# Patient Record
Sex: FEMALE | Race: BLACK OR AFRICAN AMERICAN | Employment: OTHER | URBAN - NONMETROPOLITAN AREA
[De-identification: names, ages, dates, MRNs, and addresses within clinical notes are randomized per-mention and may not be internally consistent; named-entity substitution may affect disease eponyms.]

---

## 2017-01-03 ENCOUNTER — HOSPITAL ENCOUNTER (OUTPATIENT)
Dept: LAB | Age: 42
Discharge: HOME | End: 2017-01-03
Payer: MEDICAID

## 2017-01-03 LAB
INR BLD: 2.25 (ref 0.88–1.18)
PROTHROMBIN TIME: 24.3 SEC (ref 12–14.6)

## 2017-01-03 PROCEDURE — 36415 COLL VENOUS BLD VENIPUNCTURE: CPT

## 2017-01-03 PROCEDURE — 85610 PROTHROMBIN TIME: CPT

## 2017-01-06 ENCOUNTER — HOSPITAL ENCOUNTER (OUTPATIENT)
Dept: SPEECH THERAPY | Age: 42
Setting detail: THERAPIES SERIES
Discharge: HOME | End: 2017-01-06
Payer: MEDICAID

## 2017-01-06 PROCEDURE — 92507 TX SP LANG VOICE COMM INDIV: CPT

## 2017-01-17 ENCOUNTER — HOSPITAL ENCOUNTER (OUTPATIENT)
Dept: LAB | Age: 42
Discharge: HOME | End: 2017-01-17
Payer: MEDICAID

## 2017-01-17 ENCOUNTER — HOSPITAL ENCOUNTER (OUTPATIENT)
Dept: SPEECH THERAPY | Age: 42
Setting detail: THERAPIES SERIES
Discharge: HOME | End: 2017-01-17
Payer: MEDICAID

## 2017-01-17 LAB
INR BLD: 2.34 (ref 0.88–1.18)
PROTHROMBIN TIME: 25 SEC (ref 12–14.6)

## 2017-01-17 PROCEDURE — 92507 TX SP LANG VOICE COMM INDIV: CPT

## 2017-01-17 PROCEDURE — 36415 COLL VENOUS BLD VENIPUNCTURE: CPT

## 2017-01-17 PROCEDURE — 85610 PROTHROMBIN TIME: CPT

## 2017-01-18 PROCEDURE — G9162 LANG EXPRESS CURRENT STATUS: HCPCS

## 2017-01-24 ENCOUNTER — HOSPITAL ENCOUNTER (OUTPATIENT)
Dept: SPEECH THERAPY | Age: 42
Setting detail: THERAPIES SERIES
Discharge: HOME | End: 2017-01-24
Payer: MEDICAID

## 2017-01-24 PROCEDURE — 92507 TX SP LANG VOICE COMM INDIV: CPT

## 2017-01-31 ENCOUNTER — APPOINTMENT (OUTPATIENT)
Dept: SPEECH THERAPY | Age: 42
End: 2017-01-31
Payer: MEDICAID

## 2017-02-07 ENCOUNTER — HOSPITAL ENCOUNTER (OUTPATIENT)
Dept: SPEECH THERAPY | Age: 42
Setting detail: THERAPIES SERIES
Discharge: HOME | End: 2017-02-07
Payer: MEDICAID

## 2017-02-07 PROCEDURE — 92507 TX SP LANG VOICE COMM INDIV: CPT

## 2017-02-07 PROCEDURE — G9163 LANG EXPRESS GOAL STATUS: HCPCS

## 2017-02-07 PROCEDURE — G9162 LANG EXPRESS CURRENT STATUS: HCPCS

## 2017-02-28 ENCOUNTER — HOSPITAL ENCOUNTER (OUTPATIENT)
Dept: LAB | Age: 42
Discharge: HOME | End: 2017-02-28
Payer: MEDICAID

## 2017-02-28 ENCOUNTER — HOSPITAL ENCOUNTER (OUTPATIENT)
Dept: SPEECH THERAPY | Age: 42
Setting detail: THERAPIES SERIES
End: 2017-02-28
Payer: MEDICAID

## 2017-02-28 LAB
INR BLD: 2.56 (ref 0.88–1.18)
PROTHROMBIN TIME: 26.8 SEC (ref 12–14.6)

## 2017-02-28 PROCEDURE — 36415 COLL VENOUS BLD VENIPUNCTURE: CPT

## 2017-02-28 PROCEDURE — 85610 PROTHROMBIN TIME: CPT

## 2017-03-07 ENCOUNTER — HOSPITAL ENCOUNTER (OUTPATIENT)
Dept: SPEECH THERAPY | Age: 42
Discharge: HOME | End: 2017-03-07

## 2017-03-21 ENCOUNTER — HOSPITAL ENCOUNTER (OUTPATIENT)
Dept: SPEECH THERAPY | Age: 42
Setting detail: THERAPIES SERIES
Discharge: HOME | End: 2017-03-21
Payer: MEDICAID

## 2017-03-21 PROCEDURE — 92507 TX SP LANG VOICE COMM INDIV: CPT

## 2017-03-21 PROCEDURE — 97532 HC COGNITIVE THERAPY 15 MIN: CPT

## 2017-03-23 ENCOUNTER — HOSPITAL ENCOUNTER (OUTPATIENT)
Dept: LAB | Age: 42
Discharge: HOME | End: 2017-03-23
Payer: MEDICAID

## 2017-03-23 LAB
ANION GAP SERPL CALCULATED.3IONS-SCNC: 15 MMOL/L (ref 7–19)
BUN BLDV-MCNC: 9 MG/DL (ref 6–20)
CALCIUM SERPL-MCNC: 9.1 MG/DL (ref 8.6–10)
CHLORIDE BLD-SCNC: 99 MMOL/L (ref 98–111)
CO2: 23 MMOL/L (ref 22–29)
CREAT SERPL-MCNC: 0.8 MG/DL (ref 0.5–0.9)
GFR NON-AFRICAN AMERICAN: >60
GLUCOSE BLD-MCNC: 146 MG/DL (ref 74–109)
POTASSIUM SERPL-SCNC: 3.9 MMOL/L (ref 3.5–5)
SODIUM BLD-SCNC: 137 MMOL/L (ref 136–145)

## 2017-03-23 PROCEDURE — 80048 BASIC METABOLIC PNL TOTAL CA: CPT

## 2017-03-23 PROCEDURE — 36415 COLL VENOUS BLD VENIPUNCTURE: CPT

## 2017-03-28 ENCOUNTER — HOSPITAL ENCOUNTER (OUTPATIENT)
Dept: SPEECH THERAPY | Age: 42
Setting detail: THERAPIES SERIES
Discharge: HOME | End: 2017-03-28
Payer: MEDICAID

## 2017-03-28 PROCEDURE — 97532 HC COGNITIVE THERAPY 15 MIN: CPT

## 2017-03-28 PROCEDURE — 92507 TX SP LANG VOICE COMM INDIV: CPT

## 2017-04-04 ENCOUNTER — HOSPITAL ENCOUNTER (OUTPATIENT)
Dept: LAB | Age: 42
Discharge: HOME | End: 2017-04-04
Payer: MEDICAID

## 2017-04-04 LAB
INR BLD: 2.91 (ref 0.88–1.18)
PROTHROMBIN TIME: 29.5 SEC (ref 12–14.6)

## 2017-04-04 PROCEDURE — 36415 COLL VENOUS BLD VENIPUNCTURE: CPT

## 2017-04-04 PROCEDURE — 85610 PROTHROMBIN TIME: CPT

## 2017-04-11 ENCOUNTER — HOSPITAL ENCOUNTER (OUTPATIENT)
Dept: SPEECH THERAPY | Age: 42
Setting detail: THERAPIES SERIES
Discharge: HOME | End: 2017-04-11
Payer: MEDICAID

## 2017-04-11 PROCEDURE — 92507 TX SP LANG VOICE COMM INDIV: CPT

## 2017-04-11 PROCEDURE — 97532 HC COGNITIVE THERAPY 15 MIN: CPT

## 2017-04-18 ENCOUNTER — HOSPITAL ENCOUNTER (OUTPATIENT)
Dept: SPEECH THERAPY | Age: 42
Setting detail: THERAPIES SERIES
Discharge: HOME | End: 2017-04-18
Payer: MEDICAID

## 2017-04-18 PROCEDURE — 97532 HC COGNITIVE THERAPY 15 MIN: CPT

## 2017-04-18 PROCEDURE — 92507 TX SP LANG VOICE COMM INDIV: CPT

## 2017-04-25 ENCOUNTER — HOSPITAL ENCOUNTER (OUTPATIENT)
Dept: SPEECH THERAPY | Age: 42
Setting detail: THERAPIES SERIES
Discharge: HOME | End: 2017-04-25
Payer: MEDICAID

## 2017-04-25 PROCEDURE — 92507 TX SP LANG VOICE COMM INDIV: CPT

## 2017-05-02 ENCOUNTER — HOSPITAL ENCOUNTER (OUTPATIENT)
Dept: SPEECH THERAPY | Age: 42
Setting detail: THERAPIES SERIES
Discharge: HOME | End: 2017-05-02
Payer: MEDICAID

## 2017-05-02 PROCEDURE — 92507 TX SP LANG VOICE COMM INDIV: CPT

## 2017-05-10 ENCOUNTER — HOSPITAL ENCOUNTER (OUTPATIENT)
Dept: LAB | Age: 42
Discharge: HOME | End: 2017-05-10
Payer: MEDICAID

## 2017-05-10 LAB
INR BLD: 1.31 (ref 0.88–1.18)
PROTHROMBIN TIME: 16.3 SEC (ref 12–14.6)

## 2017-05-10 PROCEDURE — 85610 PROTHROMBIN TIME: CPT

## 2017-05-10 PROCEDURE — 36415 COLL VENOUS BLD VENIPUNCTURE: CPT

## 2017-05-15 ENCOUNTER — HOSPITAL ENCOUNTER (OUTPATIENT)
Dept: LAB | Age: 42
Discharge: HOME | End: 2017-05-15
Payer: MEDICAID

## 2017-05-15 LAB
INR BLD: 3.14 (ref 0.88–1.18)
PROTHROMBIN TIME: 32.7 SEC (ref 12–14.6)

## 2017-05-15 PROCEDURE — 36415 COLL VENOUS BLD VENIPUNCTURE: CPT

## 2017-05-15 PROCEDURE — 85610 PROTHROMBIN TIME: CPT

## 2017-05-16 ENCOUNTER — HOSPITAL ENCOUNTER (OUTPATIENT)
Dept: SPEECH THERAPY | Age: 42
Setting detail: THERAPIES SERIES
End: 2017-05-16
Payer: MEDICAID

## 2017-05-23 ENCOUNTER — HOSPITAL ENCOUNTER (OUTPATIENT)
Dept: LAB | Age: 42
Discharge: HOME | End: 2017-05-23
Payer: MEDICAID

## 2017-05-23 ENCOUNTER — HOSPITAL ENCOUNTER (OUTPATIENT)
Dept: SPEECH THERAPY | Age: 42
Setting detail: THERAPIES SERIES
Discharge: HOME | End: 2017-05-23
Payer: MEDICAID

## 2017-05-23 LAB
INR BLD: 2.67 (ref 0.88–1.18)
PROTHROMBIN TIME: 28.7 SEC (ref 12–14.6)

## 2017-05-23 PROCEDURE — 36415 COLL VENOUS BLD VENIPUNCTURE: CPT

## 2017-05-23 PROCEDURE — 85610 PROTHROMBIN TIME: CPT

## 2017-05-23 PROCEDURE — 92507 TX SP LANG VOICE COMM INDIV: CPT

## 2017-05-30 ENCOUNTER — HOSPITAL ENCOUNTER (OUTPATIENT)
Dept: SPEECH THERAPY | Age: 42
Setting detail: THERAPIES SERIES
Discharge: HOME | End: 2017-05-30
Payer: MEDICAID

## 2017-05-30 PROCEDURE — 92507 TX SP LANG VOICE COMM INDIV: CPT

## 2017-06-06 ENCOUNTER — HOSPITAL ENCOUNTER (OUTPATIENT)
Dept: SPEECH THERAPY | Age: 42
Setting detail: THERAPIES SERIES
Discharge: HOME | End: 2017-06-06
Payer: MEDICAID

## 2017-06-06 PROCEDURE — 92507 TX SP LANG VOICE COMM INDIV: CPT

## 2017-06-13 ENCOUNTER — HOSPITAL ENCOUNTER (OUTPATIENT)
Dept: SPEECH THERAPY | Age: 42
Setting detail: THERAPIES SERIES
Discharge: HOME | End: 2017-06-13
Payer: MEDICAID

## 2017-06-13 PROCEDURE — 92507 TX SP LANG VOICE COMM INDIV: CPT

## 2017-06-27 ENCOUNTER — APPOINTMENT (OUTPATIENT)
Dept: SPEECH THERAPY | Age: 42
End: 2017-06-27
Payer: MEDICAID

## 2017-07-05 ENCOUNTER — HOSPITAL ENCOUNTER (OUTPATIENT)
Dept: LAB | Age: 42
Discharge: HOME | End: 2017-07-05
Payer: MEDICAID

## 2017-07-05 LAB
INR BLD: 1.99 (ref 0.88–1.18)
PROTHROMBIN TIME: 22.7 SEC (ref 12–14.6)

## 2017-07-05 PROCEDURE — 85610 PROTHROMBIN TIME: CPT

## 2017-07-05 PROCEDURE — 36415 COLL VENOUS BLD VENIPUNCTURE: CPT

## 2017-07-06 ENCOUNTER — HOSPITAL ENCOUNTER (OUTPATIENT)
Dept: SPEECH THERAPY | Age: 42
Setting detail: THERAPIES SERIES
Discharge: HOME | End: 2017-07-06
Payer: MEDICAID

## 2017-07-06 PROCEDURE — 92507 TX SP LANG VOICE COMM INDIV: CPT

## 2017-07-06 PROCEDURE — G9169 MEMORY GOAL STATUS: HCPCS

## 2017-07-06 PROCEDURE — G9168 MEMORY CURRENT STATUS: HCPCS

## 2017-07-11 ENCOUNTER — APPOINTMENT (OUTPATIENT)
Dept: SPEECH THERAPY | Age: 42
End: 2017-07-11
Payer: MEDICAID

## 2017-07-18 ENCOUNTER — HOSPITAL ENCOUNTER (OUTPATIENT)
Dept: SPEECH THERAPY | Age: 42
Setting detail: THERAPIES SERIES
Discharge: HOME | End: 2017-07-18
Payer: MEDICAID

## 2017-07-18 PROCEDURE — 92507 TX SP LANG VOICE COMM INDIV: CPT

## 2017-07-25 ENCOUNTER — HOSPITAL ENCOUNTER (OUTPATIENT)
Dept: SPEECH THERAPY | Age: 42
Setting detail: THERAPIES SERIES
Discharge: HOME | End: 2017-07-25
Payer: MEDICAID

## 2017-07-25 PROCEDURE — 92507 TX SP LANG VOICE COMM INDIV: CPT

## 2017-07-28 ENCOUNTER — HOSPITAL ENCOUNTER (OUTPATIENT)
Dept: LAB | Age: 42
Discharge: HOME | End: 2017-07-28
Payer: MEDICAID

## 2017-07-28 LAB
ALBUMIN SERPL-MCNC: 3.9 G/DL (ref 3.5–5.2)
ALP BLD-CCNC: 90 U/L (ref 35–104)
ALT SERPL-CCNC: 16 U/L (ref 5–33)
ANION GAP SERPL CALCULATED.3IONS-SCNC: 14 MMOL/L (ref 7–19)
AST SERPL-CCNC: 13 U/L (ref 5–32)
BILIRUB SERPL-MCNC: <0.2 MG/DL (ref 0.2–1.2)
BUN BLDV-MCNC: 12 MG/DL (ref 6–20)
CALCIUM SERPL-MCNC: 9.4 MG/DL (ref 8.6–10)
CHLORIDE BLD-SCNC: 101 MMOL/L (ref 98–111)
CHOLESTEROL, TOTAL: 202 MG/DL (ref 160–199)
CO2: 24 MMOL/L (ref 22–29)
CREAT SERPL-MCNC: 0.8 MG/DL (ref 0.5–0.9)
GFR NON-AFRICAN AMERICAN: >60
GLUCOSE BLD-MCNC: 136 MG/DL (ref 74–109)
HDLC SERPL-MCNC: 49 MG/DL (ref 65–121)
LDL CHOLESTEROL CALCULATED: 122 MG/DL
POTASSIUM SERPL-SCNC: 3.8 MMOL/L (ref 3.5–5)
SODIUM BLD-SCNC: 139 MMOL/L (ref 136–145)
TOTAL PROTEIN: 8.1 G/DL (ref 6.6–8.7)
TRIGL SERPL-MCNC: 155 MG/DL (ref 150–199)

## 2017-08-01 ENCOUNTER — APPOINTMENT (OUTPATIENT)
Dept: SPEECH THERAPY | Age: 42
End: 2017-08-01
Payer: MEDICAID

## 2017-08-08 ENCOUNTER — HOSPITAL ENCOUNTER (OUTPATIENT)
Dept: SPEECH THERAPY | Age: 42
Setting detail: THERAPIES SERIES
Discharge: HOME | End: 2017-08-08
Payer: MEDICAID

## 2017-08-08 PROCEDURE — 92507 TX SP LANG VOICE COMM INDIV: CPT

## 2017-08-08 PROCEDURE — G9169 MEMORY GOAL STATUS: HCPCS

## 2017-08-08 PROCEDURE — G9168 MEMORY CURRENT STATUS: HCPCS

## 2017-09-08 ENCOUNTER — HOSPITAL ENCOUNTER (OUTPATIENT)
Dept: LAB | Age: 42
Discharge: HOME | End: 2017-09-08
Payer: MEDICAID

## 2017-09-08 LAB
INR BLD: 3.2 (ref 0.88–1.18)
PROTHROMBIN TIME: 33.2 SEC (ref 12–14.6)

## 2017-09-08 PROCEDURE — 36415 COLL VENOUS BLD VENIPUNCTURE: CPT

## 2017-09-08 PROCEDURE — 85610 PROTHROMBIN TIME: CPT

## 2017-09-12 ENCOUNTER — HOSPITAL ENCOUNTER (OUTPATIENT)
Dept: SPEECH THERAPY | Age: 42
Setting detail: THERAPIES SERIES
Discharge: HOME | End: 2017-09-12
Payer: MEDICAID

## 2017-09-12 PROCEDURE — 92507 TX SP LANG VOICE COMM INDIV: CPT

## 2017-09-19 ENCOUNTER — HOSPITAL ENCOUNTER (OUTPATIENT)
Dept: LAB | Age: 42
Discharge: HOME | End: 2017-09-19
Payer: MEDICAID

## 2017-09-19 LAB
INR BLD: 2.16 (ref 0.88–1.18)
PROTHROMBIN TIME: 24.3 SEC (ref 12–14.6)

## 2017-09-19 PROCEDURE — 92507 TX SP LANG VOICE COMM INDIV: CPT

## 2017-09-19 PROCEDURE — 85610 PROTHROMBIN TIME: CPT

## 2017-09-19 PROCEDURE — 36415 COLL VENOUS BLD VENIPUNCTURE: CPT

## 2017-09-26 ENCOUNTER — HOSPITAL ENCOUNTER (OUTPATIENT)
Dept: SPEECH THERAPY | Age: 42
Setting detail: THERAPIES SERIES
Discharge: HOME | End: 2017-09-26
Payer: MEDICAID

## 2017-09-26 PROCEDURE — 92507 TX SP LANG VOICE COMM INDIV: CPT

## 2017-10-03 ENCOUNTER — HOSPITAL ENCOUNTER (OUTPATIENT)
Dept: SPEECH THERAPY | Age: 42
Setting detail: THERAPIES SERIES
Discharge: HOME | End: 2017-10-03
Payer: MEDICAID

## 2017-10-03 PROCEDURE — 92507 TX SP LANG VOICE COMM INDIV: CPT

## 2017-10-03 NOTE — PROGRESS NOTES
[facility] Outpatient Speech-Language Pathology  Treatment Note          PATIENT: Sara Mckeon  REFERRING PHYSICIAN:  Dr. Baron Arts                                                                 Therapist: Kolby Zhong       MRN: [id number]  Date: 10/03/17  24 visits per year, 8/29/17-2/28/18 (expiration date)   [phone] Speech therapy  Authorization Number: [id number]  Completed visits: 4 out of 24        Subjective: The patient arrived to therapy on time and attempted all tasks. Pt denied pain this date.         Objective: The patient exhibited decreased dysfluencies this date. She did exhibit increased anomia and difficulty spelling words. She completed a worksheet targeting word finding utilizing general information questions. This task took approximately 45 minutes due to increased anomia and poor spelling this date. Mod to max cues were provided throughout. Category cues and phonemic cues were not effective this date, however, part word cues were successful. Pt completed naming activities utilizing apps on the iPad. Pt completed naming tasks at 76% and 81%  with min to mod cues. Naming task apps were timed and the patient did exhibit increased processing speed and attention after completing a few practice rounds.             SUMMARY: Based upon case history, formal, and informal assessment, patient presents with mild language impairment. impaired, limited, or restricted. Patients current impairment level for memory is  CK at least 40% but less than 60% impaired, limited, or restricted. And is expected to improve JT . CI at least 1% but less than 20% impaired, limited or restricted. Patient also demonstrates speech dysfluency, mildly impaired language, mildly impaired attention, and mildly impaired visuospatial skills.  Speech therapy is warranted at this time to improve upon patients ability to effectively communicate with conversation partners.          Speech will target the short-term goals

## 2017-10-04 ENCOUNTER — HOSPITAL ENCOUNTER (OUTPATIENT)
Dept: LAB | Age: 42
Discharge: HOME | End: 2017-10-04
Payer: MEDICAID

## 2017-10-04 LAB
INR BLD: 3.25 (ref 0.88–1.18)
PROTHROMBIN TIME: 33.6 SEC (ref 12–14.6)

## 2017-10-04 PROCEDURE — 36415 COLL VENOUS BLD VENIPUNCTURE: CPT

## 2017-10-04 PROCEDURE — 85610 PROTHROMBIN TIME: CPT

## 2017-10-10 ENCOUNTER — HOSPITAL ENCOUNTER (OUTPATIENT)
Dept: SPEECH THERAPY | Age: 42
Setting detail: THERAPIES SERIES
Discharge: HOME | End: 2017-10-10
Payer: MEDICAID

## 2017-10-10 ENCOUNTER — HOSPITAL ENCOUNTER (EMERGENCY)
Age: 42
Discharge: HOME | End: 2017-10-10
Payer: MEDICAID

## 2017-10-10 VITALS
HEIGHT: 64 IN | DIASTOLIC BLOOD PRESSURE: 90 MMHG | HEART RATE: 80 BPM | TEMPERATURE: 97.4 F | SYSTOLIC BLOOD PRESSURE: 132 MMHG | RESPIRATION RATE: 20 BRPM | BODY MASS INDEX: 50.02 KG/M2 | WEIGHT: 293 LBS | OXYGEN SATURATION: 98 %

## 2017-10-10 DIAGNOSIS — T30.0 ERYTHEMA DUE TO BURN (FIRST DEGREE): Primary | ICD-10-CM

## 2017-10-10 PROCEDURE — 90471 IMMUNIZATION ADMIN: CPT | Performed by: NURSE PRACTITIONER

## 2017-10-10 PROCEDURE — 90715 TDAP VACCINE 7 YRS/> IM: CPT | Performed by: NURSE PRACTITIONER

## 2017-10-10 PROCEDURE — 99282 EMERGENCY DEPT VISIT SF MDM: CPT | Performed by: NURSE PRACTITIONER

## 2017-10-10 PROCEDURE — 6360000002 HC RX W HCPCS: Performed by: NURSE PRACTITIONER

## 2017-10-10 PROCEDURE — 99283 EMERGENCY DEPT VISIT LOW MDM: CPT

## 2017-10-10 PROCEDURE — 92507 TX SP LANG VOICE COMM INDIV: CPT

## 2017-10-10 RX ORDER — WARFARIN SODIUM 10 MG/1
10 TABLET ORAL
Status: ON HOLD | COMMUNITY
End: 2021-09-24 | Stop reason: SDUPTHER

## 2017-10-10 RX ADMIN — TETANUS TOXOID, REDUCED DIPHTHERIA TOXOID AND ACELLULAR PERTUSSIS VACCINE, ADSORBED 0.5 ML: 5; 2.5; 8; 8; 2.5 SUSPENSION INTRAMUSCULAR at 19:57

## 2017-10-10 ASSESSMENT — ENCOUNTER SYMPTOMS: BURN: 1
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
## 2017-10-11 NOTE — ED PROVIDER NOTES
by mouth nightly Historical Med      verapamil (CALAN) 40 MG tablet Take 40 mg by mouth every 12 hours      lisinopril (PRINIVIL;ZESTRIL) 40 MG tablet Take 40 mg by mouth daily      atorvastatin (LIPITOR) 40 MG tablet Take 80 mg by mouth daily       prochlorperazine (COMPAZINE) 10 MG tablet Take 10 mg by mouth 2 times daily as needed (headache)             ALLERGIES     Bactrim [sulfamethoxazole-trimethoprim]    FAMILY HISTORY       Family History   Problem Relation Age of Onset    Osteoarthritis Mother     Diabetes Mother     Hypertension Mother     Hypertension Father     Heart Attack Brother     Heart Failure Maternal Grandmother     Diabetes Maternal Grandfather     Heart Disease Maternal Grandfather     Heart Disease Paternal Grandmother     No Known Problems Paternal Grandfather           SOCIAL HISTORY       Social History     Social History    Marital status: Single     Spouse name: N/A    Number of children: N/A    Years of education: N/A     Social History Main Topics    Smoking status: Never Smoker    Smokeless tobacco: None    Alcohol use No    Drug use: Unknown    Sexual activity: Not Asked     Other Topics Concern    None     Social History Narrative    None       SCREENINGS             PHYSICAL EXAM    (up to 7 for level 4, 8 or more for level 5)     ED Triage Vitals [10/10/17 1714]   BP Temp Temp src Pulse Resp SpO2 Height Weight   (!) 182/105 97.4 °F (36.3 °C) -- 83 17 98 % 5' 4\" (1.626 m) (!) 351 lb (159.2 kg)       Physical Exam   Constitutional: She appears well-developed and well-nourished. HENT:   Head: Normocephalic and atraumatic. Eyes: Right eye exhibits no discharge. Left eye exhibits no discharge. No scleral icterus. Neck: Normal range of motion. Neck supple. Cardiovascular: Normal rate. Pulmonary/Chest: No respiratory distress. Neurological: She is alert. Skin:        Psychiatric: She has a normal mood and affect.  Her behavior is normal.   Nursing note

## 2017-10-24 ENCOUNTER — HOSPITAL ENCOUNTER (OUTPATIENT)
Dept: SPEECH THERAPY | Age: 42
Setting detail: THERAPIES SERIES
Discharge: HOME | End: 2017-10-24
Payer: MEDICAID

## 2017-10-24 PROCEDURE — 92507 TX SP LANG VOICE COMM INDIV: CPT

## 2017-10-27 ENCOUNTER — HOSPITAL ENCOUNTER (OUTPATIENT)
Dept: LAB | Age: 42
Discharge: HOME | End: 2017-10-27
Payer: MEDICAID

## 2017-10-27 LAB
INR BLD: 2.55 (ref 0.88–1.18)
PROTHROMBIN TIME: 27.7 SEC (ref 12–14.6)

## 2017-10-27 PROCEDURE — 85610 PROTHROMBIN TIME: CPT

## 2017-10-27 PROCEDURE — 36415 COLL VENOUS BLD VENIPUNCTURE: CPT

## 2017-10-31 ENCOUNTER — HOSPITAL ENCOUNTER (OUTPATIENT)
Dept: SPEECH THERAPY | Age: 42
Setting detail: THERAPIES SERIES
Discharge: HOME | End: 2017-10-31
Payer: MEDICAID

## 2017-10-31 PROCEDURE — 92507 TX SP LANG VOICE COMM INDIV: CPT

## 2017-11-07 ENCOUNTER — HOSPITAL ENCOUNTER (OUTPATIENT)
Dept: SPEECH THERAPY | Age: 42
Setting detail: THERAPIES SERIES
Discharge: HOME | End: 2017-11-07
Payer: MEDICAID

## 2017-11-07 PROCEDURE — 92507 TX SP LANG VOICE COMM INDIV: CPT

## 2017-11-07 NOTE — PROGRESS NOTES
to transfer back to [facility] for wound care following CABG. Patient was hospitalized at [facility] for approximately 2 days before discharging home with home health RN, speech therapy, and physical therapy. She has been receiving outpatient speech therapy services since December 2016.      Communication and Behavioral Observations:    Often times, patient displayed fragmented thoughts and word finding difficulties where she would exhibit pauses before responding.  It was noted the patient had difficulty following directions during structured tasks.             COMPREHENSIVE EVALUATION     Cognitive Formal Assessment:       The Tay Cognitive Assessment or MOCA test is a one-page 30-point test administered in approximately 10 minutes. The MoCA assesses several cognitive domains. The short-term memory recall task (5 points) involves two learning trials of five nouns and delayed recall after approximately 5 minutes. Visuospatial abilities are assessed using a clock-drawing task (3 points) and a three-dimensional cube copy (1 point). Multiple aspects of executive functions are assessed using an alternation task adapted from the trail-making B task (1 point), a phonemic fluency task (1 point), and a two-item verbal abstraction task (2 points). Attention, concentration and working memory are evaluated using a sustained attention task (target detection using tapping; 1 point), a serial subtraction task (3 points), and digits forward and backward (1 point each). Language is assessed using a three-item confrontation naming task with low-familiarity animals -3 points), repetition of two syntactically complex sentences (2 points), and the aforementioned fluency task.  Finally, orientation to time and place is evaluated (6 points).  A score of 26 or above is considered normal. Today, the patient scored 20 out of 30 possible points.         MOCA:  Visuospatial/Executive 4/5   Naming 3/3   Memory -   Attention 1/2, 1/1, 0/3

## 2017-11-14 ENCOUNTER — HOSPITAL ENCOUNTER (OUTPATIENT)
Dept: SPEECH THERAPY | Age: 42
Setting detail: THERAPIES SERIES
Discharge: HOME | End: 2017-11-14
Payer: MEDICAID

## 2017-11-14 PROCEDURE — 92507 TX SP LANG VOICE COMM INDIV: CPT

## 2017-11-14 NOTE — PROGRESS NOTES
Speech Language Pathology  Facility/Department: [facility] SPEECH THERAPY  Daily Treatment Note  NAME: Braydon Hill  : 1975  MRN: [id number]  Date: 2017  Therapist: Geronimo Dinh  24 visits per year, 17-18 (expiration date)   [phone] Speech therapy  Authorization Number: [id number]  Completed visits: 8 out of 24        Subjective: The patient arrived to therapy on time and attempted all tasks. Pt denied pain this date. The patient also denied any further episodes of confusion or inability to speak. The patient had described one of these episodes at [facility] a few weeks ago.  Kaylee Watson     Objective: The patient completed a task which required the patient to define words by composition. She required moderate cues and additional processing time. She also completed a task where she generated words from letters (letters provided were at the beginning, middle or end of the word). She required minimal cues for this task. The patient exhibited decreased dysfluencies this date. She did exhibit increased anomia and difficulty spelling words.  Phonemic cues were effective this date.       SUMMARY: Based upon case history, formal, and informal assessment, patient presents with mild language impairment. impaired, limited, or restricted. Patients current impairment level for memory is  CK at least 40% but less than 60% impaired, limited, or restricted. And is expected to improve CLAUDIA . CI at least 1% but less than 20% impaired, limited or restricted. Patient also demonstrates speech dysfluency, mildly impaired language, mildly impaired attention, and mildly impaired visuospatial skills. Speech therapy is warranted at this time to improve upon patients ability to effectively communicate with conversation partners.          Speech will target the short-term goals listed below:  SHORT-TERM GOALS: Patient Tawny Carlos  1.  Demonstrate ability to identify stuttering events on at least 75% of opportunities and with min cues/prompts. Not met  2. Demonstrate appropriate application of fluency shaping and stuttering modification techniques on at least 75% of opportunities and with min cues/prompts. Not met  3. Demonstrate an increase in word finding during structured and unstructured tasks with at least 80% accuracy and with min cues/prompts. Not met  4. Demonstrate an increase in auditory comprehension and written comprehension at phrase, sentence, and paragraph level with at least 75% accuracy and with mod cues/prompts. Not met  5. Complete select and sustained attention tasks with at least 75% accuracy and with mod cues/prompts. Not met  6. Utilize memory tools and strategies for increased safety and participation in ADLS with at least 84% accuracy and with min cues/prompts. Not met  7. Complete functional reasoning/problem solving tasks with at least 75% accuracy and with min cues/prompts. Not met  8. Pt will complete immediate and short term memory tasks with delay and distractors at 75% accuracy and min cues/prompts. Not met                      Plan:  Continued daily Speech/Language treatment with goals per plan of care.           Roula Heath     Electronically Signed By:  Roula Heath M.S., CCC-SLP  11/14/2017,5:20 PM.

## 2017-11-21 ENCOUNTER — HOSPITAL ENCOUNTER (OUTPATIENT)
Dept: SPEECH THERAPY | Age: 42
Setting detail: THERAPIES SERIES
Discharge: HOME | End: 2017-11-21
Payer: MEDICAID

## 2017-11-21 PROCEDURE — 92507 TX SP LANG VOICE COMM INDIV: CPT

## 2017-11-21 NOTE — PROGRESS NOTES
Speech Language Pathology  Facility/Department: [facility] SPEECH THERAPY  Daily Treatment Note  NAME: Luis Antonio Tony  : 1975  MRN: [id number]  Date: 2017  Therapist: Palmira Rosen  24 visits per year, 17-18 (expiration date)   [phone] Speech therapy  Authorization Number: [id number]  Completed visits: 9 out of 24        Subjective: The patient arrived to therapy on time and attempted all tasks. Pt denied pain this date. The patient also denied any further episodes of confusion or inability to speak. The patient had described one of these episodes at [facility] a few weeks ago.  Rocco Perry     Objective: Increased anomia and difficulty spelling this date. The patient denies any recent episodes like the one she described while at [facility]. She has not contacted her physician about this yet and was encouraged to call the office today. Pt  also stated she is tapering off one of her meds. She also wonders if she forgot to take her meds that day before going to [facility]. The patient completed a task which required her to choose the correct spelling of words in sentences. She required moderate verbal cues this date. She also completed a task which required her to complete the spelling of a word with a provided initial letter cue. She required moderate verbal cues for this task as well. Each task took 30 minutes to complete due to slow processing this date. The patient exhibited decreased dysfluencies this date. She did exhibit increased anomia and difficulty spelling words.      SUMMARY: Based upon case history, formal, and informal assessment, patient presents with mild language impairment. impaired, limited, or restricted. Patients current impairment level for memory is  CK at least 40% but less than 60% impaired, limited, or restricted. And is expected to improve TD . CI at least 1% but less than 20% impaired, limited or restricted.  Patient also demonstrates speech dysfluency, mildly impaired language, mildly impaired attention, and mildly impaired visuospatial skills. Speech therapy is warranted at this time to improve upon patients ability to effectively communicate with conversation partners.          Speech will target the short-term goals listed below:  SHORT-TERM GOALS: Patient Leonardo Patrick  1. Demonstrate ability to identify stuttering events on at least 75% of opportunities and with min cues/prompts. Not met  2. Demonstrate appropriate application of fluency shaping and stuttering modification techniques on at least 75% of opportunities and with min cues/prompts. Not met  3. Demonstrate an increase in word finding during structured and unstructured tasks with at least 80% accuracy and with min cues/prompts. Not met  4. Demonstrate an increase in auditory comprehension and written comprehension at phrase, sentence, and paragraph level with at least 75% accuracy and with mod cues/prompts. Not met  5. Complete select and sustained attention tasks with at least 75% accuracy and with mod cues/prompts. Not met  6. Utilize memory tools and strategies for increased safety and participation in ADLS with at least 32% accuracy and with min cues/prompts. Not met  7. Complete functional reasoning/problem solving tasks with at least 75% accuracy and with min cues/prompts. Not met  8. Pt will complete immediate and short term memory tasks with delay and distractors at 75% accuracy and min cues/prompts. Not met                      Plan:  Continued daily Speech/Language treatment with goals per plan of care.               Electronically Signed By:  Zachery Stock M.S., CCC-SLP  11/21/2017,3:33 PM.

## 2017-11-28 ENCOUNTER — HOSPITAL ENCOUNTER (OUTPATIENT)
Dept: LAB | Age: 42
Discharge: HOME | End: 2017-11-28
Payer: MEDICAID

## 2017-11-28 LAB
INR BLD: 2.38 (ref 0.88–1.18)
PROTHROMBIN TIME: 26.2 SEC (ref 12–14.6)

## 2017-11-28 PROCEDURE — 36415 COLL VENOUS BLD VENIPUNCTURE: CPT

## 2017-11-28 PROCEDURE — 85610 PROTHROMBIN TIME: CPT

## 2017-12-12 ENCOUNTER — APPOINTMENT (OUTPATIENT)
Dept: SPEECH THERAPY | Age: 42
End: 2017-12-12
Payer: MEDICAID

## 2017-12-19 ENCOUNTER — HOSPITAL ENCOUNTER (OUTPATIENT)
Dept: SPEECH THERAPY | Age: 42
Setting detail: THERAPIES SERIES
Discharge: HOME | End: 2017-12-19
Payer: MEDICAID

## 2017-12-20 ENCOUNTER — APPOINTMENT (OUTPATIENT)
Dept: SPEECH THERAPY | Age: 42
End: 2017-12-20
Payer: MEDICAID

## 2017-12-21 ENCOUNTER — APPOINTMENT (OUTPATIENT)
Dept: SPEECH THERAPY | Age: 42
End: 2017-12-21
Payer: MEDICAID

## 2017-12-22 ENCOUNTER — APPOINTMENT (OUTPATIENT)
Dept: SPEECH THERAPY | Age: 42
End: 2017-12-22
Payer: MEDICAID

## 2017-12-26 ENCOUNTER — HOSPITAL ENCOUNTER (OUTPATIENT)
Dept: SPEECH THERAPY | Age: 42
Setting detail: THERAPIES SERIES
Discharge: HOME | End: 2017-12-26
Payer: MEDICAID

## 2017-12-26 PROCEDURE — 92507 TX SP LANG VOICE COMM INDIV: CPT

## 2017-12-26 NOTE — PROGRESS NOTES
Speech Language Pathology  Facility/Department: [facility] SPEECH THERAPY  Daily Treatment Note  NAME: Ritesh Hua  : 1975  MRN: [id number]  Date: 2017  Therapist: Jenniffer Dubin  24 visits per year, 17-18 (expiration date)   [phone] Speech therapy  Authorization Number: [id number]  Completed visits: 12 out of 24        Subjective: The patient arrived to therapy on time and attempted all tasks. Pt denied pain this date. The patient reports she has been reading this week, and she has been trying to work [employer] puzzles. She states she used to work these without difficulty. Patient states she is having a hard time solving these without assistance.        Objective: The patient stated she has not slept much the past two nights. Processing time was much more delayed than normal. Increased anomia and difficulty with spelling observed. The patient exhibited semantic paraphasias this date. The patient completed a timed immediate memory task which she scored at 50% accuracy with min to mod cues. Pt did need instructions reviewed with each task. (pt had to remember sequence of numbers in ascending order). She also completed recall of paragraphs with 63% accuracy with min verbal cues. Patient answered multiple choice questions and open ended questions following the paragraphs.     The patient exhibited decreased dysfluencies this date. Pt did use easy onset with minimal cueing.      SUMMARY:   Based upon case history, formal, and informal assessment, patient presents with mild language impairment. impaired, limited, or restricted. Patients current impairment level for memory is  CK at least 40% but less than 60% impaired, limited, or restricted. And is expected to improve ZY . CI at least 1% but less than 20% impaired, limited or restricted. Patient also demonstrates speech dysfluency, mildly impaired language, mildly impaired attention, and mildly impaired visuospatial skills.  Speech therapy is

## 2017-12-27 ENCOUNTER — APPOINTMENT (OUTPATIENT)
Dept: SPEECH THERAPY | Age: 42
End: 2017-12-27
Payer: MEDICAID

## 2017-12-28 ENCOUNTER — APPOINTMENT (OUTPATIENT)
Dept: SPEECH THERAPY | Age: 42
End: 2017-12-28
Payer: MEDICAID

## 2017-12-29 ENCOUNTER — APPOINTMENT (OUTPATIENT)
Dept: SPEECH THERAPY | Age: 42
End: 2017-12-29
Payer: MEDICAID

## 2018-01-04 ENCOUNTER — APPOINTMENT (OUTPATIENT)
Dept: SPEECH THERAPY | Age: 43
End: 2018-01-04
Payer: MEDICAID

## 2018-01-06 ENCOUNTER — HOSPITAL ENCOUNTER (EMERGENCY)
Age: 43
Discharge: HOME | End: 2018-01-06
Attending: EMERGENCY MEDICINE
Payer: MEDICAID

## 2018-01-06 ENCOUNTER — APPOINTMENT (OUTPATIENT)
Dept: CT IMAGING | Age: 43
End: 2018-01-06
Payer: MEDICAID

## 2018-01-06 VITALS
OXYGEN SATURATION: 91 % | WEIGHT: 293 LBS | HEIGHT: 64 IN | RESPIRATION RATE: 18 BRPM | BODY MASS INDEX: 50.02 KG/M2 | SYSTOLIC BLOOD PRESSURE: 146 MMHG | TEMPERATURE: 97.9 F | HEART RATE: 95 BPM | DIASTOLIC BLOOD PRESSURE: 88 MMHG

## 2018-01-06 DIAGNOSIS — N10 ACUTE PYELONEPHRITIS: Primary | ICD-10-CM

## 2018-01-06 DIAGNOSIS — E86.0 DEHYDRATION: ICD-10-CM

## 2018-01-06 LAB
ALBUMIN SERPL-MCNC: 3.9 G/DL (ref 3.5–5.2)
ALP BLD-CCNC: 120 U/L (ref 35–104)
ALT SERPL-CCNC: 20 U/L (ref 5–33)
ANION GAP SERPL CALCULATED.3IONS-SCNC: 16 MMOL/L (ref 7–19)
AST SERPL-CCNC: 15 U/L (ref 5–32)
BACTERIA: ABNORMAL /HPF
BASE EXCESS VENOUS: 2 MMOL/L
BASOPHILS ABSOLUTE: 0 K/UL (ref 0–0.2)
BASOPHILS RELATIVE PERCENT: 0.4 % (ref 0–1)
BILIRUB SERPL-MCNC: 0.4 MG/DL (ref 0.2–1.2)
BILIRUBIN URINE: ABNORMAL
BLOOD, URINE: ABNORMAL
BUN BLDV-MCNC: 14 MG/DL (ref 6–20)
CALCIUM SERPL-MCNC: 9 MG/DL (ref 8.6–10)
CARBOXYHEMOGLOBIN: 2 %
CHLORIDE BLD-SCNC: 96 MMOL/L (ref 98–111)
CLARITY: ABNORMAL
CO2: 26 MMOL/L (ref 22–29)
COLOR: ABNORMAL
CREAT SERPL-MCNC: 1.1 MG/DL (ref 0.5–0.9)
EOSINOPHILS ABSOLUTE: 0.1 K/UL (ref 0–0.6)
EOSINOPHILS RELATIVE PERCENT: 1.1 % (ref 0–5)
EPITHELIAL CELLS, UA: ABNORMAL /HPF
GFR NON-AFRICAN AMERICAN: 54
GLUCOSE BLD-MCNC: 189 MG/DL (ref 70–99)
GLUCOSE BLD-MCNC: 207 MG/DL (ref 74–109)
GLUCOSE URINE: NEGATIVE MG/DL
HCG(URINE) PREGNANCY TEST: NEGATIVE
HCO3 VENOUS: 29 MMOL/L (ref 23–29)
HCT VFR BLD CALC: 45.4 % (ref 37–47)
HEMOGLOBIN: 15 G/DL (ref 12–16)
INR BLD: 3.76 (ref 0.88–1.18)
KETONES, URINE: ABNORMAL MG/DL
LEUKOCYTE ESTERASE, URINE: ABNORMAL
LYMPHOCYTES ABSOLUTE: 1.6 K/UL (ref 1.1–4.5)
LYMPHOCYTES RELATIVE PERCENT: 19 % (ref 20–40)
MCH RBC QN AUTO: 29.2 PG (ref 27–31)
MCHC RBC AUTO-ENTMCNC: 33 G/DL (ref 33–37)
MCV RBC AUTO: 88.5 FL (ref 81–99)
METHEMOGLOBIN VENOUS: 0.7 %
MONOCYTES ABSOLUTE: 0.5 K/UL (ref 0–0.9)
MONOCYTES RELATIVE PERCENT: 6.2 % (ref 0–10)
NEUTROPHILS ABSOLUTE: 6.2 K/UL (ref 1.5–7.5)
NEUTROPHILS RELATIVE PERCENT: 73.1 % (ref 50–65)
NITRITE, URINE: POSITIVE
O2 CONTENT, VEN: 11 ML/DL
O2 SAT, VEN: 50 %
PCO2, VEN: 52 MMHG (ref 40–50)
PDW BLD-RTO: 13.9 % (ref 11.5–14.5)
PERFORMED ON: ABNORMAL
PH UA: 6
PH VENOUS: 7.35 (ref 7.35–7.45)
PLATELET # BLD: 294 K/UL (ref 130–400)
PMV BLD AUTO: 11.8 FL (ref 9.4–12.3)
PO2, VEN: 29 MMHG
POTASSIUM SERPL-SCNC: 4.2 MMOL/L (ref 3.5–5)
PROTEIN UA: 100 MG/DL
PROTHROMBIN TIME: 37.8 SEC (ref 12–14.6)
RBC # BLD: 5.13 M/UL (ref 4.2–5.4)
SODIUM BLD-SCNC: 138 MMOL/L (ref 136–145)
SPECIFIC GRAVITY UA: 1.02
TOTAL PROTEIN: 8.3 G/DL (ref 6.6–8.7)
UROBILINOGEN, URINE: 1 E.U./DL
WBC # BLD: 8.5 K/UL (ref 4.8–10.8)
WBC UA: ABNORMAL /HPF (ref 0–5)

## 2018-01-06 PROCEDURE — 99284 EMERGENCY DEPT VISIT MOD MDM: CPT | Performed by: EMERGENCY MEDICINE

## 2018-01-06 PROCEDURE — 81025 URINE PREGNANCY TEST: CPT

## 2018-01-06 PROCEDURE — 74150 CT ABDOMEN W/O CONTRAST: CPT

## 2018-01-06 PROCEDURE — 87086 URINE CULTURE/COLONY COUNT: CPT

## 2018-01-06 PROCEDURE — 85610 PROTHROMBIN TIME: CPT

## 2018-01-06 PROCEDURE — 87186 SC STD MICRODIL/AGAR DIL: CPT

## 2018-01-06 PROCEDURE — 36415 COLL VENOUS BLD VENIPUNCTURE: CPT

## 2018-01-06 PROCEDURE — 82803 BLOOD GASES ANY COMBINATION: CPT

## 2018-01-06 PROCEDURE — 85025 COMPLETE CBC W/AUTO DIFF WBC: CPT

## 2018-01-06 PROCEDURE — 96365 THER/PROPH/DIAG IV INF INIT: CPT

## 2018-01-06 PROCEDURE — 36600 WITHDRAWAL OF ARTERIAL BLOOD: CPT

## 2018-01-06 PROCEDURE — 82948 REAGENT STRIP/BLOOD GLUCOSE: CPT

## 2018-01-06 PROCEDURE — 81001 URINALYSIS AUTO W/SCOPE: CPT

## 2018-01-06 PROCEDURE — 80053 COMPREHEN METABOLIC PANEL: CPT

## 2018-01-06 PROCEDURE — 2580000003 HC RX 258: Performed by: EMERGENCY MEDICINE

## 2018-01-06 PROCEDURE — 6360000002 HC RX W HCPCS: Performed by: EMERGENCY MEDICINE

## 2018-01-06 PROCEDURE — 96375 TX/PRO/DX INJ NEW DRUG ADDON: CPT

## 2018-01-06 PROCEDURE — 99284 EMERGENCY DEPT VISIT MOD MDM: CPT

## 2018-01-06 RX ORDER — PROMETHAZINE HYDROCHLORIDE 25 MG/ML
12.5 INJECTION, SOLUTION INTRAMUSCULAR; INTRAVENOUS ONCE
Status: COMPLETED | OUTPATIENT
Start: 2018-01-06 | End: 2018-01-06

## 2018-01-06 RX ORDER — MORPHINE SULFATE 4 MG/ML
4 INJECTION, SOLUTION INTRAMUSCULAR; INTRAVENOUS ONCE
Status: COMPLETED | OUTPATIENT
Start: 2018-01-06 | End: 2018-01-06

## 2018-01-06 RX ORDER — ONDANSETRON 4 MG/1
4 TABLET, ORALLY DISINTEGRATING ORAL EVERY 8 HOURS PRN
Qty: 15 TABLET | Refills: 0 | Status: SHIPPED | OUTPATIENT
Start: 2018-01-06 | End: 2021-03-25

## 2018-01-06 RX ORDER — ONDANSETRON 2 MG/ML
4 INJECTION INTRAMUSCULAR; INTRAVENOUS ONCE
Status: COMPLETED | OUTPATIENT
Start: 2018-01-06 | End: 2018-01-06

## 2018-01-06 RX ORDER — CEPHALEXIN 500 MG/1
500 CAPSULE ORAL 4 TIMES DAILY
Qty: 28 CAPSULE | Refills: 0 | Status: ON HOLD | OUTPATIENT
Start: 2018-01-06 | End: 2018-02-23

## 2018-01-06 RX ORDER — HYDROCODONE BITARTRATE AND ACETAMINOPHEN 5; 325 MG/1; MG/1
1 TABLET ORAL EVERY 6 HOURS PRN
Qty: 12 TABLET | Refills: 0 | Status: SHIPPED | OUTPATIENT
Start: 2018-01-06 | End: 2018-01-09

## 2018-01-06 RX ADMIN — PROMETHAZINE HYDROCHLORIDE 12.5 MG: 25 INJECTION INTRAMUSCULAR; INTRAVENOUS at 18:58

## 2018-01-06 RX ADMIN — CEFTRIAXONE 1 G: 1 INJECTION, POWDER, FOR SOLUTION INTRAMUSCULAR; INTRAVENOUS at 18:57

## 2018-01-06 RX ADMIN — Medication 4 MG: at 18:05

## 2018-01-06 RX ADMIN — ONDANSETRON 4 MG: 2 INJECTION INTRAMUSCULAR; INTRAVENOUS at 18:05

## 2018-01-06 ASSESSMENT — ENCOUNTER SYMPTOMS
SORE THROAT: 0
COUGH: 0
VOMITING: 0
DIARRHEA: 0
SHORTNESS OF BREATH: 0
BACK PAIN: 0
NAUSEA: 0
ABDOMINAL PAIN: 1
RHINORRHEA: 0

## 2018-01-06 ASSESSMENT — PAIN SCALES - GENERAL
PAINLEVEL_OUTOF10: 5
PAINLEVEL_OUTOF10: 5
PAINLEVEL_OUTOF10: 3

## 2018-01-06 ASSESSMENT — PAIN DESCRIPTION - LOCATION: LOCATION: ABDOMEN

## 2018-01-06 ASSESSMENT — PAIN DESCRIPTION - DESCRIPTORS: DESCRIPTORS: ACHING

## 2018-01-06 ASSESSMENT — PAIN DESCRIPTION - PAIN TYPE: TYPE: ACUTE PAIN

## 2018-01-06 NOTE — ED PROVIDER NOTES
[facility] EMERG[facility]Y DEPT  eMERGENCY dEPARTMENT eNCOUnter      Pt Name: Maxx eGller  MRN: [id number]  [place] 1975  Date of evaluation: 1/6/2018  Provider: Imer Norton MD    [address] [place] [address]       Chief Complaint   Patient presents with    Abdominal Pain     patient states that she has low abdominal pain with urinary frequency. She states that she has had elevated glucose levels ranging from 300 to 400. She states that she is a diabetic. She states that her last glucose check at home was 297    Urinary Frequency         HISTORY OF PRESENT ILLNESS   (Location/Symptom, Timing/Onset, Context/Setting, Quality, Duration, Modifying Factors, Severity)  Note limiting factors. Maxx Geller is a 43 y.o. female who presents to the emergency department Concerned of abdominal pain and urinary frequency. Patient reports constant lower abdominal pain for the past approximate 2 days that is sharp but increases and pain intermittently. She's had some associated nausea no vomiting or diarrhea vaginal discharge or bleeding. She denies any back pain or history of renal stones. She also states her blood sugars been running high in the 3-400 range. HPI    Nursing Notes were reviewed. REVIEW OF SYSTEMS    (2-9 systems for level 4, 10 or more for level 5)     Review of Systems   Constitutional: Negative for chills and fever. HENT: Negative for rhinorrhea and sore throat. Respiratory: Negative for cough and shortness of breath. Cardiovascular: Negative for chest pain. Gastrointestinal: Positive for abdominal pain. Negative for diarrhea, nausea and vomiting. Genitourinary: Positive for frequency. Negative for dysuria, hematuria, urgency, vaginal bleeding and vaginal discharge. Musculoskeletal: Negative for back pain.             PAST MEDICAL HISTORY     Past Medical History:   Diagnosis Date    CAD (coronary artery disease)     [facility] occlusion [facility] [address]     Diabetes mellitus ([facility] [address] Social History Narrative    None       SCREENINGS    [place] Coma Scale  Eye Opening: Spontaneous  Best Verbal Response: Oriented  Best Motor Response: Obeys commands  [place] Coma Scale Score: 15        PHYSICAL EXAM    (up to 7 for level 4, 8 or more for level 5)     ED Triage Vitals [01/06/18 1701]   BP Temp Temp Source Pulse Resp SpO2 Height Weight   (!) 171/99 98.4 °F (36.9 °C) Oral -- 18 94 % 5' 4\" (1.626 m) (!) 384 lb (174.2 kg)       Physical Exam   Constitutional: She is oriented to person, place, and time. She appears well-developed and well-nourished. No distress. HENT:   Head: Normocephalic and atraumatic. Right Ear: External ear normal.   Left Ear: External ear normal.   Eyes: Conjunctivae and EOM are normal.   Neck: Normal range of motion. No tracheal deviation present. Cardiovascular: Normal rate, regular rhythm and normal heart sounds. Pulmonary/Chest: Breath sounds normal. She has no wheezes. She has no rales. Abdominal: Soft. She exhibits no distension. There is tenderness in the left lower quadrant. There is CVA tenderness. There is no rebound and no guarding. Obese, left cva ttp   Musculoskeletal: Normal range of motion. She exhibits no edema. Neurological: She is alert and oriented to person, place, and time. Skin: Skin is warm and dry. DIAGNOSTIC RESULTS         RADIOLOGY:   Non-plain film images such as CT, Ultrasound and MRI are read by the radiologist. Plain radiographic images are visualized and preliminarily interpreted by the emergency physician with the below findings:        CT KIDNEY WO CONTRAST   Final Result   1. No urinary tract stones. No hydronephrosis. No acute inflammatory   process seen within the abdomen and pelvis with nonenhanced imaging. .    Signed by Dr Amaury Bean on 1/6/2018 7:16 PM              LABS:  Labs Reviewed   URINALYSIS - Abnormal; Notable for the following:        Result Value    Color, UA RED (*)     Clarity, UA TURBID (*) Bilirubin Urine SMALL (*)     Ketones, Urine TRACE (*)     Blood, Urine LARGE (*)     Protein,  (*)     Nitrite, Urine POSITIVE (*)     Leukocyte Esterase, Urine LARGE (*)     All other components within normal limits   CBC WITH AUTO DIFFERENTIAL - Abnormal; Notable for the following:     Neutrophils % 73.1 (*)     Lymphocytes % 19.0 (*)     All other components within normal limits   VENOUS BLD GAS - Abnormal; Notable for the following:     pCO2, Santiago 52.0 (*)     All other components within normal limits   PROTIME-INR - Abnormal; Notable for the following:     Protime 37.8 (*)     INR 3.76 (*)     All other components within normal limits    Narrative:     CALLED TO TATIANNA, NURSE WILL REDRAW   MICROSCOPIC URINALYSIS - Abnormal; Notable for the following:     WBC, UA TNTC (*)     All other components within normal limits   COMPREHENSIVE METABOLIC PANEL - Abnormal; Notable for the following:     Chloride 96 (*)     Glucose 207 (*)     CREATININE 1.1 (*)     GFR Non-African American 54 (*)     Alkaline Phosphatase 120 (*)     All other components within normal limits   POCT GLUCOSE - Abnormal; Notable for the following:     POC Glucose 189 (*)     All other components within normal limits   URINE CULTURE   PREGNANCY, URINE       All other labs were within normal range or not returned as of this dictation.     EMERGENCY DEPARTMENT COURSE and DIFFERENTIAL DIAGNOSIS/MDM:   Vitals:    Vitals:    01/06/18 1701 01/06/18 1808 01/06/18 1900 01/06/18 1930   BP: (!) 171/99 (!) 173/87 133/89 (!) 146/88   Pulse:  98 89 95   Resp: 18 18 18 18   Temp: 98.4 °F (36.9 °C)   97.9 °F (36.6 °C)   TempSrc: Oral      SpO2: 94% 96% 93% 91%   Weight: (!) 384 lb (174.2 kg)      Height: 5' 4\" (1.626 m)          MDM  Number of Diagnoses or Management Options  Acute pyelonephritis:   Dehydration:      Amount and/or Complexity of Data Reviewed  Clinical lab tests: ordered and reviewed  Tests in the radiology section of CPT®: ordered and

## 2018-01-06 NOTE — PROGRESS NOTES
Procedure Component Value Units Date/Time     VENOUS BLD GAS [[id number]] (Abnormal) Collected: 01/06/18 1752     Specimen: Blood Updated: 01/06/18 1753      pH, Santiago 7.35      pCO2, Santiago 52.0 (H) mmHg       pO2, Santiago 29 mmHg       HCO3, Venous 29 mmol/L       Base Excess, Santiago 2 mmol/L       O2 Sat, Santiago 50 %       Carboxyhemoglobin 2.0 %       MetHgb, Santiago 0.7 %       O2 Content, Santiago 11 mL/dL      VENOUS BLOOD GAS

## 2018-01-08 ENCOUNTER — HOSPITAL ENCOUNTER (EMERGENCY)
Age: 43
Discharge: HOME | End: 2018-01-08
Payer: MEDICAID

## 2018-01-08 VITALS
HEART RATE: 93 BPM | RESPIRATION RATE: 18 BRPM | SYSTOLIC BLOOD PRESSURE: 157 MMHG | WEIGHT: 293 LBS | OXYGEN SATURATION: 98 % | DIASTOLIC BLOOD PRESSURE: 108 MMHG | TEMPERATURE: 97.4 F | HEIGHT: 63 IN | BODY MASS INDEX: 51.91 KG/M2

## 2018-01-08 DIAGNOSIS — J10.1 INFLUENZA A: Primary | ICD-10-CM

## 2018-01-08 DIAGNOSIS — N30.01 ACUTE CYSTITIS WITH HEMATURIA: ICD-10-CM

## 2018-01-08 DIAGNOSIS — E11.8 TYPE 2 DIABETES MELLITUS WITH COMPLICATION, UNSPECIFIED LONG TERM INSULIN USE STATUS: ICD-10-CM

## 2018-01-08 LAB
ALBUMIN SERPL-MCNC: 4.1 G/DL (ref 3.5–5.2)
ALP BLD-CCNC: 111 U/L (ref 35–104)
ALT SERPL-CCNC: 18 U/L (ref 5–33)
ANION GAP SERPL CALCULATED.3IONS-SCNC: 14 MMOL/L (ref 7–19)
AST SERPL-CCNC: 15 U/L (ref 5–32)
BACTERIA: ABNORMAL /HPF
BACTERIA: ABNORMAL /HPF
BASOPHILS ABSOLUTE: 0 K/UL (ref 0–0.2)
BASOPHILS RELATIVE PERCENT: 0.4 % (ref 0–1)
BILIRUB SERPL-MCNC: 0.5 MG/DL (ref 0.2–1.2)
BILIRUBIN URINE: NEGATIVE
BILIRUBIN URINE: NEGATIVE
BLOOD, URINE: NEGATIVE
BLOOD, URINE: NEGATIVE
BUN BLDV-MCNC: 13 MG/DL (ref 6–20)
CALCIUM SERPL-MCNC: 8.7 MG/DL (ref 8.6–10)
CHLORIDE BLD-SCNC: 92 MMOL/L (ref 98–111)
CLARITY: ABNORMAL
CLARITY: ABNORMAL
CO2: 28 MMOL/L (ref 22–29)
COLOR: YELLOW
COLOR: YELLOW
CREAT SERPL-MCNC: 0.9 MG/DL (ref 0.5–0.9)
EOSINOPHILS ABSOLUTE: 0.1 K/UL (ref 0–0.6)
EOSINOPHILS RELATIVE PERCENT: 1 % (ref 0–5)
EPITHELIAL CELLS, UA: 12 /HPF (ref 0–5)
EPITHELIAL CELLS, UA: ABNORMAL /HPF
EPITHELIAL CELLS, UA: ABNORMAL /HPF
GFR NON-AFRICAN AMERICAN: >60
GLUCOSE BLD-MCNC: 268 MG/DL
GLUCOSE BLD-MCNC: 268 MG/DL (ref 74–109)
GLUCOSE URINE: NEGATIVE MG/DL
GLUCOSE URINE: NEGATIVE MG/DL
HCG(URINE) PREGNANCY TEST: NEGATIVE
HCT VFR BLD CALC: 44.9 % (ref 37–47)
HEMOGLOBIN: 14.8 G/DL (ref 12–16)
HYALINE CASTS: 12 /HPF (ref 0–8)
KETONES, URINE: NEGATIVE MG/DL
KETONES, URINE: NEGATIVE MG/DL
LEUKOCYTE ESTERASE, URINE: ABNORMAL
LEUKOCYTE ESTERASE, URINE: ABNORMAL
LYMPHOCYTES ABSOLUTE: 1.1 K/UL (ref 1.1–4.5)
LYMPHOCYTES RELATIVE PERCENT: 21.3 % (ref 20–40)
MCH RBC QN AUTO: 29 PG (ref 27–31)
MCHC RBC AUTO-ENTMCNC: 33 G/DL (ref 33–37)
MCV RBC AUTO: 87.9 FL (ref 81–99)
MONOCYTES ABSOLUTE: 0.3 K/UL (ref 0–0.9)
MONOCYTES RELATIVE PERCENT: 6.3 % (ref 0–10)
NEUTROPHILS ABSOLUTE: 3.6 K/UL (ref 1.5–7.5)
NEUTROPHILS RELATIVE PERCENT: 70.6 % (ref 50–65)
NITRITE, URINE: NEGATIVE
NITRITE, URINE: NEGATIVE
PDW BLD-RTO: 13.8 % (ref 11.5–14.5)
PH UA: 7.5
PH UA: 8
PLATELET # BLD: 293 K/UL (ref 130–400)
PMV BLD AUTO: 12.2 FL (ref 9.4–12.3)
POTASSIUM SERPL-SCNC: 4.3 MMOL/L (ref 3.5–5)
PROTEIN UA: 30 MG/DL
PROTEIN UA: ABNORMAL MG/DL
RAPID INFLUENZA  B AGN: NEGATIVE
RAPID INFLUENZA A AGN: POSITIVE
RBC # BLD: 5.11 M/UL (ref 4.2–5.4)
SODIUM BLD-SCNC: 134 MMOL/L (ref 136–145)
SPECIFIC GRAVITY UA: 1.02
SPECIFIC GRAVITY UA: 1.02
TOTAL PROTEIN: 8.2 G/DL (ref 6.6–8.7)
URINE REFLEX TO CULTURE: YES
UROBILINOGEN, URINE: 1 E.U./DL
UROBILINOGEN, URINE: 1 E.U./DL
WBC # BLD: 5.1 K/UL (ref 4.8–10.8)
WBC UA: 26 /HPF (ref 0–5)
WBC UA: ABNORMAL /HPF (ref 0–5)
WBC UA: ABNORMAL /HPF (ref 0–5)

## 2018-01-08 PROCEDURE — 80053 COMPREHEN METABOLIC PANEL: CPT

## 2018-01-08 PROCEDURE — 6360000002 HC RX W HCPCS: Performed by: PHYSICIAN ASSISTANT

## 2018-01-08 PROCEDURE — 99283 EMERGENCY DEPT VISIT LOW MDM: CPT | Performed by: PHYSICIAN ASSISTANT

## 2018-01-08 PROCEDURE — 36415 COLL VENOUS BLD VENIPUNCTURE: CPT

## 2018-01-08 PROCEDURE — 96375 TX/PRO/DX INJ NEW DRUG ADDON: CPT

## 2018-01-08 PROCEDURE — 99283 EMERGENCY DEPT VISIT LOW MDM: CPT

## 2018-01-08 PROCEDURE — 85025 COMPLETE CBC W/AUTO DIFF WBC: CPT

## 2018-01-08 PROCEDURE — 87804 INFLUENZA ASSAY W/OPTIC: CPT

## 2018-01-08 PROCEDURE — 81025 URINE PREGNANCY TEST: CPT

## 2018-01-08 PROCEDURE — 96376 TX/PRO/DX INJ SAME DRUG ADON: CPT

## 2018-01-08 PROCEDURE — 81001 URINALYSIS AUTO W/SCOPE: CPT

## 2018-01-08 PROCEDURE — 96365 THER/PROPH/DIAG IV INF INIT: CPT

## 2018-01-08 PROCEDURE — 96366 THER/PROPH/DIAG IV INF ADDON: CPT

## 2018-01-08 PROCEDURE — 2580000003 HC RX 258: Performed by: PHYSICIAN ASSISTANT

## 2018-01-08 PROCEDURE — 87086 URINE CULTURE/COLONY COUNT: CPT

## 2018-01-08 RX ORDER — 0.9 % SODIUM CHLORIDE 0.9 %
1000 INTRAVENOUS SOLUTION INTRAVENOUS ONCE
Status: COMPLETED | OUTPATIENT
Start: 2018-01-08 | End: 2018-01-08

## 2018-01-08 RX ORDER — ONDANSETRON 2 MG/ML
4 INJECTION INTRAMUSCULAR; INTRAVENOUS ONCE
Status: COMPLETED | OUTPATIENT
Start: 2018-01-08 | End: 2018-01-08

## 2018-01-08 RX ORDER — PROMETHAZINE HYDROCHLORIDE 25 MG/1
25 TABLET ORAL EVERY 6 HOURS PRN
Qty: 20 TABLET | Refills: 0 | Status: SHIPPED | OUTPATIENT
Start: 2018-01-08 | End: 2018-01-15

## 2018-01-08 RX ORDER — ONDANSETRON 2 MG/ML
INJECTION INTRAMUSCULAR; INTRAVENOUS
Status: DISCONTINUED
Start: 2018-01-08 | End: 2018-01-08 | Stop reason: HOSPADM

## 2018-01-08 RX ADMIN — CEFTRIAXONE 1 G: 1 INJECTION, POWDER, FOR SOLUTION INTRAMUSCULAR; INTRAVENOUS at 20:08

## 2018-01-08 RX ADMIN — SODIUM CHLORIDE 1000 ML: 9 INJECTION, SOLUTION INTRAVENOUS at 19:11

## 2018-01-08 RX ADMIN — ONDANSETRON 4 MG: 2 INJECTION INTRAMUSCULAR; INTRAVENOUS at 19:11

## 2018-01-08 RX ADMIN — ONDANSETRON 4 MG: 2 INJECTION INTRAMUSCULAR; INTRAVENOUS at 20:12

## 2018-01-08 ASSESSMENT — ENCOUNTER SYMPTOMS
NAUSEA: 1
RHINORRHEA: 0
SORE THROAT: 0
SINUS PRESSURE: 0
COUGH: 0
ABDOMINAL PAIN: 0
WHEEZING: 0
CONSTIPATION: 0
ABDOMINAL DISTENTION: 0
SHORTNESS OF BREATH: 0
DIARRHEA: 0
EYE PAIN: 0
APNEA: 0
VOMITING: 1
CHEST TIGHTNESS: 0

## 2018-01-08 ASSESSMENT — PAIN DESCRIPTION - LOCATION: LOCATION: ABDOMEN

## 2018-01-08 ASSESSMENT — PAIN DESCRIPTION - PAIN TYPE: TYPE: ACUTE PAIN

## 2018-01-08 ASSESSMENT — PAIN DESCRIPTION - ORIENTATION: ORIENTATION: LEFT;LOWER

## 2018-01-08 ASSESSMENT — PAIN SCALES - GENERAL: PAINLEVEL_OUTOF10: 7

## 2018-01-09 ENCOUNTER — APPOINTMENT (OUTPATIENT)
Dept: SPEECH THERAPY | Age: 43
End: 2018-01-09
Payer: MEDICAID

## 2018-01-09 LAB
ORGANISM: ABNORMAL
URINE CULTURE, ROUTINE: ABNORMAL
URINE CULTURE, ROUTINE: ABNORMAL

## 2018-01-09 NOTE — ED PROVIDER NOTES
eMERGENCY dEPARTMENT eNCOUnter      Pt Name: Lucia Whitehead  MRN: [id number]  [place] 1975  Date of evaluation: 1/8/2018  Provider: Elana Suarez, [address] [place] [address]       Chief Complaint   Patient presents with    Abdominal Pain    Emesis         HISTORY OF PRESENT ILLNESS  (Location/Symptom, Timing/Onset, Context/Setting, Quality, Duration, Modifying Factors, Severity.)   Lucia Whitehead is a 43 y.o. female who presents to the emergency department  With left flank pain nausea and vomiting. Patient was seen in our emergency department 2 days ago with similar symptoms. She is diagnosed with pyelonephritis and started on antibiotics. Patient states she's been unable to take her antibiotics due to the nausea and vomiting at home. She denies any fever. Bowel movements have been normal.  No respiratory symptoms. She denies pregnancy. Nursing Notes were reviewed and I agree. REVIEW OF SYSTEMS    (2-9 systems for level 4, 10 or more for level 5)     Review of Systems   Constitutional: Negative for activity change, chills, fatigue and fever. HENT: Negative for ear pain, hearing loss, postnasal drip, rhinorrhea, sinus pressure and sore throat. Eyes: Negative for pain and visual disturbance. Respiratory: Negative for apnea, cough, chest tightness, shortness of breath and wheezing. Cardiovascular: Negative for chest pain. Gastrointestinal: Positive for nausea and vomiting. Negative for abdominal distention, abdominal pain, constipation and diarrhea. Genitourinary: Positive for flank pain. Negative for difficulty urinating, dysuria and hematuria. Musculoskeletal: Negative for arthralgias and joint swelling. Skin: Negative for rash. Neurological: Negative for dizziness, syncope, light-headedness and headaches. Psychiatric/Behavioral: Negative for agitation and confusion. Except as noted above the remainder of the review of systems was reviewed and negative.        PAST All other components within normal limits   URINALYSIS - Abnormal; Notable for the following:     Clarity, UA CLOUDY (*)     Protein, UA TRACE (*)     Leukocyte Esterase, Urine SMALL (*)     All other components within normal limits   CBC WITH AUTO DIFFERENTIAL - Abnormal; Notable for the following:     Neutrophils % 70.6 (*)     All other components within normal limits   COMPREHENSIVE METABOLIC PANEL - Abnormal; Notable for the following:     Sodium 134 (*)     Chloride 92 (*)     Glucose 268 (*)     Alkaline Phosphatase 111 (*)     All other components within normal limits   MICROSCOPIC URINALYSIS - Abnormal; Notable for the following:     WBC, UA 3-5 (*)     Hyaline Casts, UA 12 (*)     WBC, UA 26 (*)     All other components within normal limits   URINE RT REFLEX TO CULTURE - Abnormal; Notable for the following:     Clarity, UA CLOUDY (*)     Protein, UA 30 (*)     Leukocyte Esterase, Urine SMALL (*)     All other components within normal limits   MICROSCOPIC URINALYSIS - Abnormal; Notable for the following:     WBC, UA 3-5 (*)     All other components within normal limits   POCT GLUCOSE - Normal   URINE CULTURE   URINE CULTURE   PREGNANCY, URINE       All other labs were within normal range or not returned as of this dictation. EMERGENCY DEPARTMENT COURSE and DIFFERENTIAL DIAGNOSIS/MDM:   Vitals:    Vitals:    01/08/18 1747 01/08/18 1748 01/08/18 2033 01/08/18 2044   BP:  (!) 139/94 (!) 157/108    Pulse: 93      Resp: 18      Temp: 97.4 °F (36.3 °C)      SpO2: 97%  93% 98%   Weight: (!) 375 lb (170.1 kg)      Height: 5' 3\" (1.6 m)              MDM  Number of Diagnoses or Management Options  Acute cystitis with hematuria:   Influenza A:   Type 2 diabetes mellitus with complication, unspecified long term insulin use status [facility]):   Diagnosis management comments: Patient has a positive for influenza. She had a CT of the abdomen 2 days ago her last ED visit.   Patient has had no episodes of emesis since arrival to the ED. She is outside the window of treatment with Tamiflu for influenza. She is feeling better since receiving fluids here in the ED. We will provide Phenergan for home so she may continue to take her antibiotics for her urinary tract infection. Advised close follow-up with primary care doctor to repeat urinalysis within one week. Stable ready for discharge. PROCEDURES:    Procedures      FINAL IMPRESSION      1. Influenza A    2. Acute cystitis with hematuria    3. Type 2 diabetes mellitus with complication, unspecified long term insulin use status [facility])          DISPOSITION/PLAN   DISPOSITION Decision To Discharge 01/08/2018 08:44:24 PM      Patient was told that if symptoms worsen or new symptoms develop they are to return to the emergency department immediately. Patient was educated on diagnosis and treatment plan. All of patient's questions were answered, and the patient understands the discharge plan. I do not feel the patient has a life-threatening condition at this time. Patient is to be discharged.      PATIENT REFERRED TO:  Alize Santana DO  [facility] [address]  [phone]    Schedule an appointment as soon as possible for a visit         DISCHARGE MEDICATIONS:  New Prescriptions    PROMETHAZINE (PHENERGAN) 25 MG TABLET    Take 1 tablet by mouth every 6 hours as needed for Nausea       (Please note that portions of this note were completed with a voice recognition program.  Efforts were made to edit the dictations but occasionally words are mis-transcribed.)    Dameon Velasquez [place]  01/08/18 5716

## 2018-01-10 LAB — URINE CULTURE, ROUTINE: NORMAL

## 2018-01-11 ENCOUNTER — HOSPITAL ENCOUNTER (OUTPATIENT)
Dept: LAB | Age: 43
Discharge: HOME | End: 2018-01-11
Payer: MEDICAID

## 2018-01-11 LAB
INR BLD: 1.81 (ref 0.88–1.18)
PROTHROMBIN TIME: 21.1 SEC (ref 12–14.6)

## 2018-01-11 PROCEDURE — 85610 PROTHROMBIN TIME: CPT

## 2018-01-11 PROCEDURE — 36415 COLL VENOUS BLD VENIPUNCTURE: CPT

## 2018-01-16 ENCOUNTER — HOSPITAL ENCOUNTER (OUTPATIENT)
Dept: SPEECH THERAPY | Age: 43
Setting detail: THERAPIES SERIES
End: 2018-01-16
Payer: MEDICAID

## 2018-01-23 ENCOUNTER — HOSPITAL ENCOUNTER (OUTPATIENT)
Dept: SPEECH THERAPY | Age: 43
Setting detail: THERAPIES SERIES
Discharge: HOME | End: 2018-01-23
Payer: MEDICAID

## 2018-01-23 PROCEDURE — 92507 TX SP LANG VOICE COMM INDIV: CPT

## 2018-01-23 NOTE — PROGRESS NOTES
Speech Language Pathology  Facility/Department: [facility] SPEECH THERAPY  Daily Treatment Note  NAME: Tabatha Ware  : 1975  MRN: [id number]  Date: 18  Therapist: Henok Pelaez  24 visits per year, 17-18 (expiration date)   [phone] Speech therapy  Authorization Number: [id number]  Completed visits: 14 out of 24        Subjective: The patient arrived to therapy on time and attempted all tasks. Pt denied pain this date. The patient reports she has been reading this week. She has been ill with the flu and today is only her second day out of her house.     Objective:  Multiple episodes of dysfluencies observed this date. Reviewed easy onset. Patient was able to identify dysfluent episodes. Moderate anomia was observed throughout today's session (during structured tasks and during conversation). The patient was required to read several paragraphs. She answered multiple choice questions and open ended questions following paragraphs. She scored 65% on the multiple choice questions. An antonym/synonym task was completed at 71% with minimal verbal cues. An immediate memory task was completed with 68% accuracy and moderate verbal cues. SUMMARY:   Based upon case history, formal, and informal assessment, patient presents with mild language impairment. impaired, limited, or restricted. Patients current impairment level for memory is  CK at least 40% but less than 60% impaired, limited, or restricted. And is expected to improve WL . CI at least 1% but less than 20% impaired, limited or restricted. Patient also demonstrates speech dysfluency, mildly impaired language, mildly impaired attention, and mildly impaired visuospatial skills.  Speech therapy is warranted at this time to improve upon patients ability to effectively communicate with conversation partners.          Speech will target the short-term goals listed below:  SHORT-TERM GOALS: Patient will     Demonstrate ability to identify

## 2018-02-06 ENCOUNTER — HOSPITAL ENCOUNTER (OUTPATIENT)
Dept: SPEECH THERAPY | Age: 43
Setting detail: THERAPIES SERIES
Discharge: HOME | End: 2018-02-06
Payer: MEDICAID

## 2018-02-06 PROCEDURE — 92507 TX SP LANG VOICE COMM INDIV: CPT

## 2018-02-06 NOTE — PROGRESS NOTES
Speech Language Pathology  Facility/Department: [facility] SPEECH THERAPY  Daily Treatment Note  NAME: Lauro Richards  : 1975  MRN: [id number]  Date: 18  Therapist: Danish Cantor  24 visits per year, 17-18 (expiration date)   [phone] Speech therapy  Authorization Number: [id number]  Completed visits: 16 out of 24        Subjective: The patient arrived to therapy on time and attempted all tasks. Pt denied pain this date. She is scheduled to go to [facility] tomorrow for an appointment with her cardiologist clinic.     Objective: An antonym/synonym task was completed at 70% with min to mod verbal cues. An immediate memory task was completed with 53% accuracy and moderate verbal cues. Minimal episodes of dysfluencies observed this date. Reviewed easy onset. Patient was able to identify dysfluent episodes. Minimal anomia was observed throughout today's session (during structured tasks and during conversation)     The patient was required to read several paragraphs. She answered multiple choice questions and open ended questions following paragraphs. She scored 50% on the multiple choice questions. Functional problem solving tasks were completed with 70% accuracy and min to mod verbal cues.          SUMMARY:   Based upon case history, formal, and informal assessment, patient presents with mild language impairment. impaired, limited, or restricted. Patients current impairment level for memory is  CK at least 40% but less than 60% impaired, limited, or restricted. And is expected to improve JK . CI at least 1% but less than 20% impaired, limited or restricted. Patient also demonstrates speech dysfluency, mildly impaired language, mildly impaired attention, and mildly impaired visuospatial skills.  Speech therapy is warranted at this time to improve upon patients ability to effectively communicate with conversation partners.          Speech will target the short-term goals listed below:  SHORT-TERM GOALS: Patient will     Demonstrate ability to identify stuttering events on at least 75% of opportunities and with min cues/prompts. Not met     Demonstrate appropriate application of fluency shaping and stuttering modification techniques on at least 75% of opportunities and with min cues/prompts. Not met     Demonstrate an increase in word finding during structured and unstructured tasks with at least 80% accuracy and with min cues/prompts. Not met     Demonstrate an increase in auditory comprehension and written comprehension at phrase, sentence, and paragraph level with at least 75% accuracy and with mod cues/prompts. Not met     Complete select and sustained attention tasks with at least 75% accuracy and with mod cues/prompts. met     Utilize memory tools and strategies for increased safety and participation in ADLS with at least 03% accuracy and with min cues/prompts. met     Complete functional reasoning/problem solving tasks with at least 75% accuracy and with min cues/prompts. Not met     Pt will complete immediate and short term memory tasks with delay and distractors at 75% accuracy and min cues/prompts. Not met         Electronically Signed By:  Sami Purvis M.S., CCC-SLP  7/8/7098,6:26 PM.

## 2018-02-12 ENCOUNTER — HOSPITAL ENCOUNTER (OUTPATIENT)
Dept: LAB | Age: 43
Discharge: HOME | End: 2018-02-12
Payer: MEDICAID

## 2018-02-12 LAB
INR BLD: 2.33 (ref 0.88–1.18)
PROTHROMBIN TIME: 25.6 SEC (ref 12–14.6)

## 2018-02-12 PROCEDURE — 85610 PROTHROMBIN TIME: CPT

## 2018-02-12 PROCEDURE — 36415 COLL VENOUS BLD VENIPUNCTURE: CPT

## 2018-02-20 ENCOUNTER — HOSPITAL ENCOUNTER (OUTPATIENT)
Dept: SPEECH THERAPY | Age: 43
Setting detail: THERAPIES SERIES
Discharge: HOME | End: 2018-02-20
Payer: MEDICAID

## 2018-02-20 PROCEDURE — 92507 TX SP LANG VOICE COMM INDIV: CPT

## 2018-02-20 NOTE — PROGRESS NOTES
[facility] Outpatient Speech-Language Pathology  Re-Assessment Note        PATIENT: Henok Ochoa  REFERRING PHYSICIAN:  Dr. Jessica Lea                                                                 EVALUATOR: Rica Whitney       MRN: [id number]  Date of Re-evaluation: 2/20//17  Evaluating Therapist: Rica Whitney   24 visits per year, 8/29/17-2/28/18 (expiration date)   [phone] Speech therapy  Authorization Number: [id number]  Completed visits: 16 out of 24     PERTINENT MEDICAL INFORMATION:     Henok Ochoa, a 39year-old female, was re-evaluated secondary to aphasia. Patient presented to clinic with moderate dysfluencies characterized by blocks, part-word, whole-word, and phrase repetitions with frequent secondary behaviors that primarily consisted of jaw tension, lip pressing, poor eye contact, and accessory hand movements. Patient also presented with moderately impaired attention, moderately impaired memory functioning, severely impaired executive functioning, moderately impaired language functioning, and mildly impaired visuospatial skills. Information was obtained per chart review and patient interview. Patient experienced CVA on 01/31/16 and was hospitalized until 02/08/16. During acute hospital stay, patient received speech therapy and underwent screening by physical therapy. Upon discharge, patient continued to exhibit significant dysfluencies as well as trouble remembering things, getting words mixed up, and trouble comprehending things.  Patient subsequently received outpatient speech services from 02/16/16 to 07/26/16. Patient was discharged from outpatient services secondary to change in insurance and pending acute hospitalization. Patient underwent CABG, at [facility], on 07/27/16 and was hospitalized for approximately 6 days.  Upon discharge, patient was transferred to acute rehabilitation facility where she received speech, physical, and occupational therapy until 08/12/16; patient had to

## 2018-02-23 ENCOUNTER — APPOINTMENT (OUTPATIENT)
Dept: CT IMAGING | Age: 43
End: 2018-02-23
Payer: MEDICAID

## 2018-02-23 ENCOUNTER — HOSPITAL ENCOUNTER (OUTPATIENT)
Age: 43
Setting detail: OBSERVATION
Discharge: HOME | End: 2018-02-24
Attending: EMERGENCY MEDICINE | Admitting: HOSPITALIST
Payer: MEDICAID

## 2018-02-23 ENCOUNTER — APPOINTMENT (OUTPATIENT)
Dept: GENERAL RADIOLOGY | Age: 43
End: 2018-02-23
Payer: MEDICAID

## 2018-02-23 DIAGNOSIS — I26.99 OTHER PULMONARY EMBOLISM WITHOUT ACUTE COR PULMONALE, UNSPECIFIED CHRONICITY (HCC): ICD-10-CM

## 2018-02-23 DIAGNOSIS — R07.9 CHEST PAIN, UNSPECIFIED TYPE: Primary | ICD-10-CM

## 2018-02-23 LAB
ALBUMIN SERPL-MCNC: 3.6 G/DL (ref 3.5–5.2)
ALP BLD-CCNC: 109 U/L (ref 35–104)
ALT SERPL-CCNC: 23 U/L (ref 5–33)
ANION GAP SERPL CALCULATED.3IONS-SCNC: 14 MMOL/L (ref 7–19)
APTT: 47.4 SEC (ref 26–36.2)
AST SERPL-CCNC: 15 U/L (ref 5–32)
BASE EXCESS ARTERIAL: 2.6 MMOL/L (ref -2–2)
BASOPHILS ABSOLUTE: 0 K/UL (ref 0–0.2)
BASOPHILS RELATIVE PERCENT: 0.3 % (ref 0–1)
BILIRUB SERPL-MCNC: 0.3 MG/DL (ref 0.2–1.2)
BUN BLDV-MCNC: 14 MG/DL (ref 6–20)
CALCIUM SERPL-MCNC: 8.9 MG/DL (ref 8.6–10)
CARBOXYHEMOGLOBIN ARTERIAL: 1.5 % (ref 0–5)
CHLORIDE BLD-SCNC: 88 MMOL/L (ref 98–111)
CO2: 27 MMOL/L (ref 22–29)
CREAT SERPL-MCNC: 0.9 MG/DL (ref 0.5–0.9)
EOSINOPHILS ABSOLUTE: 0.3 K/UL (ref 0–0.6)
EOSINOPHILS RELATIVE PERCENT: 2.1 % (ref 0–5)
GFR NON-AFRICAN AMERICAN: >60
GLUCOSE BLD-MCNC: 416 MG/DL (ref 74–109)
HCG QUALITATIVE: NEGATIVE
HCO3 ARTERIAL: 27.2 MMOL/L (ref 22–26)
HCT VFR BLD CALC: 51 % (ref 37–47)
HEMOGLOBIN, ART, EXTENDED: 14.4 G/DL (ref 12–16)
HEMOGLOBIN: 17.2 G/DL (ref 12–16)
INR BLD: 3.34 (ref 0.88–1.18)
LYMPHOCYTES ABSOLUTE: 3.4 K/UL (ref 1.1–4.5)
LYMPHOCYTES RELATIVE PERCENT: 25.2 % (ref 20–40)
MCH RBC QN AUTO: 32.3 PG (ref 27–31)
MCHC RBC AUTO-ENTMCNC: 33.7 G/DL (ref 33–37)
MCV RBC AUTO: 95.7 FL (ref 81–99)
METHEMOGLOBIN ARTERIAL: 0.7 %
MONOCYTES ABSOLUTE: 1.2 K/UL (ref 0–0.9)
MONOCYTES RELATIVE PERCENT: 8.7 % (ref 0–10)
NEUTROPHILS ABSOLUTE: 8.5 K/UL (ref 1.5–7.5)
NEUTROPHILS RELATIVE PERCENT: 63.3 % (ref 50–65)
O2 CONTENT ARTERIAL: 19.4 ML/DL
O2 SAT, ARTERIAL: 95.4 %
O2 THERAPY: ABNORMAL
PCO2 ARTERIAL: 41 MMHG (ref 35–45)
PDW BLD-RTO: 12 % (ref 11.5–14.5)
PERFORMED ON: NORMAL
PERFORMED ON: NORMAL
PH ARTERIAL: 7.43 (ref 7.35–7.45)
PLATELET # BLD: 382 K/UL (ref 130–400)
PMV BLD AUTO: 9.5 FL (ref 9.4–12.3)
PO2 ARTERIAL: 85 MMHG (ref 80–100)
POC TROPONIN I: 0 NG/ML (ref 0–0.08)
POC TROPONIN I: 0.01 NG/ML (ref 0–0.08)
POTASSIUM SERPL-SCNC: 4.1 MMOL/L (ref 3.5–5)
POTASSIUM, WHOLE BLOOD: 4.1
PRO-BNP: 39 PG/ML (ref 0–450)
PROTHROMBIN TIME: 34.1 SEC (ref 12–14.6)
RBC # BLD: 5.33 M/UL (ref 4.2–5.4)
SODIUM BLD-SCNC: 129 MMOL/L (ref 136–145)
TOTAL PROTEIN: 7.6 G/DL (ref 6.6–8.7)
WBC # BLD: 13.5 K/UL (ref 4.8–10.8)

## 2018-02-23 PROCEDURE — G0378 HOSPITAL OBSERVATION PER HR: HCPCS

## 2018-02-23 PROCEDURE — 84484 ASSAY OF TROPONIN QUANT: CPT

## 2018-02-23 PROCEDURE — 84703 CHORIONIC GONADOTROPIN ASSAY: CPT

## 2018-02-23 PROCEDURE — 82803 BLOOD GASES ANY COMBINATION: CPT

## 2018-02-23 PROCEDURE — 71045 X-RAY EXAM CHEST 1 VIEW: CPT

## 2018-02-23 PROCEDURE — 99220 PR INITIAL OBSERVATION CARE/DAY 70 MINUTES: CPT | Performed by: INTERNAL MEDICINE

## 2018-02-23 PROCEDURE — 93005 ELECTROCARDIOGRAM TRACING: CPT

## 2018-02-23 PROCEDURE — 6360000004 HC RX CONTRAST MEDICATION: Performed by: EMERGENCY MEDICINE

## 2018-02-23 PROCEDURE — 85610 PROTHROMBIN TIME: CPT

## 2018-02-23 PROCEDURE — 96374 THER/PROPH/DIAG INJ IV PUSH: CPT

## 2018-02-23 PROCEDURE — 84132 ASSAY OF SERUM POTASSIUM: CPT

## 2018-02-23 PROCEDURE — 85025 COMPLETE CBC W/AUTO DIFF WBC: CPT

## 2018-02-23 PROCEDURE — 36415 COLL VENOUS BLD VENIPUNCTURE: CPT

## 2018-02-23 PROCEDURE — 96375 TX/PRO/DX INJ NEW DRUG ADDON: CPT

## 2018-02-23 PROCEDURE — 99285 EMERGENCY DEPT VISIT HI MDM: CPT | Performed by: EMERGENCY MEDICINE

## 2018-02-23 PROCEDURE — 71275 CT ANGIOGRAPHY CHEST: CPT

## 2018-02-23 PROCEDURE — 85730 THROMBOPLASTIN TIME PARTIAL: CPT

## 2018-02-23 PROCEDURE — 36600 WITHDRAWAL OF ARTERIAL BLOOD: CPT

## 2018-02-23 PROCEDURE — 6360000002 HC RX W HCPCS: Performed by: EMERGENCY MEDICINE

## 2018-02-23 PROCEDURE — 80053 COMPREHEN METABOLIC PANEL: CPT

## 2018-02-23 PROCEDURE — 99285 EMERGENCY DEPT VISIT HI MDM: CPT

## 2018-02-23 PROCEDURE — 83880 ASSAY OF NATRIURETIC PEPTIDE: CPT

## 2018-02-23 PROCEDURE — 6370000000 HC RX 637 (ALT 250 FOR IP): Performed by: EMERGENCY MEDICINE

## 2018-02-23 RX ORDER — BETHANECHOL CHLORIDE 25 MG/1
1 TABLET ORAL DAILY
COMMUNITY
Start: 2017-09-14

## 2018-02-23 RX ORDER — TOPIRAMATE 50 MG/1
1 TABLET, FILM COATED ORAL DAILY
COMMUNITY
Start: 2014-08-28

## 2018-02-23 RX ORDER — SPIRONOLACTONE 25 MG/1
12.5 TABLET ORAL DAILY
COMMUNITY
Start: 2017-12-08

## 2018-02-23 RX ORDER — ONDANSETRON 2 MG/ML
4 INJECTION INTRAMUSCULAR; INTRAVENOUS ONCE
Status: COMPLETED | OUTPATIENT
Start: 2018-02-23 | End: 2018-02-23

## 2018-02-23 RX ORDER — PANTOPRAZOLE SODIUM 40 MG/1
1 TABLET, DELAYED RELEASE ORAL DAILY
COMMUNITY
Start: 2017-10-05 | End: 2021-03-25

## 2018-02-23 RX ORDER — MORPHINE SULFATE 4 MG/ML
4 INJECTION, SOLUTION INTRAMUSCULAR; INTRAVENOUS ONCE
Status: COMPLETED | OUTPATIENT
Start: 2018-02-23 | End: 2018-02-23

## 2018-02-23 RX ORDER — FUROSEMIDE 40 MG/1
80 TABLET ORAL 2 TIMES DAILY
Status: ON HOLD | COMMUNITY
End: 2021-09-24 | Stop reason: SDUPTHER

## 2018-02-23 RX ORDER — ASPIRIN 325 MG
325 TABLET ORAL ONCE
Status: COMPLETED | OUTPATIENT
Start: 2018-02-23 | End: 2018-02-23

## 2018-02-23 RX ADMIN — ASPIRIN 325 MG ORAL TABLET 325 MG: 325 PILL ORAL at 18:14

## 2018-02-23 RX ADMIN — ONDANSETRON 4 MG: 2 INJECTION, SOLUTION INTRAMUSCULAR; INTRAVENOUS at 18:14

## 2018-02-23 RX ADMIN — IOPAMIDOL 90 ML: 755 INJECTION, SOLUTION INTRAVENOUS at 19:09

## 2018-02-23 RX ADMIN — MORPHINE SULFATE 4 MG: 4 INJECTION, SOLUTION INTRAMUSCULAR; INTRAVENOUS at 18:14

## 2018-02-23 ASSESSMENT — ENCOUNTER SYMPTOMS
DIARRHEA: 0
NAUSEA: 0
SORE THROAT: 0
BACK PAIN: 0
VOMITING: 0
RHINORRHEA: 0
ABDOMINAL PAIN: 0
SHORTNESS OF BREATH: 1

## 2018-02-23 ASSESSMENT — PAIN SCALES - GENERAL
PAINLEVEL_OUTOF10: 6
PAINLEVEL_OUTOF10: 5
PAINLEVEL_OUTOF10: 2

## 2018-02-23 NOTE — ED PROVIDER NOTES
[facility] EMERGENCY DEPT  eMERGENCY dEPARTMENT eNCOUnter      Pt Name: Yenny Lucas  MRN: [id number]  [place] 1975  Date of evaluation: 2/23/2018  Provider: Mary Anne Montano MD    [address] [place] [address]       Chief Complaint   Patient presents with    Chest Pain         HISTORY OF PRESENT ILLNESS   (Location/Symptom, Timing/Onset, Context/Setting, Quality, Duration, Modifying Factors, Severity)  Note limiting factors. Yenny Lucas is a 43 y.o. female who presents to the emergency department With chest pain. The patient states it started 3 days ago. She said it started after she was leaving therapy and she moved funny. She felt a pop in her chest. It almost gone away and then she went to bend over and pick something up today and she started having a repeat in her symptoms. She hasmild shortness of breath and pain with deep breath. The patient does have a history of prior CABG in the past. Kai Rodriguez. She also has a history of prior blood clot in her lung as well as her neck. She takes Coumadin. She says the symptoms do not feel similar to her prior MI. She denies any symptoms with her PE.     HPI    Nursing Notes were reviewed. REVIEW OF SYSTEMS    (2-9 systems for level 4, 10 or more for level 5)     Review of Systems   Constitutional: Negative for chills and fever. HENT: Negative for rhinorrhea and sore throat. Respiratory: Positive for shortness of breath. Cardiovascular: Positive for chest pain and leg swelling (mild bilateral lower extremity swelling). Gastrointestinal: Negative for abdominal pain, diarrhea, nausea and vomiting. Genitourinary: Negative for difficulty urinating. Musculoskeletal: Negative for back pain and neck pain. Skin: Negative for rash. Neurological: Negative for weakness and headaches. Psychiatric/Behavioral: Negative for confusion. A complete review of systems was performed and is negative except as noted above in the HPI.        PAST MEDICAL HISTORY     Past Medical History:   Diagnosis Date    CAD (coronary artery disease)     [facility] occlusion [address]     Diabetes mellitus ([address]     GERD (gastroesophageal reflux disease)     Hypertension          SURGICAL HISTORY       Past Surgical History:   Procedure Laterality Date    CARDIAC SURGERY      CORONARY ARTERY BYPASS GRAFT      2016    TONSILLECTOMY           CURRENT MEDICATIONS       Previous Medications    ASPIRIN 81 MG TABLET    Take 81 mg by mouth daily    ATORVASTATIN (LIPITOR) 40 MG TABLET    Take 80 mg by mouth daily     CEPHALEXIN (KEFLEX) 500 MG CAPSULE    Take 1 capsule by mouth 4 times daily    GABAPENTIN (NEURONTIN) 600 MG TABLET    Take 600 mg by mouth 3 times daily    LISINOPRIL (PRINIVIL;ZESTRIL) 40 MG TABLET    Take 40 mg by mouth daily    METOPROLOL SUCCINATE ER (TOPROL-XL) 25 MG XL TABLET    Take 25 mg by mouth daily    ONDANSETRON (ZOFRAN ODT) 4 MG DISINTEGRATING TABLET    Take 1 tablet by mouth every 8 hours as needed for Nausea or Vomiting    PROCHLORPERAZINE (COMPAZINE) 10 MG TABLET    Take 10 mg by mouth 2 times daily as needed (headache)    TOPIRAMATE (TOPAMAX) 100 MG TABLET    Take 100 mg by mouth nightly     VERAPAMIL (CALAN) 40 MG TABLET    Take 40 mg by mouth every 12 hours    WARFARIN (COUMADIN) 10 MG TABLET    Take 10 mg by mouth       ALLERGIES     Bactrim [sulfamethoxazole-trimethoprim]    FAMILY HISTORY       Family History   Problem Relation Age of Onset    Osteoarthritis Mother     Diabetes Mother     Hypertension Mother     Hypertension Father     Heart Attack Brother     Heart Failure Maternal Grandmother     Diabetes Maternal Grandfather     Heart Disease Maternal Grandfather     Heart Disease Paternal Grandmother     No Known Problems Paternal Grandfather           SOCIAL HISTORY       Social History     Social History    Marital status: Single     Spouse name: N/A    Number of children: N/A    Years of education: N/A     Social

## 2018-02-23 NOTE — Clinical Note
Patient Class: Observation [104]   REQUIRED: Diagnosis: Chest pain [002903]   Estimated Length of Stay: Estimated stay of more than 2 midnights   Future Attending Provider: Elisabeth Hay [[id number]]   Telemetry Bed Required?: Yes

## 2018-02-24 VITALS
HEIGHT: 64 IN | BODY MASS INDEX: 50.02 KG/M2 | DIASTOLIC BLOOD PRESSURE: 87 MMHG | WEIGHT: 293 LBS | HEART RATE: 99 BPM | TEMPERATURE: 97.8 F | OXYGEN SATURATION: 96 % | SYSTOLIC BLOOD PRESSURE: 134 MMHG | RESPIRATION RATE: 18 BRPM

## 2018-02-24 PROBLEM — R07.9 CHEST PAIN: Status: ACTIVE | Noted: 2018-02-24

## 2018-02-24 LAB
D DIMER: <0.27 UG/ML FEU (ref 0–0.48)
GLUCOSE BLD-MCNC: 302 MG/DL (ref 70–99)
GLUCOSE BLD-MCNC: 340 MG/DL (ref 70–99)
GLUCOSE BLD-MCNC: 419 MG/DL (ref 70–99)
GLUCOSE BLD-MCNC: 481 MG/DL (ref 70–99)
INR BLD: 3.2 (ref 0.88–1.18)
PERFORMED ON: ABNORMAL
PROTHROMBIN TIME: 33 SEC (ref 12–14.6)
TROPONIN: <0.01 NG/ML (ref 0–0.03)
TROPONIN: <0.01 NG/ML (ref 0–0.03)

## 2018-02-24 PROCEDURE — 2580000003 HC RX 258: Performed by: INTERNAL MEDICINE

## 2018-02-24 PROCEDURE — 36415 COLL VENOUS BLD VENIPUNCTURE: CPT

## 2018-02-24 PROCEDURE — 6370000000 HC RX 637 (ALT 250 FOR IP): Performed by: HOSPITALIST

## 2018-02-24 PROCEDURE — 6370000000 HC RX 637 (ALT 250 FOR IP): Performed by: INTERNAL MEDICINE

## 2018-02-24 PROCEDURE — 85379 FIBRIN DEGRADATION QUANT: CPT

## 2018-02-24 PROCEDURE — 82948 REAGENT STRIP/BLOOD GLUCOSE: CPT

## 2018-02-24 PROCEDURE — G0378 HOSPITAL OBSERVATION PER HR: HCPCS

## 2018-02-24 PROCEDURE — 85610 PROTHROMBIN TIME: CPT

## 2018-02-24 PROCEDURE — 84484 ASSAY OF TROPONIN QUANT: CPT

## 2018-02-24 PROCEDURE — 99245 OFF/OP CONSLTJ NEW/EST HI 55: CPT | Performed by: INTERNAL MEDICINE

## 2018-02-24 PROCEDURE — 99217 PR OBSERVATION CARE DISCHARGE MANAGEMENT: CPT | Performed by: HOSPITALIST

## 2018-02-24 RX ORDER — SODIUM CHLORIDE 0.9 % (FLUSH) 0.9 %
10 SYRINGE (ML) INJECTION PRN
Status: DISCONTINUED | OUTPATIENT
Start: 2018-02-24 | End: 2018-02-24 | Stop reason: HOSPADM

## 2018-02-24 RX ORDER — ACETAMINOPHEN 325 MG/1
650 TABLET ORAL EVERY 4 HOURS PRN
Status: DISCONTINUED | OUTPATIENT
Start: 2018-02-24 | End: 2018-02-24 | Stop reason: HOSPADM

## 2018-02-24 RX ORDER — ASPIRIN 81 MG/1
81 TABLET ORAL DAILY
Status: DISCONTINUED | OUTPATIENT
Start: 2018-02-24 | End: 2018-02-24 | Stop reason: HOSPADM

## 2018-02-24 RX ORDER — DEXTROSE MONOHYDRATE 50 MG/ML
100 INJECTION, SOLUTION INTRAVENOUS PRN
Status: DISCONTINUED | OUTPATIENT
Start: 2018-02-24 | End: 2018-02-24 | Stop reason: HOSPADM

## 2018-02-24 RX ORDER — FUROSEMIDE 80 MG
80 TABLET ORAL 2 TIMES DAILY
Status: DISCONTINUED | OUTPATIENT
Start: 2018-02-24 | End: 2018-02-24 | Stop reason: HOSPADM

## 2018-02-24 RX ORDER — PANTOPRAZOLE SODIUM 40 MG/1
40 TABLET, DELAYED RELEASE ORAL DAILY
Status: DISCONTINUED | OUTPATIENT
Start: 2018-02-24 | End: 2018-02-24 | Stop reason: HOSPADM

## 2018-02-24 RX ORDER — INSULIN GLARGINE 100 [IU]/ML
50 INJECTION, SOLUTION SUBCUTANEOUS NIGHTLY
Status: DISCONTINUED | OUTPATIENT
Start: 2018-02-24 | End: 2018-02-24 | Stop reason: HOSPADM

## 2018-02-24 RX ORDER — ONDANSETRON 2 MG/ML
4 INJECTION INTRAMUSCULAR; INTRAVENOUS EVERY 6 HOURS PRN
Status: DISCONTINUED | OUTPATIENT
Start: 2018-02-24 | End: 2018-02-24 | Stop reason: HOSPADM

## 2018-02-24 RX ORDER — SODIUM CHLORIDE 0.9 % (FLUSH) 0.9 %
10 SYRINGE (ML) INJECTION EVERY 12 HOURS SCHEDULED
Status: DISCONTINUED | OUTPATIENT
Start: 2018-02-24 | End: 2018-02-24 | Stop reason: HOSPADM

## 2018-02-24 RX ORDER — METOPROLOL SUCCINATE 25 MG/1
25 TABLET, EXTENDED RELEASE ORAL DAILY
Status: DISCONTINUED | OUTPATIENT
Start: 2018-02-24 | End: 2018-02-24 | Stop reason: HOSPADM

## 2018-02-24 RX ORDER — ATORVASTATIN CALCIUM 40 MG/1
80 TABLET, FILM COATED ORAL NIGHTLY
Status: DISCONTINUED | OUTPATIENT
Start: 2018-02-24 | End: 2018-02-24 | Stop reason: HOSPADM

## 2018-02-24 RX ORDER — NICOTINE POLACRILEX 4 MG
15 LOZENGE BUCCAL PRN
Status: DISCONTINUED | OUTPATIENT
Start: 2018-02-24 | End: 2018-02-24 | Stop reason: HOSPADM

## 2018-02-24 RX ORDER — TOPIRAMATE 25 MG/1
50 TABLET ORAL DAILY
Status: DISCONTINUED | OUTPATIENT
Start: 2018-02-24 | End: 2018-02-24 | Stop reason: HOSPADM

## 2018-02-24 RX ORDER — GABAPENTIN 600 MG/1
600 TABLET ORAL 3 TIMES DAILY
Status: DISCONTINUED | OUTPATIENT
Start: 2018-02-24 | End: 2018-02-24 | Stop reason: HOSPADM

## 2018-02-24 RX ORDER — ASPIRIN 81 MG/1
81 TABLET, CHEWABLE ORAL DAILY
Status: DISCONTINUED | OUTPATIENT
Start: 2018-02-25 | End: 2018-02-24

## 2018-02-24 RX ORDER — DEXTROSE MONOHYDRATE 25 G/50ML
12.5 INJECTION, SOLUTION INTRAVENOUS PRN
Status: DISCONTINUED | OUTPATIENT
Start: 2018-02-24 | End: 2018-02-24 | Stop reason: HOSPADM

## 2018-02-24 RX ADMIN — METOPROLOL SUCCINATE 25 MG: 25 TABLET, EXTENDED RELEASE ORAL at 08:42

## 2018-02-24 RX ADMIN — ATORVASTATIN CALCIUM 80 MG: 40 TABLET, FILM COATED ORAL at 02:10

## 2018-02-24 RX ADMIN — PANTOPRAZOLE SODIUM 40 MG: 40 TABLET, DELAYED RELEASE ORAL at 08:42

## 2018-02-24 RX ADMIN — INSULIN LISPRO 18 UNITS: 100 INJECTION, SOLUTION INTRAVENOUS; SUBCUTANEOUS at 12:08

## 2018-02-24 RX ADMIN — INSULIN LISPRO 12 UNITS: 100 INJECTION, SOLUTION INTRAVENOUS; SUBCUTANEOUS at 08:43

## 2018-02-24 RX ADMIN — FUROSEMIDE 80 MG: 80 TABLET ORAL at 08:42

## 2018-02-24 RX ADMIN — FUROSEMIDE 80 MG: 80 TABLET ORAL at 02:10

## 2018-02-24 RX ADMIN — INSULIN LISPRO 9 UNITS: 100 INJECTION, SOLUTION INTRAVENOUS; SUBCUTANEOUS at 02:10

## 2018-02-24 RX ADMIN — ASPIRIN 81 MG: 81 TABLET, COATED ORAL at 08:42

## 2018-02-24 RX ADMIN — Medication 10 ML: at 08:43

## 2018-02-24 RX ADMIN — GABAPENTIN 600 MG: 600 TABLET, FILM COATED ORAL at 13:58

## 2018-02-24 RX ADMIN — TOPIRAMATE 50 MG: 25 TABLET, FILM COATED ORAL at 08:42

## 2018-02-24 RX ADMIN — INSULIN LISPRO 12 UNITS: 100 INJECTION, SOLUTION INTRAVENOUS; SUBCUTANEOUS at 17:26

## 2018-02-24 RX ADMIN — GABAPENTIN 600 MG: 600 TABLET, FILM COATED ORAL at 08:42

## 2018-02-24 ASSESSMENT — ENCOUNTER SYMPTOMS
SPUTUM PRODUCTION: 0
SHORTNESS OF BREATH: 0
HEARTBURN: 0
DOUBLE VISION: 0
COUGH: 0
ABDOMINAL PAIN: 0
VOMITING: 0
BLURRED VISION: 0
PHOTOPHOBIA: 0
HEMOPTYSIS: 0
ORTHOPNEA: 0
NAUSEA: 0

## 2018-02-24 NOTE — CONSULTS
Historical Provider, MD   verapamil (CALAN) 40 MG tablet Take 40 mg by mouth every 12 hours   Yes Historical Provider, MD   atorvastatin (LIPITOR) 40 MG tablet Take 80 mg by mouth nightly    Yes Historical Provider, MD   ondansetron (ZOFRAN ODT) 4 MG disintegrating tablet Take 1 tablet by mouth every 8 hours as needed for Nausea or Vomiting 1/6/18   Zulema Lagos MD   lisinopril (PRINIVIL;ZESTRIL) 40 MG tablet Take 40 mg by mouth daily    Historical Provider, MD        Facility Administered Medications:    sodium chloride flush  10 mL Intravenous 2 times per day    aspirin  81 mg Oral Daily    atorvastatin  80 mg Oral Nightly    gabapentin  600 mg Oral TID    furosemide  80 mg Oral BID    metoprolol succinate  25 mg Oral Daily    pantoprazole  40 mg Oral Daily    topiramate  50 mg Oral Daily    insulin lispro  0-18 Units Subcutaneous TID WC    insulin lispro  0-9 Units Subcutaneous Nightly    warfarin (COUMADIN) daily dosing (placeholder)   Other RX Placeholder    insulin glargine  50 Units Subcutaneous Nightly       Allergies:    Bactrim [sulfamethoxazole-trimethoprim]     Social History:    Social History     Social History    Marital status: Single     Spouse name: N/A    Number of children: N/A    Years of education: N/A     Occupational History    Not on file.      Social History Main Topics    Smoking status: Never Smoker    Smokeless tobacco: Never Used    Alcohol use No    Drug use: No    Sexual activity: Not on file     Other Topics Concern    Not on file     Social History Narrative    No narrative on file       Family History:  Family History   Problem Relation Age of Onset    Osteoarthritis Mother     Diabetes Mother     Hypertension Mother     Hypertension Father     Heart Attack Brother     Heart Failure Maternal Grandmother     Diabetes Maternal Grandfather     Heart Disease Maternal Grandfather     Heart Disease Paternal Grandmother     No Known Problems Paternal

## 2018-02-24 NOTE — H&P
and bowel sounds are normal. There is no hepatosplenomegaly. There is no tenderness. Musculoskeletal:        Right lower leg: She exhibits no edema. Left lower leg: She exhibits no edema. Neurological: She is alert and oriented to person, place, and time. No cranial nerve deficit. GCS eye subscore is 4. GCS verbal subscore is 5. GCS motor subscore is 6. Skin: Skin is warm and dry. Psychiatric: She has a normal mood and affect. Her speech is normal and behavior is normal.     DATA:  EKG:  I have reviewed EKG with the following interpretation:  Imaging:    CTA PULMONARY W CONTRAST   Final Result   1. The exam is grossly limited by the patient's large body habitus as   well as the contrast bolus. Bilateral lower lobe emboli are   considered. No large central embolus. 2. Cardiomegaly. Prior mediastinal surgery. No thoracic aortic   aneurysm or dissection. 3. Hypoventilated lungs with no consolidation. Signed by Dr Samina Webber on 2/23/2018 7:25 PM      XR CHEST PORTABLE   Final Result   Impression:   1. Diminished level of inspiration with no acute process identified. Prior mediastinal surgery. .   Signed by Dr Samina Webber on 2/23/2018 6:16 PM                 Labs Reviewed   APTT - Abnormal; Notable for the following:        Result Value    aPTT 47.4 (*)     All other components within normal limits   CBC WITH AUTO DIFFERENTIAL - Abnormal; Notable for the following:     WBC 13.5 (*)     Hemoglobin 17.2 (*)     Hematocrit 51.0 (*)     MCH 32.3 (*)     Neutrophils # 8.5 (*)     Monocytes # 1.20 (*)     All other components within normal limits   COMPREHENSIVE METABOLIC PANEL - Abnormal; Notable for the following:     Sodium 129 (*)     Chloride 88 (*)     Glucose 416 (*)     Alkaline Phosphatase 109 (*)     All other components within normal limits   PROTIME-INR - Abnormal; Notable for the following:     Protime 34.1 (*)     INR 3.34 (*)     All other components within normal limits   BLOOD GAS,

## 2018-02-24 NOTE — PROGRESS NOTES
Blood Gas, Arterial [[id number]] (Abnormal) Collected: 02/23/18 2016     Specimen: Blood gases Updated: 02/23/18 2016      pH, Arterial 7.430      pCO2, Arterial 41.0 mmHg       pO2, Arterial 85.0 mmHg       HCO3, Arterial 27.2 (H) mmol/L       Base Excess, Arterial 2.6 (H) mmol/L       Hemoglobin, Art, Extended 14.4 g/dL       O2 Sat, Arterial 95.4 %       Carboxyhgb, Arterial 1.5 %       Methemoglobin, Arterial 0.7 %       O2 Content, Arterial 19.4 mL/dL      Pt on room air  resp rate 18  Right brachial puncture
in cardiac patient. CT noted but D dimer was normal  Active Problems:    CAD - medical management    Essential hypertension - medical management    Type 2 DM - Lantus, POC, Lispro    GERD - medical management    Morbid obesity due to excess calories - lifestyle efforts,check TSH in am    Mixed hyperlipidemia - check lipids in am    NAYAN - CPAP if uses at home      Advance Directive: Full Code    DVT prophylaxis: Therapeutic coumadin    Discharge planning: TBD    MDM: She has atypical CP and CAD, will defer to cardiology re: evaluation. She has an abnormal CT but D dimer is NORMAL.     Patricia Alvarez MD  [facility] Hospitalist

## 2018-02-27 ENCOUNTER — HOSPITAL ENCOUNTER (OUTPATIENT)
Dept: SPEECH THERAPY | Age: 43
Setting detail: THERAPIES SERIES
Discharge: HOME | End: 2018-02-27
Payer: MEDICAID

## 2018-02-27 LAB
EKG P AXIS: 39 DEGREES
EKG P AXIS: 49 DEGREES
EKG P-R INTERVAL: 158 MS
EKG P-R INTERVAL: 162 MS
EKG Q-T INTERVAL: 340 MS
EKG Q-T INTERVAL: 372 MS
EKG QRS DURATION: 76 MS
EKG QRS DURATION: 78 MS
EKG QTC CALCULATION (BAZETT): 415 MS
EKG QTC CALCULATION (BAZETT): 436 MS
EKG T AXIS: 87 DEGREES
EKG T AXIS: 97 DEGREES

## 2018-02-27 PROCEDURE — 92507 TX SP LANG VOICE COMM INDIV: CPT

## 2018-02-27 NOTE — PROGRESS NOTES
Speech Language Pathology  Facility/Department: [facility] SPEECH THERAPY  Daily Treatment Note      NAME: Riassa Tubbs  : 1975  MRN: [id number]  Date of Eval: 2018  Evaluating Therapist: Jac Cooney   24 visits per year, 17-18 (expiration date)   [phone] Speech therapy  Authorization Number: [id number]  Completed visits: 18 out of 24         Subjective: The patient arrived to therapy on time and attempted all tasks. Pt was recently hospitalized for chest pain. She also reports very high blood sugar. Her insurance has been contacted for additional visits. Objective:  Results of the [facility] REHABILITATION were reviewed with the patient and plan of care was updated. Pt reports difficulty with selective and alternating attention. Patient does best with activities which require sustained or divided attention. An antonym/synonym task was completed at 50% with mod verbal cues. An immediate memory task was completed with 44% accuracy and moderate verbal cues. Minimal episodes of dysfluencies observed this date. Reviewed easy onset. Patient was able to identify dysfluent episodes. Nataliel Handler was observed throughout today's session (during structured tasks and during conversation)     The patient was required to read several paragraphs. She answered multiple choice questions and open ended questions following paragraphs. She scored 57% on the multiple choice questions with mod to max cues provided by the therapist.      Divided attention tasks were attempted, and the patient scored at 36% with mod to max cues.          SUMMARY:   Based upon case history, formal, and informal assessment, patient presents with mild language impairment. impaired, limited, or restricted. Patients current impairment level for memory is  CK at least 40% but less than 60% impaired, limited, or restricted. And is expected to improve VO . CI at least 1% but less than 20% impaired, limited or restricted.  Patient also demonstrates speech dysfluency, mildly impaired language, mildly impaired attention, and mildly impaired visuospatial skills. Speech therapy is warranted at this time to improve upon patients ability to effectively communicate with conversation partners.          Speech will target the short-term goals listed below:  SHORT-TERM GOALS: Patient will     Demonstrate ability to identify stuttering events on at least 75% of opportunities and with min cues/prompts. Not met     Demonstrate appropriate application of fluency shaping and stuttering modification techniques on at least 75% of opportunities and with min cues/prompts. Not met     Demonstrate an increase in word finding during structured and unstructured tasks with at least 80% accuracy and with min cues/prompts. Not met     Demonstrate an increase in auditory comprehension and written comprehension at phrase, sentence, and paragraph level with at least 75% accuracy and with mod cues/prompts. Not met     Complete select and sustained attention tasks with at least 75% accuracy and with mod cues/prompts. met    Complete alternating attention tasks with at least 75% accuracy and mod cues/prompts.     Utilize memory tools and strategies for increased safety and participation in ADLS with at least 20% accuracy and with min cues/prompts. met     Complete functional reasoning/problem solving tasks with at least 75% accuracy and with min cues/prompts. Not met     Pt will complete immediate and short term memory tasks with delay and distractors at 75% accuracy and min cues/prompts. Not met         Plan:  Continued daily Speech/Language treatment with goals per plan of care.   Electronically Signed By:  Roxana Echavarria  1/59/7087,8:06 PM.    Jenae Elliott

## 2018-03-13 ENCOUNTER — HOSPITAL ENCOUNTER (OUTPATIENT)
Dept: SPEECH THERAPY | Age: 43
Setting detail: THERAPIES SERIES
Discharge: HOME | End: 2018-03-13
Payer: MEDICAID

## 2018-03-13 PROCEDURE — 92507 TX SP LANG VOICE COMM INDIV: CPT

## 2018-03-13 NOTE — PROGRESS NOTES
Speech Language Pathology  Facility/Department: [facility] SPEECH THERAPY  Daily Treatment Note         NAME: Kyra Ruiz  : 1975  MRN: [id number]  Date : 3/13/2018  Therapist: Alicia Graves  Insurance: Arlene Cullen Number: [id number]  Authorized visits: 1x/week for 24 weeks 3/6/18-18        Subjective: The patient arrived on time. She states she has been utilizing her Alan to read. Objective: An immediate memory task was completed with 57% accuracy and moderate verbal cues. Reading comprehension tasks were completed with 70% accuracy today with minimal verbal cues. An antonym/synonym task was completed at 65% with mod verbal cues. Minimal episodes of dysfluencies observed this date. Reviewed easy onset. Patient was able to identify dysfluent episodes. Decreased anomia was observed during today's session (during structured tasks and during conversation)        Assessment:  Patient demonstrates mild speech dysfluency, mild to mod impaired language, mildly impaired attention, and mildly impaired visuospatial skills. Speech therapy is recommended to continue  to improve upon patients ability to effectively communicate with conversation partners.   [address] will target the short-term goals listed below:  SHORT-TERM GOALS: Patient will     Demonstrate ability to identify stuttering events on at least 75% of opportunities and with min cues/prompts. Not met     Demonstrate appropriate application of fluency shaping and stuttering modification techniques on at least 75% of opportunities and with min cues/prompts. Not met     Demonstrate an increase in word finding during structured and unstructured tasks with at least 80% accuracy and with min cues/prompts. Not met     Demonstrate an increase in auditory comprehension and written comprehension at phrase, sentence, and paragraph level with at least 75% accuracy and with mod cues/prompts.  Not met     Complete select and sustained attention tasks with at least 75% accuracy and with mod cues/prompts.  met     Utilize memory tools and strategies for increased safety and participation in ADLS with at least 32% accuracy and with min cues/prompts. met     Complete functional reasoning/problem solving tasks with at least 75% accuracy and with min cues/prompts. Not met     Pt will complete immediate and short term memory tasks with delay and distractors at 75% accuracy and min cues/prompts. Not met        Plan:  Continue speech therapy services at this time.     Electronically Signed By:  Moon Mccoy  9/28/8866,3:80 PM.

## 2018-03-20 ENCOUNTER — HOSPITAL ENCOUNTER (OUTPATIENT)
Dept: SPEECH THERAPY | Age: 43
Setting detail: THERAPIES SERIES
Discharge: HOME | End: 2018-03-20
Payer: MEDICAID

## 2018-03-20 PROCEDURE — 92507 TX SP LANG VOICE COMM INDIV: CPT

## 2018-03-21 ENCOUNTER — HOSPITAL ENCOUNTER (OUTPATIENT)
Dept: LAB | Age: 43
Discharge: HOME | End: 2018-03-21
Payer: MEDICAID

## 2018-03-21 LAB
INR BLD: 4.63 (ref 0.88–1.18)
PROTHROMBIN TIME: 44.2 SEC (ref 12–14.6)

## 2018-03-21 PROCEDURE — 85610 PROTHROMBIN TIME: CPT

## 2018-03-21 PROCEDURE — 36415 COLL VENOUS BLD VENIPUNCTURE: CPT

## 2018-03-27 ENCOUNTER — HOSPITAL ENCOUNTER (OUTPATIENT)
Dept: SPEECH THERAPY | Age: 43
Setting detail: THERAPIES SERIES
Discharge: HOME | End: 2018-03-27
Payer: MEDICAID

## 2018-03-27 PROCEDURE — 92507 TX SP LANG VOICE COMM INDIV: CPT

## 2018-03-28 ENCOUNTER — HOSPITAL ENCOUNTER (OUTPATIENT)
Dept: LAB | Age: 43
Discharge: HOME | End: 2018-03-28
Payer: MEDICAID

## 2018-03-28 LAB
INR BLD: 3.76 (ref 0.88–1.18)
PROTHROMBIN TIME: 37.5 SEC (ref 12–14.6)

## 2018-04-04 ENCOUNTER — HOSPITAL ENCOUNTER (OUTPATIENT)
Dept: LAB | Age: 43
Discharge: HOME | End: 2018-04-04
Payer: MEDICAID

## 2018-04-04 LAB
INR BLD: 2.8 (ref 0.88–1.18)
PROTHROMBIN TIME: 29.7 SEC (ref 12–14.6)

## 2018-04-10 ENCOUNTER — HOSPITAL ENCOUNTER (OUTPATIENT)
Dept: SPEECH THERAPY | Age: 43
Setting detail: THERAPIES SERIES
Discharge: HOME | End: 2018-04-10
Payer: MEDICAID

## 2018-04-10 PROCEDURE — 92507 TX SP LANG VOICE COMM INDIV: CPT

## 2018-04-11 ENCOUNTER — HOSPITAL ENCOUNTER (OUTPATIENT)
Dept: LAB | Age: 43
Discharge: HOME | End: 2018-04-11
Payer: MEDICAID

## 2018-04-11 LAB
INR BLD: 2.88 (ref 0.88–1.18)
PROTHROMBIN TIME: 30.3 SEC (ref 12–14.6)

## 2018-04-11 PROCEDURE — 36415 COLL VENOUS BLD VENIPUNCTURE: CPT

## 2018-04-11 PROCEDURE — 85610 PROTHROMBIN TIME: CPT

## 2018-04-17 ENCOUNTER — HOSPITAL ENCOUNTER (OUTPATIENT)
Dept: SPEECH THERAPY | Age: 43
Setting detail: THERAPIES SERIES
Discharge: HOME | End: 2018-04-17
Payer: MEDICAID

## 2018-04-17 PROCEDURE — 92507 TX SP LANG VOICE COMM INDIV: CPT

## 2018-04-24 ENCOUNTER — HOSPITAL ENCOUNTER (OUTPATIENT)
Dept: SPEECH THERAPY | Age: 43
Setting detail: THERAPIES SERIES
Discharge: HOME | End: 2018-04-24
Payer: MEDICAID

## 2018-04-24 PROCEDURE — 92507 TX SP LANG VOICE COMM INDIV: CPT

## 2018-04-25 ENCOUNTER — HOSPITAL ENCOUNTER (OUTPATIENT)
Dept: GENERAL RADIOLOGY | Age: 43
Discharge: HOME | End: 2018-04-25
Payer: MEDICAID

## 2018-04-25 LAB
INR BLD: 6.24 (ref 0.88–1.18)
PROTHROMBIN TIME: 56 SEC (ref 12–14.6)

## 2018-04-25 PROCEDURE — 36415 COLL VENOUS BLD VENIPUNCTURE: CPT

## 2018-04-25 PROCEDURE — 85610 PROTHROMBIN TIME: CPT

## 2018-04-26 ENCOUNTER — HOSPITAL ENCOUNTER (OUTPATIENT)
Dept: CT IMAGING | Age: 43
Discharge: HOME | End: 2018-04-26
Payer: MEDICAID

## 2018-04-26 DIAGNOSIS — I63.30: ICD-10-CM

## 2018-04-26 PROCEDURE — 70450 CT HEAD/BRAIN W/O DYE: CPT

## 2018-04-27 ENCOUNTER — HOSPITAL ENCOUNTER (OUTPATIENT)
Dept: LAB | Age: 43
Discharge: HOME | End: 2018-04-27
Payer: MEDICAID

## 2018-04-27 LAB
INR BLD: 3.13 (ref 0.88–1.18)
PROTHROMBIN TIME: 32.4 SEC (ref 12–14.6)

## 2018-04-27 PROCEDURE — 36415 COLL VENOUS BLD VENIPUNCTURE: CPT

## 2018-04-27 PROCEDURE — 85610 PROTHROMBIN TIME: CPT

## 2018-05-01 ENCOUNTER — HOSPITAL ENCOUNTER (OUTPATIENT)
Dept: SPEECH THERAPY | Age: 43
Setting detail: THERAPIES SERIES
Discharge: HOME | End: 2018-05-01
Payer: MEDICAID

## 2018-05-01 PROCEDURE — 92507 TX SP LANG VOICE COMM INDIV: CPT

## 2018-05-02 ENCOUNTER — HOSPITAL ENCOUNTER (OUTPATIENT)
Dept: LAB | Age: 43
Discharge: HOME | End: 2018-05-02
Payer: MEDICAID

## 2018-05-02 LAB
INR BLD: 2.72 (ref 0.88–1.18)
PROTHROMBIN TIME: 29 SEC (ref 12–14.6)

## 2018-05-02 PROCEDURE — 36415 COLL VENOUS BLD VENIPUNCTURE: CPT

## 2018-05-02 PROCEDURE — 85610 PROTHROMBIN TIME: CPT

## 2018-05-09 ENCOUNTER — HOSPITAL ENCOUNTER (OUTPATIENT)
Dept: LAB | Age: 43
Discharge: HOME | End: 2018-05-09
Payer: MEDICAID

## 2018-05-09 LAB
INR BLD: 2.48 (ref 0.88–1.18)
PROTHROMBIN TIME: 26.9 SEC (ref 12–14.6)

## 2018-05-09 PROCEDURE — 85610 PROTHROMBIN TIME: CPT

## 2018-05-09 PROCEDURE — 36415 COLL VENOUS BLD VENIPUNCTURE: CPT

## 2018-05-22 ENCOUNTER — HOSPITAL ENCOUNTER (OUTPATIENT)
Dept: SPEECH THERAPY | Age: 43
Setting detail: THERAPIES SERIES
Discharge: HOME | End: 2018-05-22
Payer: MEDICAID

## 2018-05-22 PROCEDURE — 92507 TX SP LANG VOICE COMM INDIV: CPT

## 2018-05-23 ENCOUNTER — HOSPITAL ENCOUNTER (OUTPATIENT)
Dept: LAB | Age: 43
Discharge: HOME | End: 2018-05-23
Payer: MEDICAID

## 2018-05-23 LAB
INR BLD: 3.09 (ref 0.88–1.18)
PROTHROMBIN TIME: 32.1 SEC (ref 12–14.6)

## 2018-06-05 ENCOUNTER — APPOINTMENT (OUTPATIENT)
Dept: SPEECH THERAPY | Age: 43
End: 2018-06-05
Payer: MEDICAID

## 2018-06-15 ENCOUNTER — HOSPITAL ENCOUNTER (OUTPATIENT)
Dept: LAB | Age: 43
Discharge: HOME | End: 2018-06-15
Payer: MEDICAID

## 2018-06-15 LAB
INR BLD: 1.4 (ref 0.88–1.18)
PROTHROMBIN TIME: 17.1 SEC (ref 12–14.6)

## 2018-06-19 ENCOUNTER — HOSPITAL ENCOUNTER (OUTPATIENT)
Dept: LAB | Age: 43
Discharge: HOME | End: 2018-06-19
Payer: MEDICAID

## 2018-06-19 ENCOUNTER — HOSPITAL ENCOUNTER (OUTPATIENT)
Dept: SPEECH THERAPY | Age: 43
Setting detail: THERAPIES SERIES
Discharge: HOME | End: 2018-06-19
Payer: MEDICAID

## 2018-06-19 LAB
INR BLD: 2.58 (ref 0.88–1.18)
PROTHROMBIN TIME: 27.8 SEC (ref 12–14.6)

## 2018-06-19 PROCEDURE — 92507 TX SP LANG VOICE COMM INDIV: CPT

## 2018-06-22 ENCOUNTER — HOSPITAL ENCOUNTER (OUTPATIENT)
Dept: LAB | Age: 43
Discharge: HOME | End: 2018-06-22
Payer: MEDICAID

## 2018-06-22 LAB
ANION GAP SERPL CALCULATED.3IONS-SCNC: 19 MMOL/L (ref 7–19)
BUN BLDV-MCNC: 12 MG/DL (ref 6–20)
CALCIUM SERPL-MCNC: 8.8 MG/DL (ref 8.6–10)
CHLORIDE BLD-SCNC: 99 MMOL/L (ref 98–111)
CO2: 21 MMOL/L (ref 22–29)
CREAT SERPL-MCNC: 1 MG/DL (ref 0.5–0.9)
GFR NON-AFRICAN AMERICAN: >60
GLUCOSE BLD-MCNC: 278 MG/DL (ref 74–109)
POTASSIUM SERPL-SCNC: 4 MMOL/L (ref 3.5–5)
SODIUM BLD-SCNC: 139 MMOL/L (ref 136–145)

## 2018-06-22 PROCEDURE — 80048 BASIC METABOLIC PNL TOTAL CA: CPT

## 2018-06-22 PROCEDURE — 36415 COLL VENOUS BLD VENIPUNCTURE: CPT

## 2018-06-26 ENCOUNTER — HOSPITAL ENCOUNTER (OUTPATIENT)
Dept: SPEECH THERAPY | Age: 43
Setting detail: THERAPIES SERIES
Discharge: HOME | End: 2018-06-26
Payer: MEDICAID

## 2018-06-26 PROCEDURE — 92507 TX SP LANG VOICE COMM INDIV: CPT

## 2018-06-27 ENCOUNTER — HOSPITAL ENCOUNTER (OUTPATIENT)
Dept: LAB | Age: 43
Discharge: HOME | End: 2018-06-27
Payer: MEDICAID

## 2018-06-27 LAB
INR BLD: 3.11 (ref 0.88–1.18)
PROTHROMBIN TIME: 32.2 SEC (ref 12–14.6)

## 2018-06-27 PROCEDURE — 85610 PROTHROMBIN TIME: CPT

## 2018-06-27 PROCEDURE — 36415 COLL VENOUS BLD VENIPUNCTURE: CPT

## 2018-07-03 ENCOUNTER — HOSPITAL ENCOUNTER (OUTPATIENT)
Dept: SPEECH THERAPY | Age: 43
Setting detail: THERAPIES SERIES
Discharge: HOME | End: 2018-07-03
Payer: MEDICARE

## 2018-07-03 PROCEDURE — 92507 TX SP LANG VOICE COMM INDIV: CPT

## 2018-07-10 ENCOUNTER — HOSPITAL ENCOUNTER (OUTPATIENT)
Dept: LAB | Age: 43
Discharge: HOME | End: 2018-07-10
Payer: MEDICARE

## 2018-07-10 LAB
INR BLD: 3.8 (ref 0.88–1.18)
PROTHROMBIN TIME: 37.8 SEC (ref 12–14.6)

## 2018-07-17 ENCOUNTER — HOSPITAL ENCOUNTER (OUTPATIENT)
Dept: SPEECH THERAPY | Age: 43
Setting detail: THERAPIES SERIES
Discharge: HOME | End: 2018-07-17
Payer: MEDICARE

## 2018-07-17 PROCEDURE — 92507 TX SP LANG VOICE COMM INDIV: CPT

## 2018-07-17 NOTE — PROGRESS NOTES
Speech Language Pathology  Facility/Department: [facility] SPEECH THERAPY  Re-assessment Note       NAME: Kade Marrero  : 1975   MRN: [id number]  Date of Eval: 2018   Evaluating Therapist: Nini Garcia MS CCC-SLP     ADMITTING DIAGNOSIS: has Atherosclerosis of native coronary artery of native heart with unstable angina pectoris ([address] Essential hypertension; Diabetes mellitus ([address] Mixed hyperlipidemia; GERD (gastroesophageal reflux disease); NAYAN (obstructive sleep apnea); Morbid obesity due to excess calories ([address] Acute blood loss anemia; [facility] occlusion with cerebral infarction ([address] Type 2 diabetes mellitus without complication, with long-term current use of insulin ([address] Surgical wound dehiscence; Chest pain; Chest pain; and Gastroesophageal reflux disease without esophagitis on her problem list.           PERTINENT MEDICAL INFORMATION:     Nora Elliott, a 39year-old female, was re-evaluated secondary to aphasia. Patient presented to clinic with moderate dysfluencies characterized by blocks, part-word, whole-word, and phrase repetitions with frequent secondary behaviors that primarily consisted of jaw tension, lip pressing, poor eye contact, and accessory hand movements. Patient also presented with moderately impaired attention, moderately impaired memory functioning, severely impaired executive functioning, moderately impaired language functioning, and mildly impaired visuospatial skills. Information was obtained per chart review and patient interview. Patient experienced CVA on 16 and was hospitalized until 16. During acute hospital stay, patient received speech therapy and underwent screening by physical therapy. Upon discharge, patient continued to exhibit significant dysfluencies as well as trouble remembering things, getting words mixed up, and trouble comprehending things.  Patient subsequently received outpatient speech services from 16 to 16.  Patient was discharged from outpatient services secondary to change in insurance and pending acute hospitalization. Patient underwent CABG, at [facility], on 07/27/16 and was hospitalized for approximately 6 days. Upon discharge, patient was transferred to acute rehabilitation facility where she received speech, physical, and occupational therapy until 08/12/16; patient had to transfer back to [address] [place] [address] for wound care following CABG. Patient was hospitalized at [address] [place] [address] for approximately 2 days before discharging home with home health RN, speech therapy, and physical therapy. She has been receiving outpatient speech therapy services since December 2016.      Communication and Behavioral Observations:    Often times, patient displayed fragmented thoughts and word finding difficulties where she would exhibit pauses before responding.  It was noted the patient had difficulty following directions during structured tasks. Delayed processing was observed with simple calculations.           COMPREHENSIVE EVALUATION     Cognitive Formal Assessment:       The Tay Cognitive Assessment or MOCA test assesses several cognitive domains. The short-term memory recall task (5 points) involves two learning trials of five nouns and delayed recall after approximately 5 minutes. Visuospatial abilities are assessed using a clock-drawing task (3 points) and a three-dimensional cube copy (1 point). Multiple aspects of executive functions are assessed using an alternation task adapted from the trail-making B task (1 point), a phonemic fluency task (1 point), and a two-item verbal abstraction task (2 points). Attention, concentration and working memory are evaluated using a sustained attention task (target detection using tapping; 1 point), a serial subtraction task (3 points), and digits forward and backward (1 point each).  Language is assessed using a three-item confrontation naming task with low-familiarity animals -3 points), cues/prompts. Not met  4. Patient will demonstrate an increase in simple calculations with 100% accuracy and minimal verbal cues/prompts. Not met  5. Patient will follow multistep commands with 100% accuracy and minimal verbal cues.  Not met     If you have any questions and/or concerns, please feel free to contact me at [phone].     Thank you for this referral and the opportunity to participate in this patients plan of care.     Electronically Signed By:  Catracho Newman M.S., CCC-SLP  7/17/2018,3:30 PM.

## 2018-07-24 ENCOUNTER — HOSPITAL ENCOUNTER (OUTPATIENT)
Dept: LAB | Age: 43
Discharge: HOME | End: 2018-07-24
Payer: MEDICARE

## 2018-07-24 ENCOUNTER — HOSPITAL ENCOUNTER (OUTPATIENT)
Dept: SPEECH THERAPY | Age: 43
Setting detail: THERAPIES SERIES
Discharge: HOME | End: 2018-07-24
Payer: MEDICARE

## 2018-07-24 LAB
INR BLD: 4.43 (ref 0.88–1.18)
PROTHROMBIN TIME: 42.7 SEC (ref 12–14.6)

## 2018-07-24 PROCEDURE — 92507 TX SP LANG VOICE COMM INDIV: CPT

## 2018-07-31 ENCOUNTER — APPOINTMENT (OUTPATIENT)
Dept: SPEECH THERAPY | Age: 43
End: 2018-07-31
Payer: MEDICARE

## 2018-08-07 ENCOUNTER — HOSPITAL ENCOUNTER (OUTPATIENT)
Dept: SPEECH THERAPY | Age: 43
Setting detail: THERAPIES SERIES
Discharge: HOME | End: 2018-08-07
Payer: MEDICARE

## 2018-08-13 ENCOUNTER — HOSPITAL ENCOUNTER (OUTPATIENT)
Dept: LAB | Age: 43
Discharge: HOME | End: 2018-08-13
Payer: MEDICARE

## 2018-08-13 LAB
INR BLD: 2.79 (ref 0.88–1.18)
PROTHROMBIN TIME: 29.6 SEC (ref 12–14.6)

## 2018-08-14 ENCOUNTER — HOSPITAL ENCOUNTER (OUTPATIENT)
Dept: SPEECH THERAPY | Age: 43
Setting detail: THERAPIES SERIES
Discharge: HOME | End: 2018-08-14
Payer: MEDICARE

## 2018-08-14 PROCEDURE — G9169 MEMORY GOAL STATUS: HCPCS

## 2018-08-14 PROCEDURE — 92507 TX SP LANG VOICE COMM INDIV: CPT

## 2018-08-14 PROCEDURE — G9170 MEMORY D/C STATUS: HCPCS

## 2018-08-21 ENCOUNTER — APPOINTMENT (OUTPATIENT)
Dept: SPEECH THERAPY | Age: 43
End: 2018-08-21
Payer: MEDICARE

## 2018-08-28 ENCOUNTER — APPOINTMENT (OUTPATIENT)
Dept: SPEECH THERAPY | Age: 43
End: 2018-08-28
Payer: MEDICARE

## 2018-08-28 ENCOUNTER — HOSPITAL ENCOUNTER (OUTPATIENT)
Dept: LAB | Age: 43
Discharge: HOME | End: 2018-08-28
Payer: MEDICARE

## 2018-08-28 LAB
INR BLD: 2.78 (ref 0.88–1.18)
PROTHROMBIN TIME: 29.5 SEC (ref 12–14.6)

## 2018-08-28 PROCEDURE — 85610 PROTHROMBIN TIME: CPT

## 2018-08-28 PROCEDURE — 36415 COLL VENOUS BLD VENIPUNCTURE: CPT

## 2018-09-26 ENCOUNTER — HOSPITAL ENCOUNTER (OUTPATIENT)
Dept: LAB | Age: 43
Discharge: HOME | End: 2018-09-26
Payer: MEDICARE

## 2018-09-26 LAB
INR BLD: 1.18 (ref 0.88–1.18)
PROTHROMBIN TIME: 14.9 SEC (ref 12–14.6)

## 2018-09-26 PROCEDURE — 36415 COLL VENOUS BLD VENIPUNCTURE: CPT

## 2018-09-26 PROCEDURE — 85610 PROTHROMBIN TIME: CPT

## 2018-10-03 ENCOUNTER — HOSPITAL ENCOUNTER (OUTPATIENT)
Dept: LAB | Age: 43
Discharge: HOME | End: 2018-10-03
Payer: MEDICARE

## 2018-10-03 LAB
INR BLD: 2.05 (ref 0.88–1.18)
PROTHROMBIN TIME: 23.2 SEC (ref 12–14.6)

## 2018-10-03 PROCEDURE — 36415 COLL VENOUS BLD VENIPUNCTURE: CPT

## 2018-10-03 PROCEDURE — 85610 PROTHROMBIN TIME: CPT

## 2018-10-16 ENCOUNTER — HOSPITAL ENCOUNTER (OUTPATIENT)
Dept: LAB | Age: 43
Discharge: HOME | End: 2018-10-16
Payer: MEDICARE

## 2018-10-16 LAB
INR BLD: 3.09 (ref 0.88–1.18)
PROTHROMBIN TIME: 32.1 SEC (ref 12–14.6)

## 2018-11-08 ENCOUNTER — HOSPITAL ENCOUNTER (OUTPATIENT)
Dept: LAB | Age: 43
Discharge: HOME | End: 2018-11-08
Payer: MEDICARE

## 2018-11-08 LAB
INR BLD: 4.13 (ref 0.88–1.18)
PROTHROMBIN TIME: 40.4 SEC (ref 12–14.6)

## 2018-11-08 PROCEDURE — 36415 COLL VENOUS BLD VENIPUNCTURE: CPT

## 2018-11-08 PROCEDURE — 85610 PROTHROMBIN TIME: CPT

## 2018-11-15 ENCOUNTER — HOSPITAL ENCOUNTER (OUTPATIENT)
Dept: LAB | Age: 43
Discharge: HOME | End: 2018-11-15
Payer: MEDICARE

## 2018-11-15 LAB
INR BLD: 4.16 (ref 0.88–1.18)
PROTHROMBIN TIME: 40.6 SEC (ref 12–14.6)

## 2018-11-21 ENCOUNTER — HOSPITAL ENCOUNTER (OUTPATIENT)
Dept: LAB | Age: 43
Discharge: HOME | End: 2018-11-21
Payer: MEDICARE

## 2018-11-21 LAB
INR BLD: 1.18 (ref 0.88–1.18)
PROTHROMBIN TIME: 14.9 SEC (ref 12–14.6)

## 2018-11-21 PROCEDURE — 36415 COLL VENOUS BLD VENIPUNCTURE: CPT

## 2018-11-21 PROCEDURE — 85610 PROTHROMBIN TIME: CPT

## 2018-11-27 ENCOUNTER — HOSPITAL ENCOUNTER (OUTPATIENT)
Dept: LAB | Age: 43
Discharge: HOME | End: 2018-11-27
Payer: MEDICARE

## 2018-11-27 LAB
INR BLD: 1.91 (ref 0.88–1.18)
PROTHROMBIN TIME: 21.9 SEC (ref 12–14.6)

## 2018-12-02 ENCOUNTER — OFFICE VISIT (OUTPATIENT)
Dept: URGENT CARE | Age: 43
End: 2018-12-02
Payer: MEDICARE

## 2018-12-02 VITALS
TEMPERATURE: 97.1 F | DIASTOLIC BLOOD PRESSURE: 78 MMHG | RESPIRATION RATE: 18 BRPM | OXYGEN SATURATION: 96 % | HEART RATE: 70 BPM | SYSTOLIC BLOOD PRESSURE: 138 MMHG | BODY MASS INDEX: 64.37 KG/M2 | WEIGHT: 293 LBS
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
## 2018-12-02 PROCEDURE — 99201 PR OFFICE OUTPATIENT NEW 10 MINUTES: CPT | Performed by: NURSE PRACTITIONER

## 2018-12-02 PROCEDURE — G8417 CALC BMI ABV UP PARAM F/U: HCPCS | Performed by: NURSE PRACTITIONER

## 2018-12-02 PROCEDURE — G8598 ASA/ANTIPLAT THER USED: HCPCS | Performed by: NURSE PRACTITIONER

## 2018-12-02 PROCEDURE — G8484 FLU IMMUNIZE NO ADMIN: HCPCS | Performed by: NURSE PRACTITIONER

## 2018-12-02 PROCEDURE — 1036F TOBACCO NON-USER: CPT | Performed by: NURSE PRACTITIONER

## 2018-12-02 PROCEDURE — G8428 CUR MEDS NOT DOCUMENT: HCPCS | Performed by: NURSE PRACTITIONER

## 2018-12-02 RX ORDER — CLOTRIMAZOLE AND BETAMETHASONE DIPROPIONATE 10; .64 MG/G; MG/G
CREAM TOPICAL
Qty: 30 G | Refills: 0 | Status: SHIPPED | OUTPATIENT
Start: 2018-12-02 | End: 2021-03-25

## 2018-12-04 ENCOUNTER — HOSPITAL ENCOUNTER (OUTPATIENT)
Dept: LAB | Age: 43
Discharge: HOME | End: 2018-12-04
Payer: MEDICARE

## 2018-12-04 LAB
INR BLD: 3 (ref 0.88–1.18)
PROTHROMBIN TIME: 31.3 SEC (ref 12–14.6)

## 2018-12-04 PROCEDURE — 36415 COLL VENOUS BLD VENIPUNCTURE: CPT

## 2018-12-04 PROCEDURE — 85610 PROTHROMBIN TIME: CPT

## 2018-12-11 ENCOUNTER — HOSPITAL ENCOUNTER (OUTPATIENT)
Dept: LAB | Age: 43
Discharge: HOME | End: 2018-12-11
Payer: MEDICARE

## 2018-12-11 LAB
INR BLD: 4.24 (ref 0.88–1.18)
PROTHROMBIN TIME: 40 SEC (ref 12–14.6)

## 2018-12-19 ENCOUNTER — HOSPITAL ENCOUNTER (OUTPATIENT)
Dept: LAB | Age: 43
Discharge: HOME | End: 2018-12-19
Payer: MEDICARE

## 2018-12-19 LAB
INR BLD: 1.64 (ref 0.88–1.18)
PROTHROMBIN TIME: 18.7 SEC (ref 12–14.6)

## 2018-12-19 PROCEDURE — 85610 PROTHROMBIN TIME: CPT

## 2018-12-19 PROCEDURE — 36415 COLL VENOUS BLD VENIPUNCTURE: CPT

## 2018-12-27 ENCOUNTER — HOSPITAL ENCOUNTER (OUTPATIENT)
Dept: LAB | Age: 43
Discharge: HOME | End: 2018-12-27
Payer: MEDICARE

## 2018-12-27 LAB
INR BLD: 2.56 (ref 0.88–1.18)
PROTHROMBIN TIME: 26.7 SEC (ref 12–14.6)

## 2018-12-27 PROCEDURE — 36415 COLL VENOUS BLD VENIPUNCTURE: CPT

## 2018-12-27 PROCEDURE — 85610 PROTHROMBIN TIME: CPT

## 2019-01-09 NOTE — PROGRESS NOTES
[facility] Outpatient Speech-Language Pathology  Re-Assessment Note        PATIENT: Rudy Goode  REFERRING PHYSICIAN:  Dr. Francia Barth                                                                 EVALUATOR: Myrna Hendricks       MRN: [id number]  Date of Re-evaluation: 10/10/17  Evaluating Therapist: Myrna Hendricks   24 visits per year, 8/29/17-2/28/18 (expiration date)   [phone] Speech therapy  Authorization Number: [id number]  Completed visits: 5 out of 24     PERTINENT MEDICAL INFORMATION:     Rudy Goode, a 39year-old female, was re-evaluated secondary to aphasia. Patient presented to clinic with moderate dysfluencies characterized by blocks, part-word, whole-word, and phrase repetitions with frequent secondary behaviors that primarily consisted of jaw tension, lip pressing, poor eye contact, and accessory hand movements. Patient also presented with moderately impaired attention, moderately impaired memory functioning, severely impaired executive functioning, moderately impaired language functioning, and mildly impaired visuospatial skills. Information was obtained per chart review and patient interview. Patient experienced CVA on 01/31/16 and was hospitalized until 02/08/16. During acute hospital stay, patient received speech therapy and underwent screening by physical therapy. Upon discharge, patient continued to exhibit significant dysfluencies as well as trouble remembering things, getting words mixed up, and trouble comprehending things.  Patient subsequently received outpatient speech services from 02/16/16 to 07/26/16. Patient was discharged from outpatient services secondary to change in insurance and pending acute hospitalization. Patient underwent CABG, at [facility], on 07/27/16 and was hospitalized for approximately 6 days.  Upon discharge, patient was transferred to acute rehabilitation facility where she received speech, physical, and occupational therapy until 08/12/16; patient had to transfer back to [place] for wound care following CABG. Patient was hospitalized at [place] for approximately 2 days before discharging home with home health RN, speech therapy, and physical therapy. She has been receiving outpatient speech therapy services since December 2016.  Today, she exhibited increased dysfluencies and anomia.     Communication and Behavioral Observations:  Often times, patient displayed fragmented thoughts, dysfluencies and word finding difficulties where she would exhibit pauses before responding.  It was noted the patient had difficulty following directions during structured tasks.             COMPREHENSIVE EVALUATION     Language Formal Assessment:       The Cognitive Linguistic Quick Test, and portions of the [facility] Assessment, the Western Aphasia Battery, and the Crash Inventory were used to assess the patients cognitive linguistic skills including auditory processing, recall/short-term memory with time delays and environmental distracters and problem solving/reasoning, as well as overall expressive language skills.  Results are listed below.       Patient was oriented to all biographical and temporal information.  Patient was oriented to current location.         In relation to patients memory/recall ability, a small paragraph of information was read to the patient with the patient recalling only 11 out of 18 details from the paragraph.  No time delays and/or distracters were presented.  No cues and/or prompts were given to assist in the patients recall of paragraph details.       Symbol trails and mazes were nonlinguistic tasks that were presented to assess planning, self-monitoring/executive function skills, working memory, mental flexibility and visual planning and discrimination.  Patient did not demonstrate difficulty with the scored symbol trails.      Symbol Cancellation was a nonlinguistic task of visual attention and perception.  It provides or above is considered normal. Today, the patient scored 19 out of 30 possible points.        MOCA:  Visuospatial/Executive 5/5   Naming 3/3   Memory -   Attention 0/2, 1/1, 0/3   Language 0/2, 0/1   Abstraction 2/2   Delayed Recall 2/5   Orientation 6/6   Total Score 19/30    Normal score >26/30       Cognitive Linguistic Quick Test:  COGNITIVE DOMAIN SCORE SEVERITY RANGE SEVERITY RATING   Attention 186 215-180 WNL   Memory 152 154-141 Mild   Executive Function 21 23-20 Mild   Language 29 37-29 WNL   Visuospatial Skills 83 105-82 WNL   Composite Severity Rating 3.6 4.0-3.5 WNL          SUMMARY:   Based on case history, formal and informal evaluation, Caroline Clonts exhibits moderate deficits in the areas of memory.  Patients current level of impairment for memory is  CK (at least 40% but less than 60% impaired, limited, or restricted).  She is expected to decrease impairment of memory to at least 1% but less than 20% impaired, limited, or restricted (.CI).  Speech therapy will target the following areas: auditory comprehension, immediate recall, short term memory and calculations.     RECOMMENDATION  Based on assessment results, speech therapy is recommended at 1x/wk for approximately 24 weeks. Treatment will primarily focus on stimulating patients current cognitive linguistic functioning.      1. Patient will complete short term recall tasks with 100% accuracy and minimal verbal cues. Not met  2. Patient will improve immediate recall (with and without distractors) in structured and unstructured tasks with 100% accuracy and with minimal cues/prompts. Not met  3. Patient will demonstrate an increase in paragraph recall with 100% accuracy and minimal cues/prompts. Not met  4. Patient will demonstrate an increase in simple calculations with 100% accuracy and minimal verbal cues/prompts. Not met  5. Patient will follow multistep commands with 100% accuracy and minimal verbal cues.  Not met     If you have any Unknown

## 2019-01-10 ENCOUNTER — HOSPITAL ENCOUNTER (OUTPATIENT)
Dept: LAB | Age: 44
Discharge: HOME | End: 2019-01-10
Payer: MEDICARE

## 2019-01-10 LAB
INR BLD: 2.32 (ref 0.88–1.18)
PROTHROMBIN TIME: 24.7 SEC (ref 12–14.6)

## 2019-01-10 PROCEDURE — 36415 COLL VENOUS BLD VENIPUNCTURE: CPT

## 2019-01-10 PROCEDURE — 85610 PROTHROMBIN TIME: CPT

## 2019-01-22 ENCOUNTER — HOSPITAL ENCOUNTER (OUTPATIENT)
Dept: LAB | Age: 44
Discharge: HOME | End: 2019-01-22
Payer: MEDICARE

## 2019-01-22 LAB
INR BLD: 3.68 (ref 0.88–1.18)
PROTHROMBIN TIME: 35.7 SEC (ref 12–14.6)

## 2019-02-04 ENCOUNTER — HOSPITAL ENCOUNTER (OUTPATIENT)
Dept: LAB | Age: 44
Discharge: HOME | End: 2019-02-04
Payer: MEDICARE

## 2019-02-04 LAB
INR BLD: 1.98 (ref 0.88–1.18)
PROTHROMBIN TIME: 21.8 SEC (ref 12–14.6)

## 2019-02-19 ENCOUNTER — OFFICE VISIT (OUTPATIENT)
Dept: URGENT CARE | Age: 44
End: 2019-02-19
Payer: MEDICARE

## 2019-02-19 VITALS
DIASTOLIC BLOOD PRESSURE: 86 MMHG | SYSTOLIC BLOOD PRESSURE: 126 MMHG | RESPIRATION RATE: 18 BRPM | WEIGHT: 293 LBS | OXYGEN SATURATION: 99 % | TEMPERATURE: 97.5 F | HEIGHT: 64 IN | BODY MASS INDEX: 50.02 KG/M2 | HEART RATE: 108 BPM

## 2019-02-19 DIAGNOSIS — J06.9 URI WITH COUGH AND CONGESTION: Primary | ICD-10-CM

## 2019-02-19 DIAGNOSIS — R05.9 COUGH: ICD-10-CM

## 2019-02-19 LAB
INFLUENZA A ANTIBODY: NEGATIVE
INFLUENZA B ANTIBODY: NEGATIVE
S PYO AG THROAT QL: NORMAL

## 2019-02-19 PROCEDURE — 87804 INFLUENZA ASSAY W/OPTIC: CPT | Performed by: NURSE PRACTITIONER

## 2019-02-19 PROCEDURE — 1036F TOBACCO NON-USER: CPT | Performed by: NURSE PRACTITIONER

## 2019-02-19 PROCEDURE — G8484 FLU IMMUNIZE NO ADMIN: HCPCS | Performed by: NURSE PRACTITIONER

## 2019-02-19 PROCEDURE — G8427 DOCREV CUR MEDS BY ELIG CLIN: HCPCS | Performed by: NURSE PRACTITIONER

## 2019-02-19 PROCEDURE — G8417 CALC BMI ABV UP PARAM F/U: HCPCS | Performed by: NURSE PRACTITIONER

## 2019-02-19 PROCEDURE — G8598 ASA/ANTIPLAT THER USED: HCPCS | Performed by: NURSE PRACTITIONER

## 2019-02-19 PROCEDURE — 99213 OFFICE O/P EST LOW 20 MIN: CPT | Performed by: NURSE PRACTITIONER

## 2019-02-19 PROCEDURE — 87880 STREP A ASSAY W/OPTIC: CPT | Performed by: NURSE PRACTITIONER

## 2019-02-19 RX ORDER — METHYLPREDNISOLONE 4 MG/1
TABLET ORAL
Qty: 1 KIT | Refills: 0 | Status: SHIPPED | OUTPATIENT
Start: 2019-02-19 | End: 2020-02-05

## 2019-02-19 RX ORDER — EZETIMIBE 10 MG/1
10 TABLET ORAL DAILY
COMMUNITY
Start: 2019-02-15

## 2019-02-19 RX ORDER — AZITHROMYCIN 250 MG/1
250 TABLET, FILM COATED ORAL SEE ADMIN INSTRUCTIONS
Qty: 6 TABLET | Refills: 0 | Status: SHIPPED | OUTPATIENT
Start: 2019-02-19 | End: 2019-02-24

## 2019-02-19 RX ORDER — DEXTROMETHORPHAN HYDROBROMIDE AND PROMETHAZINE HYDROCHLORIDE 15; 6.25 MG/5ML; MG/5ML
5 SYRUP ORAL NIGHTLY PRN
Qty: 120 ML | Refills: 0 | Status: SHIPPED | OUTPATIENT
Start: 2019-02-19 | End: 2020-02-05

## 2019-02-19 RX ORDER — BENZONATATE 100 MG/1
100 CAPSULE ORAL 3 TIMES DAILY PRN
Qty: 21 CAPSULE | Refills: 0 | Status: SHIPPED | OUTPATIENT
Start: 2019-02-19 | End: 2019-02-26

## 2019-02-19 ASSESSMENT — ENCOUNTER SYMPTOMS
COUGH: 1
SORE THROAT: 1

## 2019-02-21 ENCOUNTER — HOSPITAL ENCOUNTER (OUTPATIENT)
Dept: WOMENS IMAGING | Age: 44
Discharge: HOME | End: 2019-02-21
Payer: MEDICARE

## 2019-02-21 ENCOUNTER — HOSPITAL ENCOUNTER (OUTPATIENT)
Dept: LAB | Age: 44
Discharge: HOME | End: 2019-02-21
Payer: MEDICARE

## 2019-02-21 DIAGNOSIS — Z12.39 BREAST CANCER SCREENING: ICD-10-CM

## 2019-02-21 LAB
INR BLD: 3.24 (ref 0.88–1.18)
PROTHROMBIN TIME: 32.3 SEC (ref 12–14.6)

## 2019-02-21 PROCEDURE — 77067 SCR MAMMO BI INCL CAD: CPT

## 2019-02-21 PROCEDURE — 36415 COLL VENOUS BLD VENIPUNCTURE: CPT

## 2019-02-21 PROCEDURE — 85610 PROTHROMBIN TIME: CPT

## 2019-02-25 ENCOUNTER — HOSPITAL ENCOUNTER (OUTPATIENT)
Dept: LAB | Age: 44
Discharge: HOME | End: 2019-02-25
Payer: MEDICARE

## 2019-02-25 LAB
INR BLD: 3.62 (ref 0.88–1.18)
PROTHROMBIN TIME: 35.3 SEC (ref 12–14.6)

## 2019-03-04 ENCOUNTER — HOSPITAL ENCOUNTER (OUTPATIENT)
Dept: LAB | Age: 44
Discharge: HOME | End: 2019-03-04
Payer: MEDICARE

## 2019-03-04 LAB
INR BLD: 3.86 (ref 0.88–1.18)
PROTHROMBIN TIME: 37.1 SEC (ref 12–14.6)

## 2019-03-04 PROCEDURE — 36415 COLL VENOUS BLD VENIPUNCTURE: CPT

## 2019-03-04 PROCEDURE — 85610 PROTHROMBIN TIME: CPT

## 2019-03-11 ENCOUNTER — HOSPITAL ENCOUNTER (OUTPATIENT)
Dept: LAB | Age: 44
Discharge: HOME | End: 2019-03-11
Payer: MEDICARE

## 2019-03-11 LAB
INR BLD: 4.83 (ref 0.88–1.18)
PROTHROMBIN TIME: 44.4 SEC (ref 12–14.6)

## 2019-03-11 PROCEDURE — 36415 COLL VENOUS BLD VENIPUNCTURE: CPT

## 2019-03-11 PROCEDURE — 85610 PROTHROMBIN TIME: CPT

## 2019-03-18 ENCOUNTER — HOSPITAL ENCOUNTER (OUTPATIENT)
Dept: LAB | Age: 44
Discharge: HOME | End: 2019-03-18
Payer: MEDICARE

## 2019-03-18 LAB
INR BLD: 2.09 (ref 0.88–1.18)
PROTHROMBIN TIME: 22.7 SEC (ref 12–14.6)

## 2019-03-18 PROCEDURE — 36415 COLL VENOUS BLD VENIPUNCTURE: CPT

## 2019-03-18 PROCEDURE — 85610 PROTHROMBIN TIME: CPT

## 2019-03-26 ENCOUNTER — HOSPITAL ENCOUNTER (OUTPATIENT)
Dept: LAB | Age: 44
Discharge: HOME | End: 2019-03-26
Payer: MEDICARE

## 2019-03-26 LAB
INR BLD: 1.66 (ref 0.88–1.18)
PROTHROMBIN TIME: 18.9 SEC (ref 12–14.6)

## 2019-03-26 PROCEDURE — 85610 PROTHROMBIN TIME: CPT

## 2019-03-26 PROCEDURE — 36415 COLL VENOUS BLD VENIPUNCTURE: CPT

## 2019-04-02 ENCOUNTER — HOSPITAL ENCOUNTER (OUTPATIENT)
Dept: LAB | Age: 44
Discharge: HOME | End: 2019-04-02
Payer: MEDICARE

## 2019-04-02 LAB
INR BLD: 1.95 (ref 0.88–1.18)
PROTHROMBIN TIME: 21.5 SEC (ref 12–14.6)

## 2019-04-16 ENCOUNTER — HOSPITAL ENCOUNTER (OUTPATIENT)
Dept: LAB | Age: 44
Discharge: HOME | End: 2019-04-16
Payer: MEDICARE

## 2019-04-16 LAB
INR BLD: 1.64 (ref 0.88–1.18)
PROTHROMBIN TIME: 18.7 SEC (ref 12–14.6)

## 2019-04-23 ENCOUNTER — HOSPITAL ENCOUNTER (OUTPATIENT)
Dept: LAB | Age: 44
Discharge: HOME | End: 2019-04-23
Payer: MEDICARE

## 2019-04-23 LAB
INR BLD: 1.88 (ref 0.88–1.18)
PROTHROMBIN TIME: 20.9 SEC (ref 12–14.6)

## 2019-05-13 ENCOUNTER — HOSPITAL ENCOUNTER (OUTPATIENT)
Dept: LAB | Age: 44
Discharge: HOME | End: 2019-05-13
Payer: MEDICARE

## 2019-05-13 LAB
INR BLD: 2.53 (ref 0.88–1.18)
PROTHROMBIN TIME: 26.5 SEC (ref 12–14.6)

## 2019-05-13 PROCEDURE — 36415 COLL VENOUS BLD VENIPUNCTURE: CPT

## 2019-05-13 PROCEDURE — 85610 PROTHROMBIN TIME: CPT

## 2019-06-20 ENCOUNTER — HOSPITAL ENCOUNTER (OUTPATIENT)
Dept: LAB | Age: 44
Discharge: HOME | End: 2019-06-20
Payer: MEDICARE

## 2019-06-20 LAB
INR BLD: 2.39 (ref 0.88–1.18)
PROTHROMBIN TIME: 25.3 SEC (ref 12–14.6)

## 2019-06-20 PROCEDURE — 85610 PROTHROMBIN TIME: CPT

## 2019-06-20 PROCEDURE — 36415 COLL VENOUS BLD VENIPUNCTURE: CPT

## 2019-07-23 ENCOUNTER — HOSPITAL ENCOUNTER (OUTPATIENT)
Dept: LAB | Age: 44
Discharge: HOME | End: 2019-07-23
Payer: MEDICARE

## 2019-07-23 LAB
INR BLD: 3.04 (ref 0.88–1.18)
PROTHROMBIN TIME: 30.7 SEC (ref 12–14.6)

## 2019-07-23 PROCEDURE — 85610 PROTHROMBIN TIME: CPT

## 2019-07-23 PROCEDURE — 36415 COLL VENOUS BLD VENIPUNCTURE: CPT

## 2019-08-26 ENCOUNTER — HOSPITAL ENCOUNTER (OUTPATIENT)
Dept: LAB | Age: 44
Discharge: HOME | End: 2019-08-26
Payer: MEDICARE

## 2019-08-26 LAB
INR BLD: 2.17 (ref 0.88–1.18)
PROTHROMBIN TIME: 23.4 SEC (ref 12–14.6)

## 2019-08-26 PROCEDURE — 36415 COLL VENOUS BLD VENIPUNCTURE: CPT

## 2019-08-26 PROCEDURE — 85610 PROTHROMBIN TIME: CPT

## 2019-09-25 ENCOUNTER — HOSPITAL ENCOUNTER (OUTPATIENT)
Dept: LAB | Age: 44
Discharge: HOME | End: 2019-09-25
Payer: MEDICARE

## 2019-09-25 LAB
INR BLD: 1.73 (ref 0.88–1.18)
PROTHROMBIN TIME: 19.5 SEC (ref 12–14.6)

## 2019-09-25 PROCEDURE — 85610 PROTHROMBIN TIME: CPT

## 2019-09-25 PROCEDURE — 36415 COLL VENOUS BLD VENIPUNCTURE: CPT

## 2019-10-18 ENCOUNTER — HOSPITAL ENCOUNTER (OUTPATIENT)
Dept: LAB | Age: 44
Discharge: HOME | End: 2019-10-18
Payer: MEDICARE

## 2019-10-18 LAB
INR BLD: 1.26 (ref 0.88–1.18)
PROTHROMBIN TIME: 15.2 SEC (ref 12–14.6)

## 2019-10-18 PROCEDURE — 36415 COLL VENOUS BLD VENIPUNCTURE: CPT

## 2019-10-18 PROCEDURE — 85610 PROTHROMBIN TIME: CPT

## 2019-10-22 ENCOUNTER — HOSPITAL ENCOUNTER (OUTPATIENT)
Dept: LAB | Age: 44
Discharge: HOME | End: 2019-10-22
Payer: MEDICARE

## 2019-10-22 LAB
INR BLD: 2.19 (ref 0.88–1.18)
PROTHROMBIN TIME: 23.6 SEC (ref 12–14.6)

## 2019-10-22 PROCEDURE — 85610 PROTHROMBIN TIME: CPT

## 2019-10-22 PROCEDURE — 36415 COLL VENOUS BLD VENIPUNCTURE: CPT

## 2019-10-30 ENCOUNTER — HOSPITAL ENCOUNTER (OUTPATIENT)
Dept: LAB | Age: 44
Discharge: HOME | End: 2019-10-30
Payer: MEDICARE

## 2019-10-30 LAB
INR BLD: 3.59 (ref 0.88–1.18)
PROTHROMBIN TIME: 35 SEC (ref 12–14.6)

## 2019-10-30 PROCEDURE — 36415 COLL VENOUS BLD VENIPUNCTURE: CPT

## 2019-10-30 PROCEDURE — 85610 PROTHROMBIN TIME: CPT

## 2019-11-06 ENCOUNTER — HOSPITAL ENCOUNTER (OUTPATIENT)
Dept: LAB | Age: 44
Discharge: HOME | End: 2019-11-06
Payer: MEDICARE

## 2019-11-06 LAB
INR BLD: 2.5 (ref 0.88–1.18)
PROTHROMBIN TIME: 26.2 SEC (ref 12–14.6)

## 2019-11-13 ENCOUNTER — HOSPITAL ENCOUNTER (OUTPATIENT)
Dept: LAB | Age: 44
Discharge: HOME | End: 2019-11-13
Payer: MEDICARE

## 2019-11-13 LAB
INR BLD: 1.94 (ref 0.88–1.18)
PROTHROMBIN TIME: 21.4 SEC (ref 12–14.6)

## 2019-11-20 ENCOUNTER — HOSPITAL ENCOUNTER (OUTPATIENT)
Dept: LAB | Age: 44
Discharge: HOME | End: 2019-11-20
Payer: MEDICARE

## 2019-11-20 LAB
INR BLD: 2.66 (ref 0.88–1.18)
PROTHROMBIN TIME: 27.6 SEC (ref 12–14.6)

## 2019-11-20 PROCEDURE — 85610 PROTHROMBIN TIME: CPT

## 2019-11-20 PROCEDURE — 36415 COLL VENOUS BLD VENIPUNCTURE: CPT

## 2019-12-04 ENCOUNTER — HOSPITAL ENCOUNTER (OUTPATIENT)
Dept: LAB | Age: 44
Discharge: HOME | End: 2019-12-04
Payer: MEDICARE

## 2019-12-04 LAB
INR BLD: 1.82 (ref 0.88–1.18)
PROTHROMBIN TIME: 20.3 SEC (ref 12–14.6)

## 2019-12-04 PROCEDURE — 85610 PROTHROMBIN TIME: CPT

## 2019-12-04 PROCEDURE — 36415 COLL VENOUS BLD VENIPUNCTURE: CPT

## 2019-12-11 ENCOUNTER — HOSPITAL ENCOUNTER (OUTPATIENT)
Dept: LAB | Age: 44
Discharge: HOME | End: 2019-12-11
Payer: MEDICARE

## 2019-12-11 LAB
INR BLD: 3.08 (ref 0.88–1.18)
PROTHROMBIN TIME: 31 SEC (ref 12–14.6)

## 2019-12-11 PROCEDURE — 85610 PROTHROMBIN TIME: CPT

## 2019-12-11 PROCEDURE — 36415 COLL VENOUS BLD VENIPUNCTURE: CPT

## 2019-12-18 ENCOUNTER — HOSPITAL ENCOUNTER (OUTPATIENT)
Dept: LAB | Age: 44
Discharge: HOME | End: 2019-12-18
Payer: MEDICARE

## 2019-12-18 LAB
INR BLD: 2.5 (ref 0.88–1.18)
PROTHROMBIN TIME: 26.2 SEC (ref 12–14.6)

## 2020-01-08 ENCOUNTER — HOSPITAL ENCOUNTER (OUTPATIENT)
Dept: LAB | Age: 45
Discharge: HOME | End: 2020-01-08
Payer: MEDICARE

## 2020-01-08 LAB
INR BLD: 2.44 (ref 0.88–1.18)
PROTHROMBIN TIME: 25.7 SEC (ref 12–14.6)

## 2020-01-08 PROCEDURE — 36415 COLL VENOUS BLD VENIPUNCTURE: CPT

## 2020-01-08 PROCEDURE — 85610 PROTHROMBIN TIME: CPT

## 2020-02-05 ENCOUNTER — OFFICE VISIT (OUTPATIENT)
Dept: URGENT CARE | Age: 45
End: 2020-02-05
Payer: MEDICARE

## 2020-02-05 VITALS
DIASTOLIC BLOOD PRESSURE: 84 MMHG | TEMPERATURE: 97.2 F | HEIGHT: 64 IN | OXYGEN SATURATION: 97 % | BODY MASS INDEX: 50.02 KG/M2 | WEIGHT: 293 LBS | HEART RATE: 91 BPM | SYSTOLIC BLOOD PRESSURE: 134 MMHG | RESPIRATION RATE: 18 BRPM

## 2020-02-05 LAB
INFLUENZA A ANTIBODY: NEGATIVE
INFLUENZA B ANTIBODY: NEGATIVE
S PYO AG THROAT QL: NORMAL

## 2020-02-05 PROCEDURE — 87804 INFLUENZA ASSAY W/OPTIC: CPT | Performed by: NURSE PRACTITIONER

## 2020-02-05 PROCEDURE — 1036F TOBACCO NON-USER: CPT | Performed by: NURSE PRACTITIONER

## 2020-02-05 PROCEDURE — 99213 OFFICE O/P EST LOW 20 MIN: CPT | Performed by: NURSE PRACTITIONER

## 2020-02-05 PROCEDURE — 87880 STREP A ASSAY W/OPTIC: CPT | Performed by: NURSE PRACTITIONER

## 2020-02-05 PROCEDURE — G8484 FLU IMMUNIZE NO ADMIN: HCPCS | Performed by: NURSE PRACTITIONER

## 2020-02-05 PROCEDURE — G8428 CUR MEDS NOT DOCUMENT: HCPCS | Performed by: NURSE PRACTITIONER

## 2020-02-05 PROCEDURE — G8417 CALC BMI ABV UP PARAM F/U: HCPCS | Performed by: NURSE PRACTITIONER

## 2020-02-05 RX ORDER — AMOXICILLIN 500 MG/1
500 CAPSULE ORAL 2 TIMES DAILY
Qty: 20 CAPSULE | Refills: 0 | Status: SHIPPED | OUTPATIENT
Start: 2020-02-05 | End: 2020-02-15

## 2020-02-05 RX ORDER — BENZONATATE 100 MG/1
100 CAPSULE ORAL 3 TIMES DAILY PRN
Qty: 21 CAPSULE | Refills: 0 | Status: SHIPPED | OUTPATIENT
Start: 2020-02-05 | End: 2020-02-12

## 2020-02-05 RX ORDER — DEXTROMETHORPHAN HYDROBROMIDE AND PROMETHAZINE HYDROCHLORIDE 15; 6.25 MG/5ML; MG/5ML
5 SYRUP ORAL NIGHTLY PRN
Qty: 120 ML | Refills: 0 | Status: SHIPPED | OUTPATIENT
Start: 2020-02-05 | End: 2021-03-18

## 2020-02-05 ASSESSMENT — ENCOUNTER SYMPTOMS
NAUSEA: 0
ABDOMINAL PAIN: 0
SHORTNESS OF BREATH: 0
VOMITING: 0
COUGH: 1
DIARRHEA: 0
SORE THROAT: 1
RHINORRHEA: 0

## 2020-02-05 NOTE — PATIENT INSTRUCTIONS
acetaminophen (Tylenol) can be harmful. · Get plenty of rest.  · Do not smoke or allow others to smoke around you. If you need help quitting, talk to your doctor about stop-smoking programs and medicines. These can increase your chances of quitting for good. When should you call for help? Call 911 anytime you think you may need emergency care. For example, call if:    · You have severe trouble breathing.    Call your doctor now or seek immediate medical care if:    · You seem to be getting much sicker.     · You have new or worse trouble breathing.     · You have a new or higher fever.     · You have a new rash.    Watch closely for changes in your health, and be sure to contact your doctor if:    · You have a new symptom, such as a sore throat, an earache, or sinus pain.     · You cough more deeply or more often, especially if you notice more mucus or a change in the color of your mucus.     · You do not get better as expected. Where can you learn more? Go to https://Stitch.[employer] org and sign in to your [employer] account. Enter J329 in the Valopaa box to learn more about \"Upper Respiratory Infection (Cold): Care Instructions. \"     If you do not have an account, please click on the \"Sign Up Now\" link. Current as of: June 9, 2019  Content Version: 12.3  © 5614-8736 Healthwise, Incorporated. Care instructions adapted under license by [facility] ([facility]). If you have questions about a medical condition or this instruction, always ask your healthcare professional. [place] [address] any warranty or liability for your use of this information. Patient Education        Sore Throat: Care Instructions  Your Care Instructions    Infection by bacteria or a virus causes most sore throats. Cigarette smoke, dry air, air pollution, allergies, and yelling can also cause a sore throat. Sore throats can be painful and annoying. Fortunately, most sore throats go away on their own.  Watch closely for changes in your health, and be sure to contact your doctor if you do not get better as expected. Where can you learn more? Go to https://chpepiceweb.[employer]. org and sign in to your [employer] account. Enter W725 in the One On One box to learn more about \"Sore Throat: Care Instructions. \"     If you do not have an account, please click on the \"Sign Up Now\" link. Current as of: July 28, 2019  Content Version: 12.3  © 2788-7303 Healthwise, Incorporated. Care instructions adapted under license by [facility] ([facility]). If you have questions about a medical condition or this instruction, always ask your healthcare professional. [address] any warranty or liability for your use of this information. 1. Rest and increase fluid intake. 2. Take tessalon as needed for coughing. Take Promethazine DM as needed at night for cough-this medication can make you drowsy. 3. Monitor for fever and treat as needed with tylenol or ibuprofen  4. If patient is not improving or developing any new/worsening symptoms then return to clinic as needed or follow up with PCP  5.  Antibiotic as prescribed

## 2020-02-05 NOTE — PROGRESS NOTES
(PROMETHAZINE-DM) 6.25-15 MG/5ML syrup Take 5 mLs by mouth nightly as needed for Cough 120 mL 0    ezetimibe (ZETIA) 10 MG tablet Take 10 mg by mouth      Exenatide (BYDUREON) 2 MG PEN INJECT 0.65 MLS ONCE Q WEEK      clotrimazole-betamethasone (LOTRISONE) 1-0.05 % cream Apply topically 2 times daily. 30 g 0    furosemide (LASIX) 40 MG tablet Take 80 mg by mouth 2 times daily      insulin glargine (BASAGLAR KWIKPEN) 100 UNIT/ML injection pen Inject 50 Units into the skin nightly      bethanechol (URECHOLINE) 25 MG tablet Take 1 tablet by mouth daily      metFORMIN (GLUCOPHAGE) 1000 MG tablet Take 1 tablet by mouth daily Hold till monday      insulin aspart (NOVOLOG) 100 UNIT/ML injection vial Inject 10 Units into the skin 4 times daily (before meals and nightly) Up to 10 units ss coverage as directed      pantoprazole (PROTONIX) 40 MG tablet Take 1 tablet by mouth daily      spironolactone (ALDACTONE) 25 MG tablet Take 12.5 mg by mouth daily      topiramate (TOPAMAX) 50 MG tablet Take 1 tablet by mouth daily      ondansetron (ZOFRAN ODT) 4 MG disintegrating tablet Take 1 tablet by mouth every 8 hours as needed for Nausea or Vomiting 15 tablet 0    warfarin (COUMADIN) 10 MG tablet Take 10 mg by mouth      aspirin 81 MG tablet Take 81 mg by mouth daily      gabapentin (NEURONTIN) 600 MG tablet Take 600 mg by mouth 3 times daily .  metoprolol succinate ER (TOPROL-XL) 25 MG XL tablet Take 25 mg by mouth daily      prochlorperazine (COMPAZINE) 10 MG tablet Take 10 mg by mouth 2 times daily as needed (headache)      verapamil (CALAN) 40 MG tablet Take 40 mg by mouth every 12 hours      lisinopril (PRINIVIL;ZESTRIL) 40 MG tablet Take 40 mg by mouth daily      atorvastatin (LIPITOR) 40 MG tablet Take 80 mg by mouth nightly        No current facility-administered medications for this visit.       Allergies   Allergen Reactions    Bactrim [Sulfamethoxazole-Trimethoprim]        Health Maintenance Topic Date Due    Diabetic foot exam  05/02/1985    A1C test (Diabetic or Prediabetic)  05/02/1985    Diabetic retinal exam  05/02/1985    HIV screen  05/02/1990    Diabetic microalbuminuria test  05/02/1993    Hepatitis B vaccine (1 of 3 - Risk 3-dose series) 05/02/1994    Cervical cancer screen  05/02/1996    Lipid screen  07/28/2018    Annual Wellness Visit (AWV)  06/20/2019    Potassium monitoring  06/22/2019    Creatinine monitoring  06/22/2019    Flu vaccine (1) 09/01/2019    Shingles Vaccine (1 of 2) 05/02/2025    DTaP/Tdap/Td vaccine (2 - Td) 10/10/2027    Pneumococcal 0-64 years Vaccine  Completed    Hepatitis A vaccine  Aged Out    Hib vaccine  Aged Out    Meningococcal (ACWY) vaccine  Aged Out       Subjective:     Review of Systems   Constitutional: Negative for chills, fatigue and fever. HENT: Positive for congestion and sore throat. Negative for ear pain and rhinorrhea. Respiratory: Positive for cough. Negative for shortness of breath. Gastrointestinal: Negative for abdominal pain, diarrhea, nausea and vomiting. Skin: Negative for rash. Neurological: Negative for headaches. All other systems reviewed and are negative.      :Objective      Physical Exam  Vitals signs and nursing note reviewed. Constitutional:       General: She is not in acute distress. Appearance: Normal appearance. She is well-developed. She is ill-appearing. She is not diaphoretic. HENT:      Head: Normocephalic and atraumatic. Right Ear: Tympanic membrane, ear canal and external ear normal.      Left Ear: Tympanic membrane, ear canal and external ear normal.      Nose: Nose normal.      Mouth/Throat:      Mouth: Mucous membranes are moist.      Pharynx: Posterior oropharyngeal erythema present. Eyes:      Conjunctiva/sclera: Conjunctivae normal.      Pupils: Pupils are equal, round, and reactive to light. Neck:      Musculoskeletal: Normal range of motion.    Cardiovascular: Rate and Rhythm: Normal rate and regular rhythm. Heart sounds: Normal heart sounds. No murmur. Pulmonary:      Effort: Pulmonary effort is normal. No respiratory distress. Breath sounds: Normal breath sounds. No wheezing or rhonchi. Skin:     General: Skin is warm and dry. Findings: No rash. Neurological:      Mental Status: She is alert and oriented to person, place, and time. Psychiatric:         Behavior: Behavior normal.       /84   Pulse 91   Temp 97.2 °F (36.2 °C) (Temporal)   Resp 18   Ht 5' 4\" (1.626 m)   Wt (!) 366 lb (166 kg)   SpO2 97%   BMI 62.82 kg/m²     :Assessment       Diagnosis Orders   1. Upper respiratory tract infection, unspecified type  amoxicillin (AMOXIL) 500 MG capsule    benzonatate (TESSALON) 100 MG capsule    promethazine-dextromethorphan (PROMETHAZINE-DM) 6.25-15 MG/5ML syrup   2. Sore throat  POCT rapid strep A    POCT Influenza A/B       :Plan    1. Rest and increase fluid intake. 2. Take tessalon as needed for coughing. Take Promethazine DM as needed at night for cough-this medication can make you drowsy. 3. Monitor for fever and treat as needed with tylenol or ibuprofen  4. If patient is not improving or developing any new/worsening symptoms then return to clinic as needed or follow up with PCP  5. Antibiotic as prescribed   Orders Placed This Encounter   Procedures    POCT rapid strep A    POCT Influenza A/B     Results for orders placed or performed in visit on 02/05/20   POCT rapid strep A   Result Value Ref Range    Strep A Ag None Detected None Detected   POCT Influenza A/B   Result Value Ref Range    Influenza A Ab Negative     Influenza B Ab Negative          No follow-ups on file.     Orders Placed This Encounter   Medications    amoxicillin (AMOXIL) 500 MG capsule     Sig: Take 1 capsule by mouth 2 times daily for 10 days     Dispense:  20 capsule     Refill:  0    benzonatate (TESSALON) 100 MG capsule     Sig: Take 1 capsule by mouth 3 times daily as needed for Cough     Dispense:  21 capsule     Refill:  0    promethazine-dextromethorphan (PROMETHAZINE-DM) 6.25-15 MG/5ML syrup     Sig: Take 5 mLs by mouth nightly as needed for Cough     Dispense:  120 mL     Refill:  0       Patient given educational materials- see patient instructions. Discussed use, benefit, and side effects of prescribedmedications. All patient questions answered. Pt voiced understanding. Patient Instructions       Patient Education        Upper Respiratory Infection (Cold): Care Instructions  Your Care Instructions    An upper respiratory infection, or URI, is an infection of the nose, sinuses, or throat. URIs are spread by coughs, sneezes, and direct contact. The common cold is the most frequent kind of URI. The flu and sinus infections are other kinds of URIs. Almost all URIs are caused by viruses. Antibiotics won't cure them. But you can treat most infections with home care. This may include drinking lots of fluids and taking over-the-counter pain medicine. You will probably feel better in 4 to 10 days. The doctor has checked you carefully, but problems can develop later. If you notice any problems or new symptoms, get medical treatment right away. Follow-up care is a key part of your treatment and safety. Be sure to make and go to all appointments, and call your doctor if you are having problems. It's also a good idea to know your test results and keep a list of the medicines you take. How can you care for yourself at home? · To prevent dehydration, drink plenty of fluids, enough so that your urine is light yellow or clear like water. Choose water and other caffeine-free clear liquids until you feel better. If you have kidney, heart, or liver disease and have to limit fluids, talk with your doctor before you increase the amount of fluids you drink.   · Take an over-the-counter pain medicine, such as acetaminophen (Tylenol), ibuprofen (Advil, Motrin), always ask your healthcare professional. [place] [address] any warranty or liability for your use of this information. Patient Education        Sore Throat: Care Instructions  Your Care Instructions    Infection by bacteria or a virus causes most sore throats. Cigarette smoke, dry air, air pollution, allergies, and yelling can also cause a sore throat. Sore throats can be painful and annoying. Fortunately, most sore throats go away on their own. If you have a bacterial infection, your doctor may prescribe antibiotics. Follow-up care is a key part of your treatment and safety. Be sure to make and go to all appointments, and call your doctor if you are having problems. It's also a good idea to know your test results and keep a list of the medicines you take. How can you care for yourself at home? · If your doctor prescribed antibiotics, take them as directed. Do not stop taking them just because you feel better. You need to take the full course of antibiotics. · Gargle with warm salt water once an hour to help reduce swelling and relieve discomfort. Use 1 teaspoon of salt mixed in 1 cup of warm water. · Take an over-the-counter pain medicine, such as acetaminophen (Tylenol), ibuprofen (Advil, Motrin), or naproxen (Aleve). Read and follow all instructions on the label. · Be careful when taking over-the-counter cold or flu medicines and Tylenol at the same time. Many of these medicines have acetaminophen, which is Tylenol. Read the labels to make sure that you are not taking more than the recommended dose. Too much acetaminophen (Tylenol) can be harmful. · Drink plenty of fluids. Fluids may help soothe an irritated throat. Hot fluids, such as tea or soup, may help decrease throat pain. · Use over-the-counter throat lozenges to soothe pain. Regular cough drops or hard candy may also help. These should not be given to young children because of the risk of choking.   · Do not smoke or allow others to smoke around you. If you need help quitting, talk to your doctor about stop-smoking programs and medicines. These can increase your chances of quitting for good. · Use a vaporizer or humidifier to add moisture to your bedroom. Follow the directions for cleaning the machine. When should you call for help? Call your doctor now or seek immediate medical care if:    · You have new or worse trouble swallowing.     · Your sore throat gets much worse on one side.    Watch closely for changes in your health, and be sure to contact your doctor if you do not get better as expected. Where can you learn more? Go to https://[email or web address] Socialpepiceweb.Indicee. org and sign in to your [employer] account. Enter N506 in the [employer] box to learn more about \"Sore Throat: Care Instructions. \"     If you do not have an account, please click on the \"Sign Up Now\" link. Current as of: July 28, 2019  Content Version: 12.3  © 7674-7470 [employer]. Care instructions adapted under license by [facility] ([facility]). If you have questions about a medical condition or this instruction, always ask your healthcare professional. [place] [address] any warranty or liability for your use of this information. 1. Rest and increase fluid intake. 2. Take tessalon as needed for coughing. Take Promethazine DM as needed at night for cough-this medication can make you drowsy. 3. Monitor for fever and treat as needed with tylenol or ibuprofen  4. If patient is not improving or developing any new/worsening symptoms then return to clinic as needed or follow up with PCP  5.  Antibiotic as prescribed         Electronically signed by YARIEL Emerson on 2/5/2020 at 4:40 PM

## 2020-02-11 ENCOUNTER — HOSPITAL ENCOUNTER (OUTPATIENT)
Dept: LAB | Age: 45
Discharge: HOME | End: 2020-02-11
Payer: MEDICARE

## 2020-02-11 LAB
INR BLD: 1.53 (ref 0.88–1.18)
PROTHROMBIN TIME: 17.7 SEC (ref 12–14.6)

## 2020-02-11 PROCEDURE — 85610 PROTHROMBIN TIME: CPT

## 2020-02-11 PROCEDURE — 36415 COLL VENOUS BLD VENIPUNCTURE: CPT

## 2020-02-17 ENCOUNTER — HOSPITAL ENCOUNTER (OUTPATIENT)
Dept: LAB | Age: 45
Discharge: HOME | End: 2020-02-17
Payer: MEDICARE

## 2020-02-17 LAB
INR BLD: 2.32 (ref 0.88–1.18)
PROTHROMBIN TIME: 24.7 SEC (ref 12–14.6)

## 2020-02-17 PROCEDURE — 85610 PROTHROMBIN TIME: CPT

## 2020-02-17 PROCEDURE — 36415 COLL VENOUS BLD VENIPUNCTURE: CPT

## 2020-03-14 ENCOUNTER — OFFICE VISIT (OUTPATIENT)
Dept: URGENT CARE | Age: 45
End: 2020-03-14
Payer: MEDICARE

## 2020-03-14 VITALS
DIASTOLIC BLOOD PRESSURE: 80 MMHG | RESPIRATION RATE: 18 BRPM | HEIGHT: 64 IN | OXYGEN SATURATION: 99 % | TEMPERATURE: 98.2 F | BODY MASS INDEX: 50.02 KG/M2 | SYSTOLIC BLOOD PRESSURE: 112 MMHG | HEART RATE: 108 BPM | WEIGHT: 293 LBS

## 2020-03-14 LAB
APPEARANCE FLUID: ABNORMAL
BILIRUBIN, POC: ABNORMAL
BLOOD URINE, POC: ABNORMAL
CLARITY, POC: ABNORMAL
COLOR, POC: ABNORMAL
GLUCOSE URINE, POC: 250
KETONES, POC: ABNORMAL
LEUKOCYTE EST, POC: ABNORMAL
NITRITE, POC: ABNORMAL
PH, POC: 5.5
PROTEIN, POC: 30
SPECIFIC GRAVITY, POC: 1.02
UROBILINOGEN, POC: 1

## 2020-03-14 PROCEDURE — 81002 URINALYSIS NONAUTO W/O SCOPE: CPT | Performed by: PHYSICIAN ASSISTANT

## 2020-03-14 PROCEDURE — 99213 OFFICE O/P EST LOW 20 MIN: CPT | Performed by: PHYSICIAN ASSISTANT

## 2020-03-14 PROCEDURE — G8417 CALC BMI ABV UP PARAM F/U: HCPCS | Performed by: PHYSICIAN ASSISTANT

## 2020-03-14 PROCEDURE — 1036F TOBACCO NON-USER: CPT | Performed by: PHYSICIAN ASSISTANT

## 2020-03-14 PROCEDURE — G8484 FLU IMMUNIZE NO ADMIN: HCPCS | Performed by: PHYSICIAN ASSISTANT

## 2020-03-14 PROCEDURE — G8427 DOCREV CUR MEDS BY ELIG CLIN: HCPCS | Performed by: PHYSICIAN ASSISTANT

## 2020-03-14 RX ORDER — AMOXICILLIN AND CLAVULANATE POTASSIUM 500; 125 MG/1; MG/1
1 TABLET, FILM COATED ORAL 2 TIMES DAILY
Qty: 20 TABLET | Refills: 0 | Status: SHIPPED | OUTPATIENT
Start: 2020-03-14 | End: 2020-03-24

## 2020-03-14 RX ORDER — FLUCONAZOLE 150 MG/1
TABLET ORAL
Qty: 3 TABLET | Refills: 0 | Status: SHIPPED | OUTPATIENT
Start: 2020-03-14 | End: 2021-03-18

## 2020-03-14 NOTE — PROGRESS NOTES
Subjective:      Patient ID: Roland Deal is a 40 y.o. female. HPI  Raj Mina presents with pain with urination, urinary urgency, and vaginal irritation. She first noticed this 2 days ago. She denies fever. Results for orders placed or performed in visit on 03/14/20   POCT Urinalysis no Micro   Result Value Ref Range    Color, UA Lindsay     Clarity, UA slightly cloudy     Glucose, UA      Bilirubin, UA Small     Ketones, UA Trace     Spec Grav, UA 1.025     Blood, UA POC Small     pH, UA 5.5     Protein, UA POC 30     Urobilinogen, UA 1.0     Leukocytes, UA Small     Nitrite, UA Neg     Appearance, Fluid Slightly Cloudy Clear, Slightly Cloudy       Roland Deal is a 40 y.o. female with the following history as recorded in [facility]:  Patient Active Problem List    Diagnosis Date Noted    Chest pain 02/24/2018    Gastroesophageal reflux disease without esophagitis     Chest pain 02/23/2018    Surgical wound dehiscence     Atherosclerosis of native coronary artery of native heart with unstable angina pectoris [facility] 08/04/2016    Essential hypertension 08/04/2016    Diabetes mellitus [address] 08/04/2016    Mixed hyperlipidemia 08/04/2016    GERD (gastroesophageal reflux disease) 08/04/2016    NAYAN (obstructive sleep apnea) 08/04/2016    Morbid obesity due to excess calories ([address] 08/04/2016    Acute blood loss anemia 08/04/2016    [facility] occlusion with cerebral infarction ([facility] 08/04/2016    Type 2 diabetes mellitus without complication, with long-term current use of insulin ([facility])      Current Outpatient Medications   Medication Sig Dispense Refill    fluconazole (DIFLUCAN) 150 MG tablet 1 tablet po today, then take 1 tablet po on day 5 and 1 tablet po on day 10 of antibiotics.  3 tablet 0    amoxicillin-clavulanate (AUGMENTIN) 500-125 MG per tablet Take 1 tablet by mouth 2 times daily for 10 days 20 tablet 0    ezetimibe (ZETIA) 10 MG tablet Take 10 mg by mouth      Exenatide (BYDUREON) 2 MG PEN INJECT 0.65 MLS ONCE Q WEEK      clotrimazole-betamethasone (LOTRISONE) 1-0.05 % cream Apply topically 2 times daily. 30 g 0    furosemide (LASIX) 40 MG tablet Take 80 mg by mouth 2 times daily      insulin glargine (BASAGLAR KWIKPEN) 100 UNIT/ML injection pen Inject 50 Units into the skin nightly      bethanechol (URECHOLINE) 25 MG tablet Take 1 tablet by mouth daily      metFORMIN (GLUCOPHAGE) 1000 MG tablet Take 1 tablet by mouth daily Hold till monday      insulin aspart (NOVOLOG) 100 UNIT/ML injection vial Inject 10 Units into the skin 4 times daily (before meals and nightly) Up to 10 units ss coverage as directed      pantoprazole (PROTONIX) 40 MG tablet Take 1 tablet by mouth daily      spironolactone (ALDACTONE) 25 MG tablet Take 12.5 mg by mouth daily      topiramate (TOPAMAX) 50 MG tablet Take 1 tablet by mouth daily      ondansetron (ZOFRAN ODT) 4 MG disintegrating tablet Take 1 tablet by mouth every 8 hours as needed for Nausea or Vomiting 15 tablet 0    warfarin (COUMADIN) 10 MG tablet Take 10 mg by mouth      aspirin 81 MG tablet Take 81 mg by mouth daily      gabapentin (NEURONTIN) 600 MG tablet Take 600 mg by mouth 3 times daily .  metoprolol succinate ER (TOPROL-XL) 25 MG XL tablet Take 25 mg by mouth daily      prochlorperazine (COMPAZINE) 10 MG tablet Take 10 mg by mouth 2 times daily as needed (headache)      verapamil (CALAN) 40 MG tablet Take 40 mg by mouth every 12 hours      lisinopril (PRINIVIL;ZESTRIL) 40 MG tablet Take 40 mg by mouth daily      atorvastatin (LIPITOR) 40 MG tablet Take 80 mg by mouth nightly       promethazine-dextromethorphan (PROMETHAZINE-DM) 6.25-15 MG/5ML syrup Take 5 mLs by mouth nightly as needed for Cough (Patient not taking: Reported on 3/14/2020) 120 mL 0     No current facility-administered medications for this visit.       Allergies: Bactrim [sulfamethoxazole-trimethoprim]  Past Medical History:   Diagnosis Date    CAD (coronary artery disease)     [facility] occlusion [address]     Diabetes mellitus ([address]     GERD (gastroesophageal reflux disease)     Hypertension      Past Surgical History:   Procedure Laterality Date    CARDIAC SURGERY      CORONARY ARTERY BYPASS GRAFT      2016    TONSILLECTOMY       Family History   Problem Relation Age of Onset    Osteoarthritis Mother     Diabetes Mother     Hypertension Mother     Hypertension Father     Heart Attack Brother     Heart Failure Maternal Grandmother     Diabetes Maternal Grandfather     Heart Disease Maternal Grandfather     Heart Disease Paternal Grandmother     No Known Problems Paternal Grandfather      Social History     Tobacco Use    Smoking status: Never Smoker    Smokeless tobacco: Never Used   Substance Use Topics    Alcohol use: No         Review of Systems   Genitourinary: Positive for dysuria, frequency and urgency. All other systems reviewed and are negative. Objective:   Physical Exam  Constitutional:       Appearance: Normal appearance. Genitourinary:     Comments: There is white discharge and the bilateral labia are swollen and irritated. Skin:     General: Skin is warm and dry. Neurological:      General: No focal deficit present. Mental Status: She is alert and oriented to person, place, and time. Psychiatric:         Mood and Affect: Mood normal.         Behavior: Behavior normal.         Thought Content: Thought content normal.         Judgment: Judgment normal.         Assessment:      UTI  Yeast vaginitis      Plan:      Her urine was sent for culture. I placed her on abx and diflucan. She will return if her symptoms worsen or fail to improve.           Tita Reid PA-C

## 2020-03-16 ENCOUNTER — TELEPHONE (OUTPATIENT)
Dept: SURGERY | Age: 45
End: 2020-03-16

## 2020-03-16 LAB — URINE CULTURE, ROUTINE: NORMAL

## 2020-03-24 ENCOUNTER — HOSPITAL ENCOUNTER (OUTPATIENT)
Dept: LAB | Age: 45
Discharge: HOME | End: 2020-03-24
Payer: MEDICARE

## 2020-03-24 LAB
INR BLD: 3.31 (ref 0.88–1.18)
PROTHROMBIN TIME: 34.7 SEC (ref 12–14.6)

## 2020-03-25 PROBLEM — R07.9 CHEST PAIN: Status: RESOLVED | Noted: 2018-02-23 | Resolved: 2020-03-24

## 2020-05-11 ENCOUNTER — HOSPITAL ENCOUNTER (OUTPATIENT)
Dept: LAB | Age: 45
Discharge: HOME | End: 2020-05-11
Payer: MEDICARE

## 2020-05-11 LAB
INR BLD: 2.13 (ref 0.88–1.18)
PROTHROMBIN TIME: 24.2 SEC (ref 12–14.6)

## 2020-05-11 PROCEDURE — 85610 PROTHROMBIN TIME: CPT

## 2020-05-11 PROCEDURE — 36415 COLL VENOUS BLD VENIPUNCTURE: CPT

## 2020-06-10 ENCOUNTER — HOSPITAL ENCOUNTER (OUTPATIENT)
Dept: LAB | Age: 45
Discharge: HOME | End: 2020-06-10
Payer: MEDICARE

## 2020-06-10 LAB
INR BLD: 3.07 (ref 0.88–1.18)
PROTHROMBIN TIME: 32.6 SEC (ref 12–14.6)

## 2020-06-10 PROCEDURE — 85610 PROTHROMBIN TIME: CPT

## 2020-06-10 PROCEDURE — 36415 COLL VENOUS BLD VENIPUNCTURE: CPT

## 2020-06-29 ENCOUNTER — HOSPITAL ENCOUNTER (OUTPATIENT)
Dept: LAB | Age: 45
Discharge: HOME | End: 2020-06-29
Payer: MEDICARE

## 2020-06-29 LAB
ANION GAP SERPL CALCULATED.3IONS-SCNC: 15 MMOL/L (ref 7–19)
BUN BLDV-MCNC: 20 MG/DL (ref 6–20)
CALCIUM SERPL-MCNC: 9 MG/DL (ref 8.6–10)
CHLORIDE BLD-SCNC: 99 MMOL/L (ref 98–111)
CO2: 23 MMOL/L (ref 22–29)
CREAT SERPL-MCNC: 1.2 MG/DL (ref 0.5–0.9)
GFR NON-AFRICAN AMERICAN: 49
GLUCOSE BLD-MCNC: 192 MG/DL (ref 74–109)
INR BLD: 3.9 (ref 0.88–1.18)
POTASSIUM SERPL-SCNC: 3.7 MMOL/L (ref 3.5–5)
PROTHROMBIN TIME: 39.6 SEC (ref 12–14.6)
SODIUM BLD-SCNC: 137 MMOL/L (ref 136–145)

## 2020-06-29 PROCEDURE — 36415 COLL VENOUS BLD VENIPUNCTURE: CPT

## 2020-06-29 PROCEDURE — 80048 BASIC METABOLIC PNL TOTAL CA: CPT

## 2020-06-29 PROCEDURE — 85610 PROTHROMBIN TIME: CPT

## 2020-07-06 ENCOUNTER — HOSPITAL ENCOUNTER (OUTPATIENT)
Dept: LAB | Age: 45
Discharge: HOME | End: 2020-07-06
Payer: MEDICARE

## 2020-07-06 LAB
INR BLD: 2.91 (ref 0.88–1.18)
PROTHROMBIN TIME: 31.2 SEC (ref 12–14.6)

## 2020-07-06 PROCEDURE — 85610 PROTHROMBIN TIME: CPT

## 2020-07-06 PROCEDURE — 36415 COLL VENOUS BLD VENIPUNCTURE: CPT

## 2020-07-29 ENCOUNTER — HOSPITAL ENCOUNTER (OUTPATIENT)
Dept: LAB | Age: 45
Discharge: HOME | End: 2020-07-29
Payer: MEDICARE

## 2020-07-29 LAB
ANION GAP SERPL CALCULATED.3IONS-SCNC: 13 MMOL/L (ref 7–19)
BUN BLDV-MCNC: 18 MG/DL (ref 6–20)
CALCIUM SERPL-MCNC: 8.9 MG/DL (ref 8.6–10)
CHLORIDE BLD-SCNC: 99 MMOL/L (ref 98–111)
CO2: 26 MMOL/L (ref 22–29)
CREAT SERPL-MCNC: 1.2 MG/DL (ref 0.5–0.9)
GFR AFRICAN AMERICAN: 59
GFR NON-AFRICAN AMERICAN: 49
GLUCOSE BLD-MCNC: 244 MG/DL (ref 74–109)
POTASSIUM SERPL-SCNC: 4 MMOL/L (ref 3.5–5)
SODIUM BLD-SCNC: 138 MMOL/L (ref 136–145)

## 2020-07-31 ENCOUNTER — HOSPITAL ENCOUNTER (OUTPATIENT)
Dept: LAB | Age: 45
Discharge: HOME | End: 2020-07-31
Payer: MEDICARE

## 2020-07-31 LAB
INR BLD: 1.96 (ref 0.88–1.18)
PROTHROMBIN TIME: 22.7 SEC (ref 12–14.6)

## 2020-07-31 PROCEDURE — 36415 COLL VENOUS BLD VENIPUNCTURE: CPT

## 2020-07-31 PROCEDURE — 85610 PROTHROMBIN TIME: CPT

## 2020-08-14 ENCOUNTER — HOSPITAL ENCOUNTER (OUTPATIENT)
Dept: LAB | Age: 45
Discharge: HOME | End: 2020-08-14
Payer: MEDICARE

## 2020-08-14 LAB
INR BLD: 1.75 (ref 0.88–1.18)
PROTHROMBIN TIME: 20.6 SEC (ref 12–14.6)

## 2020-08-14 PROCEDURE — 85610 PROTHROMBIN TIME: CPT

## 2020-08-14 PROCEDURE — 36415 COLL VENOUS BLD VENIPUNCTURE: CPT

## 2020-08-24 ENCOUNTER — HOSPITAL ENCOUNTER (OUTPATIENT)
Dept: LAB | Age: 45
Discharge: HOME | End: 2020-08-24
Payer: MEDICARE

## 2020-08-24 LAB
INR BLD: 1.92 (ref 0.88–1.18)
PROTHROMBIN TIME: 22.3 SEC (ref 12–14.6)

## 2020-08-31 ENCOUNTER — HOSPITAL ENCOUNTER (OUTPATIENT)
Dept: LAB | Age: 45
Discharge: HOME | End: 2020-08-31
Payer: MEDICARE

## 2020-08-31 LAB
INR BLD: 3.77 (ref 0.88–1.18)
PROTHROMBIN TIME: 38.5 SEC (ref 12–14.6)

## 2020-09-09 LAB
INR BLD: 3.19 (ref 0.88–1.18)
PROTHROMBIN TIME: 33.6 SEC (ref 12–14.6)

## 2020-09-16 ENCOUNTER — HOSPITAL ENCOUNTER (OUTPATIENT)
Dept: LAB | Age: 45
Discharge: HOME | End: 2020-09-16
Payer: MEDICARE

## 2020-09-16 LAB
INR BLD: 2.02 (ref 0.88–1.18)
PROTHROMBIN TIME: 23.2 SEC (ref 12–14.6)

## 2020-09-16 PROCEDURE — 36415 COLL VENOUS BLD VENIPUNCTURE: CPT

## 2020-09-16 PROCEDURE — 85610 PROTHROMBIN TIME: CPT

## 2020-09-22 ENCOUNTER — HOSPITAL ENCOUNTER (OUTPATIENT)
Dept: LAB | Age: 45
Discharge: HOME | End: 2020-09-22
Payer: MEDICARE

## 2020-09-22 LAB
INR BLD: 1.51 (ref 0.88–1.18)
PROTHROMBIN TIME: 18.3 SEC (ref 12–14.6)

## 2020-09-22 PROCEDURE — 85610 PROTHROMBIN TIME: CPT

## 2020-09-22 PROCEDURE — 36415 COLL VENOUS BLD VENIPUNCTURE: CPT

## 2020-09-27 PROCEDURE — 87635 SARS-COV-2 COVID-19 AMP PRB: CPT | Performed by: NURSE PRACTITIONER

## 2020-09-29 ENCOUNTER — HOSPITAL ENCOUNTER (EMERGENCY)
Facility: HOSPITAL | Age: 45
Discharge: HOME | End: 2020-09-29
Attending: INTERNAL MEDICINE | Admitting: INTERNAL MEDICINE

## 2020-09-29 VITALS
HEIGHT: 64 IN | DIASTOLIC BLOOD PRESSURE: 85 MMHG | BODY MASS INDEX: 50.02 KG/M2 | WEIGHT: 293 LBS | OXYGEN SATURATION: 98 % | RESPIRATION RATE: 16 BRPM | SYSTOLIC BLOOD PRESSURE: 145 MMHG | HEART RATE: 98 BPM | TEMPERATURE: 99 F

## 2020-09-29 DIAGNOSIS — B37.0 THRUSH: Primary | ICD-10-CM

## 2020-09-29 LAB — S PYO AG THROAT QL: NEGATIVE

## 2020-09-29 PROCEDURE — 99283 EMERGENCY DEPT VISIT LOW MDM: CPT

## 2020-09-29 PROCEDURE — 87081 CULTURE SCREEN ONLY: CPT | Performed by: INTERNAL MEDICINE

## 2020-09-29 PROCEDURE — 87880 STREP A ASSAY W/OPTIC: CPT | Performed by: INTERNAL MEDICINE

## 2020-10-01 LAB — BACTERIA SPEC AEROBE CULT: NORMAL

## 2020-10-02 ENCOUNTER — HOSPITAL ENCOUNTER (OUTPATIENT)
Dept: LAB | Age: 45
Discharge: HOME | End: 2020-10-02
Payer: MEDICARE

## 2020-10-02 LAB
INR BLD: 1.79 (ref 0.88–1.18)
PROTHROMBIN TIME: 21 SEC (ref 12–14.6)

## 2020-10-26 ENCOUNTER — HOSPITAL ENCOUNTER (OUTPATIENT)
Dept: LAB | Age: 45
Discharge: HOME | End: 2020-10-26
Payer: MEDICARE

## 2020-10-26 PROCEDURE — 36415 COLL VENOUS BLD VENIPUNCTURE: CPT

## 2020-10-26 PROCEDURE — 85610 PROTHROMBIN TIME: CPT

## 2020-10-27 LAB
INR BLD: 2.35 (ref 0.88–1.18)
PROTHROMBIN TIME: 26.2 SEC (ref 12–14.6)

## 2020-12-16 ENCOUNTER — HOSPITAL ENCOUNTER (OUTPATIENT)
Dept: LAB | Age: 45
Discharge: HOME | End: 2020-12-16
Payer: MEDICARE

## 2020-12-16 LAB
INR BLD: 2.25 (ref 0.88–1.18)
PROTHROMBIN TIME: 25.3 SEC (ref 12–14.6)

## 2020-12-16 PROCEDURE — 36415 COLL VENOUS BLD VENIPUNCTURE: CPT

## 2020-12-16 PROCEDURE — 85610 PROTHROMBIN TIME: CPT

## 2021-02-05 ENCOUNTER — HOSPITAL ENCOUNTER (OUTPATIENT)
Dept: LAB | Age: 46
Discharge: HOME | End: 2021-02-05
Payer: COMMERCIAL

## 2021-02-05 LAB
INR BLD: 1.42 (ref 0.88–1.18)
PROTHROMBIN TIME: 17.4 SEC (ref 12–14.6)

## 2021-02-05 PROCEDURE — 85610 PROTHROMBIN TIME: CPT

## 2021-02-05 PROCEDURE — 36415 COLL VENOUS BLD VENIPUNCTURE: CPT

## 2021-02-19 ENCOUNTER — HOSPITAL ENCOUNTER (OUTPATIENT)
Dept: LAB | Age: 46
Discharge: HOME | End: 2021-02-19
Payer: MEDICARE

## 2021-02-19 LAB
INR BLD: 1.9 (ref 0.88–1.18)
PROTHROMBIN TIME: 22.1 SEC (ref 12–14.6)

## 2021-02-19 PROCEDURE — 36415 COLL VENOUS BLD VENIPUNCTURE: CPT

## 2021-02-19 PROCEDURE — 85610 PROTHROMBIN TIME: CPT

## 2021-03-01 ENCOUNTER — HOSPITAL ENCOUNTER (OUTPATIENT)
Dept: LAB | Age: 46
Discharge: HOME | End: 2021-03-01
Payer: COMMERCIAL

## 2021-03-01 LAB
INR BLD: 1.23 (ref 0.88–1.18)
PROTHROMBIN TIME: 15.5 SEC (ref 12–14.6)

## 2021-03-01 PROCEDURE — 36415 COLL VENOUS BLD VENIPUNCTURE: CPT

## 2021-03-01 PROCEDURE — 85610 PROTHROMBIN TIME: CPT

## 2021-03-05 ENCOUNTER — HOSPITAL ENCOUNTER (OUTPATIENT)
Dept: LAB | Age: 46
Discharge: HOME | End: 2021-03-05
Payer: COMMERCIAL

## 2021-03-05 LAB
INR BLD: 3.24 (ref 0.88–1.18)
PROTHROMBIN TIME: 34 SEC (ref 12–14.6)

## 2021-03-05 PROCEDURE — 85610 PROTHROMBIN TIME: CPT

## 2021-03-05 PROCEDURE — 36415 COLL VENOUS BLD VENIPUNCTURE: CPT

## 2021-03-11 ENCOUNTER — OFFICE VISIT (OUTPATIENT)
Dept: URGENT CARE | Age: 46
End: 2021-03-11
Payer: MEDICARE

## 2021-03-11 VITALS
DIASTOLIC BLOOD PRESSURE: 87 MMHG | TEMPERATURE: 98.5 F | HEART RATE: 84 BPM | RESPIRATION RATE: 24 BRPM | OXYGEN SATURATION: 99 % | BODY MASS INDEX: 60.25 KG/M2 | WEIGHT: 293 LBS | SYSTOLIC BLOOD PRESSURE: 139 MMHG

## 2021-03-11 DIAGNOSIS — R10.11 RUQ PAIN: Primary | ICD-10-CM

## 2021-03-11 PROCEDURE — 99203 OFFICE O/P NEW LOW 30 MIN: CPT | Performed by: NURSE PRACTITIONER

## 2021-03-11 PROCEDURE — 1036F TOBACCO NON-USER: CPT | Performed by: NURSE PRACTITIONER

## 2021-03-11 PROCEDURE — G8417 CALC BMI ABV UP PARAM F/U: HCPCS | Performed by: NURSE PRACTITIONER

## 2021-03-11 PROCEDURE — G8427 DOCREV CUR MEDS BY ELIG CLIN: HCPCS | Performed by: NURSE PRACTITIONER

## 2021-03-11 PROCEDURE — G8484 FLU IMMUNIZE NO ADMIN: HCPCS | Performed by: NURSE PRACTITIONER

## 2021-03-11 ASSESSMENT — ENCOUNTER SYMPTOMS
NAUSEA: 0
EYES NEGATIVE: 1
SORE THROAT: 0
ABDOMINAL PAIN: 1
WHEEZING: 0
CHEST TIGHTNESS: 0
SHORTNESS OF BREATH: 0
DIARRHEA: 0
CONSTIPATION: 0
VOMITING: 0

## 2021-03-11 NOTE — PATIENT INSTRUCTIONS
problems. It's also a good idea to know your test results and keep a list of the medicines you take. Where can you learn more? Go to https://chpepiceweb."[email or web address]". org and sign in to your [employer] account. Enter T940 in the [facility] box to learn more about \"Learning About Acute Cholecystitis. \"     If you do not have an account, please click on the \"Sign Up Now\" link. Current as of: April 15, 2020               Content Version: 12.8  © 6781-0840 [employer]. Care instructions adapted under license by [facility] ([facility]). If you have questions about a medical condition or this instruction, always ask your healthcare professional. [address] any warranty or liability for your use of this information.

## 2021-03-11 NOTE — PROGRESS NOTES
[facility] [address] [facility]  [address] [address] [place] [id number]  Dept: [phone]  Dept Fax: Freda Win: [phone]    Marisabel Lam is a 39 y.o. female who presents today for her medical conditions/complaintsas noted below. Marisabel Lam is c/o of Abdominal Pain (onset a few days ago, upper stomach and around her R side, pt reports increased pain after meals,)        HPI:     Abdominal Pain  This is a new problem. The current episode started in the past 7 days. The onset quality is sudden. The problem occurs intermittently. The problem has been waxing and waning. The pain is located in the RUQ. The pain is moderate. The abdominal pain radiates to the back and epigastric region. Pertinent negatives include no constipation, diarrhea, fever, nausea or vomiting. The pain is aggravated by eating. The pain is relieved by nothing. She has tried nothing for the symptoms.        Past Medical History:   Diagnosis Date    CAD (coronary artery disease)     [address]     Diabetes mellitus ([address]     GERD (gastroesophageal reflux disease)     Hypertension      Past Surgical History:   Procedure Laterality Date    CARDIAC SURGERY      CORONARY ARTERY BYPASS GRAFT      2016    TONSILLECTOMY         Family History   Problem Relation Age of Onset    Osteoarthritis Mother     Diabetes Mother     Hypertension Mother     Hypertension Father     Heart Attack Brother     Heart Failure Maternal Grandmother     Diabetes Maternal Grandfather     Heart Disease Maternal Grandfather     Heart Disease Paternal Grandmother     No Known Problems Paternal Grandfather        Social History     Tobacco Use    Smoking status: Never Smoker    Smokeless tobacco: Never Used   Substance Use Topics    Alcohol use: No      Current Outpatient Medications   Medication Sig Dispense Refill    furosemide (LASIX) 40 MG tablet Take 80 mg by mouth 2 mouth 2 times daily as needed (headache)      lisinopril (PRINIVIL;ZESTRIL) 40 MG tablet Take 40 mg by mouth daily       No current facility-administered medications for this visit. Allergies   Allergen Reactions    Bactrim [Sulfamethoxazole-Trimethoprim]        Health Maintenance   Topic Date Due    Hepatitis C screen  Never done    Diabetic foot exam  Never done    A1C test (Diabetic or Prediabetic)  Never done    Diabetic retinal exam  Never done    HIV screen  Never done    COVID-19 Vaccine (1 of 2) Never done    Diabetic microalbuminuria test  Never done    Hepatitis B vaccine (1 of 3 - Risk 3-dose series) Never done    Cervical cancer screen  Never done    Lipid screen  07/28/2018    Potassium monitoring  07/29/2021    Creatinine monitoring  07/29/2021    DTaP/Tdap/Td vaccine (2 - Td) 10/10/2027    Flu vaccine  Completed    Pneumococcal 0-64 years Vaccine  Completed    Hepatitis A vaccine  Aged Out    Hib vaccine  Aged Out    Meningococcal (ACWY) vaccine  Aged Out       Subjective:     Review of Systems   Constitutional: Negative for fever. HENT: Negative for congestion and sore throat. Eyes: Negative. Respiratory: Negative for chest tightness, shortness of breath and wheezing. Cardiovascular: Negative for chest pain and palpitations. Gastrointestinal: Positive for abdominal pain. Negative for constipation, diarrhea, nausea and vomiting. Endocrine: Negative. Genitourinary: Negative. Musculoskeletal: Negative. Skin: Negative. Neurological: Negative for dizziness, speech difficulty, weakness and numbness. Psychiatric/Behavioral: Negative for confusion. All other systems reviewed and are negative. Objective:     Physical Exam  Vitals signs and nursing note reviewed. Constitutional:       General: She is not in acute distress. Appearance: Normal appearance. She is morbidly obese. She is not ill-appearing or toxic-appearing.    HENT:      Head: Normocephalic and atraumatic. Right Ear: External ear normal.      Left Ear: External ear normal.   Eyes:      Extraocular Movements: Extraocular movements intact. Conjunctiva/sclera: Conjunctivae normal.      Pupils: Pupils are equal, round, and reactive to light. Cardiovascular:      Rate and Rhythm: Normal rate and regular rhythm. Heart sounds: Normal heart sounds. No murmur. Pulmonary:      Effort: Pulmonary effort is normal. No respiratory distress. Breath sounds: Normal breath sounds. No wheezing or rales. Abdominal:      Tenderness: There is abdominal tenderness in the right upper quadrant and epigastric area. Musculoskeletal:         General: No swelling or signs of injury. Skin:     General: Skin is warm and dry. Findings: No rash. Neurological:      General: No focal deficit present. Mental Status: She is alert and oriented to person, place, and time. Motor: No weakness. Psychiatric:         Mood and Affect: Mood normal.       /87   Pulse 84   Temp 98.5 °F (36.9 °C) (Temporal)   Resp 24   Wt (!) 351 lb (159.2 kg)   LMP 03/08/2021 (Exact Date)   SpO2 99%   BMI 60.25 kg/m²     Assessment:       Diagnosis Orders   1. RUQ pain  US GALLBLADDER RUQ       Plan:      Orders Placed This Encounter   Procedures    US GALLBLADDER RUQ     Standing Status:   Future     Standing Expiration Date:   3/11/2022     Order Specific Question:   Reason for exam:     Answer:   RUQ pain, tenderness. Worse with eating. Discussed that I believe patient is having pain r/t gallbladder, but because she is female and has cardiovascular history if she has CP, SOB, neck pain, jaw pain, left shoulder pain, sweating, nausea, she is to go to ER for work up. She VU. She has eaten today so we will schedule GB US for tomorrow. She VU. Discussed staying away from fatty foods to try to decrease discomfort. Return if symptoms worsen or fail to improve.     No orders of the defined types were placed in this encounter. Patient given educational materials- see patient instructions. Discussed use, benefit, and side effects of prescribedmedications. All patient questions answered. Pt voiced understanding. Reviewedhealth maintenance. Instructed to continue current medications, diet and exercise. Patient agreed with treatment plan. Follow up as directed. Patient Instructions   1. Galbladder US tomorrow at 1030.   2. No fatty foods to decrease abdominal discomfort. 3. If symptoms change or worsen go to ER. If you have chest discomfort, shortness of breath, jaw/neck pain, sweating episodes or nausea go to ER. Patient Education        Learning About Acute Cholecystitis  What is cholecystitis? Cholecystitis (say \"koh-lih-sis-TY-tus\") is inflammation of the gallbladder. The gallbladder stores bile. Bile helps the body digest food. Normally, the bile flows from the gallbladder to the small intestine. A gallstone stuck in the cystic duct is most often the cause of sudden (acute) cholecystitis. The cystic duct is the tube that carries the bile out of the gallbladder. The gallstone blocks the bile from leaving the gallbladder. This results in an irritated and swollen gallbladder. The disease can also be caused by infection or trauma, such as an injury from a car accident. Cholecystitis has to be treated right away. You will probably have to go to the hospital. Surgery is the usual treatment. What are the symptoms? Symptoms include:  · Steady and severe pain in the upper right part of belly. This is the most common symptom. The pain can sometimes move to your back or right shoulder blade. It may last for more than 6 hours. · Nausea or vomiting. · A fever. How is it treated? The main way to treat this disease is surgery to remove the gallbladder. This surgery can often be done through small cuts (incisions) in the belly. This is called a laparoscopic cholecystectomy.  In some cases, you may need a more extensive surgery. You may need surgery as soon as possible. The doctor may try to reduce swelling and irritation in the gallbladder before removing it. You may be given fluids and antibiotics through an IV. You may also be given pain medicine. Follow-up care is a key part of your treatment and safety. Be sure to make and go to all appointments, and call your doctor if you are having problems. It's also a good idea to know your test results and keep a list of the medicines you take. Where can you learn more? Go to [email or web address] and sign in to your Kno account. Enter T940 in the TickTickTickets box to learn more about \"Learning About Acute Cholecystitis. \"     If you do not have an account, please click on the \"Sign Up Now\" link. Current as of: April 15, 2020               Content Version: 12.8  © 9590-4934 Healthwise, [facility]. Care instructions adapted under license by [facility] ([facility]). If you have questions about a medical condition or this instruction, always ask your healthcare professional. [place] [address] any warranty or liability for your use of this information.                Electronically signed by YARIEL Webb CNP on 3/11/2021 at 12:25 PM

## 2021-03-12 ENCOUNTER — HOSPITAL ENCOUNTER (OUTPATIENT)
Dept: LAB | Age: 46
Discharge: HOME | End: 2021-03-12
Payer: MEDICARE

## 2021-03-12 ENCOUNTER — HOSPITAL ENCOUNTER (OUTPATIENT)
Dept: ULTRASOUND IMAGING | Age: 46
Discharge: HOME | End: 2021-03-12
Payer: MEDICARE

## 2021-03-12 DIAGNOSIS — R10.11 RUQ PAIN: ICD-10-CM

## 2021-03-12 LAB
INR BLD: 3.16 (ref 0.88–1.18)
PROTHROMBIN TIME: 33.4 SEC (ref 12–14.6)

## 2021-03-12 PROCEDURE — 76705 ECHO EXAM OF ABDOMEN: CPT

## 2021-03-12 PROCEDURE — 36415 COLL VENOUS BLD VENIPUNCTURE: CPT

## 2021-03-12 PROCEDURE — 85610 PROTHROMBIN TIME: CPT

## 2021-03-13 DIAGNOSIS — K80.20 CALCULUS OF GALLBLADDER WITHOUT CHOLECYSTITIS WITHOUT OBSTRUCTION: Primary | ICD-10-CM

## 2021-03-18 ENCOUNTER — OFFICE VISIT (OUTPATIENT)
Dept: SURGERY | Age: 46
End: 2021-03-18
Payer: MEDICARE

## 2021-03-18 VITALS
WEIGHT: 293 LBS | HEIGHT: 64 IN | DIASTOLIC BLOOD PRESSURE: 74 MMHG | TEMPERATURE: 97.4 F | BODY MASS INDEX: 50.02 KG/M2 | SYSTOLIC BLOOD PRESSURE: 118 MMHG

## 2021-03-18 DIAGNOSIS — R10.12 LUQ PAIN: Primary | ICD-10-CM

## 2021-03-18 PROCEDURE — 1036F TOBACCO NON-USER: CPT | Performed by: SURGERY

## 2021-03-18 PROCEDURE — G8427 DOCREV CUR MEDS BY ELIG CLIN: HCPCS | Performed by: SURGERY

## 2021-03-18 PROCEDURE — G8484 FLU IMMUNIZE NO ADMIN: HCPCS | Performed by: SURGERY

## 2021-03-18 PROCEDURE — G8417 CALC BMI ABV UP PARAM F/U: HCPCS | Performed by: SURGERY

## 2021-03-18 PROCEDURE — 99203 OFFICE O/P NEW LOW 30 MIN: CPT | Performed by: SURGERY

## 2021-03-25 ENCOUNTER — OFFICE VISIT (OUTPATIENT)
Dept: GASTROENTEROLOGY | Age: 46
End: 2021-03-25
Payer: MEDICARE

## 2021-03-25 VITALS
HEART RATE: 100 BPM | SYSTOLIC BLOOD PRESSURE: 120 MMHG | WEIGHT: 293 LBS | OXYGEN SATURATION: 99 % | BODY MASS INDEX: 50.02 KG/M2 | DIASTOLIC BLOOD PRESSURE: 80 MMHG | HEIGHT: 64 IN

## 2021-03-25 DIAGNOSIS — R11.0 NAUSEA: ICD-10-CM

## 2021-03-25 DIAGNOSIS — R10.12 LEFT UPPER QUADRANT ABDOMINAL PAIN: Primary | ICD-10-CM

## 2021-03-25 DIAGNOSIS — K80.20 GALLSTONES: ICD-10-CM

## 2021-03-25 DIAGNOSIS — R10.11 RIGHT UPPER QUADRANT ABDOMINAL PAIN: ICD-10-CM

## 2021-03-25 PROCEDURE — G8484 FLU IMMUNIZE NO ADMIN: HCPCS | Performed by: NURSE PRACTITIONER

## 2021-03-25 PROCEDURE — G8428 CUR MEDS NOT DOCUMENT: HCPCS | Performed by: NURSE PRACTITIONER

## 2021-03-25 PROCEDURE — 1036F TOBACCO NON-USER: CPT | Performed by: NURSE PRACTITIONER

## 2021-03-25 PROCEDURE — 99214 OFFICE O/P EST MOD 30 MIN: CPT | Performed by: NURSE PRACTITIONER

## 2021-03-25 PROCEDURE — G8417 CALC BMI ABV UP PARAM F/U: HCPCS | Performed by: NURSE PRACTITIONER

## 2021-03-25 RX ORDER — SUCRALFATE 1 G/1
1 TABLET ORAL 4 TIMES DAILY
Qty: 120 TABLET | Refills: 3 | Status: SHIPPED | OUTPATIENT
Start: 2021-03-25 | End: 2021-08-09

## 2021-03-25 RX ORDER — PANTOPRAZOLE SODIUM 40 MG/1
40 TABLET, DELAYED RELEASE ORAL DAILY
Qty: 60 TABLET | Refills: 2 | Status: SHIPPED | OUTPATIENT
Start: 2021-03-25 | End: 2021-05-06 | Stop reason: SDUPTHER

## 2021-03-25 ASSESSMENT — ENCOUNTER SYMPTOMS
SHORTNESS OF BREATH: 0
ANAL BLEEDING: 0
VOMITING: 0
TROUBLE SWALLOWING: 0
CONSTIPATION: 0
CHOKING: 0
ABDOMINAL DISTENTION: 0
ABDOMINAL PAIN: 1
NAUSEA: 1
RECTAL PAIN: 0
BLOOD IN STOOL: 0
DIARRHEA: 0
COUGH: 0

## 2021-03-25 NOTE — PATIENT INSTRUCTIONS
Patient Education        sucralfate (oral)  Pronunciation:  Garrett elise  Brand:  Carafate  What is the most important information I should know about sucralfate? The liquid form of sucralfate should never be injected through a needle into the body, or death may occur. What is sucralfate? Sucralfate is used short-term (up to 8 weeks) to treat an active duodenal ulcer. Sucralfate works mainly in the lining of the stomach and is not highly absorbed into the body. This medicine adheres to ulcer sites and protects them from acids, enzymes, and bile salts. Sucralfate can heal an active ulcer, but it will not prevent future ulcers from occurring. Sucralfate may also be used for purposes not listed in this medication guide. What should I discuss with my healthcare provider before taking sucralfate? You should not use sucralfate if you are allergic to it. Tell your doctor if you have ever had:  · diabetes;  · kidney disease (or if you are on dialysis); or  · trouble swallowing tablets. Tell your doctor if you are pregnant or breastfeeding. Sucralfate is not approved for use by anyone younger than 25years old. How should I take sucralfate? Follow all directions on your prescription label and read all medication guides or instruction sheets. Use the medicine exactly as directed. Take sucralfate on an empty stomach. Shake the oral suspension (liquid) before you measure a dose. Use the dosing syringe provided, or use a medicine dose-measuring device (not a kitchen spoon). The liquid from of this medicine should never be injected through a needle into the body, or death may occur. Sucralfate oral suspension is to be taken only by mouth. If you are diabetic, check your blood sugar regularly. Your doctor may adjust your dose based on your blood sugar levels. It may take 2 to 8 weeks before you receive the full benefit of taking sucralfate. Sucralfate should not be taken for longer than 8 weeks at a time. Use this medicine for the full prescribed length of time, even if your symptoms quickly improve. Store at room temperature away from moisture and heat. Do not allow the liquid medicine to freeze. What happens if I miss a dose? Take the medicine as soon as you can, but skip the missed dose if it is almost time for your next dose. Do not take two doses at one time. What happens if I overdose? Seek emergency medical attention or call the Poison Help line at 1-466.626.8730. What should I avoid while taking sucralfate? Avoid taking any other medications within 2 hours before or after you take sucralfate. Sucralfate can make it harder for your body to absorb other medications you take by mouth. Ask your doctor before using an antacid, and use only the type your doctor recommends. Some antacids can make it harder for sucralfate to work in your stomach. Avoid taking an antacid within 30 minutes before or after taking sucralfate. What are the possible side effects of sucralfate? Get emergency medical help if you have signs of an allergic reaction: hives; difficult breathing; swelling of your face, lips, tongue, or throat. Common side effects may include:  · constipation, diarrhea;  · nausea, vomiting, upset stomach;  · itching, rash;  · dizziness, drowsiness;  · sleep problems (insomnia);  · headache; or  · back pain. This is not a complete list of side effects and others may occur. Call your doctor for medical advice about side effects. You may report side effects to FDA at 8-844-ZBP-2467. What other drugs will affect sucralfate? Other drugs may affect sucralfate, including prescription and over-the-counter medicines, vitamins, and herbal products. Tell your doctor about all your current medicines and any medicine you start or stop using. Where can I get more information? Your pharmacist can provide more information about sucralfate.   Remember, keep this and all other medicines out of the reach of children, never share your medicines with others, and use this medication only for the indication prescribed. Every effort has been made to ensure that the information provided by [address] is accurate, up-to-date, and complete, but no guarantee is made to that effect. Drug information contained herein may be time sensitive. [facility] information has been compiled for use by healthcare practitioners and consumers in the [place] and therefore [facility] does not warrant that uses outside of the [place] are appropriate, unless specifically indicated otherwise. [facility]'s drug information does not endorse drugs, diagnose patients or recommend therapy. [facility]'s drug information is an informational resource designed to assist licensed healthcare practitioners in caring for their patients and/or to serve consumers viewing this service as a supplement to, and not a substitute for, the expertise, skill, knowledge and judgment of healthcare practitioners. The absence of a warning for a given drug or drug combination in no way should be construed to indicate that the drug or drug combination is safe, effective or appropriate for any given patient. [facility] does not assume any responsibility for any aspect of healthcare administered with the aid of information [facility] provides. The information contained herein is not intended to cover all possible uses, directions, precautions, warnings, drug interactions, allergic reactions, or adverse effects. If you have questions about the drugs you are taking, check with your doctor, nurse or pharmacist.  Copyright 1764-4260 [address]. Version: 9.01. Revision date: 3/26/2019. Care instructions adapted under license by [facility] ([facility]). If you have questions about a medical condition or this instruction, always ask your healthcare professional. [place] [address] any warranty or liability for your use of this information.

## 2021-03-25 NOTE — PROGRESS NOTES
Subjective:     Patient ID: Ramo Tejeda is a 39 y.o. female  PCP: Geovany Rueda DO  Referring Provider: Marcos Marks MD    HPI  Patient presents to the office today with the following complaints: Abdominal Pain      Abdominal Pain  Patient complains of abdominal pain. The pain is located in the RUQ, in the LUQ and in the upper abdomen. The pain is described as dull and worsens at times. Onset was couple of weeks ago. Aggravating factors include food. Alleviating factors include none. Associated symptoms include nausea. The patient denies melena and vomiting. She has seen General Surgery, pt states they did not think she needed gallbladder out at this time. Last EGD 2014? - dilation     Hx CAD with CABG 2016  Hx CVA  Daily use of Coumadin    Narrative   US GALLBLADDER RUQ    3/12/2021 10:57 AM   History: Right upper quadrant abdominal pain. Grayscale and color flow ultrasound evaluation of the right upper   quadrant. Limited detail due to patient size. Fatty liver. Small gallstones. No gallbladder wall thickening or pericholecystic fluid. Mildly prominent distal common bile duct measuring up to 8 mm. Pancreas obscured by bowel gas. Normal right kidney to the extent visualized. Right kidney = 102 x 57 x 50 mm.       Impression   1. Small gallstones with no evidence of gallbladder wall thickening or   pericholecystic fluid. 2. Borderline enlargement distal common bile duct. 3. Fatty liver. Signed by Dr Dajuan Turcios on 3/12/2021 11:00 AM       Assessment:     1. Left upper quadrant abdominal pain    2. Right upper quadrant abdominal pain    3. Nausea    4. Gallstones            Plan:   - Increase Protonix to BID  - Trial of Carafate  - Follow up in 6 weeks or sooner if needed to assess symptoms  - If symptoms continue or worsen, consider EGD.    - Pt has increased risk factors for sedation due to CVA and CAD.         Orders  No orders of the defined types were placed in this encounter.     Medications  Orders Placed This Encounter   Medications    pantoprazole (PROTONIX) 40 MG tablet     Sig: Take 1 tablet by mouth daily     Dispense:  60 tablet     Refill:  2    sucralfate (CARAFATE) 1 GM tablet     Sig: Take 1 tablet by mouth 4 times daily     Dispense:  120 tablet     Refill:  3         Patient History:     Past Medical History:   Diagnosis Date    CAD (coronary artery disease)     [facility]     Diabetes mellitus ([facility]     GERD (gastroesophageal reflux disease)     Hypertension        Past Surgical History:   Procedure Laterality Date    CARDIAC SURGERY      CORONARY ARTERY BYPASS GRAFT      2016    TONSILLECTOMY      UPPER GASTROINTESTINAL ENDOSCOPY      sujatha Mcclure/snow per patient       Family History   Problem Relation Age of Onset    Osteoarthritis Mother     Diabetes Mother     Hypertension Mother     Hypertension Father     Heart Attack Brother     Heart Failure Maternal Grandmother     Diabetes Maternal Grandfather     Heart Disease Maternal Grandfather     Heart Disease Paternal Grandmother     No Known Problems Paternal Grandfather     Colon Cancer Neg Hx     Colon Polyps Neg Hx     Esophageal Cancer Neg Hx     Liver Cancer Neg Hx     Liver Disease Neg Hx     Rectal Cancer Neg Hx     Stomach Cancer Neg Hx        Social History     Socioeconomic History    Marital status: Single     Spouse name: None    Number of children: None    Years of education: None    Highest education level: None   Occupational History    None   Social Needs    Financial resource strain: None    Food insecurity     Worry: None     Inability: None    Transportation needs     Medical: None     Non-medical: None   Tobacco Use    Smoking status: Never Smoker    Smokeless tobacco: Never Used   Substance and Sexual Activity    Alcohol use: Yes     Comment: occ    Drug use: No    Sexual activity: None   Lifestyle    Physical activity     Days per week: None     Minutes per session: None    Stress: None   Relationships    Social connections     Talks on phone: None     Gets together: None     Attends Buddhist service: None     Active member of club or organization: None     Attends meetings of clubs or organizations: None     Relationship status: None    Intimate partner violence     Fear of current or ex partner: None     Emotionally abused: None     Physically abused: None     Forced sexual activity: None   Other Topics Concern    None   Social History Narrative    None       Current Outpatient Medications   Medication Sig Dispense Refill    pantoprazole (PROTONIX) 40 MG tablet Take 1 tablet by mouth daily 60 tablet 2    sucralfate (CARAFATE) 1 GM tablet Take 1 tablet by mouth 4 times daily 120 tablet 3    ezetimibe (ZETIA) 10 MG tablet Take 10 mg by mouth      Exenatide (BYDUREON) 2 MG PEN INJECT 0.65 MLS ONCE Q WEEK      furosemide (LASIX) 40 MG tablet Take 80 mg by mouth 2 times daily      insulin glargine (BASAGLAR KWIKPEN) 100 UNIT/ML injection pen Inject 50 Units into the skin nightly      bethanechol (URECHOLINE) 25 MG tablet Take 1 tablet by mouth daily      metFORMIN (GLUCOPHAGE) 1000 MG tablet Take 1 tablet by mouth daily Hold till monday      insulin aspart (NOVOLOG) 100 UNIT/ML injection vial Inject 10 Units into the skin 4 times daily (before meals and nightly) Up to 10 units ss coverage as directed      spironolactone (ALDACTONE) 25 MG tablet Take 12.5 mg by mouth daily      topiramate (TOPAMAX) 50 MG tablet Take 1 tablet by mouth daily      warfarin (COUMADIN) 10 MG tablet Take 10 mg by mouth      aspirin 81 MG tablet Take 81 mg by mouth daily      gabapentin (NEURONTIN) 600 MG tablet Take 600 mg by mouth 3 times daily .       metoprolol succinate ER (TOPROL-XL) 25 MG XL tablet Take 25 mg by mouth daily      prochlorperazine (COMPAZINE) 10 MG tablet Take 10 mg by mouth 2 times daily as needed (headache)      verapamil (CALAN) 40 MG tablet Take 40 mg by mouth every 12 hours      atorvastatin (LIPITOR) 40 MG tablet Take 80 mg by mouth nightly        No current facility-administered medications for this visit. Allergies   Allergen Reactions    Bactrim [Sulfamethoxazole-Trimethoprim] Other (See Comments)     Yeast inf       Review of Systems   Constitutional: Negative for activity change, appetite change, fatigue, fever and unexpected weight change. HENT: Negative for trouble swallowing. Respiratory: Negative for cough, choking and shortness of breath. Cardiovascular: Negative for chest pain. Gastrointestinal: Positive for abdominal pain and nausea. Negative for abdominal distention, anal bleeding, blood in stool, constipation, diarrhea, rectal pain and vomiting. Allergic/Immunologic: Negative for food allergies. All other systems reviewed and are negative. Objective:     /80 (Site: Left Upper Arm, Position: Sitting, Cuff Size: Large Adult)   Pulse 100   Ht 5' 4\" (1.626 m)   Wt (!) 348 lb 12.8 oz (158.2 kg)   LMP 03/08/2021 (Exact Date)   SpO2 99%   BMI 59.87 kg/m²     Physical Exam  Vitals signs reviewed. Constitutional:       General: She is not in acute distress. Appearance: She is well-developed. HENT:      Head: Normocephalic and atraumatic. Right Ear: External ear normal.      Left Ear: External ear normal.      Nose: Nose normal.      Comments: Mask on     Mouth/Throat:      Comments: Mask on  Eyes:      Conjunctiva/sclera: Conjunctivae normal.      Pupils: Pupils are equal, round, and reactive to light. Neck:      Musculoskeletal: Normal range of motion and neck supple. Cardiovascular:      Rate and Rhythm: Normal rate and regular rhythm. Heart sounds: Normal heart sounds. No murmur. No friction rub. No gallop. Pulmonary:      Effort: Pulmonary effort is normal. No respiratory distress.       Breath sounds: Normal breath sounds. Abdominal:      General: Bowel sounds are normal. There is no distension. Palpations: Abdomen is soft. There is no mass. Tenderness: There is abdominal tenderness in the right upper quadrant and epigastric area. There is no guarding or rebound. Musculoskeletal: Normal range of motion. Skin:     General: Skin is warm and dry. Findings: No rash. Nails: There is no clubbing. Neurological:      Mental Status: She is alert and oriented to person, place, and time. Gait: Gait normal.   Psychiatric:         Behavior: Behavior normal.         Thought Content:  Thought content normal.

## 2021-03-26 ENCOUNTER — HOSPITAL ENCOUNTER (OUTPATIENT)
Dept: LAB | Age: 46
Discharge: HOME | End: 2021-03-26
Payer: MEDICARE

## 2021-03-26 LAB
INR BLD: 3.05 (ref 0.88–1.18)
PROTHROMBIN TIME: 32.4 SEC (ref 12–14.6)

## 2021-03-26 PROCEDURE — 36415 COLL VENOUS BLD VENIPUNCTURE: CPT

## 2021-03-26 PROCEDURE — 85610 PROTHROMBIN TIME: CPT

## 2021-03-29 ASSESSMENT — ENCOUNTER SYMPTOMS
WHEEZING: 0
VOMITING: 0
RHINORRHEA: 0
CONSTIPATION: 0
SORE THROAT: 0
ABDOMINAL DISTENTION: 0
ABDOMINAL PAIN: 1
SHORTNESS OF BREATH: 0
COUGH: 0
BACK PAIN: 1
EYE PAIN: 0
DIARRHEA: 0
EYE REDNESS: 0
NAUSEA: 1

## 2021-03-29 NOTE — PROGRESS NOTES
Ms. Sriram Polk is a 39year old female who presents with a complaint of abdominal pain. The patient reports that her pain is in the LUQ and radiates over to the RUQ. She describes the pain as sharp and burning, nagging and uncomfortable. The pain is constant. She reports that laying down sometimes makes the pain worse. It is worse with some foods. She has some occasional nausea. The patient is on coumadin for a h/o stroke. She follows at [facility] for her heart disease and stroke history.      Past Medical History:   Diagnosis Date    CAD (coronary artery disease)     [facility] occlusion [facility] infarction [facility] [facility]     Diabetes mellitus ([facility] [facility]     GERD (gastroesophageal reflux disease)     Hypertension      Past Surgical History:   Procedure Laterality Date    CARDIAC SURGERY      CORONARY ARTERY BYPASS GRAFT      2016    TONSILLECTOMY      UPPER GASTROINTESTINAL ENDOSCOPY      sujatha Montesinos/snow per patient     Current Outpatient Medications   Medication Sig Dispense Refill    ezetimibe (ZETIA) 10 MG tablet Take 10 mg by mouth      Exenatide (BYDUREON) 2 MG PEN INJECT 0.65 MLS ONCE Q WEEK      furosemide (LASIX) 40 MG tablet Take 80 mg by mouth 2 times daily      insulin glargine (BASAGLAR KWIKPEN) 100 UNIT/ML injection pen Inject 50 Units into the skin nightly      bethanechol (URECHOLINE) 25 MG tablet Take 1 tablet by mouth daily      metFORMIN (GLUCOPHAGE) 1000 MG tablet Take 1 tablet by mouth daily Hold till monday      insulin aspart (NOVOLOG) 100 UNIT/ML injection vial Inject 10 Units into the skin 4 times daily (before meals and nightly) Up to 10 units ss coverage as directed      spironolactone (ALDACTONE) 25 MG tablet Take 12.5 mg by mouth daily      topiramate (TOPAMAX) 50 MG tablet Take 1 tablet by mouth daily      warfarin (COUMADIN) 10 MG tablet Take 10 mg by mouth      aspirin 81 MG tablet Take 81 mg by mouth daily      gabapentin (NEURONTIN) 600 MG tablet Take 600 mg by mouth 3 times daily .  metoprolol succinate ER (TOPROL-XL) 25 MG XL tablet Take 25 mg by mouth daily      prochlorperazine (COMPAZINE) 10 MG tablet Take 10 mg by mouth 2 times daily as needed (headache)      verapamil (CALAN) 40 MG tablet Take 40 mg by mouth every 12 hours      atorvastatin (LIPITOR) 40 MG tablet Take 80 mg by mouth nightly       pantoprazole (PROTONIX) 40 MG tablet Take 1 tablet by mouth daily 60 tablet 2    sucralfate (CARAFATE) 1 GM tablet Take 1 tablet by mouth 4 times daily 120 tablet 3     No current facility-administered medications for this visit. Allergies: Bactrim [sulfamethoxazole-trimethoprim]    Family History   Problem Relation Age of Onset    Osteoarthritis Mother     Diabetes Mother     Hypertension Mother     Hypertension Father     Heart Attack Brother     Heart Failure Maternal Grandmother     Diabetes Maternal Grandfather     Heart Disease Maternal Grandfather     Heart Disease Paternal Grandmother     No Known Problems Paternal Grandfather     Colon Cancer Neg Hx     Colon Polyps Neg Hx     Esophageal Cancer Neg Hx     Liver Cancer Neg Hx     Liver Disease Neg Hx     Rectal Cancer Neg Hx     Stomach Cancer Neg Hx        Social History     Tobacco Use    Smoking status: Never Smoker    Smokeless tobacco: Never Used   Substance Use Topics    Alcohol use: Yes     Comment: occ       Review of Systems   Constitutional: Negative for activity change, appetite change and fever. HENT: Negative for congestion, rhinorrhea and sore throat. Eyes: Negative for pain, redness and visual disturbance. Respiratory: Negative for cough, shortness of breath and wheezing. Cardiovascular: Negative for chest pain, palpitations and leg swelling. Gastrointestinal: Positive for abdominal pain and nausea. Negative for abdominal distention, constipation, diarrhea and vomiting. Endocrine: Positive for polydipsia. Negative for polyphagia and polyuria.    Genitourinary: Negative for dysuria, frequency and hematuria. Musculoskeletal: Positive for back pain. Negative for arthralgias and gait problem. Skin: Negative for rash and wound. Neurological: Negative for dizziness, seizures and headaches. Psychiatric/Behavioral: Negative for agitation, confusion and dysphoric mood. Physical Exam  Vitals signs reviewed. Constitutional:       General: She is not in acute distress. Appearance: She is obese. HENT:      Head: Normocephalic and atraumatic. Nose:      Comments: Wearing face mask  Eyes:      General: No scleral icterus. Pupils: Pupils are equal, round, and reactive to light. Neck:      Musculoskeletal: Neck supple. No neck rigidity. Cardiovascular:      Rate and Rhythm: Normal rate and regular rhythm. Heart sounds: No murmur. Pulmonary:      Effort: Pulmonary effort is normal. No respiratory distress. Breath sounds: No wheezing. Abdominal:      General: There is no distension. Palpations: Abdomen is soft. Tenderness: There is no abdominal tenderness. There is no guarding. Musculoskeletal: Normal range of motion. General: No swelling or deformity. Skin:     General: Skin is warm and dry. Coloration: Skin is not jaundiced. Neurological:      General: No focal deficit present. Mental Status: She is alert and oriented to person, place, and time. Mental status is at baseline. Psychiatric:         Mood and Affect: Mood normal.         Behavior: Behavior normal.         US:  Impression   1. Small gallstones with no evidence of gallbladder wall thickening or   pericholecystic fluid. 2. Borderline enlargement distal common bile duct. 3. Fatty liver.    Signed by Dr Domo Hoffmann on 3/12/2021 11:00 AM         Assessment and plan:  39year old obese female with LUQ abdominal pain and small gallstones  I discussed with the patient that she does have small gallstones, no evidence of cholecystitis, on imaging. I would not expect her pain to be located in the LUQ. I recommend she have work up with GI. Depending on their evaluation, if no gastric findings, would plan to refer the patient back to [facility] for surgery, as that is where she receives her care for high risk medical conditions.     Rikki Soulier, MD  3/29/2021  1:51 PM

## 2021-04-14 ENCOUNTER — HOSPITAL ENCOUNTER (OUTPATIENT)
Dept: LAB | Age: 46
Discharge: HOME | End: 2021-04-14
Payer: MEDICARE

## 2021-04-14 LAB
INR BLD: 3.81 (ref 0.88–1.18)
PROTHROMBIN TIME: 38.9 SEC (ref 12–14.6)

## 2021-04-14 PROCEDURE — 36415 COLL VENOUS BLD VENIPUNCTURE: CPT

## 2021-04-14 PROCEDURE — 85610 PROTHROMBIN TIME: CPT

## 2021-04-23 ENCOUNTER — HOSPITAL ENCOUNTER (OUTPATIENT)
Dept: LAB | Age: 46
Discharge: HOME | End: 2021-04-23
Payer: MEDICARE

## 2021-04-23 LAB
INR BLD: 4.56 (ref 0.88–1.18)
PROTHROMBIN TIME: 45 SEC (ref 12–14.6)

## 2021-04-23 PROCEDURE — 85610 PROTHROMBIN TIME: CPT

## 2021-04-23 PROCEDURE — 36415 COLL VENOUS BLD VENIPUNCTURE: CPT

## 2021-04-29 ENCOUNTER — HOSPITAL ENCOUNTER (OUTPATIENT)
Dept: LAB | Age: 46
Discharge: HOME | End: 2021-04-29
Payer: MEDICARE

## 2021-04-29 LAB
INR BLD: 2.2 (ref 0.88–1.18)
PROTHROMBIN TIME: 24.8 SEC (ref 12–14.6)

## 2021-04-29 PROCEDURE — 36415 COLL VENOUS BLD VENIPUNCTURE: CPT

## 2021-04-29 PROCEDURE — 85610 PROTHROMBIN TIME: CPT

## 2021-05-06 ENCOUNTER — OFFICE VISIT (OUTPATIENT)
Dept: GASTROENTEROLOGY | Age: 46
End: 2021-05-06
Payer: MEDICARE

## 2021-05-06 ENCOUNTER — HOSPITAL ENCOUNTER (OUTPATIENT)
Dept: LAB | Age: 46
Discharge: HOME | End: 2021-05-06
Payer: MEDICARE

## 2021-05-06 VITALS
OXYGEN SATURATION: 98 % | BODY MASS INDEX: 50.02 KG/M2 | DIASTOLIC BLOOD PRESSURE: 70 MMHG | HEIGHT: 64 IN | SYSTOLIC BLOOD PRESSURE: 117 MMHG | WEIGHT: 293 LBS | HEART RATE: 106 BPM

## 2021-05-06 DIAGNOSIS — R10.84 GENERALIZED ABDOMINAL PAIN: Primary | ICD-10-CM

## 2021-05-06 DIAGNOSIS — R11.0 NAUSEA: ICD-10-CM

## 2021-05-06 DIAGNOSIS — Z79.899 CURRENT USE OF PROTON PUMP INHIBITOR: ICD-10-CM

## 2021-05-06 LAB
INR BLD: 2.28 (ref 0.88–1.18)
PROTHROMBIN TIME: 25.6 SEC (ref 12–14.6)

## 2021-05-06 PROCEDURE — 99213 OFFICE O/P EST LOW 20 MIN: CPT | Performed by: NURSE PRACTITIONER

## 2021-05-06 PROCEDURE — G8427 DOCREV CUR MEDS BY ELIG CLIN: HCPCS | Performed by: NURSE PRACTITIONER

## 2021-05-06 PROCEDURE — 36415 COLL VENOUS BLD VENIPUNCTURE: CPT

## 2021-05-06 PROCEDURE — 1036F TOBACCO NON-USER: CPT | Performed by: NURSE PRACTITIONER

## 2021-05-06 PROCEDURE — G8417 CALC BMI ABV UP PARAM F/U: HCPCS | Performed by: NURSE PRACTITIONER

## 2021-05-06 PROCEDURE — 85610 PROTHROMBIN TIME: CPT

## 2021-05-06 RX ORDER — PANTOPRAZOLE SODIUM 40 MG/1
40 TABLET, DELAYED RELEASE ORAL DAILY
Qty: 90 TABLET | Refills: 3 | Status: SHIPPED | OUTPATIENT
Start: 2021-05-06 | End: 2022-06-26

## 2021-05-06 ASSESSMENT — ENCOUNTER SYMPTOMS
NAUSEA: 0
CONSTIPATION: 0
ABDOMINAL PAIN: 1
COUGH: 0
VOMITING: 0
DIARRHEA: 0
SHORTNESS OF BREATH: 0
ABDOMINAL DISTENTION: 0
BLOOD IN STOOL: 0
CHOKING: 0
ANAL BLEEDING: 0
TROUBLE SWALLOWING: 0
RECTAL PAIN: 0

## 2021-05-06 NOTE — PATIENT INSTRUCTIONS
Take Carafate 2 hours before or 1 hour after other medications     Patient Education        Gastroesophageal Reflux Disease (GERD): Care Instructions  Overview     Gastroesophageal reflux disease (GERD) is the backward flow of stomach acid into the esophagus. The esophagus is the tube that leads from your throat to your stomach. A one-way valve prevents the stomach acid from backing up into this tube. But when you have GERD, this valve does not close tightly enough. This can also cause pain and swelling in your esophagus. (This is called esophagitis.)  If you have mild GERD symptoms including heartburn, you may be able to control the problem with antacids or over-the-counter medicine. You can also make lifestyle changes to help reduce your symptoms. These include changing your diet and eating habits, such as not eating late at night and losing weight. Follow-up care is a key part of your treatment and safety. Be sure to make and go to all appointments, and call your doctor if you are having problems. It's also a good idea to know your test results and keep a list of the medicines you take. How can you care for yourself at home? · Take your medicines exactly as prescribed. Call your doctor if you think you are having a problem with your medicine. · Your doctor may recommend over-the-counter medicine. For mild or occasional indigestion, antacids, such as Tums, Gaviscon, Mylanta, or Maalox, may help. Your doctor also may recommend over-the-counter acid reducers, such as famotidine (Pepcid AC), cimetidine (Tagamet HB), or omeprazole (Prilosec). Read and follow all instructions on the label. If you use these medicines often, talk with your doctor. · Change your eating habits. ? It's best to eat several small meals instead of two or three large meals. ? After you eat, wait 2 to 3 hours before you lie down. ? Chocolate, mint, and alcohol can make GERD worse. ?  Spicy foods, foods that have a lot of acid (like tomatoes and oranges), and coffee can make GERD symptoms worse in some people. If your symptoms are worse after you eat a certain food, you may want to stop eating that food to see if your symptoms get better. · Do not smoke or chew tobacco. Smoking can make GERD worse. If you need help quitting, talk to your doctor about stop-smoking programs and medicines. These can increase your chances of quitting for good. · If you have GERD symptoms at night, raise the head of your bed 6 to 8 inches by putting the frame on blocks or placing a foam wedge under the head of your mattress. (Adding extra pillows does not work.)  · Do not wear tight clothing around your middle. · Lose weight if you need to. Losing just 5 to 10 pounds can help. When should you call for help? Call your doctor now or seek immediate medical care if:    · You have new or different belly pain.     · Your stools are black and tarlike or have streaks of blood. Watch closely for changes in your health, and be sure to contact your doctor if:    · Your symptoms have not improved after 2 days.     · Food seems to catch in your throat or chest.   Where can you learn more? Go to https://[employer]. org and sign in to your "[employer] account. Enter B491 in the [facility] box to learn more about \"Gastroesophageal Reflux Disease (GERD): Care Instructions. \"     If you do not have an account, please click on the \"Sign Up Now\" link. Current as of: April 15, 2020               Content Version: 12.8  © 2006-2021 [employer]. Care instructions adapted under license by [facility] ([facility]). If you have questions about a medical condition or this instruction, always ask your healthcare professional. [place] [address] any warranty or liability for your use of this information. Miralax one teaspoon daily mixed as directed until you are having daily bowel movement.  If no bm for 4 days increase to two teaspoons daily and gradually increase every 3-4 days until desired bowel movement is achieved. If you do not have bm for more than 4 days please use magnesim citrate, 1/2 bottle, to prevent becoming obstructed but continue to use miralax daily. If your stools become too loose or too frequent on daily dose you may reduce the amount taken daily. Make reductions gradually, every 3-4 days. Adjust dose, more or less, as needed for desired bm. You should have a soft bm at least every 3 days. Miralax is safe and effective for long term relief of chronic constipation. Drink 6-8 glasses of water daily. Regular exercise encouraged. High fiber diet recommended.

## 2021-05-06 NOTE — PROGRESS NOTES
Hypertension        Past Surgical History:   Procedure Laterality Date    CARDIAC SURGERY      CORONARY ARTERY BYPASS GRAFT      2016    TONSILLECTOMY      UPPER GASTROINTESTINAL ENDOSCOPY      Lianna Hardin w/dil per patient       Family History   Problem Relation Age of Onset    Osteoarthritis Mother     Diabetes Mother     Hypertension Mother     Hypertension Father     Heart Attack Brother     Heart Failure Maternal Grandmother     Diabetes Maternal Grandfather     Heart Disease Maternal Grandfather     Heart Disease Paternal Grandmother     No Known Problems Paternal Grandfather     Colon Cancer Neg Hx     Colon Polyps Neg Hx     Esophageal Cancer Neg Hx     Liver Cancer Neg Hx     Liver Disease Neg Hx     Rectal Cancer Neg Hx     Stomach Cancer Neg Hx        Social History     Socioeconomic History    Marital status: Single     Spouse name: None    Number of children: None    Years of education: None    Highest education level: None   Occupational History    None   Social Needs    Financial resource strain: None    Food insecurity     Worry: None     Inability: None    Transportation needs     Medical: None     Non-medical: None   Tobacco Use    Smoking status: Never Smoker    Smokeless tobacco: Never Used   Substance and Sexual Activity    Alcohol use: Yes     Comment: occ    Drug use: No    Sexual activity: None   Lifestyle    Physical activity     Days per week: None     Minutes per session: None    Stress: None   Relationships    Social connections     Talks on phone: None     Gets together: None     Attends [employer] day service: None     Active member of club or organization: None     Attends meetings of clubs or organizations: None     Relationship status: None    Intimate partner violence     Fear of current or ex partner: None     Emotionally abused: None     Physically abused: None     Forced sexual activity: None   Other Topics Concern    None   Social History Narrative swallowing. Respiratory: Negative for cough, choking and shortness of breath. Cardiovascular: Negative for chest pain. Gastrointestinal: Positive for abdominal pain (improved). Negative for abdominal distention, anal bleeding, blood in stool, constipation, diarrhea, nausea, rectal pain and vomiting. Allergic/Immunologic: Negative for food allergies. All other systems reviewed and are negative. Objective:     /70   Pulse 106   Ht 5' 4\" (1.626 m)   Wt (!) 350 lb (158.8 kg)   SpO2 98%   BMI 60.08 kg/m²     Physical Exam  Vitals signs reviewed. Constitutional:       General: She is not in acute distress. Appearance: She is well-developed. HENT:      Head: Normocephalic and atraumatic. Right Ear: External ear normal.      Left Ear: External ear normal.      Nose: Nose normal.      Comments: Mask on     Mouth/Throat:      Comments: Mask on  Eyes:      Conjunctiva/sclera: Conjunctivae normal.      Pupils: Pupils are equal, round, and reactive to light. Neck:      Musculoskeletal: Normal range of motion and neck supple. Cardiovascular:      Rate and Rhythm: Normal rate and regular rhythm. Heart sounds: Normal heart sounds. No murmur. No friction rub. No gallop. Pulmonary:      Effort: Pulmonary effort is normal. No respiratory distress. Breath sounds: Normal breath sounds. Abdominal:      General: Bowel sounds are normal. There is no distension. Palpations: Abdomen is soft. There is no mass. Tenderness: There is no abdominal tenderness. There is no guarding or rebound. Musculoskeletal: Normal range of motion. Skin:     General: Skin is warm and dry. Findings: No rash. Nails: There is no clubbing. Neurological:      Mental Status: She is alert and oriented to person, place, and time. Gait: Gait normal.   Psychiatric:         Behavior: Behavior normal.         Thought Content:  Thought content normal.

## 2021-05-13 ENCOUNTER — HOSPITAL ENCOUNTER (OUTPATIENT)
Dept: LAB | Age: 46
Discharge: HOME | End: 2021-05-13
Payer: MEDICARE

## 2021-05-13 LAB
INR BLD: 3.75 (ref 0.88–1.18)
PROTHROMBIN TIME: 38.4 SEC (ref 12–14.6)

## 2021-05-13 PROCEDURE — 36415 COLL VENOUS BLD VENIPUNCTURE: CPT

## 2021-05-13 PROCEDURE — 85610 PROTHROMBIN TIME: CPT

## 2021-05-17 ENCOUNTER — OFFICE VISIT (OUTPATIENT)
Dept: URGENT CARE | Age: 46
End: 2021-05-17
Payer: MEDICARE

## 2021-05-17 ENCOUNTER — HOSPITAL ENCOUNTER (OUTPATIENT)
Dept: NON INVASIVE DIAGNOSTICS | Age: 46
Discharge: HOME | End: 2021-05-17
Payer: MEDICARE

## 2021-05-17 VITALS
WEIGHT: 293 LBS | TEMPERATURE: 97.3 F | BODY MASS INDEX: 50.02 KG/M2 | RESPIRATION RATE: 16 BRPM | OXYGEN SATURATION: 100 % | HEART RATE: 86 BPM | DIASTOLIC BLOOD PRESSURE: 89 MMHG | SYSTOLIC BLOOD PRESSURE: 138 MMHG | HEIGHT: 64 IN

## 2021-05-17 DIAGNOSIS — R60.0 EDEMA OF RIGHT LOWER LEG: Primary | ICD-10-CM

## 2021-05-17 DIAGNOSIS — M71.21 BAKER'S CYST OF KNEE, RIGHT: ICD-10-CM

## 2021-05-17 DIAGNOSIS — B37.2 CANDIDIASIS OF SKIN: ICD-10-CM

## 2021-05-17 PROCEDURE — G8417 CALC BMI ABV UP PARAM F/U: HCPCS | Performed by: NURSE PRACTITIONER

## 2021-05-17 PROCEDURE — 99213 OFFICE O/P EST LOW 20 MIN: CPT | Performed by: NURSE PRACTITIONER

## 2021-05-17 PROCEDURE — 1036F TOBACCO NON-USER: CPT | Performed by: NURSE PRACTITIONER

## 2021-05-17 PROCEDURE — 93971 EXTREMITY STUDY: CPT

## 2021-05-17 PROCEDURE — G8427 DOCREV CUR MEDS BY ELIG CLIN: HCPCS | Performed by: NURSE PRACTITIONER

## 2021-05-17 RX ORDER — NYSTATIN 100000 [USP'U]/G
POWDER TOPICAL
Qty: 1 BOTTLE | Refills: 0 | Status: SHIPPED | OUTPATIENT
Start: 2021-05-17

## 2021-05-17 ASSESSMENT — ENCOUNTER SYMPTOMS
ABDOMINAL PAIN: 0
NAUSEA: 0
SHORTNESS OF BREATH: 0
DIARRHEA: 0
NAIL CHANGES: 0
VOMITING: 0

## 2021-05-17 ASSESSMENT — VISUAL ACUITY: OU: 1

## 2021-05-17 NOTE — PROGRESS NOTES
[address] [facility] URGENT CARE  [address] [place] [address] [place] [id number]  Dept: [phone]  Dept Fax: David Gavesmiley: [phone]    Erica Blevins is a 55 y.o. female who presents today for her medical conditions/complaintsas noted below. Erica Blevins is c/o of Knee Pain (R knee, no known injury onset 2 weeks ago ) and Rash (on ABD, comes and goes, onset couple weeks ago, yeast)        HPI:     Knee Pain   The incident occurred more than 1 week ago (2 weeks). The incident occurred at home. There was no injury mechanism. The pain is present in the right knee (Right lower leg). The quality of the pain is described as burning and cramping. The pain is at a severity of 6/10. The pain is moderate. The pain has been constant since onset. Pertinent negatives include no inability to bear weight, loss of motion, loss of sensation or tingling. She reports no foreign bodies present. The symptoms are aggravated by weight bearing. The treatment provided mild relief. Rash  This is a new problem. The current episode started in the past 7 days. The problem has been gradually worsening since onset. The affected locations include the groin (Bilateral groin, under bilateral breasts). The rash is characterized by burning and redness. It is unknown if there was an exposure to a precipitant. Pertinent negatives include no diarrhea, fatigue, fever, nail changes, shortness of breath or vomiting. Treatments tried: Trying to keep areas dry. The treatment provided mild relief. Salena Raphael is here with right lower leg pain, edema and burning down the front of her right shin/leg area. She has had no recent fall or injury.    Past Medical History:   Diagnosis Date    CAD (coronary artery disease)     [facility] occlusion [facility] infarction [address]     Diabetes mellitus ([address]     GERD (gastroesophageal reflux disease)     Hypertension      Past Surgical History:   Procedure Laterality Date    CARDIAC SURGERY      CORONARY ARTERY BYPASS GRAFT      2016    TONSILLECTOMY      UPPER GASTROINTESTINAL ENDOSCOPY      Kami, w/dil per patient       Family History   Problem Relation Age of Onset    Osteoarthritis Mother     Diabetes Mother     Hypertension Mother     Hypertension Father     Heart Attack Brother     Heart Failure Maternal Grandmother     Diabetes Maternal Grandfather     Heart Disease Maternal Grandfather     Heart Disease Paternal Grandmother     No Known Problems Paternal Grandfather     Colon Cancer Neg Hx     Colon Polyps Neg Hx     Esophageal Cancer Neg Hx     Liver Cancer Neg Hx     Liver Disease Neg Hx     Rectal Cancer Neg Hx     Stomach Cancer Neg Hx        Social History     Tobacco Use    Smoking status: Never Smoker    Smokeless tobacco: Never Used   Substance Use Topics    Alcohol use: Yes     Comment: occ      Current Outpatient Medications   Medication Sig Dispense Refill    nystatin (MYCOSTATIN) 253902 UNIT/GM powder Apply 3 times daily to lower groin, abdominal area and under bilateral breasts 1 Bottle 0    NONFORMULARY 3 times daily apple cider gummy      pantoprazole (PROTONIX) 40 MG tablet Take 1 tablet by mouth daily 90 tablet 3    sucralfate (CARAFATE) 1 GM tablet Take 1 tablet by mouth 4 times daily 120 tablet 3    ezetimibe (ZETIA) 10 MG tablet Take 10 mg by mouth      Exenatide (BYDUREON) 2 MG PEN INJECT 0.65 MLS ONCE Q WEEK      furosemide (LASIX) 40 MG tablet Take 80 mg by mouth 2 times daily      insulin glargine (BASAGLAR KWIKPEN) 100 UNIT/ML injection pen Inject 50 Units into the skin nightly      bethanechol (URECHOLINE) 25 MG tablet Take 1 tablet by mouth daily      metFORMIN (GLUCOPHAGE) 1000 MG tablet Take 1 tablet by mouth daily Hold till monday      insulin aspart (NOVOLOG) 100 UNIT/ML injection vial Inject 10 Units into the skin 4 times daily (before meals and nightly) Up to 10 units ss coverage as directed      spironolactone (ALDACTONE) 25 MG tablet Take 12.5 mg by mouth daily      topiramate (TOPAMAX) 50 MG tablet Take 1 tablet by mouth daily      warfarin (COUMADIN) 10 MG tablet Take 10 mg by mouth      aspirin 81 MG tablet Take 81 mg by mouth daily      gabapentin (NEURONTIN) 600 MG tablet Take 600 mg by mouth 3 times daily .  metoprolol succinate ER (TOPROL-XL) 25 MG XL tablet Take 25 mg by mouth daily      prochlorperazine (COMPAZINE) 10 MG tablet Take 10 mg by mouth 2 times daily as needed (headache)      verapamil (CALAN) 40 MG tablet Take 40 mg by mouth every 12 hours      atorvastatin (LIPITOR) 40 MG tablet Take 80 mg by mouth nightly        No current facility-administered medications for this visit. Allergies   Allergen Reactions    Bactrim [Sulfamethoxazole-Trimethoprim] Other (See Comments)     Yeast inf       Health Maintenance   Topic Date Due    Hepatitis C screen  Never done    Diabetic foot exam  Never done    A1C test (Diabetic or Prediabetic)  Never done    Diabetic retinal exam  Never done    HIV screen  Never done    Diabetic microalbuminuria test  Never done    Hepatitis B vaccine (1 of 3 - Risk 3-dose series) Never done    Cervical cancer screen  Never done    Lipid screen  07/28/2018    Annual Wellness Visit (AWV)  Never done    Potassium monitoring  07/29/2021    Creatinine monitoring  07/29/2021    DTaP/Tdap/Td vaccine (2 - Td) 10/10/2027    Pneumococcal 0-64 years Vaccine (2 of 2) 05/02/2040    Flu vaccine  Completed    COVID-19 Vaccine  Completed    Hepatitis A vaccine  Aged Out    Hib vaccine  Aged Out    Meningococcal (ACWY) vaccine  Aged Out       Subjective:     Review of Systems   Constitutional: Negative for activity change, appetite change, chills, fatigue and fever. Respiratory: Negative for shortness of breath. Cardiovascular: Positive for leg swelling. Negative for chest pain.         Right lower leg   Gastrointestinal: Negative for abdominal pain, diarrhea, nausea and vomiting. Musculoskeletal: Positive for arthralgias and myalgias. Right lower leg pain     Skin: Positive for rash. Negative for nail changes. Groin area  Under bilateral breasts   Neurological: Negative for tingling. Hematological: Negative. Psychiatric/Behavioral: Negative.        :Objective      Physical Exam  Vitals and nursing note reviewed. Constitutional:       General: She is awake. She is not in acute distress. Appearance: Normal appearance. She is well-developed and well-groomed. She is morbidly obese. She is not ill-appearing or toxic-appearing. HENT:      Head: Normocephalic. Right Ear: Hearing normal.      Left Ear: Hearing normal.      Nose: Nose normal.      Mouth/Throat:      Lips: Pink. Eyes:      General: Vision grossly intact. Neck:      Trachea: Phonation normal.   Cardiovascular:      Rate and Rhythm: Normal rate and regular rhythm. Heart sounds: Normal heart sounds, S1 normal and S2 normal. No murmur heard. No friction rub. No gallop. Comments: Non-pitting edema right lower leg  Pulmonary:      Effort: Pulmonary effort is normal. No respiratory distress. Breath sounds: Normal breath sounds and air entry. No wheezing, rhonchi or rales. Abdominal:      General: Abdomen is protuberant. Palpations: Abdomen is soft. Musculoskeletal:         General: No deformity. Normal range of motion. Cervical back: Neck supple. Right lower leg: Tenderness present. 2+ Edema present. Legs:       Comments: Tenderness to right posterior calf, negative Olinda's sign   Skin:     General: Skin is warm and dry. Capillary Refill: Capillary refill takes less than 2 seconds. Findings: Rash present. Rash is macular. Comments: Erythematic, moist rash to bilateral groin folds and under bilateral breasts   Neurological:      General: No focal deficit present.       Mental Status: She is alert, oriented to person, place, and time and easily aroused. Psychiatric:         Attention and Perception: Attention normal.         Mood and Affect: Mood normal.         Speech: Speech normal.         Behavior: Behavior normal. Behavior is cooperative. /89   Pulse 86   Temp 97.3 °F (36.3 °C) (Temporal)   Resp 16   Ht 5' 4\" (1.626 m)   Wt (!) 349 lb (158.3 kg)   SpO2 100%   BMI 59.91 kg/m²     :Assessment       Diagnosis Orders   1. Edema of right lower leg  US DUP LOWER EXTREMITY RIGHT YOANDY    Ambulatory referral to Orthopedic Surgery   2. Candidiasis of skin  nystatin (MYCOSTATIN) 527019 UNIT/GM powder   3. Baker's cyst of knee, right  Ambulatory referral to Orthopedic Surgery       :Plan      Orders Placed This Encounter   Procedures    US DUP LOWER EXTREMITY RIGHT YOANDY     Standing Status:   Future     Standing Expiration Date:   5/17/2022     Order Specific Question:   Reason for exam:     Answer:   edema right lower leg, right leg pain    Ambulatory referral to Orthopedic Surgery     Referral Priority:   Urgent     Referral Type:   Consult for Advice and Opinion     Referral Reason:   Specialty Services Required     Requested Specialty:   Orthopedic Surgery     Number of Visits Requested:   1       Return if symptoms worsen or fail to improve. Orders Placed This Encounter   Medications    nystatin (MYCOSTATIN) 080261 UNIT/GM powder     Sig: Apply 3 times daily to lower groin, abdominal area and under bilateral breasts     Dispense:  1 Bottle     Refill:  0      Venous scan negative for DVT, discussed with pt at office visit. She voices understanding. Patient Instructions     Elevate right leg as much as possible  OTC Tylenol as needed for discomfort  Referral to Ortho for Baker's cyst  Nystatin powder as directed, keep skin as dry as possible groin, under breasts  Follow up with PCP or return to Urgent Care for worsening or unresolved symptoms.      Patient Education        Yeast Skin Infection: Care Instructions  Your Care Instructions     Yeast normally lives on your skin. Sometimes too much yeast can overgrow in certain areas of the skin and cause an infection. The infection causes red, scaly, moist patches on your skin that may itch. Common areas for skin yeast infections are skin folds under the breasts or belly area. The warm and moist areas in the skin folds can make it easier for yeast to overgrow. Yeast infections also can be found on other parts of the body such as the groin or armpits. You will probably get a cream or ointment that contains an antifungal medicine. Examples of these are miconazole and clotrimazole. You put it on your skin to treat the infection. Your doctor may give you a prescription for the cream or ointment. Or you may be able to buy it without a prescription at most drugstores. If the infection is severe, the doctor will prescribe antifungal pills. A yeast infection usually goes away after about a week of treatment. But it's important to use the medicine for as long as your doctor tells you to. Follow-up care is a key part of your treatment and safety. Be sure to make and go to all appointments, and call your doctor if you are having problems. It's also a good idea to know your test results and keep a list of the medicines you take. How can you care for yourself at home? · Be safe with medicines. Take your medicines exactly as prescribed. Call your doctor if you think you are having a problem with your medicine. · Keep your skin clean and dry. Your doctor may suggest using powder that contains an antifungal medicine in the skin folds. · Wear loose clothing. When should you call for help? Call your doctor now or seek immediate medical care if:    · You have symptoms of infection, such as:  ? Increased pain, swelling, warmth, or redness. ? Red streaks leading from the area. ? Pus draining from the area. ? A fever.    Watch closely for changes in your health, and be sure to contact your doctor if:    · You do not get better as expected. Where can you learn more? Go to https://chpepiceweb.Checkd.In. org and sign in to your [employer] account. Enter R580 in the [facility] box to learn more about \"Yeast Skin Infection: Care Instructions. \"     If you do not have an account, please click on the \"Sign Up Now\" link. Current as of: July 2, 2020               Content Version: 12.8  © 2006-2021 [employer]. Care instructions adapted under license by [facility] ([facility]). If you have questions about a medical condition or this instruction, always ask your healthcare professional. [place] [address] any warranty or liability for your use of this information. Patient Education        Henriquez's Cyst: Care Instructions  Your Care Instructions     A Baker's cyst is a swelling behind the knee. It may cause pain or stiffness when you bend your knee or straighten it all the way. Baker's cysts are also called popliteal cysts. If you have arthritis or another condition that is the cause of the Baker's cyst, your doctor may treat that condition. A Baker's cyst may go away on its own. If not, or if it is causing a lot of discomfort, your doctor may drain the fluid that has built up behind the knee. In some cases, a Baker's cyst is removed in surgery. There are things you can do at home, such as staying off your leg, to reduce the swelling and pain. Follow-up care is a key part of your treatment and safety. Be sure to make and go to all appointments, and call your doctor if you are having problems. It's also a good idea to know your test results and keep a list of the medicines you take. How can you care for yourself at home? · Rest your knee as much as possible. · Ask your doctor if you can take an over-the-counter pain medicine, such as acetaminophen (Tylenol), ibuprofen (Advil, Motrin), or naproxen (Aleve). Be safe with medicines.  Read and follow all instructions on the label. · Use a cane, a crutch, a walker, or another device if you need help to get around. These can help rest your knees. · If you have an elastic bandage, make sure it is snug but not so tight that your leg is numb, tingles, or swells below the bandage. Ask your doctor if you can loosen the bandage if it is too tight. · Follow your doctor's instructions about how much weight you can put on your knee. · Stay at a healthy weight. Being overweight puts extra strain on your knee. When should you call for help? Call 911 anytime you think you may need emergency care. For example, call if:    · You have chest pain, are short of breath, or you cough up blood. Call your doctor now or seek immediate medical care if:    · You have new or worse pain.     · Your foot is cool or pale or changes color.     · You have tingling, weakness, or numbness in your foot or toes.     · You have signs of a blood clot in your leg (called a deep vein thrombosis), such as:  ? Pain in your calf, back of the knee, thigh, or groin. ? Redness or swelling in your leg. Watch closely for changes in your health, and be sure to contact your doctor if:    · You do not get better as expected. Where can you learn more? Go to https://Nonaboxpemaria elenaeb.Libox. org and sign in to your [facility] account. Enter Q651 in the [employer] box to learn more about \"Baker's Cyst: Care Instructions. \"     If you do not have an account, please click on the \"Sign Up Now\" link. Current as of: November 16, 2020               Content Version: 12.8  © 2965-8067 [employer]. Care instructions adapted under license by [facility] ([facility]). If you have questions about a medical condition or this instruction, always ask your healthcare professional. [place] [address] any warranty or liability for your use of this information. Patient given educational materials- see patient instructions. Discussed use, benefit, and side effects of prescribedmedications. All patient questions answered. Pt voiced understanding.        Electronically signed by YARIEL Villaseñor CNP on 5/17/2021 at 12:37 PM

## 2021-05-20 ENCOUNTER — HOSPITAL ENCOUNTER (OUTPATIENT)
Dept: LAB | Age: 46
Discharge: HOME | End: 2021-05-20
Payer: MEDICARE

## 2021-05-20 LAB
INR BLD: 4.29 (ref 0.88–1.18)
PROTHROMBIN TIME: 42.8 SEC (ref 12–14.6)

## 2021-05-20 PROCEDURE — 85610 PROTHROMBIN TIME: CPT

## 2021-05-20 PROCEDURE — 36415 COLL VENOUS BLD VENIPUNCTURE: CPT

## 2021-05-28 ENCOUNTER — HOSPITAL ENCOUNTER (OUTPATIENT)
Dept: LAB | Age: 46
Discharge: HOME | End: 2021-05-28
Payer: MEDICARE

## 2021-05-28 LAB
INR BLD: 1.83 (ref 0.88–1.18)
PROTHROMBIN TIME: 21.4 SEC (ref 12–14.6)

## 2021-06-04 ENCOUNTER — HOSPITAL ENCOUNTER (OUTPATIENT)
Dept: LAB | Age: 46
Discharge: HOME | End: 2021-06-04
Payer: MEDICARE

## 2021-06-04 LAB
INR BLD: 2.31 (ref 0.88–1.18)
PROTHROMBIN TIME: 25.9 SEC (ref 12–14.6)

## 2021-06-04 PROCEDURE — 36415 COLL VENOUS BLD VENIPUNCTURE: CPT

## 2021-06-04 PROCEDURE — 85610 PROTHROMBIN TIME: CPT

## 2021-06-11 ENCOUNTER — HOSPITAL ENCOUNTER (OUTPATIENT)
Dept: LAB | Age: 46
Discharge: HOME | End: 2021-06-11
Payer: MEDICARE

## 2021-06-11 LAB
INR BLD: 5.98 (ref 0.88–1.18)
PROTHROMBIN TIME: 56.1 SEC (ref 12–14.6)

## 2021-06-11 PROCEDURE — 85610 PROTHROMBIN TIME: CPT

## 2021-06-11 PROCEDURE — 36415 COLL VENOUS BLD VENIPUNCTURE: CPT

## 2021-06-14 ENCOUNTER — HOSPITAL ENCOUNTER (OUTPATIENT)
Dept: LAB | Age: 46
Discharge: HOME | End: 2021-06-14
Payer: MEDICARE

## 2021-06-14 LAB
INR BLD: 2.09 (ref 0.88–1.18)
PROTHROMBIN TIME: 23.9 SEC (ref 12–14.6)

## 2021-06-14 PROCEDURE — 36415 COLL VENOUS BLD VENIPUNCTURE: CPT

## 2021-06-14 PROCEDURE — 85610 PROTHROMBIN TIME: CPT

## 2021-06-18 ENCOUNTER — HOSPITAL ENCOUNTER (OUTPATIENT)
Dept: LAB | Age: 46
Discharge: HOME | End: 2021-06-18
Payer: MEDICARE

## 2021-06-18 LAB
INR BLD: 1.24 (ref 0.88–1.18)
PROTHROMBIN TIME: 15.6 SEC (ref 12–14.6)

## 2021-06-18 PROCEDURE — 36415 COLL VENOUS BLD VENIPUNCTURE: CPT

## 2021-06-18 PROCEDURE — 85610 PROTHROMBIN TIME: CPT

## 2021-06-21 ENCOUNTER — HOSPITAL ENCOUNTER (OUTPATIENT)
Dept: LAB | Age: 46
Discharge: HOME | End: 2021-06-21
Payer: MEDICARE

## 2021-06-21 LAB
INR BLD: 1.95 (ref 0.88–1.18)
PROTHROMBIN TIME: 22.5 SEC (ref 12–14.6)

## 2021-06-21 PROCEDURE — 85610 PROTHROMBIN TIME: CPT

## 2021-06-21 PROCEDURE — 36415 COLL VENOUS BLD VENIPUNCTURE: CPT

## 2021-06-24 ENCOUNTER — HOSPITAL ENCOUNTER (OUTPATIENT)
Dept: LAB | Age: 46
Discharge: HOME | End: 2021-06-24
Payer: MEDICARE

## 2021-06-24 LAB
INR BLD: 2.37 (ref 0.88–1.18)
PROTHROMBIN TIME: 25.5 SEC (ref 12–14.6)

## 2021-06-24 PROCEDURE — 36415 COLL VENOUS BLD VENIPUNCTURE: CPT

## 2021-06-24 PROCEDURE — 85610 PROTHROMBIN TIME: CPT

## 2021-06-29 ENCOUNTER — HOSPITAL ENCOUNTER (OUTPATIENT)
Dept: LAB | Age: 46
Discharge: HOME | End: 2021-06-29
Payer: MEDICARE

## 2021-06-29 LAB
INR BLD: 1.85 (ref 0.88–1.18)
PROTHROMBIN TIME: 21.2 SEC (ref 12–14.6)

## 2021-06-29 PROCEDURE — 85610 PROTHROMBIN TIME: CPT

## 2021-06-29 PROCEDURE — 36415 COLL VENOUS BLD VENIPUNCTURE: CPT

## 2021-07-02 ENCOUNTER — HOSPITAL ENCOUNTER (OUTPATIENT)
Dept: LAB | Age: 46
Discharge: HOME | End: 2021-07-02
Payer: MEDICARE

## 2021-07-02 LAB
INR BLD: 2.02 (ref 0.88–1.18)
PROTHROMBIN TIME: 22.7 SEC (ref 12–14.6)

## 2021-07-02 PROCEDURE — 85610 PROTHROMBIN TIME: CPT

## 2021-07-02 PROCEDURE — 36415 COLL VENOUS BLD VENIPUNCTURE: CPT

## 2021-07-07 ENCOUNTER — HOSPITAL ENCOUNTER (OUTPATIENT)
Dept: LAB | Age: 46
Discharge: HOME | End: 2021-07-07
Payer: MEDICARE

## 2021-07-07 LAB
INR BLD: 2.63 (ref 0.88–1.18)
PROTHROMBIN TIME: 27.7 SEC (ref 12–14.6)

## 2021-07-13 ENCOUNTER — HOSPITAL ENCOUNTER (OUTPATIENT)
Dept: LAB | Age: 46
Discharge: HOME | End: 2021-07-13
Payer: MEDICARE

## 2021-07-13 LAB
INR BLD: 1.87 (ref 0.88–1.18)
PROTHROMBIN TIME: 21.4 SEC (ref 12–14.6)

## 2021-07-13 PROCEDURE — 85610 PROTHROMBIN TIME: CPT

## 2021-07-13 PROCEDURE — 36415 COLL VENOUS BLD VENIPUNCTURE: CPT

## 2021-07-15 ENCOUNTER — TELEPHONE (OUTPATIENT)
Dept: GASTROENTEROLOGY | Age: 46
End: 2021-07-15

## 2021-07-15 NOTE — TELEPHONE ENCOUNTER
07-15-21 Called to remind the patient to get her BMP prior to her next visit on 08-05-21.      Verbal agreement per patient Cv

## 2021-07-20 ENCOUNTER — HOSPITAL ENCOUNTER (OUTPATIENT)
Dept: LAB | Age: 46
Discharge: HOME | End: 2021-07-20
Payer: MEDICARE

## 2021-07-20 DIAGNOSIS — Z79.899 CURRENT USE OF PROTON PUMP INHIBITOR: ICD-10-CM

## 2021-07-20 LAB
INR BLD: 3.27 (ref 0.88–1.18)
PROTHROMBIN TIME: 32.4 SEC (ref 12–14.6)

## 2021-07-20 PROCEDURE — 85610 PROTHROMBIN TIME: CPT

## 2021-07-20 PROCEDURE — 36415 COLL VENOUS BLD VENIPUNCTURE: CPT

## 2021-07-27 ENCOUNTER — HOSPITAL ENCOUNTER (OUTPATIENT)
Dept: LAB | Age: 46
Discharge: HOME | End: 2021-07-27
Payer: MEDICARE

## 2021-07-27 LAB
INR BLD: 2.64 (ref 0.88–1.18)
PROTHROMBIN TIME: 27.8 SEC (ref 12–14.6)

## 2021-07-27 PROCEDURE — 85610 PROTHROMBIN TIME: CPT

## 2021-07-27 PROCEDURE — 36415 COLL VENOUS BLD VENIPUNCTURE: CPT

## 2021-07-30 ENCOUNTER — TELEPHONE (OUTPATIENT)
Dept: GASTROENTEROLOGY | Age: 46
End: 2021-07-30

## 2021-07-30 NOTE — TELEPHONE ENCOUNTER
Patient is requesting a return call from the office in regards to labs. Please return call. Thank you!

## 2021-07-30 NOTE — TELEPHONE ENCOUNTER
7-30-21  I called this patient and she wanted to know where to go and get the lab that [facility] ap[facility]ita wanted her to have, I told the patient either [facility] or the lab in our building  on the 2nd floor of the [address] [place] [address]. Patient voiced understanding and appreciation for the return call.  Kenneth smith

## 2021-08-02 DIAGNOSIS — R10.84 GENERALIZED ABDOMINAL PAIN: Primary | ICD-10-CM

## 2021-08-02 DIAGNOSIS — Z79.899 CURRENT USE OF PROTON PUMP INHIBITOR: ICD-10-CM

## 2021-08-02 DIAGNOSIS — R11.0 NAUSEA: ICD-10-CM

## 2021-08-02 DIAGNOSIS — R10.84 GENERALIZED ABDOMINAL PAIN: ICD-10-CM

## 2021-08-02 LAB
ANION GAP SERPL CALCULATED.3IONS-SCNC: 18 MMOL/L (ref 7–19)
BUN BLDV-MCNC: 25 MG/DL (ref 6–20)
CALCIUM SERPL-MCNC: 9.1 MG/DL (ref 8.6–10)
CHLORIDE BLD-SCNC: 96 MMOL/L (ref 98–111)
CO2: 22 MMOL/L (ref 22–29)
CREAT SERPL-MCNC: 1.5 MG/DL (ref 0.5–0.9)
GFR AFRICAN AMERICAN: 45
GFR NON-AFRICAN AMERICAN: 37
GLUCOSE BLD-MCNC: 210 MG/DL (ref 74–109)
POTASSIUM SERPL-SCNC: 3.8 MMOL/L (ref 3.5–5)
SODIUM BLD-SCNC: 136 MMOL/L (ref 136–145)

## 2021-08-02 NOTE — TELEPHONE ENCOUNTER
08-02-21  Called Spoke with the patient. She stated that she was fixing to head this way to get her BMP completed.

## 2021-08-06 ENCOUNTER — HOSPITAL ENCOUNTER (OUTPATIENT)
Dept: LAB | Age: 46
Discharge: HOME | End: 2021-08-06
Payer: MEDICARE

## 2021-08-06 LAB
INR BLD: 3.65 (ref 0.88–1.18)
PROTHROMBIN TIME: 35.5 SEC (ref 12–14.6)

## 2021-08-09 RX ORDER — SUCRALFATE 1 G/1
1 TABLET ORAL 4 TIMES DAILY PRN
Qty: 120 TABLET | Refills: 5 | Status: SHIPPED | OUTPATIENT
Start: 2021-08-09 | End: 2021-10-05 | Stop reason: CLARIF

## 2021-08-16 ENCOUNTER — OFFICE VISIT (OUTPATIENT)
Dept: GASTROENTEROLOGY | Age: 46
End: 2021-08-16
Payer: MEDICARE

## 2021-08-16 VITALS
HEIGHT: 64 IN | DIASTOLIC BLOOD PRESSURE: 88 MMHG | BODY MASS INDEX: 50.02 KG/M2 | WEIGHT: 293 LBS | SYSTOLIC BLOOD PRESSURE: 132 MMHG | HEART RATE: 98 BPM | OXYGEN SATURATION: 98 %

## 2021-08-16 DIAGNOSIS — Z79.01 CURRENT USE OF ANTICOAGULANT THERAPY: ICD-10-CM

## 2021-08-16 DIAGNOSIS — R11.0 NAUSEA: ICD-10-CM

## 2021-08-16 DIAGNOSIS — Z91.89 AT RISK FOR COMPLICATION OF ANESTHESIA: ICD-10-CM

## 2021-08-16 DIAGNOSIS — R10.84 GENERALIZED ABDOMINAL PAIN: Primary | ICD-10-CM

## 2021-08-16 DIAGNOSIS — Z12.11 COLON CANCER SCREENING: ICD-10-CM

## 2021-08-16 PROCEDURE — 99215 OFFICE O/P EST HI 40 MIN: CPT | Performed by: NURSE PRACTITIONER

## 2021-08-16 ASSESSMENT — ENCOUNTER SYMPTOMS
ABDOMINAL DISTENTION: 0
CHOKING: 0
TROUBLE SWALLOWING: 0
DIARRHEA: 0
RECTAL PAIN: 0
NAUSEA: 1
ANAL BLEEDING: 0
COUGH: 1
ABDOMINAL PAIN: 1
BLOOD IN STOOL: 0
SHORTNESS OF BREATH: 0
CONSTIPATION: 1
VOMITING: 0

## 2021-08-16 NOTE — PROGRESS NOTES
Subjective:     Patient ID: Onofre Mixon is a 55 y.o. female  PCP: Daniela Argueta DO  Referring Provider: No ref. provider found    HPI  Patient presents to the office today with the following complaints: Follow-up      Pt here for follow up on abdominal pain and nausea. Today, she states symptoms are better. She is taking Protonix and Carafate. She is using Miralax prn. She continues to c/o abdominal pain and nausea. However, this has improved. Symptoms are worsened by greasy foods. Hx CABG - she follows [address] [place] [address] Cardiology with every 6 month appts    LAST OV 5/6/2021  Pt here for follow up on abdominal pain with nausea. Hx gallstones. Protonix was increased to BID with trial of Carafate at last visit. Today, she reports symptoms are better. She has trouble remembering to take the evening dose of PPI. She will still have some middle abdominal pain that is burning in nature. However, this has decreased in frequency and intensity. She denies any issues swallowing, diarrhea, changes in bowel habits, blood in stool, or melena. She c/o hard stools and straining. \"I think I need to take the Miralax. \"         Last EGD 2014? - dilation  Hx CAD with CABG 2016  Hx CVA  Daily use of Coumadin     Lab Results   Component Value Date     08/02/2021    K 3.8 08/02/2021    CL 96 08/02/2021    CO2 22 08/02/2021    BUN 25 08/02/2021    CREATININE 1.5 08/02/2021    GLUCOSE 210 08/02/2021    CALCIUM 9.1 08/02/2021        Assessment:     1. Generalized abdominal pain    2. Nausea    3. Colon cancer screening            Plan:   - .Continue current medications  - Miralax daily, adjust dose as needed  - Schedule colonoscopy and endoscopy  - Obtain clearance to hold Coumadin  - Obtain cardiac clearance from Cardiologist  Instruct on bowel prep. Nothing to eat or drink after midnight the day of the exam.  Unable to drive for 24 hours after the procedure.    No aspirin or nonsteroidal anti-inflammatories for 5 days before procedure. I have discussed the benefits, alternatives, and risks (including bleeding, perforation and death)  for pursuing Endoscopy (EGD/Colonscopy/EUS/ERCP) with the patient and they are willing to continue. We also discussed the need for anesthesia, IV access, proper dietary changes, medication changes if necessary, and need for bowel prep (if ordered) prior to their Endoscopic procedure. They are aware they must have someone accompany them to their scheduled procedure to drive them home - they agree to the above and are willing to continue.      - Pt has increased risk factors for sedation due to hx CVA, CABG, morbid obesity and will require clearance prior to sedation. Orders  No orders of the defined types were placed in this encounter. Medications  No orders of the defined types were placed in this encounter.         Patient History:     Past Medical History:   Diagnosis Date    CAD (coronary artery disease)     [address]     Diabetes mellitus ([address]     GERD (gastroesophageal reflux disease)     Hypertension        Past Surgical History:   Procedure Laterality Date    CARDIAC SURGERY      CORONARY ARTERY BYPASS GRAFT      2016    TONSILLECTOMY      UPPER GASTROINTESTINAL ENDOSCOPY      sujatha Chappell/snow per patient       Family History   Problem Relation Age of Onset    Osteoarthritis Mother     Diabetes Mother     Hypertension Mother     Hypertension Father     Heart Attack Brother     Heart Failure Maternal Grandmother     Diabetes Maternal Grandfather     Heart Disease Maternal Grandfather     Heart Disease Paternal Grandmother     No Known Problems Paternal Grandfather     Colon Cancer Neg Hx     Colon Polyps Neg Hx     Esophageal Cancer Neg Hx     Liver Cancer Neg Hx     Liver Disease Neg Hx     Rectal Cancer Neg Hx     Stomach Cancer Neg Hx        Social History     Socioeconomic History    Marital status: Single mouth 2 times daily      insulin glargine (BASAGLAR KWIKPEN) 100 UNIT/ML injection pen Inject 50 Units into the skin nightly      bethanechol (URECHOLINE) 25 MG tablet Take 1 tablet by mouth daily      metFORMIN (GLUCOPHAGE) 1000 MG tablet Take 1 tablet by mouth daily Hold till monday      insulin aspart (NOVOLOG) 100 UNIT/ML injection vial Inject 10 Units into the skin 4 times daily (before meals and nightly) Up to 10 units ss coverage as directed      spironolactone (ALDACTONE) 25 MG tablet Take 12.5 mg by mouth daily      topiramate (TOPAMAX) 50 MG tablet Take 1 tablet by mouth daily      warfarin (COUMADIN) 10 MG tablet Take 10 mg by mouth      aspirin 81 MG tablet Take 81 mg by mouth daily      gabapentin (NEURONTIN) 600 MG tablet Take 600 mg by mouth 3 times daily .  metoprolol succinate ER (TOPROL-XL) 25 MG XL tablet Take 25 mg by mouth daily      prochlorperazine (COMPAZINE) 10 MG tablet Take 10 mg by mouth 2 times daily as needed (headache)      verapamil (CALAN) 40 MG tablet Take 40 mg by mouth every 12 hours      atorvastatin (LIPITOR) 40 MG tablet Take 80 mg by mouth nightly        No current facility-administered medications for this visit. Allergies   Allergen Reactions    Bactrim [Sulfamethoxazole-Trimethoprim] Other (See Comments)     Yeast inf       Review of Systems   Constitutional: Negative for activity change, appetite change, fatigue, fever and unexpected weight change. HENT: Negative for trouble swallowing. Respiratory: Positive for cough. Negative for choking and shortness of breath. Cardiovascular: Negative for chest pain. Gastrointestinal: Positive for abdominal pain, constipation and nausea. Negative for abdominal distention, anal bleeding, blood in stool, diarrhea, rectal pain and vomiting. Allergic/Immunologic: Negative for food allergies. All other systems reviewed and are negative.         Objective:     /88   Pulse 98   Ht 5' 4\" (1.626 m) Wt (!) 352 lb 9.6 oz (159.9 kg)   SpO2 98%   BMI 60.52 kg/m²     Physical Exam  Vitals reviewed. Constitutional:       General: She is not in acute distress. Appearance: She is well-developed. Eyes:      General:         Right eye: No discharge. Left eye: No discharge. Cardiovascular:      Rate and Rhythm: Normal rate and regular rhythm. Heart sounds: No murmur heard. Pulmonary:      Effort: Pulmonary effort is normal. No respiratory distress. Breath sounds: Normal breath sounds. Abdominal:      General: Bowel sounds are normal. There is no distension. Palpations: Abdomen is soft. There is no mass. Tenderness: There is no abdominal tenderness. There is no guarding or rebound. Musculoskeletal:         General: Normal range of motion. Cervical back: Normal range of motion. Skin:     General: Skin is warm and dry. Neurological:      Mental Status: She is alert and oriented to person, place, and time.    Psychiatric:         Behavior: Behavior normal.

## 2021-08-16 NOTE — PATIENT INSTRUCTIONS
Schedule colonoscopy and endoscopy. Do not eat or drink after midnight the day of the procedure. Allowed medications can be taken with a small sip of water. Please review your prep instructions for allowed medications. You will not be able to drive for 24 hours after the procedure due to sedation. Must have a responsible adult, 18 years or older, accompany you to drive you home the day of your procedure. If you are on blood thinners, clearance from the prescribing physician will be obtained before your procedure is scheduled. If it is determined it is not safe to hold these medications for a short time an alternative procedure for evaluation may be recommended. No aspirin, ibuprofen, naproxen, fish oil or vitamin E for 5 days before procedure. Risks of colonoscopy and endoscopy include, but are not limited to, perforation, bleeding, and infection, Risk of perforation and bleeding are increased if there is a polyp removed or dilation completed. Anesthesia risks will be reviewed with you before the procedure by a member of the anesthesia department. Your physician may also schedule a follow up appointment with the nurse practitioner to discuss pathology, symptoms or to check if you have had any problems related to your procedure. If you prefer not to return to the office after your procedure please discuss this with your physician on the day of your colonoscopy. The physician will talk with you and/or your family after the procedure is completed. Final recommendations are based on the pathologist report if biopsies or specimens are taken. If polyps are removed during the procedure they will be sent to a pathologist for analysis. Unless you have a follow up appointment scheduled, you will be notified by mail of the pathology results within 4 weeks. If you have not received results after 4 weeks you may call the office to obtain this information. For Colonoscopy:   You will be given specific directions regarding restrictions to diet and bowel prep instructions including laxatives. Please read these instructions one week prior to your scheduled procedure to ensure that you are prepared. If you have any questions regarding these instructions please call our office Mon through Fri from 8:00 am to 4:00 pm.     Follow prep instructions provided for bowel prep. Take all of the bowel prep as directed. If you are having problems with nausea, stop your prep for 30-45 min to allow the nausea to subside before resuming your prep. It is important to drink plenty of fluids throughout the day before taking your laxatives. This will help to protect your kidneys, prevent dehydration and maximize the effect of the bowel prep. Your diet before a colonoscopy bowel preparation is very important to ensure a successful colon exam. It is recommended to consider certain changes to your diet three to four days prior to the procedure. Remember that your bowels need to be completely empty for the exam.    What foods are good to eat? Cut down on heavy solid foods three to four days before the procedure and start introducing lighter meals to your diet. The following food suggestions are a good part of your diet before a colonoscopy bowel preparation.  Light meat that is easily digestible such as chicken (without the skin)    Potatoes without skin    Cheese    Eggs    A light meal of steamed white fish    Light clear soups    Foods and drinks to avoid  Avoid foods that contain too much fiber. Stay clear of dark colored beverages. They can stick to the walls of the digestive tract and make it difficult to differentiate from blood.  Some of these foods are:   Red meat, rice, nuts and vegetables    Milk, other milk based fluids and cream    Most fruit and puddings    Whole grain pasta    Cereals, bran and seeds    Colored beverages, especially those that are red or purple in color    Red colored Jell-O   On the day before the colonoscopy, continue to drink plenty of clear fluids. It is important   to keep yourself hydrated before the exam.     Please follow all instructions as provided for cleansing the bowel. Failure to have an adequately prepped colon may cause you to have incomplete exam with further testing required. [email or web address].[email or web address]      Miralax one teaspoon daily mixed as directed until you are having daily bowel movement. If no bm for 4 days increase to two teaspoons daily and gradually increase every 3-4 days until desired bowel movement is achieved. If you do not have bm for more than 4 days please use magnesim citrate, 1/2 bottle, to prevent becoming obstructed but continue to use miralax daily. If your stools become too loose or too frequent on daily dose you may reduce the amount taken daily. Make reductions gradually, every 3-4 days. Adjust dose, more or less, as needed for desired bm. You should have a soft bm at least every 3 days. Miralax is safe and effective for long term relief of chronic constipation. Drink 6-8 glasses of water daily. Regular exercise encouraged. High fiber diet recommended.

## 2021-08-20 ENCOUNTER — TELEPHONE (OUTPATIENT)
Dept: GASTROENTEROLOGY | Age: 46
End: 2021-08-20

## 2021-08-20 ENCOUNTER — HOSPITAL ENCOUNTER (OUTPATIENT)
Dept: LAB | Age: 46
Discharge: HOME | End: 2021-08-20
Payer: MEDICARE

## 2021-08-20 LAB
INR BLD: 2.19 (ref 0.88–1.18)
PROTHROMBIN TIME: 24.2 SEC (ref 12–14.6)

## 2021-08-20 PROCEDURE — 36415 COLL VENOUS BLD VENIPUNCTURE: CPT

## 2021-08-20 PROCEDURE — 85610 PROTHROMBIN TIME: CPT

## 2021-08-30 ENCOUNTER — OFFICE VISIT (OUTPATIENT)
Age: 46
End: 2021-08-30

## 2021-08-30 DIAGNOSIS — Z11.59 SCREENING FOR VIRAL DISEASE: Primary | ICD-10-CM

## 2021-08-30 LAB — SARS-COV-2, PCR: NOT DETECTED

## 2021-08-30 PROCEDURE — 99999 PR OFFICE/OUTPT VISIT,PROCEDURE ONLY: CPT | Performed by: NURSE PRACTITIONER

## 2021-09-02 ENCOUNTER — ANESTHESIA (OUTPATIENT)
Dept: ENDOSCOPY | Age: 46
End: 2021-09-02
Payer: MEDICARE

## 2021-09-02 ENCOUNTER — HOSPITAL ENCOUNTER (OUTPATIENT)
Age: 46
Setting detail: OUTPATIENT SURGERY
Discharge: HOME | End: 2021-09-02
Attending: INTERNAL MEDICINE | Admitting: INTERNAL MEDICINE
Payer: MEDICARE

## 2021-09-02 ENCOUNTER — ANESTHESIA EVENT (OUTPATIENT)
Dept: ENDOSCOPY | Age: 46
End: 2021-09-02
Payer: MEDICARE

## 2021-09-02 VITALS
DIASTOLIC BLOOD PRESSURE: 86 MMHG | HEART RATE: 95 BPM | TEMPERATURE: 97 F | SYSTOLIC BLOOD PRESSURE: 139 MMHG | OXYGEN SATURATION: 99 % | RESPIRATION RATE: 20 BRPM | HEIGHT: 64 IN | WEIGHT: 293 LBS | BODY MASS INDEX: 50.02 KG/M2

## 2021-09-02 VITALS — DIASTOLIC BLOOD PRESSURE: 51 MMHG | SYSTOLIC BLOOD PRESSURE: 95 MMHG | OXYGEN SATURATION: 91 %

## 2021-09-02 LAB
GLUCOSE BLD-MCNC: 244 MG/DL (ref 70–99)
HCG(URINE) PREGNANCY TEST: NEGATIVE
PERFORMED ON: ABNORMAL

## 2021-09-02 PROCEDURE — 3609012400 HC EGD TRANSORAL BIOPSY SINGLE/MULTIPLE: Performed by: INTERNAL MEDICINE

## 2021-09-02 PROCEDURE — 82947 ASSAY GLUCOSE BLOOD QUANT: CPT

## 2021-09-02 PROCEDURE — 88305 TISSUE EXAM BY PATHOLOGIST: CPT

## 2021-09-02 PROCEDURE — 3609027000 HC COLONOSCOPY: Performed by: INTERNAL MEDICINE

## 2021-09-02 PROCEDURE — 3700000000 HC ANESTHESIA ATTENDED CARE: Performed by: INTERNAL MEDICINE

## 2021-09-02 PROCEDURE — 2580000003 HC RX 258: Performed by: INTERNAL MEDICINE

## 2021-09-02 PROCEDURE — 45378 DIAGNOSTIC COLONOSCOPY: CPT | Performed by: INTERNAL MEDICINE

## 2021-09-02 PROCEDURE — 3700000001 HC ADD 15 MINUTES (ANESTHESIA): Performed by: INTERNAL MEDICINE

## 2021-09-02 PROCEDURE — 43239 EGD BIOPSY SINGLE/MULTIPLE: CPT | Performed by: INTERNAL MEDICINE

## 2021-09-02 PROCEDURE — 2709999900 HC NON-CHARGEABLE SUPPLY: Performed by: INTERNAL MEDICINE

## 2021-09-02 PROCEDURE — 2500000003 HC RX 250 WO HCPCS: Performed by: NURSE ANESTHETIST, CERTIFIED REGISTERED

## 2021-09-02 PROCEDURE — 84703 CHORIONIC GONADOTROPIN ASSAY: CPT

## 2021-09-02 PROCEDURE — 7100000010 HC PHASE II RECOVERY - FIRST 15 MIN: Performed by: INTERNAL MEDICINE

## 2021-09-02 PROCEDURE — 6360000002 HC RX W HCPCS: Performed by: NURSE ANESTHETIST, CERTIFIED REGISTERED

## 2021-09-02 PROCEDURE — 7100000011 HC PHASE II RECOVERY - ADDTL 15 MIN: Performed by: INTERNAL MEDICINE

## 2021-09-02 RX ORDER — ONDANSETRON 2 MG/ML
INJECTION INTRAMUSCULAR; INTRAVENOUS PRN
Status: DISCONTINUED | OUTPATIENT
Start: 2021-09-02 | End: 2021-09-02 | Stop reason: SDUPTHER

## 2021-09-02 RX ORDER — FENTANYL CITRATE 50 UG/ML
INJECTION, SOLUTION INTRAMUSCULAR; INTRAVENOUS PRN
Status: DISCONTINUED | OUTPATIENT
Start: 2021-09-02 | End: 2021-09-02 | Stop reason: SDUPTHER

## 2021-09-02 RX ORDER — MIDAZOLAM HYDROCHLORIDE 1 MG/ML
INJECTION INTRAMUSCULAR; INTRAVENOUS PRN
Status: DISCONTINUED | OUTPATIENT
Start: 2021-09-02 | End: 2021-09-02 | Stop reason: SDUPTHER

## 2021-09-02 RX ORDER — LIDOCAINE HYDROCHLORIDE 20 MG/ML
INJECTION, SOLUTION INFILTRATION; PERINEURAL PRN
Status: DISCONTINUED | OUTPATIENT
Start: 2021-09-02 | End: 2021-09-02 | Stop reason: SDUPTHER

## 2021-09-02 RX ORDER — SODIUM CHLORIDE, SODIUM LACTATE, POTASSIUM CHLORIDE, CALCIUM CHLORIDE 600; 310; 30; 20 MG/100ML; MG/100ML; MG/100ML; MG/100ML
INJECTION, SOLUTION INTRAVENOUS CONTINUOUS
Status: DISCONTINUED | OUTPATIENT
Start: 2021-09-02 | End: 2021-09-02 | Stop reason: HOSPADM

## 2021-09-02 RX ORDER — PROPOFOL 10 MG/ML
INJECTION, EMULSION INTRAVENOUS PRN
Status: DISCONTINUED | OUTPATIENT
Start: 2021-09-02 | End: 2021-09-02 | Stop reason: SDUPTHER

## 2021-09-02 RX ADMIN — FENTANYL CITRATE 100 MCG: 50 INJECTION INTRAMUSCULAR; INTRAVENOUS at 09:27

## 2021-09-02 RX ADMIN — PROPOFOL 250 MG: 10 INJECTION, EMULSION INTRAVENOUS at 09:29

## 2021-09-02 RX ADMIN — MIDAZOLAM 2 MG: 1 INJECTION INTRAMUSCULAR; INTRAVENOUS at 09:25

## 2021-09-02 RX ADMIN — LIDOCAINE HYDROCHLORIDE 40 MG: 20 INJECTION, SOLUTION INFILTRATION; PERINEURAL at 09:29

## 2021-09-02 RX ADMIN — SODIUM CHLORIDE, SODIUM LACTATE, POTASSIUM CHLORIDE, AND CALCIUM CHLORIDE: 600; 310; 30; 20 INJECTION, SOLUTION INTRAVENOUS at 09:20

## 2021-09-02 RX ADMIN — ONDANSETRON HYDROCHLORIDE 4 MG: 2 INJECTION, SOLUTION INTRAMUSCULAR; INTRAVENOUS at 09:28

## 2021-09-02 ASSESSMENT — PAIN - FUNCTIONAL ASSESSMENT: PAIN_FUNCTIONAL_ASSESSMENT: 0-10

## 2021-09-02 ASSESSMENT — PAIN SCALES - GENERAL
PAINLEVEL_OUTOF10: 0
PAINLEVEL_OUTOF10: 0

## 2021-09-02 NOTE — ANESTHESIA POSTPROCEDURE EVALUATION
Department of Anesthesiology  Postprocedure Note    Patient: Ange Fritz  MRN: [id number]  YOB: 1975  Date of evaluation: 9/2/2021  Time:  9:57 AM     Procedure Summary     Date: 09/02/21 Room / Location: [address]    Anesthesia Start: 9250 Anesthesia Stop:     Procedures:       EGD BIOPSY (N/A Abdomen)      COLORECTAL CANCER SCREENING, NOT HIGH RISK (N/A ) Diagnosis: (ABD PAIN, NAUSEA, SCREEN)    Surgeons: Gabby Huang MD Responsible Provider: YARIEL Haque CRNA    Anesthesia Type: general, TIVA ASA Status: 4          Anesthesia Type: No value filed. Naz Phase I:      Naz Phase II:      Last vitals: Reviewed and per EMR flowsheets.        Anesthesia Post Evaluation    Patient location during evaluation: bedside  Patient participation: complete - patient participated  Level of consciousness: sleepy but conscious  Pain score: 0  Airway patency: patent  Nausea & Vomiting: no nausea and no vomiting  Complications: no  Cardiovascular status: hemodynamically stable and blood pressure returned to baseline  Respiratory status: acceptable and nasal cannula  Hydration status: stable

## 2021-09-02 NOTE — OP NOTE
Endoscopic Procedure Note    Patient: Johanna Matthew : 1975  Med Rec#: [id number] Acc#: [id number]     Primary Care Provider Yao Galeana DO    Endoscopist: Sarah De Leon MD, MD    Date of Procedure:  2021    Procedure:   1. EGD with cold biopsies    Indications: For both EGD and colonoscopy exams today:  1. Generalized abdominal pain    2. Nausea    3. Colon cancer screening      Anesthesia:  Sedation was administered by anesthesia who monitored the patient during the procedure. Estimated Blood Loss: minimal    Procedure:   After reviewing the patient's chart and obtaining informed consent, the patient was placed in the left lateral decubitus position. A forward-viewing Olympus endoscope was lubricated and inserted through the mouth into the oropharynx. Under direct visualization, the upper esophagus was intubated. The scope was advanced to the level of the third portion of duodenum. Scope was slowly withdrawn with careful inspection of the mucosal surfaces. The scope was retroflexed for inspection of the gastric fundus and incisura. Findings and maneuvers are listed in impression below. The patient tolerated the procedure well. The scope was removed. There were no immediate complications. Findings/IMPRESSION:  Esophagus: normal; EG junction at 38 cm also was normal.    NO erosions or ulcers or nodules or strictures or webs or rings or mass lesions or extrinsic compression or diverticula noted. There is no obvious hiatal hernia present. Stomach:  Normal.    NO ulcers or masses or gastric outlet obstruction or retained food or fluid. Rugae were normal and lumen distended well with insufflation. Retroflexed views otherwise revealed a normal GE junction, fundus and cardia as well. Duodenum: Normal. Random biopsies were taken to check for Celiac disease and other causes of villous atrophy.     Clear-cut lesions to explain the patient's abdominal pain or nausea were discovered on this exam  RECOMMENDATIONS:    1. Await path results, the patient will be contacted in 7-10 days with biopsy results. 2.  Small frequent meals  3. Patient will benefit from weight loss through a regimen of low calorie diet and moderate exercise as feasible. The results were discussed with the patient and family. A copy of the images obtained were given to the patient.      Candy Farmer MD, MD  9/2/2021  8:57 AM

## 2021-09-02 NOTE — H&P
Patient Name: Virgil Krishnan  : 1975  MRN: [id number]  DATE: 21    Allergies: Allergies   Allergen Reactions    Bactrim [Sulfamethoxazole-Trimethoprim] Other (See Comments)     Yeast inf        ENDOSCOPY  History and Physical    Procedure:    [] Diagnostic Colonoscopy       [x] Screening Colonoscopy  [x] EGD      [] ERCP      [] EUS       [] Other    [x] Previous office notes/History and Physical reviewed from the patients chart. Please see EMR for further details of HPI. I have examined the patient's status immediately prior to the procedure and:      Indications/HPI: For both EGD and colonoscopy exams today:  1. Generalized abdominal pain    2. Nausea    3. Colon cancer screening      []Abdominal Pain   []Cancer- GI/Lung     []Fhx of colon CA/polyps  []History of Polyps  []Barretts            []Melena  []Abnormal Imaging              []Dysphagia              []Persistent Pneumonia   []Anemia                            []Food Impaction        []History of Polyps  [] GI Bleed             []Pulmonary nodule/Mass   []Change in bowel habits []Heartburn/Reflux  []Rectal Bleed (BRBPR)  []Chest Pain - Non Cardiac []Heme (+) Stool []Ulcers  []Constipation  []Hemoptysis  []Varices  []Diarrhea  []Hypoxemia    []Nausea/Vomiting   []Screening   []Crohns/Colitis  []Other:     Anesthesia:   [x] MAC [] Moderate Sedation   [] General   [] None     ROS: 12 pt Review of Symptoms was negative unless mentioned above    Medications:   Prior to Admission medications    Medication Sig Start Date End Date Taking?  Authorizing Provider   sucralfate (CARAFATE) 1 GM tablet Take 1 tablet by mouth 4 times daily as needed (gastric upset) 21   YARIEL Montoya - NP   nystatin (MYCOSTATIN) 774814 UNIT/GM powder Apply 3 times daily to lower groin, abdominal area and under bilateral breasts 21   Ashton Essex, APRN - CNP   NONFORMULARY daily apple cider gummy     Historical Provider, MD   pantoprazole (PROTONIX) 40 MG tablet Take 1 tablet by mouth daily 5/6/21   Drew Screen, APRN - NP   ezetimibe (ZETIA) 10 MG tablet Take 10 mg by mouth 2/15/19   Historical Provider, MD   Exenatide (BYDUREON) 2 MG PEN INJECT 0.65 MLS ONCE Q WEEK 1/29/19   Historical Provider, MD   furosemide (LASIX) 40 MG tablet Take 80 mg by mouth 2 times daily    Historical Provider, MD   insulin glargine (BASAGLAR KWIKPEN) 100 UNIT/ML injection pen Inject 80 Units into the skin nightly  10/24/17   Historical Provider, MD   bethanechol (URECHOLINE) 25 MG tablet Take 1 tablet by mouth daily 9/14/17   Historical Provider, MD   metFORMIN (GLUCOPHAGE) 1000 MG tablet Take 1 tablet by mouth daily Hold till monday 2/8/16   Historical Provider, MD   insulin aspart (NOVOLOG) 100 UNIT/ML injection vial Inject 10 Units into the skin 4 times daily (before meals and nightly) Up to 10 units ss coverage as directed 10/26/17   Historical Provider, MD   spironolactone (ALDACTONE) 25 MG tablet Take 12.5 mg by mouth daily 12/8/17   Historical Provider, MD   topiramate (TOPAMAX) 50 MG tablet Take 1 tablet by mouth daily 8/28/14   Historical Provider, MD   warfarin (COUMADIN) 10 MG tablet Take 10 mg by mouth    Historical Provider, MD   aspirin 81 MG tablet Take 81 mg by mouth daily    Historical Provider, MD   gabapentin (NEURONTIN) 600 MG tablet Take 600 mg by mouth 3 times daily .     Historical Provider, MD   metoprolol succinate ER (TOPROL-XL) 25 MG XL tablet Take 25 mg by mouth daily    Historical Provider, MD   prochlorperazine (COMPAZINE) 10 MG tablet Take 10 mg by mouth 2 times daily as needed (headache)    Historical Provider, MD   verapamil (CALAN) 40 MG tablet Take 40 mg by mouth every 12 hours    Historical Provider, MD   atorvastatin (LIPITOR) 40 MG tablet Take 80 mg by mouth nightly     Historical Provider, MD       Past Medical History:  Past Medical History:   Diagnosis Date    CAD (coronary artery disease)     [facility]     Diabetes mellitus ([facility]     GERD (gastroesophageal reflux disease)     Hypertension        Past Surgical History:  Past Surgical History:   Procedure Laterality Date    CARDIAC SURGERY      CORONARY ARTERY BYPASS GRAFT      2016    TONSILLECTOMY      UPPER GASTROINTESTINAL ENDOSCOPY      ermelinda Carr per patient       Social History:  Social History     Tobacco Use    Smoking status: Never Smoker    Smokeless tobacco: Never Used   Vaping Use    Vaping Use: Never used   Substance Use Topics    Alcohol use: Yes     Comment: occ    Drug use: No       Vital Signs: There were no vitals filed for this visit. Physical Exam:  Cardiac:  [x]WNL  []Comments:  Pulmonary:  [x]WNL   []Comments:  Neuro/Mental Status:  [x]WNL  []Comments:  Abdominal:  [x]WNL    []Comments:  Other:   []WNL  []Comments:    Informed Consent:  The risks and benefits of the procedure have been discussed with either the patient or if they cannot consent, their representative. Assessment:  Patient examined and appropriate for planned sedation and procedure. Plan:  Proceed with planned sedation and procedure as above.          Candy Farmer MD

## 2021-09-02 NOTE — OP NOTE
Patient: Nicole Le : 1975  Med Rec#: [id number] Acc#: [id number]   Primary Care Provider Lita Sam DO    Date of Procedure:  2021    Endoscopist: Nikki Christopher MD, MD    Referring Provider: Lita Sam DO,     Operation Performed: Colonoscopy up to the cecum    Indications: For both EGD and colonoscopy exams today:  1. Generalized abdominal pain    2. Nausea    3. Colon cancer screening      Anesthesia:  Sedation was administered by anesthesia who monitored the patient during the procedure. I met with Nicole Le prior to procedure. We discussed the procedure itself, and I have discussed the risks of endoscopy (including-- but not limited to-- pain, discomfort, bleeding potentially requiring second endoscopic procedure and/or blood transfusion, organ perforation requiring operative repair, damage to organs near the colon, infection, aspiration, cardiopulmonary/allergic reaction), benefits, indications to endoscopy. Additionally, we discussed options other than colonoscopy. The patient expressed understanding. All questions answered. The patient decided to proceed with the procedure. Signed informed consent was placed on the chart. Blood Loss: minimal    Withdrawal time: More than 6 minutes  Bowel Prep: Fair with small amounts of thick solid and semisolid stool scattered in patchy segments throughout the colon obscuring the underlying mucosa. Small lesions including polyps may have been missed. Complications: no immediate complications    DESCRIPTION OF PROCEDURE:     A time out was performed. After written informed consent was obtained, the patient was placed in the left lateral position. The perianal area was inspected, and a digital rectal exam was performed. A rectal exam was performed: normal tone, no palpable lesions. At this point, a forward viewing Olympus colonoscope was inserted into the anus and carefully advanced to the cecum.   The cecum was identified by the ileocecal valve and the appendiceal orifice. The colonoscope was then slowly withdrawn with careful inspection of the mucosa in a linear and circumferential fashion. The scope was retroflexed in the rectum. Suction was utilized during the procedure to remove as much air as possible from the bowel. The colonoscope was removed from the patient, and the procedure was terminated. Findings are listed below. Findings:     NO large polyps or masses or strictures or colitis. Suboptimal exam due to prep quality as described above. Where it was clearly visible, the mucosa appeared normal throughout the entire examined colon  Retroflexion in the rectum was otherwise normal and revealed no further abnormalities     No obvious lesions to explain the patient's abdominal symptoms were discovered on this exam.  She may have IBS or a non-GI source of her symptoms. Recommendations:  1. Repeat colonoscopy: At age 48 for colorectal cancer screening  2. - Resume previous meds and diet  - GI clinic f/u 4-6 weeks with Ms. Tavo Patel scheduled f/u appts with other MDs     Findings and recommendations were discussed w/ the patient. A copy of the images was provided.     Matthew Sanders MD, MD  9/2/2021  8:57 AM

## 2021-09-02 NOTE — ANESTHESIA PRE PROCEDURE
Department of Anesthesiology  Preprocedure Note       Name:  Kateryna Amato   Age:  55 y.o.  :  1975                                          MRN:  [id number]         Date:  2021      Surgeon: Guerline Anders):  Eloisa Simmons MD    Procedure: Procedure(s):  EGD BIOPSY  COLORECTAL CANCER SCREENING, NOT HIGH RISK    Medications prior to admission:   Prior to Admission medications    Medication Sig Start Date End Date Taking?  Authorizing Provider   sucralfate (CARAFATE) 1 GM tablet Take 1 tablet by mouth 4 times daily as needed (gastric upset) 21   YARIEL Faustin NP   nystatin (MYCOSTATIN) 672994 UNIT/GM powder Apply 3 times daily to lower groin, abdominal area and under bilateral breasts 21   YARIEL Scherer CNP   NONFORMULARY daily apple cider gummy     Historical Provider, MD   pantoprazole (PROTONIX) 40 MG tablet Take 1 tablet by mouth daily 21   YARIEL Faustin NP   ezetimibe (ZETIA) 10 MG tablet Take 10 mg by mouth 2/15/19   Historical Provider, MD   Exenatide (BYDUREON) 2 MG PEN INJECT 0.65 MLS ONCE Q WEEK 19   Historical Provider, MD   furosemide (LASIX) 40 MG tablet Take 80 mg by mouth 2 times daily    Historical Provider, MD   insulin glargine (BASAGLAR KWIKPEN) 100 UNIT/ML injection pen Inject 50 Units into the skin nightly 10/24/17   Historical Provider, MD   bethanechol (URECHOLINE) 25 MG tablet Take 1 tablet by mouth daily 17   Historical Provider, MD   metFORMIN (GLUCOPHAGE) 1000 MG tablet Take 1 tablet by mouth daily Hold till 16   Historical Provider, MD   insulin aspart (NOVOLOG) 100 UNIT/ML injection vial Inject 10 Units into the skin 4 times daily (before meals and nightly) Up to 10 units ss coverage as directed 10/26/17   Historical Provider, MD   spironolactone (ALDACTONE) 25 MG tablet Take 12.5 mg by mouth daily 17   Historical Provider, MD   topiramate (TOPAMAX) 50 MG tablet Take 1 tablet by mouth daily 14   Historical Provider, MD   warfarin (COUMADIN) 10 MG tablet Take 10 mg by mouth    Historical Provider, MD   aspirin 81 MG tablet Take 81 mg by mouth daily    Historical Provider, MD   gabapentin (NEURONTIN) 600 MG tablet Take 600 mg by mouth 3 times daily . Historical Provider, MD   metoprolol succinate ER (TOPROL-XL) 25 MG XL tablet Take 25 mg by mouth daily    Historical Provider, MD   prochlorperazine (COMPAZINE) 10 MG tablet Take 10 mg by mouth 2 times daily as needed (headache)    Historical Provider, MD   verapamil (CALAN) 40 MG tablet Take 40 mg by mouth every 12 hours    Historical Provider, MD   atorvastatin (LIPITOR) 40 MG tablet Take 80 mg by mouth nightly     Historical Provider, MD       Current medications:    No current facility-administered medications for this encounter. Allergies:     Allergies   Allergen Reactions    Bactrim [Sulfamethoxazole-Trimethoprim] Other (See Comments)     Yeast inf       Problem List:    Patient Active Problem List   Diagnosis Code    Atherosclerosis of native coronary artery of native heart with unstable angina pectoris ([facility]) I25.110    Essential hypertension I10    Diabetes mellitus ([address] E11.9    Mixed hyperlipidemia E78.2    NAYAN (obstructive sleep apnea) G47.33    Morbid obesity due to excess calories ([facility]) E66.01    Acute blood loss anemia D62    [facility] occlusion with cerebral infarction ([facility]) I63.50    Type 2 diabetes mellitus without complication, with long-term current use of insulin ([facility]) E11.9, Z79.4    Surgical wound dehiscence T81.31XA    Chest pain R07.9    Gastroesophageal reflux disease without esophagitis K21.9       Past Medical History:        Diagnosis Date    CAD (coronary artery disease)     [facility] occlusion with cerebral infarction ([address]     Diabetes mellitus ([address]     GERD (gastroesophageal reflux disease)     Hypertension        Past Surgical History:        Procedure Laterality Date    CARDIAC SURGERY      CORONARY ARTERY BYPASS GRAFT      2016    TONSILLECTOMY      UPPER GASTROINTESTINAL ENDOSCOPY      sujatha Mcclure/snow per patient       Social History:    Social History     Tobacco Use    Smoking status: Never Smoker    Smokeless tobacco: Never Used   Substance Use Topics    Alcohol use: Yes     Comment: occ                                Counseling given: Not Answered      Vital Signs (Current): There were no vitals filed for this visit. BP Readings from Last 3 Encounters:   08/16/21 132/88   05/17/21 138/89   05/06/21 117/70       NPO Status:                                                                                 BMI:   Wt Readings from Last 3 Encounters:   08/16/21 (!) 352 lb 9.6 oz (159.9 kg)   05/17/21 (!) 349 lb (158.3 kg)   05/06/21 (!) 350 lb (158.8 kg)     There is no height or weight on file to calculate BMI.    CBC:   Lab Results   Component Value Date    WBC 13.5 02/23/2018    RBC 5.33 02/23/2018    HGB 17.2 02/23/2018    HCT 51.0 02/23/2018    MCV 95.7 02/23/2018    RDW 12.0 02/23/2018     02/23/2018       CMP:   Lab Results   Component Value Date     08/02/2021    K 3.8 08/02/2021    CL 96 08/02/2021    CO2 22 08/02/2021    BUN 25 08/02/2021    CREATININE 1.5 08/02/2021    GFRAA 45 08/02/2021    LABGLOM 37 08/02/2021    GLUCOSE 210 08/02/2021    PROT 7.6 02/23/2018    CALCIUM 9.1 08/02/2021    BILITOT 0.3 02/23/2018    ALKPHOS 109 02/23/2018    AST 15 02/23/2018    ALT 23 02/23/2018       POC Tests: No results for input(s): POCGLU, POCNA, POCK, POCCL, POCBUN, POCHEMO, POCHCT in the last 72 hours.     Coags:   Lab Results   Component Value Date    PROTIME 24.2 08/20/2021    INR 2.19 08/20/2021    APTT 47.4 02/23/2018       HCG (If Applicable):   Lab Results   Component Value Date    PREGTESTUR Negative 01/08/2018        ABGs:   Lab Results   Component Value Date    PHART 7.430 02/23/2018    PO2ART 85.0 02/23/2018    LMM3ITJ 41.0 02/23/2018 LVV3JFS 27.2 02/23/2018    BEART 2.6 02/23/2018    U2MFESIV 95.4 02/23/2018        Type & Screen (If Applicable):  No results found for: LABABO, LABRH    Drug/Infectious Status (If Applicable):  No results found for: HIV, HEPCAB    COVID-19 Screening (If Applicable):   Lab Results   Component Value Date    COVID19 Not Detected 08/30/2021           Anesthesia Evaluation  Patient summary reviewed  Airway: Mallampati: II  TM distance: >3 FB   Neck ROM: full  Mouth opening: < 3 FB Dental: normal exam         Pulmonary:normal exam    (+) sleep apnea:                             Cardiovascular:  Exercise tolerance: poor (<4 METS),   (+) hypertension:, angina:, CAD:, CABG/stent (CABG 2016):, hyperlipidemia         Beta Blocker:  Dose within 24 Hrs         Neuro/Psych:   (+) CVA:,             GI/Hepatic/Renal:   (+) GERD:, bowel prep, morbid obesity (BMI 60)          Endo/Other:    (+) DiabetesType II DM, , blood dyscrasia (Coumadin): anticoagulation therapy:., .                 Abdominal:             Vascular: negative vascular ROS. Other Findings:             Anesthesia Plan      general and TIVA     ASA 4       Induction: intravenous. Anesthetic plan and risks discussed with patient.                       YARIEL Ward - BHARTI   9/2/2021

## 2021-09-10 ENCOUNTER — HOSPITAL ENCOUNTER (OUTPATIENT)
Dept: LAB | Age: 46
Discharge: HOME | End: 2021-09-10
Payer: MEDICARE

## 2021-09-10 LAB
INR BLD: 2.61 (ref 0.88–1.18)
PROTHROMBIN TIME: 27.4 SEC (ref 12–14.6)

## 2021-09-10 PROCEDURE — 36415 COLL VENOUS BLD VENIPUNCTURE: CPT

## 2021-09-10 PROCEDURE — 85610 PROTHROMBIN TIME: CPT

## 2021-09-18 ENCOUNTER — OFFICE VISIT (OUTPATIENT)
Dept: URGENT CARE | Age: 46
End: 2021-09-18

## 2021-09-18 ENCOUNTER — HOSPITAL ENCOUNTER (EMERGENCY)
Age: 46
Discharge: HOME | DRG: 439 | End: 2021-09-18
Attending: EMERGENCY MEDICINE
Payer: MEDICARE

## 2021-09-18 ENCOUNTER — APPOINTMENT (OUTPATIENT)
Dept: CT IMAGING | Age: 46
DRG: 439 | End: 2021-09-18
Payer: MEDICARE

## 2021-09-18 VITALS
OXYGEN SATURATION: 99 % | BODY MASS INDEX: 60.59 KG/M2 | TEMPERATURE: 97.1 F | DIASTOLIC BLOOD PRESSURE: 74 MMHG | RESPIRATION RATE: 18 BRPM | WEIGHT: 293 LBS | HEART RATE: 51 BPM | SYSTOLIC BLOOD PRESSURE: 122 MMHG

## 2021-09-18 VITALS
WEIGHT: 293 LBS | DIASTOLIC BLOOD PRESSURE: 82 MMHG | HEART RATE: 81 BPM | RESPIRATION RATE: 16 BRPM | OXYGEN SATURATION: 97 % | TEMPERATURE: 98 F | BODY MASS INDEX: 66.94 KG/M2 | SYSTOLIC BLOOD PRESSURE: 112 MMHG

## 2021-09-18 DIAGNOSIS — R10.9 ABDOMINAL PAIN IN FEMALE: Primary | ICD-10-CM

## 2021-09-18 DIAGNOSIS — R74.8 ELEVATED LIPASE: ICD-10-CM

## 2021-09-18 DIAGNOSIS — R10.13 EPIGASTRIC PAIN: Primary | ICD-10-CM

## 2021-09-18 LAB
ALBUMIN SERPL-MCNC: 4 G/DL (ref 3.5–5.2)
ALP BLD-CCNC: 113 U/L (ref 35–104)
ALT SERPL-CCNC: 14 U/L (ref 5–33)
ANION GAP SERPL CALCULATED.3IONS-SCNC: 10 MMOL/L (ref 7–19)
AST SERPL-CCNC: 14 U/L (ref 5–32)
BASOPHILS ABSOLUTE: 0 K/UL (ref 0–0.2)
BASOPHILS RELATIVE PERCENT: 0.4 % (ref 0–1)
BILIRUB SERPL-MCNC: 0.3 MG/DL (ref 0.2–1.2)
BUN BLDV-MCNC: 19 MG/DL (ref 6–20)
CALCIUM SERPL-MCNC: 9.3 MG/DL (ref 8.6–10)
CHLORIDE BLD-SCNC: 98 MMOL/L (ref 98–111)
CO2: 26 MMOL/L (ref 22–29)
CREAT SERPL-MCNC: 1.2 MG/DL (ref 0.5–0.9)
EOSINOPHILS ABSOLUTE: 0.2 K/UL (ref 0–0.6)
EOSINOPHILS RELATIVE PERCENT: 3.1 % (ref 0–5)
GFR AFRICAN AMERICAN: 58
GFR NON-AFRICAN AMERICAN: 48
GLUCOSE BLD-MCNC: 81 MG/DL (ref 74–109)
HCG QUALITATIVE: NEGATIVE
HCT VFR BLD CALC: 48.1 % (ref 37–47)
HEMOGLOBIN: 15.6 G/DL (ref 12–16)
IMMATURE GRANULOCYTES #: 0 K/UL
LACTIC ACID: 1.2 MMOL/L (ref 0.5–1.9)
LIPASE: 81 U/L (ref 13–60)
LYMPHOCYTES ABSOLUTE: 2.4 K/UL (ref 1.1–4.5)
LYMPHOCYTES RELATIVE PERCENT: 33 % (ref 20–40)
MCH RBC QN AUTO: 29.6 PG (ref 27–31)
MCHC RBC AUTO-ENTMCNC: 32.4 G/DL (ref 33–37)
MCV RBC AUTO: 91.3 FL (ref 81–99)
MONOCYTES ABSOLUTE: 0.6 K/UL (ref 0–0.9)
MONOCYTES RELATIVE PERCENT: 8.6 % (ref 0–10)
NEUTROPHILS ABSOLUTE: 4 K/UL (ref 1.5–7.5)
NEUTROPHILS RELATIVE PERCENT: 54.5 % (ref 50–65)
PDW BLD-RTO: 14.6 % (ref 11.5–14.5)
PLATELET # BLD: 336 K/UL (ref 130–400)
PMV BLD AUTO: 12.1 FL (ref 9.4–12.3)
POTASSIUM SERPL-SCNC: 3.9 MMOL/L (ref 3.5–5)
RBC # BLD: 5.27 M/UL (ref 4.2–5.4)
SODIUM BLD-SCNC: 134 MMOL/L (ref 136–145)
TOTAL PROTEIN: 7.8 G/DL (ref 6.6–8.7)
WBC # BLD: 7.4 K/UL (ref 4.8–10.8)

## 2021-09-18 PROCEDURE — 99999 PR OFFICE/OUTPT VISIT,PROCEDURE ONLY: CPT | Performed by: NURSE PRACTITIONER

## 2021-09-18 PROCEDURE — 83605 ASSAY OF LACTIC ACID: CPT

## 2021-09-18 PROCEDURE — 96374 THER/PROPH/DIAG INJ IV PUSH: CPT

## 2021-09-18 PROCEDURE — 99283 EMERGENCY DEPT VISIT LOW MDM: CPT

## 2021-09-18 PROCEDURE — 85025 COMPLETE CBC W/AUTO DIFF WBC: CPT

## 2021-09-18 PROCEDURE — 36415 COLL VENOUS BLD VENIPUNCTURE: CPT

## 2021-09-18 PROCEDURE — 84703 CHORIONIC GONADOTROPIN ASSAY: CPT

## 2021-09-18 PROCEDURE — 6360000002 HC RX W HCPCS: Performed by: EMERGENCY MEDICINE

## 2021-09-18 PROCEDURE — 96375 TX/PRO/DX INJ NEW DRUG ADDON: CPT

## 2021-09-18 PROCEDURE — 83690 ASSAY OF LIPASE: CPT

## 2021-09-18 PROCEDURE — 74177 CT ABD & PELVIS W/CONTRAST: CPT

## 2021-09-18 PROCEDURE — 6360000004 HC RX CONTRAST MEDICATION: Performed by: EMERGENCY MEDICINE

## 2021-09-18 PROCEDURE — 80053 COMPREHEN METABOLIC PANEL: CPT

## 2021-09-18 RX ORDER — ONDANSETRON 2 MG/ML
4 INJECTION INTRAMUSCULAR; INTRAVENOUS ONCE
Status: COMPLETED | OUTPATIENT
Start: 2021-09-18 | End: 2021-09-18

## 2021-09-18 RX ORDER — ONDANSETRON 4 MG/1
4 TABLET, ORALLY DISINTEGRATING ORAL EVERY 8 HOURS PRN
Qty: 15 TABLET | Refills: 0 | Status: SHIPPED | OUTPATIENT
Start: 2021-09-18

## 2021-09-18 RX ORDER — MORPHINE SULFATE 4 MG/ML
4 INJECTION, SOLUTION INTRAMUSCULAR; INTRAVENOUS ONCE
Status: COMPLETED | OUTPATIENT
Start: 2021-09-18 | End: 2021-09-18

## 2021-09-18 RX ADMIN — IOPAMIDOL 90 ML: 755 INJECTION, SOLUTION INTRAVENOUS at 18:59

## 2021-09-18 RX ADMIN — MORPHINE SULFATE 4 MG: 4 INJECTION, SOLUTION INTRAMUSCULAR; INTRAVENOUS at 20:32

## 2021-09-18 RX ADMIN — ONDANSETRON HYDROCHLORIDE 4 MG: 2 SOLUTION INTRAMUSCULAR; INTRAVENOUS at 20:32

## 2021-09-18 ASSESSMENT — PAIN SCALES - GENERAL
PAINLEVEL_OUTOF10: 10
PAINLEVEL_OUTOF10: 9

## 2021-09-18 NOTE — PROGRESS NOTES
Pt presents to triage with c/o abdominal pain and lower back pain. Pt reports that her pain is currently at a level 10, and she is experiencing sob. YARIEL Bernal advised pt to go to ED for further work'up and evaluation. Pt was agreeable and voiced understanding. She left the clinic in stable condition.

## 2021-09-19 NOTE — ED PROVIDER NOTES
[facility] EMERGENCY DEPT  eMERGENCY dEPARTMENT eNCOUnter      Pt Name: Fred Gamez  MRN: [id number]  [place] 1975  Date of evaluation: 9/18/2021  Provider: Brooke Mckeon MD    [address] [place] [address]       Chief Complaint   Patient presents with    Abdominal Pain     upper abd pain with n/v         HISTORY OF PRESENT ILLNESS   (Location/Symptom, Timing/Onset,Context/Setting, Quality, Duration, Modifying Factors, Severity)  Note limiting factors. Fred Gamez is a 55 y.o. female who presents to the emergency department for evaluation regarding upper abdominal pain. Patient states that the symptoms began today, have been of moderate severity. She describes the pain is sharp in nature and associated with nausea and several episodes of vomiting today. She has not had any episodes of black tarry stools. Denies fevers or chills. No recent oral antibiotics. He has an EGD and colonoscopy performed a couple of weeks previously. There have been no relieving factors for the symptoms. HPI    NursingNotes were reviewed. REVIEW OF SYSTEMS    (2-9 systems for level 4, 10 or more for level 5)     Review of Systems   Constitutional: Negative for chills and fever. Respiratory: Negative for shortness of breath. Cardiovascular: Negative for chest pain. Gastrointestinal: Positive for abdominal pain, nausea and vomiting. Negative for abdominal distention, blood in stool and diarrhea. Neurological: Negative for dizziness and syncope. All other systems reviewed and are negative.            PAST MEDICALHISTORY     Past Medical History:   Diagnosis Date    CAD (coronary artery disease)     [facility] [address]     Diabetes mellitus ([facility] [address]     GERD (gastroesophageal reflux disease)     Hypertension          SURGICAL HISTORY       Past Surgical History:   Procedure Laterality Date    CARDIAC SURGERY      COLONOSCOPY N/A 09/02/2021    Dr Stanford Marshall, 4 year recall   [place] [place] [sulfamethoxazole-trimethoprim]    FAMILY HISTORY       Family History   Problem Relation Age of Onset    Osteoarthritis Mother     Diabetes Mother     Hypertension Mother     Hypertension Father     Heart Attack Brother     Heart Failure Maternal Grandmother     Diabetes Maternal Grandfather     Heart Disease Maternal Grandfather     Heart Disease Paternal Grandmother     No Known Problems Paternal Grandfather     Colon Cancer Neg Hx     Colon Polyps Neg Hx     Esophageal Cancer Neg Hx     Liver Cancer Neg Hx     Liver Disease Neg Hx     Rectal Cancer Neg Hx     Stomach Cancer Neg Hx           SOCIAL HISTORY       Social History     Socioeconomic History    Marital status: Single     Spouse name: Not on file    Number of children: Not on file    Years of education: Not on file    Highest education level: Not on file   Occupational History    Not on file   Tobacco Use    Smoking status: Never Smoker    Smokeless tobacco: Never Used   Vaping Use    Vaping Use: Never used   Substance and Sexual Activity    Alcohol use: Yes     Comment: occ    Drug use: No    Sexual activity: Not on file   Other Topics Concern    Not on file   Social History Narrative    Not on file     Social Determinants of Health     Financial Resource Strain:     Difficulty of Paying Living Expenses:    Food Insecurity:     Worried About Running Out of Food in the Last Year:     Ran Out of Food in the Last Year:    Transportation Needs:     Lack of Transportation (Medical):      Lack of Transportation (Non-Medical):    Physical Activity:     Days of Exercise per Week:     Minutes of Exercise per Session:    Stress:     Feeling of Stress :    Social Connections:     Frequency of Communication with Friends and Family:     Frequency of Social Gatherings with Friends and Family:     Attends [employer] Services:     Active Member of Clubs or Organizations:     Attends Club or Organization Meetings:     Marital Status:    Intimate Partner Violence:     Fear of Current or Ex-Partner:     Emotionally Abused:     Physically Abused:     Sexually Abused:        SCREENINGS    [place] Coma Scale  Eye Opening: Spontaneous  Best Verbal Response: Oriented  Best Motor Response: Obeys commands  [place] Coma Scale Score: 15        PHYSICAL EXAM    (up to 7 for level 4, 8 or more for level 5)     ED Triage Vitals [09/18/21 1620]   BP Temp Temp Source Pulse Resp SpO2 Height Weight   120/76 98.8 °F (37.1 °C) Temporal 86 20 98 % -- (!) 390 lb (176.9 kg)       Physical Exam  Vitals and nursing note reviewed. HENT:      Head: Atraumatic. Mouth/Throat:      Mouth: Mucous membranes are moist. Mucous membranes are not dry. Eyes:      General: No scleral icterus. Pupils: Pupils are equal, round, and reactive to light. Neck:      Trachea: No tracheal deviation. Cardiovascular:      Rate and Rhythm: Normal rate and regular rhythm. Pulses: Normal pulses. Heart sounds: Normal heart sounds. No murmur heard. Pulmonary:      Effort: Pulmonary effort is normal. No respiratory distress. Breath sounds: Normal breath sounds. No stridor. Abdominal:      General: There is no distension. Palpations: Abdomen is soft. Tenderness: There is abdominal tenderness. There is no guarding. Musculoskeletal:      Right lower leg: No edema. Left lower leg: No edema. Skin:     Coloration: Skin is not pale. Findings: No rash. Neurological:      Mental Status: She is alert and oriented to person, place, and time. Psychiatric:         Behavior: Behavior is cooperative.          DIAGNOSTIC RESULTS       RADIOLOGY:  Non-plain film images such as CT, Ultrasound and MRI are read by the radiologist. Plain radiographic images are visualized and preliminarily interpreted bythe emergency physician with the below findings:      CT ABDOMEN PELVIS W IV CONTRAST Additional Contrast? None   Final Result   No acute abdominal pelvic abnormalities. Signed by Dr Thu Monae:  Ward Ryliealejo - Abnormal; Notable for the following components:       Result Value    Sodium 134 (*)     CREATININE 1.2 (*)     GFR Non- 48 (*)     GFR African American 58 (*)     Alkaline Phosphatase 113 (*)     All other components within normal limits   CBC WITH AUTO DIFFERENTIAL - Abnormal; Notable for the following components:    Hematocrit 48.1 (*)     MCHC 32.4 (*)     RDW 14.6 (*)     All other components within normal limits   LIPASE - Abnormal; Notable for the following components:    Lipase 81 (*)     All other components within normal limits   LACTIC ACID, PLASMA   HCG, SERUM, QUALITATIVE       All other labs were within normal range or not returned as of this dictation. EMERGENCY DEPARTMENT COURSE and DIFFERENTIAL DIAGNOSIS/MDM:   Vitals:    Vitals:    09/18/21 1700 09/18/21 1730 09/18/21 1900 09/18/21 2034   BP: 118/84 136/87 124/77 (!) 123/92   Pulse: 88 86 84 80   Resp: 20 20 20 18   Temp:       TempSrc:       SpO2: 95% 90% 94% 97%   Weight:           MDM    Reassessment    Lipase is mildly elevated at 81. There is no CT evidence of early acute pancreatitis. Laboratory studies are otherwise unremarkable. We will plan to discharge patient home at this time. PROCEDURES:  Unless otherwise noted below, none     Procedures    FINAL IMPRESSION      1. Epigastric pain    2.  Elevated lipase          DISPOSITION/PLAN   DISPOSITION Decision To Discharge 09/18/2021 09:20:59 PM      PATIENT REFERRED TO:  DO Todd Diane 1122  [phone]            DISCHARGE MEDICATIONS:  New Prescriptions    ONDANSETRON (ZOFRAN ODT) 4 MG DISINTEGRATING TABLET    Take 1 tablet by mouth every 8 hours as needed for Nausea or Vomiting          (Please note that portions of this note were completed with a voice recognition program.  Efforts were made to edit thedictations but occasionally words are mis-transcribed.)    Lorenzo Broderick MD (electronically signed)  Attending Emergency Physician         Lorenzo Broderick MD  10/01/21 [id number]

## 2021-09-20 ENCOUNTER — APPOINTMENT (OUTPATIENT)
Dept: CT IMAGING | Age: 46
DRG: 439 | End: 2021-09-20
Payer: MEDICARE

## 2021-09-20 ENCOUNTER — OFFICE VISIT (OUTPATIENT)
Dept: URGENT CARE | Age: 46
End: 2021-09-20

## 2021-09-20 ENCOUNTER — HOSPITAL ENCOUNTER (INPATIENT)
Age: 46
LOS: 4 days | Discharge: HOME HEALTH | DRG: 439 | End: 2021-09-24
Attending: EMERGENCY MEDICINE
Payer: MEDICARE

## 2021-09-20 DIAGNOSIS — G89.4 CHRONIC PAIN SYNDROME: ICD-10-CM

## 2021-09-20 DIAGNOSIS — K85.90 ACUTE PANCREATITIS WITHOUT INFECTION OR NECROSIS, UNSPECIFIED PANCREATITIS TYPE: Primary | ICD-10-CM

## 2021-09-20 DIAGNOSIS — Z79.01 CHRONIC ANTICOAGULATION: ICD-10-CM

## 2021-09-20 DIAGNOSIS — Z87.19 HISTORY OF GALLSTONES: ICD-10-CM

## 2021-09-20 DIAGNOSIS — G62.9 NEUROPATHY: ICD-10-CM

## 2021-09-20 DIAGNOSIS — Z53.21 PATIENT LEFT WITHOUT BEING SEEN: Primary | ICD-10-CM

## 2021-09-20 PROBLEM — K85.10 PANCREATITIS, GALLSTONE: Status: ACTIVE | Noted: 2021-09-20

## 2021-09-20 LAB
ALBUMIN SERPL-MCNC: 4 G/DL (ref 3.5–5.2)
ALP BLD-CCNC: 109 U/L (ref 35–104)
ALT SERPL-CCNC: 14 U/L (ref 5–33)
ANION GAP SERPL CALCULATED.3IONS-SCNC: 10 MMOL/L (ref 7–19)
AST SERPL-CCNC: 13 U/L (ref 5–32)
BASOPHILS ABSOLUTE: 0 K/UL (ref 0–0.2)
BASOPHILS RELATIVE PERCENT: 0.3 % (ref 0–1)
BILIRUB SERPL-MCNC: 0.3 MG/DL (ref 0.2–1.2)
BILIRUBIN URINE: NEGATIVE
BLOOD, URINE: NEGATIVE
BUN BLDV-MCNC: 18 MG/DL (ref 6–20)
CALCIUM SERPL-MCNC: 9.2 MG/DL (ref 8.6–10)
CHLORIDE BLD-SCNC: 100 MMOL/L (ref 98–111)
CHOLESTEROL, TOTAL: 137 MG/DL (ref 160–199)
CLARITY: CLEAR
CO2: 29 MMOL/L (ref 22–29)
COLOR: YELLOW
CREAT SERPL-MCNC: 1.2 MG/DL (ref 0.5–0.9)
EOSINOPHILS ABSOLUTE: 0.3 K/UL (ref 0–0.6)
EOSINOPHILS RELATIVE PERCENT: 3.4 % (ref 0–5)
GFR AFRICAN AMERICAN: 58
GFR NON-AFRICAN AMERICAN: 48
GLUCOSE BLD-MCNC: 93 MG/DL (ref 74–109)
GLUCOSE URINE: =>1000 MG/DL
HCG(URINE) PREGNANCY TEST: NEGATIVE
HCT VFR BLD CALC: 49.2 % (ref 37–47)
HDLC SERPL-MCNC: 52 MG/DL (ref 65–121)
HEMOGLOBIN: 15.5 G/DL (ref 12–16)
IMMATURE GRANULOCYTES #: 0 K/UL
KETONES, URINE: NEGATIVE MG/DL
LDL CHOLESTEROL CALCULATED: 60 MG/DL
LEUKOCYTE ESTERASE, URINE: NEGATIVE
LIPASE: 141 U/L (ref 13–60)
LYMPHOCYTES ABSOLUTE: 1.8 K/UL (ref 1.1–4.5)
LYMPHOCYTES RELATIVE PERCENT: 23.9 % (ref 20–40)
MCH RBC QN AUTO: 29.6 PG (ref 27–31)
MCHC RBC AUTO-ENTMCNC: 31.5 G/DL (ref 33–37)
MCV RBC AUTO: 93.9 FL (ref 81–99)
MONOCYTES ABSOLUTE: 0.6 K/UL (ref 0–0.9)
MONOCYTES RELATIVE PERCENT: 7.5 % (ref 0–10)
NEUTROPHILS ABSOLUTE: 4.8 K/UL (ref 1.5–7.5)
NEUTROPHILS RELATIVE PERCENT: 64.6 % (ref 50–65)
NITRITE, URINE: NEGATIVE
PDW BLD-RTO: 14.6 % (ref 11.5–14.5)
PH UA: 7 (ref 5–8)
PLATELET # BLD: 307 K/UL (ref 130–400)
PMV BLD AUTO: 11.5 FL (ref 9.4–12.3)
POTASSIUM REFLEX MAGNESIUM: 4.3 MMOL/L (ref 3.5–5)
PROTEIN UA: NEGATIVE MG/DL
RBC # BLD: 5.24 M/UL (ref 4.2–5.4)
SARS-COV-2, NAAT: NOT DETECTED
SODIUM BLD-SCNC: 139 MMOL/L (ref 136–145)
SPECIFIC GRAVITY UA: 1.02 (ref 1–1.03)
TOTAL PROTEIN: 8 G/DL (ref 6.6–8.7)
TRIGL SERPL-MCNC: 126 MG/DL (ref 0–149)
UROBILINOGEN, URINE: 1 E.U./DL
WBC # BLD: 7.4 K/UL (ref 4.8–10.8)

## 2021-09-20 PROCEDURE — 99284 EMERGENCY DEPT VISIT MOD MDM: CPT

## 2021-09-20 PROCEDURE — 6360000002 HC RX W HCPCS: Performed by: EMERGENCY MEDICINE

## 2021-09-20 PROCEDURE — 6370000000 HC RX 637 (ALT 250 FOR IP): Performed by: EMERGENCY MEDICINE

## 2021-09-20 PROCEDURE — 85025 COMPLETE CBC W/AUTO DIFF WBC: CPT

## 2021-09-20 PROCEDURE — 96374 THER/PROPH/DIAG INJ IV PUSH: CPT

## 2021-09-20 PROCEDURE — 36415 COLL VENOUS BLD VENIPUNCTURE: CPT

## 2021-09-20 PROCEDURE — 84703 CHORIONIC GONADOTROPIN ASSAY: CPT

## 2021-09-20 PROCEDURE — 2580000003 HC RX 258: Performed by: EMERGENCY MEDICINE

## 2021-09-20 PROCEDURE — 80053 COMPREHEN METABOLIC PANEL: CPT

## 2021-09-20 PROCEDURE — 87635 SARS-COV-2 COVID-19 AMP PRB: CPT

## 2021-09-20 PROCEDURE — 74177 CT ABD & PELVIS W/CONTRAST: CPT

## 2021-09-20 PROCEDURE — 99999 PR OFFICE/OUTPT VISIT,PROCEDURE ONLY: CPT | Performed by: NURSE PRACTITIONER

## 2021-09-20 PROCEDURE — 83690 ASSAY OF LIPASE: CPT

## 2021-09-20 PROCEDURE — 1210000000 HC MED SURG R&B

## 2021-09-20 PROCEDURE — 96376 TX/PRO/DX INJ SAME DRUG ADON: CPT

## 2021-09-20 PROCEDURE — 81003 URINALYSIS AUTO W/O SCOPE: CPT

## 2021-09-20 PROCEDURE — 6360000004 HC RX CONTRAST MEDICATION: Performed by: EMERGENCY MEDICINE

## 2021-09-20 PROCEDURE — 80061 LIPID PANEL: CPT

## 2021-09-20 RX ORDER — MORPHINE SULFATE 4 MG/ML
4 INJECTION, SOLUTION INTRAMUSCULAR; INTRAVENOUS EVERY 4 HOURS PRN
Status: DISCONTINUED | OUTPATIENT
Start: 2021-09-20 | End: 2021-09-24 | Stop reason: HOSPADM

## 2021-09-20 RX ORDER — SODIUM CHLORIDE 9 MG/ML
25 INJECTION, SOLUTION INTRAVENOUS PRN
Status: DISCONTINUED | OUTPATIENT
Start: 2021-09-20 | End: 2021-09-24 | Stop reason: HOSPADM

## 2021-09-20 RX ORDER — ONDANSETRON 2 MG/ML
4 INJECTION INTRAMUSCULAR; INTRAVENOUS EVERY 6 HOURS PRN
Status: DISCONTINUED | OUTPATIENT
Start: 2021-09-20 | End: 2021-09-24 | Stop reason: HOSPADM

## 2021-09-20 RX ORDER — ACETAMINOPHEN 325 MG/1
650 TABLET ORAL EVERY 6 HOURS PRN
Status: DISCONTINUED | OUTPATIENT
Start: 2021-09-20 | End: 2021-09-24 | Stop reason: HOSPADM

## 2021-09-20 RX ORDER — SODIUM CHLORIDE 0.9 % (FLUSH) 0.9 %
5-40 SYRINGE (ML) INJECTION PRN
Status: DISCONTINUED | OUTPATIENT
Start: 2021-09-20 | End: 2021-09-24 | Stop reason: HOSPADM

## 2021-09-20 RX ORDER — SODIUM CHLORIDE, SODIUM LACTATE, POTASSIUM CHLORIDE, CALCIUM CHLORIDE 600; 310; 30; 20 MG/100ML; MG/100ML; MG/100ML; MG/100ML
1000 INJECTION, SOLUTION INTRAVENOUS ONCE
Status: COMPLETED | OUTPATIENT
Start: 2021-09-20 | End: 2021-09-21

## 2021-09-20 RX ORDER — ACETAMINOPHEN 650 MG/1
650 SUPPOSITORY RECTAL EVERY 6 HOURS PRN
Status: DISCONTINUED | OUTPATIENT
Start: 2021-09-20 | End: 2021-09-24 | Stop reason: HOSPADM

## 2021-09-20 RX ORDER — MORPHINE SULFATE 4 MG/ML
4 INJECTION, SOLUTION INTRAMUSCULAR; INTRAVENOUS ONCE
Status: COMPLETED | OUTPATIENT
Start: 2021-09-20 | End: 2021-09-20

## 2021-09-20 RX ORDER — ONDANSETRON 4 MG/1
4 TABLET, ORALLY DISINTEGRATING ORAL EVERY 8 HOURS PRN
Status: DISCONTINUED | OUTPATIENT
Start: 2021-09-20 | End: 2021-09-24 | Stop reason: HOSPADM

## 2021-09-20 RX ORDER — SODIUM CHLORIDE 0.9 % (FLUSH) 0.9 %
5-40 SYRINGE (ML) INJECTION EVERY 12 HOURS SCHEDULED
Status: DISCONTINUED | OUTPATIENT
Start: 2021-09-20 | End: 2021-09-24 | Stop reason: HOSPADM

## 2021-09-20 RX ORDER — SODIUM CHLORIDE 9 MG/ML
INJECTION, SOLUTION INTRAVENOUS CONTINUOUS
Status: DISCONTINUED | OUTPATIENT
Start: 2021-09-20 | End: 2021-09-21

## 2021-09-20 RX ORDER — ONDANSETRON 4 MG/1
4 TABLET, ORALLY DISINTEGRATING ORAL ONCE
Status: COMPLETED | OUTPATIENT
Start: 2021-09-20 | End: 2021-09-20

## 2021-09-20 RX ORDER — POLYETHYLENE GLYCOL 3350 17 G/17G
17 POWDER, FOR SOLUTION ORAL DAILY PRN
Status: DISCONTINUED | OUTPATIENT
Start: 2021-09-20 | End: 2021-09-24 | Stop reason: HOSPADM

## 2021-09-20 RX ORDER — MORPHINE SULFATE 2 MG/ML
2 INJECTION, SOLUTION INTRAMUSCULAR; INTRAVENOUS ONCE
Status: COMPLETED | OUTPATIENT
Start: 2021-09-20 | End: 2021-09-20

## 2021-09-20 RX ADMIN — IOPAMIDOL 90 ML: 755 INJECTION, SOLUTION INTRAVENOUS at 20:56

## 2021-09-20 RX ADMIN — ONDANSETRON 4 MG: 4 TABLET, ORALLY DISINTEGRATING ORAL at 19:59

## 2021-09-20 RX ADMIN — SODIUM CHLORIDE, POTASSIUM CHLORIDE, SODIUM LACTATE AND CALCIUM CHLORIDE 1000 ML: 600; 310; 30; 20 INJECTION, SOLUTION INTRAVENOUS at 22:03

## 2021-09-20 RX ADMIN — MORPHINE SULFATE 2 MG: 2 INJECTION, SOLUTION INTRAMUSCULAR; INTRAVENOUS at 19:59

## 2021-09-20 RX ADMIN — MORPHINE SULFATE 4 MG: 4 INJECTION, SOLUTION INTRAMUSCULAR; INTRAVENOUS at 21:57

## 2021-09-20 ASSESSMENT — PAIN SCALES - GENERAL
PAINLEVEL_OUTOF10: 9
PAINLEVEL_OUTOF10: 7
PAINLEVEL_OUTOF10: 9
PAINLEVEL_OUTOF10: 4

## 2021-09-20 ASSESSMENT — PAIN DESCRIPTION - ORIENTATION: ORIENTATION: RIGHT;LEFT

## 2021-09-20 ASSESSMENT — ENCOUNTER SYMPTOMS
VOICE CHANGE: 0
TROUBLE SWALLOWING: 0
BLOOD IN STOOL: 0
WHEEZING: 0
COUGH: 0
ABDOMINAL DISTENTION: 0
BACK PAIN: 1
VOMITING: 0
EYE REDNESS: 0
RHINORRHEA: 0
DIARRHEA: 0
NAUSEA: 1
CONSTIPATION: 0
EYE PAIN: 0
ABDOMINAL PAIN: 1
SINUS PAIN: 0
SORE THROAT: 0
SHORTNESS OF BREATH: 0

## 2021-09-20 ASSESSMENT — PAIN DESCRIPTION - FREQUENCY: FREQUENCY: CONTINUOUS

## 2021-09-20 ASSESSMENT — PAIN DESCRIPTION - DESCRIPTORS: DESCRIPTORS: ACHING;CRAMPING

## 2021-09-20 ASSESSMENT — PAIN DESCRIPTION - LOCATION: LOCATION: ABDOMEN

## 2021-09-20 ASSESSMENT — PAIN DESCRIPTION - PAIN TYPE: TYPE: ACUTE PAIN

## 2021-09-20 NOTE — PROGRESS NOTES
Patient checked in to UC with Pain to ABD and back rated at 9-10. Patient was called on telephone after being sent out to wait in car for UC. Patient reports pain is at 9-10 at this time in her ABD and back, pt reports pain is the same pain from the weekend when she was seen in ED. Patient notified that r/t her c/o pain in ABD and back, with current pain level of 9-10, patient advised to go to ED to be seen. Patient notified that it appears she will need to be seen at ED to receive a higher level of care than we are able to offer at [facility]. Patient verbalized understanding.

## 2021-09-21 ENCOUNTER — APPOINTMENT (OUTPATIENT)
Dept: ULTRASOUND IMAGING | Age: 46
DRG: 439 | End: 2021-09-21
Payer: MEDICARE

## 2021-09-21 PROBLEM — Z87.19 HISTORY OF GALLSTONES: Status: ACTIVE | Noted: 2021-09-21

## 2021-09-21 PROBLEM — K85.90 ACUTE PANCREATITIS: Status: ACTIVE | Noted: 2021-09-20

## 2021-09-21 LAB
ALBUMIN SERPL-MCNC: 3.3 G/DL (ref 3.5–5.2)
ALBUMIN SERPL-MCNC: 3.7 G/DL (ref 3.5–5.2)
ALP BLD-CCNC: 94 U/L (ref 35–104)
ALP BLD-CCNC: 97 U/L (ref 35–104)
ALT SERPL-CCNC: 12 U/L (ref 5–33)
ALT SERPL-CCNC: 12 U/L (ref 5–33)
AMYLASE: 83 U/L (ref 28–100)
ANION GAP SERPL CALCULATED.3IONS-SCNC: 11 MMOL/L (ref 7–19)
ANION GAP SERPL CALCULATED.3IONS-SCNC: 11 MMOL/L (ref 7–19)
AST SERPL-CCNC: 11 U/L (ref 5–32)
AST SERPL-CCNC: 11 U/L (ref 5–32)
BASOPHILS ABSOLUTE: 0 K/UL (ref 0–0.2)
BASOPHILS RELATIVE PERCENT: 0.1 % (ref 0–1)
BILIRUB SERPL-MCNC: 0.3 MG/DL (ref 0.2–1.2)
BILIRUB SERPL-MCNC: 0.3 MG/DL (ref 0.2–1.2)
BUN BLDV-MCNC: 18 MG/DL (ref 6–20)
BUN BLDV-MCNC: 18 MG/DL (ref 6–20)
CALCIUM SERPL-MCNC: 8.7 MG/DL (ref 8.6–10)
CALCIUM SERPL-MCNC: 8.7 MG/DL (ref 8.6–10)
CHLORIDE BLD-SCNC: 103 MMOL/L (ref 98–111)
CHLORIDE BLD-SCNC: 103 MMOL/L (ref 98–111)
CO2: 24 MMOL/L (ref 22–29)
CO2: 26 MMOL/L (ref 22–29)
CREAT SERPL-MCNC: 1 MG/DL (ref 0.5–0.9)
CREAT SERPL-MCNC: 1.1 MG/DL (ref 0.5–0.9)
EOSINOPHILS ABSOLUTE: 0.2 K/UL (ref 0–0.6)
EOSINOPHILS RELATIVE PERCENT: 3.1 % (ref 0–5)
GFR AFRICAN AMERICAN: >59
GFR AFRICAN AMERICAN: >59
GFR NON-AFRICAN AMERICAN: 53
GFR NON-AFRICAN AMERICAN: 60
GLUCOSE BLD-MCNC: 164 MG/DL (ref 70–99)
GLUCOSE BLD-MCNC: 62 MG/DL (ref 70–99)
GLUCOSE BLD-MCNC: 66 MG/DL (ref 70–99)
GLUCOSE BLD-MCNC: 68 MG/DL (ref 70–99)
GLUCOSE BLD-MCNC: 82 MG/DL (ref 74–109)
GLUCOSE BLD-MCNC: 84 MG/DL (ref 74–109)
HCT VFR BLD CALC: 44.8 % (ref 37–47)
HEMOGLOBIN: 14.3 G/DL (ref 12–16)
IMMATURE GRANULOCYTES #: 0 K/UL
INR BLD: 3.46 (ref 0.88–1.18)
INR BLD: 4.2 (ref 0.88–1.18)
LIPASE: 113 U/L (ref 13–60)
LYMPHOCYTES ABSOLUTE: 2.1 K/UL (ref 1.1–4.5)
LYMPHOCYTES RELATIVE PERCENT: 28 % (ref 20–40)
MCH RBC QN AUTO: 29.6 PG (ref 27–31)
MCHC RBC AUTO-ENTMCNC: 31.9 G/DL (ref 33–37)
MCV RBC AUTO: 92.8 FL (ref 81–99)
MONOCYTES ABSOLUTE: 0.5 K/UL (ref 0–0.9)
MONOCYTES RELATIVE PERCENT: 7.2 % (ref 0–10)
NEUTROPHILS ABSOLUTE: 4.6 K/UL (ref 1.5–7.5)
NEUTROPHILS RELATIVE PERCENT: 61.2 % (ref 50–65)
PDW BLD-RTO: 14.5 % (ref 11.5–14.5)
PERFORMED ON: ABNORMAL
PLATELET # BLD: 309 K/UL (ref 130–400)
PMV BLD AUTO: 11.6 FL (ref 9.4–12.3)
POTASSIUM REFLEX MAGNESIUM: 3.8 MMOL/L (ref 3.5–5)
POTASSIUM SERPL-SCNC: 3.8 MMOL/L (ref 3.5–5)
PROTHROMBIN TIME: 34.1 SEC (ref 12–14.6)
PROTHROMBIN TIME: 39.4 SEC (ref 12–14.6)
RBC # BLD: 4.83 M/UL (ref 4.2–5.4)
SODIUM BLD-SCNC: 138 MMOL/L (ref 136–145)
SODIUM BLD-SCNC: 140 MMOL/L (ref 136–145)
TOTAL PROTEIN: 6.7 G/DL (ref 6.6–8.7)
TOTAL PROTEIN: 7.1 G/DL (ref 6.6–8.7)
WBC # BLD: 7.5 K/UL (ref 4.8–10.8)

## 2021-09-21 PROCEDURE — C9113 INJ PANTOPRAZOLE SODIUM, VIA: HCPCS | Performed by: STUDENT IN AN ORGANIZED HEALTH CARE EDUCATION/TRAINING PROGRAM

## 2021-09-21 PROCEDURE — 76705 ECHO EXAM OF ABDOMEN: CPT

## 2021-09-21 PROCEDURE — 85610 PROTHROMBIN TIME: CPT

## 2021-09-21 PROCEDURE — 36415 COLL VENOUS BLD VENIPUNCTURE: CPT

## 2021-09-21 PROCEDURE — 2500000003 HC RX 250 WO HCPCS

## 2021-09-21 PROCEDURE — 85025 COMPLETE CBC W/AUTO DIFF WBC: CPT

## 2021-09-21 PROCEDURE — 2580000003 HC RX 258: Performed by: HOSPITALIST

## 2021-09-21 PROCEDURE — 82947 ASSAY GLUCOSE BLOOD QUANT: CPT

## 2021-09-21 PROCEDURE — 2500000003 HC RX 250 WO HCPCS: Performed by: STUDENT IN AN ORGANIZED HEALTH CARE EDUCATION/TRAINING PROGRAM

## 2021-09-21 PROCEDURE — 6360000002 HC RX W HCPCS: Performed by: NURSE PRACTITIONER

## 2021-09-21 PROCEDURE — 80053 COMPREHEN METABOLIC PANEL: CPT

## 2021-09-21 PROCEDURE — 6360000002 HC RX W HCPCS: Performed by: STUDENT IN AN ORGANIZED HEALTH CARE EDUCATION/TRAINING PROGRAM

## 2021-09-21 PROCEDURE — 1210000000 HC MED SURG R&B

## 2021-09-21 PROCEDURE — 83690 ASSAY OF LIPASE: CPT

## 2021-09-21 PROCEDURE — 82150 ASSAY OF AMYLASE: CPT

## 2021-09-21 PROCEDURE — 6370000000 HC RX 637 (ALT 250 FOR IP): Performed by: NURSE PRACTITIONER

## 2021-09-21 PROCEDURE — 2580000003 HC RX 258: Performed by: STUDENT IN AN ORGANIZED HEALTH CARE EDUCATION/TRAINING PROGRAM

## 2021-09-21 PROCEDURE — C9113 INJ PANTOPRAZOLE SODIUM, VIA: HCPCS | Performed by: NURSE PRACTITIONER

## 2021-09-21 PROCEDURE — 2580000003 HC RX 258: Performed by: NURSE PRACTITIONER

## 2021-09-21 PROCEDURE — 6360000002 HC RX W HCPCS: Performed by: HOSPITALIST

## 2021-09-21 PROCEDURE — 99223 1ST HOSP IP/OBS HIGH 75: CPT | Performed by: INTERNAL MEDICINE

## 2021-09-21 RX ORDER — ATORVASTATIN CALCIUM 80 MG/1
80 TABLET, FILM COATED ORAL NIGHTLY
Status: DISCONTINUED | OUTPATIENT
Start: 2021-09-21 | End: 2021-09-24 | Stop reason: HOSPADM

## 2021-09-21 RX ORDER — VERAPAMIL HYDROCHLORIDE 80 MG/1
40 TABLET ORAL EVERY 12 HOURS
Status: DISCONTINUED | OUTPATIENT
Start: 2021-09-22 | End: 2021-09-24 | Stop reason: HOSPADM

## 2021-09-21 RX ORDER — BETHANECHOL CHLORIDE 25 MG/1
25 TABLET ORAL DAILY
Status: DISCONTINUED | OUTPATIENT
Start: 2021-09-21 | End: 2021-09-24 | Stop reason: HOSPADM

## 2021-09-21 RX ORDER — DEXTROSE MONOHYDRATE 25 G/50ML
INJECTION, SOLUTION INTRAVENOUS
Status: COMPLETED
Start: 2021-09-21 | End: 2021-09-21

## 2021-09-21 RX ORDER — MAGNESIUM SULFATE IN WATER 40 MG/ML
2000 INJECTION, SOLUTION INTRAVENOUS PRN
Status: DISCONTINUED | OUTPATIENT
Start: 2021-09-21 | End: 2021-09-24 | Stop reason: HOSPADM

## 2021-09-21 RX ORDER — GABAPENTIN 600 MG/1
600 TABLET ORAL 3 TIMES DAILY
Status: DISCONTINUED | OUTPATIENT
Start: 2021-09-21 | End: 2021-09-24 | Stop reason: HOSPADM

## 2021-09-21 RX ORDER — DEXTROSE MONOHYDRATE 50 MG/ML
100 INJECTION, SOLUTION INTRAVENOUS PRN
Status: DISCONTINUED | OUTPATIENT
Start: 2021-09-21 | End: 2021-09-24 | Stop reason: HOSPADM

## 2021-09-21 RX ORDER — METOPROLOL SUCCINATE 25 MG/1
25 TABLET, EXTENDED RELEASE ORAL DAILY
Status: DISCONTINUED | OUTPATIENT
Start: 2021-09-21 | End: 2021-09-24 | Stop reason: HOSPADM

## 2021-09-21 RX ORDER — SODIUM CHLORIDE 9 MG/ML
10 INJECTION INTRAVENOUS DAILY
Status: DISCONTINUED | OUTPATIENT
Start: 2021-09-21 | End: 2021-09-22

## 2021-09-21 RX ORDER — MORPHINE SULFATE 2 MG/ML
2 INJECTION, SOLUTION INTRAMUSCULAR; INTRAVENOUS EVERY 4 HOURS PRN
Status: DISCONTINUED | OUTPATIENT
Start: 2021-09-21 | End: 2021-09-24 | Stop reason: HOSPADM

## 2021-09-21 RX ORDER — TOPIRAMATE 50 MG/1
50 TABLET, FILM COATED ORAL DAILY
Status: DISCONTINUED | OUTPATIENT
Start: 2021-09-21 | End: 2021-09-24 | Stop reason: HOSPADM

## 2021-09-21 RX ORDER — SODIUM CHLORIDE 0.9 % (FLUSH) 0.9 %
5-40 SYRINGE (ML) INJECTION EVERY 12 HOURS SCHEDULED
Status: DISCONTINUED | OUTPATIENT
Start: 2021-09-21 | End: 2021-09-24 | Stop reason: HOSPADM

## 2021-09-21 RX ORDER — SPIRONOLACTONE 25 MG/1
12.5 TABLET ORAL DAILY
Status: DISCONTINUED | OUTPATIENT
Start: 2021-09-21 | End: 2021-09-24 | Stop reason: HOSPADM

## 2021-09-21 RX ORDER — SODIUM CHLORIDE, SODIUM LACTATE, POTASSIUM CHLORIDE, CALCIUM CHLORIDE 600; 310; 30; 20 MG/100ML; MG/100ML; MG/100ML; MG/100ML
INJECTION, SOLUTION INTRAVENOUS CONTINUOUS
Status: DISCONTINUED | OUTPATIENT
Start: 2021-09-21 | End: 2021-09-21

## 2021-09-21 RX ORDER — DEXTROSE MONOHYDRATE 25 G/50ML
12.5 INJECTION, SOLUTION INTRAVENOUS PRN
Status: DISCONTINUED | OUTPATIENT
Start: 2021-09-21 | End: 2021-09-24 | Stop reason: HOSPADM

## 2021-09-21 RX ORDER — EZETIMIBE 10 MG/1
10 TABLET ORAL NIGHTLY
Status: DISCONTINUED | OUTPATIENT
Start: 2021-09-21 | End: 2021-09-24 | Stop reason: HOSPADM

## 2021-09-21 RX ORDER — ASPIRIN 81 MG/1
81 TABLET, CHEWABLE ORAL DAILY
Status: DISCONTINUED | OUTPATIENT
Start: 2021-09-21 | End: 2021-09-24 | Stop reason: HOSPADM

## 2021-09-21 RX ORDER — SODIUM CHLORIDE 9 MG/ML
25 INJECTION, SOLUTION INTRAVENOUS PRN
Status: DISCONTINUED | OUTPATIENT
Start: 2021-09-21 | End: 2021-09-24 | Stop reason: HOSPADM

## 2021-09-21 RX ORDER — SODIUM CHLORIDE 9 MG/ML
INJECTION, SOLUTION INTRAVENOUS CONTINUOUS
Status: DISCONTINUED | OUTPATIENT
Start: 2021-09-21 | End: 2021-09-21

## 2021-09-21 RX ORDER — NICOTINE POLACRILEX 4 MG
15 LOZENGE BUCCAL PRN
Status: DISCONTINUED | OUTPATIENT
Start: 2021-09-21 | End: 2021-09-24 | Stop reason: HOSPADM

## 2021-09-21 RX ORDER — POTASSIUM CHLORIDE 7.45 MG/ML
10 INJECTION INTRAVENOUS PRN
Status: DISCONTINUED | OUTPATIENT
Start: 2021-09-21 | End: 2021-09-24 | Stop reason: HOSPADM

## 2021-09-21 RX ORDER — PANTOPRAZOLE SODIUM 40 MG/10ML
40 INJECTION, POWDER, LYOPHILIZED, FOR SOLUTION INTRAVENOUS DAILY
Status: DISCONTINUED | OUTPATIENT
Start: 2021-09-21 | End: 2021-09-22

## 2021-09-21 RX ORDER — VERAPAMIL HYDROCHLORIDE 80 MG/1
40 TABLET ORAL EVERY 12 HOURS
Status: DISCONTINUED | OUTPATIENT
Start: 2021-09-21 | End: 2021-09-21

## 2021-09-21 RX ORDER — DEXTROSE, SODIUM CHLORIDE, SODIUM LACTATE, POTASSIUM CHLORIDE, AND CALCIUM CHLORIDE 5; .6; .31; .03; .02 G/100ML; G/100ML; G/100ML; G/100ML; G/100ML
INJECTION, SOLUTION INTRAVENOUS CONTINUOUS
Status: DISCONTINUED | OUTPATIENT
Start: 2021-09-21 | End: 2021-09-22

## 2021-09-21 RX ORDER — POTASSIUM CHLORIDE 20 MEQ/1
40 TABLET, EXTENDED RELEASE ORAL PRN
Status: DISCONTINUED | OUTPATIENT
Start: 2021-09-21 | End: 2021-09-24 | Stop reason: HOSPADM

## 2021-09-21 RX ORDER — SODIUM CHLORIDE 0.9 % (FLUSH) 0.9 %
5-40 SYRINGE (ML) INJECTION PRN
Status: DISCONTINUED | OUTPATIENT
Start: 2021-09-21 | End: 2021-09-24 | Stop reason: HOSPADM

## 2021-09-21 RX ORDER — FUROSEMIDE 40 MG/1
80 TABLET ORAL 2 TIMES DAILY
Status: DISCONTINUED | OUTPATIENT
Start: 2021-09-21 | End: 2021-09-24 | Stop reason: HOSPADM

## 2021-09-21 RX ORDER — ACETAMINOPHEN 325 MG/1
650 TABLET ORAL EVERY 4 HOURS PRN
Status: DISCONTINUED | OUTPATIENT
Start: 2021-09-21 | End: 2021-09-24 | Stop reason: HOSPADM

## 2021-09-21 RX ADMIN — MORPHINE SULFATE 4 MG: 4 INJECTION, SOLUTION INTRAMUSCULAR; INTRAVENOUS at 13:30

## 2021-09-21 RX ADMIN — MORPHINE SULFATE 4 MG: 4 INJECTION, SOLUTION INTRAMUSCULAR; INTRAVENOUS at 03:57

## 2021-09-21 RX ADMIN — SODIUM CHLORIDE, PRESERVATIVE FREE 10 ML: 5 INJECTION INTRAVENOUS at 01:00

## 2021-09-21 RX ADMIN — SODIUM CHLORIDE, PRESERVATIVE FREE 10 ML: 5 INJECTION INTRAVENOUS at 19:57

## 2021-09-21 RX ADMIN — SODIUM CHLORIDE 10 ML: 9 INJECTION, SOLUTION INTRAMUSCULAR; INTRAVENOUS; SUBCUTANEOUS at 09:18

## 2021-09-21 RX ADMIN — MORPHINE SULFATE 4 MG: 4 INJECTION, SOLUTION INTRAMUSCULAR; INTRAVENOUS at 08:16

## 2021-09-21 RX ADMIN — SODIUM CHLORIDE 40 MG/HR: 9 INJECTION, SOLUTION INTRAVENOUS at 08:23

## 2021-09-21 RX ADMIN — SODIUM CHLORIDE, SODIUM LACTATE, POTASSIUM CHLORIDE, CALCIUM CHLORIDE AND DEXTROSE MONOHYDRATE: 5; 600; 310; 30; 20 INJECTION, SOLUTION INTRAVENOUS at 17:41

## 2021-09-21 RX ADMIN — MORPHINE SULFATE 4 MG: 4 INJECTION, SOLUTION INTRAMUSCULAR; INTRAVENOUS at 17:41

## 2021-09-21 RX ADMIN — PANTOPRAZOLE SODIUM 40 MG: 40 INJECTION, POWDER, FOR SOLUTION INTRAVENOUS at 09:14

## 2021-09-21 RX ADMIN — SODIUM CHLORIDE: 9 INJECTION, SOLUTION INTRAVENOUS at 00:58

## 2021-09-21 RX ADMIN — DEXTROSE MONOHYDRATE 12.5 G: 25 INJECTION, SOLUTION INTRAVENOUS at 11:44

## 2021-09-21 RX ADMIN — SODIUM CHLORIDE, SODIUM LACTATE, POTASSIUM CHLORIDE, CALCIUM CHLORIDE AND DEXTROSE MONOHYDRATE: 5; 600; 310; 30; 20 INJECTION, SOLUTION INTRAVENOUS at 21:55

## 2021-09-21 RX ADMIN — VERAPAMIL HYDROCHLORIDE 40 MG: 80 TABLET ORAL at 19:58

## 2021-09-21 RX ADMIN — DEXTROSE MONOHYDRATE 12.5 G: 25 INJECTION, SOLUTION INTRAVENOUS at 08:03

## 2021-09-21 RX ADMIN — MORPHINE SULFATE 4 MG: 4 INJECTION, SOLUTION INTRAMUSCULAR; INTRAVENOUS at 00:53

## 2021-09-21 RX ADMIN — SODIUM CHLORIDE, POTASSIUM CHLORIDE, SODIUM LACTATE AND CALCIUM CHLORIDE: 600; 310; 30; 20 INJECTION, SOLUTION INTRAVENOUS at 09:14

## 2021-09-21 RX ADMIN — EZETIMIBE 10 MG: 10 TABLET ORAL at 19:54

## 2021-09-21 RX ADMIN — MORPHINE SULFATE 4 MG: 4 INJECTION, SOLUTION INTRAMUSCULAR; INTRAVENOUS at 21:52

## 2021-09-21 ASSESSMENT — PAIN SCALES - GENERAL
PAINLEVEL_OUTOF10: 7
PAINLEVEL_OUTOF10: 8
PAINLEVEL_OUTOF10: 0
PAINLEVEL_OUTOF10: 7
PAINLEVEL_OUTOF10: 10
PAINLEVEL_OUTOF10: 8
PAINLEVEL_OUTOF10: 10
PAINLEVEL_OUTOF10: 8
PAINLEVEL_OUTOF10: 5

## 2021-09-21 ASSESSMENT — PAIN DESCRIPTION - PAIN TYPE
TYPE: ACUTE PAIN
TYPE: ACUTE PAIN

## 2021-09-21 ASSESSMENT — PAIN DESCRIPTION - LOCATION
LOCATION: ABDOMEN
LOCATION: ABDOMEN

## 2021-09-21 ASSESSMENT — PAIN DESCRIPTION - FREQUENCY
FREQUENCY: CONTINUOUS
FREQUENCY: CONTINUOUS

## 2021-09-21 ASSESSMENT — PAIN - FUNCTIONAL ASSESSMENT: PAIN_FUNCTIONAL_ASSESSMENT: PREVENTS OR INTERFERES SOME ACTIVE ACTIVITIES AND ADLS

## 2021-09-21 ASSESSMENT — PAIN DESCRIPTION - ONSET: ONSET: ON-GOING

## 2021-09-21 ASSESSMENT — PAIN DESCRIPTION - DESCRIPTORS
DESCRIPTORS: ACHING;RADIATING;SHARP
DESCRIPTORS: CRAMPING

## 2021-09-21 ASSESSMENT — PAIN DESCRIPTION - ORIENTATION
ORIENTATION: RIGHT;UPPER
ORIENTATION: RIGHT;ANTERIOR

## 2021-09-21 ASSESSMENT — PAIN DESCRIPTION - PROGRESSION: CLINICAL_PROGRESSION: NOT CHANGED

## 2021-09-21 NOTE — PROGRESS NOTES
Patient leaving floor for ultrasound. Patient's purse locked in locker with patient and transporter Chava present.      Electronically signed by Viviana Maher RN on 9/21/2021 at 10:34 AM

## 2021-09-21 NOTE — H&P
[facility] Hospital[facility]      Hospitalist - History & Physical      PCP: Darek Quinonez DO    Date of Admission: 9/20/2021    Date of Service: 9/20/2021    Chief Complaint:  Abdominal pain    History Of Present Illness: The patient is a 55 y.o. female with a history of HLD, DM, CVA, HTN and morbid obesity who presented to [facility] ED complaining of abdominal pain. Pain has been ongoing for several days and was recently seen in the ED on 918 where there were no acute findings. Further ED work-up revealed mildly elevated creatinine at 1.2, and an elevated lipase of 141. CT of the abdomen and pelvis with IV contrast revealed mild fat stranding associated with pancreatic head and neck, may be seen in acute pancreatitis. No associated necrosis or hemorrhage. No fluid collection. Hospital services are being requested to admit the patient for abdominal pain and pancreatitis for further medical management. The patient states that she has had ongoing pain on and off for 6 months or better. She was found to have gallstones and a mildly dilated bile duct in March 2021. She was referred to general surgery however never followed up with the consultation. She complains of heavy pressure in the middle abdominal region. The pain is worse in the right upper quadrant and then radiates to her back. She does complain of occasional nausea. She denies vomiting. Her only other complaint is that she is thirsty. She is slightly concerned about not receiving her Coumadin due to her history of strokes. she denies fever or chills. Denies any recent loose stools. She denies heartburn.   She reports that she will occasionally drink a glass of wine however has not had any alcoholic beverages in several months    Past Medical History:        Diagnosis Date    CAD (coronary artery disease)     [facility] occlusion with [facility] infarction ([facility] [address]     Diabetes mellitus ([facility] [address]     GERD (gastroesophageal reflux disease)     Hypertension        Past Surgical History:        Procedure Laterality Date    CARDIAC SURGERY      COLONOSCOPY N/A 09/02/2021    Dr Christina Moore, 4 year recall    CORONARY ARTERY BYPASS GRAFT      2016    TONSILLECTOMY      UPPER GASTROINTESTINAL ENDOSCOPY      [facility], w/dil per patient    UPPER GASTROINTESTINAL ENDOSCOPY N/A 09/02/2021    Dr Madelaine Muñoz, [facility], (-[facility]       Home Medications:  Prior to Admission medications    Medication Sig Start Date End Date Taking?  Authorizing Provider   ondansetron (ZOFRAN ODT) 4 MG disintegrating tablet Take 1 tablet by mouth every 8 hours as needed for Nausea or Vomiting 9/18/21  Yes Katie Fernandez MD   sucralfate (CARAFATE) 1 GM tablet Take 1 tablet by mouth 4 times daily as needed (gastric upset) 8/9/21  Yes YARIEL Elizondo NP   nystatin (MYCOSTATIN) 451667 UNIT/GM powder Apply 3 times daily to lower groin, abdominal area and under bilateral breasts 5/17/21  Yes YARIEL Gonzalez CNP   NONFORMULARY daily apple cider gummy    Yes Historical Provider, MD   pantoprazole (PROTONIX) 40 MG tablet Take 1 tablet by mouth daily 5/6/21  Yes YARIEL Elizondo NP   ezetimibe (ZETIA) 10 MG tablet Take 10 mg by mouth 2/15/19  Yes Historical Provider, MD   Exenatide (BYDUREON) 2 MG PEN INJECT 0.65 MLS ONCE Q WEEK 1/29/19  Yes Historical Provider, MD   furosemide (LASIX) 40 MG tablet Take 80 mg by mouth 2 times daily   Yes Historical Provider, MD   insulin glargine (BASAGLAR KWIKPEN) 100 UNIT/ML injection pen Inject 80 Units into the skin nightly  10/24/17  Yes Historical Provider, MD   bethanechol (URECHOLINE) 25 MG tablet Take 1 tablet by mouth daily 9/14/17  Yes Historical Provider, MD   metFORMIN (GLUCOPHAGE) 1000 MG tablet Take 1 tablet by mouth daily Hold till monday 2/8/16  Yes Historical Provider, MD   insulin aspart (NOVOLOG) 100 UNIT/ML injection vial Inject 10 Units into the skin 4 times daily (before meals and nightly) Up to 10 units ss coverage as directed 10/26/17  Yes Historical Provider, MD   spironolactone (ALDACTONE) 25 MG tablet Take 12.5 mg by mouth daily 12/8/17  Yes Historical Provider, MD   topiramate (TOPAMAX) 50 MG tablet Take 1 tablet by mouth daily 8/28/14  Yes Historical Provider, MD   warfarin (COUMADIN) 10 MG tablet Take 10 mg by mouth   Yes Historical Provider, MD   aspirin 81 MG tablet Take 81 mg by mouth daily   Yes Historical Provider, MD   gabapentin (NEURONTIN) 600 MG tablet Take 600 mg by mouth 3 times daily . Yes Historical Provider, MD   metoprolol succinate ER (TOPROL-XL) 25 MG XL tablet Take 25 mg by mouth daily   Yes Historical Provider, MD   prochlorperazine (COMPAZINE) 10 MG tablet Take 10 mg by mouth 2 times daily as needed (headache)   Yes Historical Provider, MD   verapamil (CALAN) 40 MG tablet Take 40 mg by mouth every 12 hours   Yes Historical Provider, MD   atorvastatin (LIPITOR) 40 MG tablet Take 80 mg by mouth nightly    Yes Historical Provider, MD       Allergies:    Bactrim [sulfamethoxazole-trimethoprim]    Social History:     The patient currently lives home  Tobacco:   reports that she has never smoked. She has never used smokeless tobacco.  Alcohol:   reports current alcohol use. Illicit Drugs: Denies    Family History:      Problem Relation Age of Onset    Osteoarthritis Mother     Diabetes Mother     Hypertension Mother     Hypertension Father     Heart Attack Brother     Heart Failure Maternal Grandmother     Diabetes Maternal Grandfather     Heart Disease Maternal Grandfather     Heart Disease Paternal Grandmother     No Known Problems Paternal Grandfather     Colon Cancer Neg Hx     Colon Polyps Neg Hx     Esophageal Cancer Neg Hx     Liver Cancer Neg Hx     Liver Disease Neg Hx     Rectal Cancer Neg Hx     Stomach Cancer Neg Hx        Review of Systems:   Review of Systems   Constitutional: Negative for chills, diaphoresis, fatigue and fever.    HENT: Negative for congestion, ear pain, sinus pain, sore throat and trouble swallowing. Eyes: Negative for visual disturbance. Respiratory: Negative for cough, shortness of breath and wheezing. Cardiovascular: Negative for chest pain, palpitations and leg swelling. Gastrointestinal: Positive for abdominal pain and nausea. Negative for abdominal distention, blood in stool, constipation, diarrhea and vomiting. Endocrine: Negative for cold intolerance and heat intolerance. Genitourinary: Negative for difficulty urinating, flank pain, frequency and urgency. Musculoskeletal: Positive for back pain (Radiates from RUQ). Negative for arthralgias and myalgias. Neurological: Positive for speech difficulty. Negative for dizziness, syncope, weakness, light-headedness, numbness and headaches. Appears to have mild expressive aphasia   Hematological: Does not bruise/bleed easily. Psychiatric/Behavioral: Negative for agitation, confusion and dysphoric mood. 14 point review of systems is negative except as specifically addressed above. Physical Examination:  BP 98/66   Pulse 80   Temp 98.1 °F (36.7 °C) (Oral)   Resp 15   Ht 5' 4\" (1.626 m)   Wt (!) 353 lb (160.1 kg)   SpO2 95%   BMI 60.59 kg/m²   Physical Exam  Constitutional:       General: She is not in acute distress. Appearance: Normal appearance. She is obese. She is not toxic-appearing or diaphoretic. HENT:      Head: Normocephalic and atraumatic. Right Ear: External ear normal.      Left Ear: External ear normal.      Nose: Nose normal. No congestion or rhinorrhea. Mouth/Throat:      Mouth: Mucous membranes are moist.      Pharynx: Oropharynx is clear. Eyes:      General: No scleral icterus. Extraocular Movements: Extraocular movements intact. Conjunctiva/sclera: Conjunctivae normal.   Cardiovascular:      Rate and Rhythm: Normal rate and regular rhythm. Pulses: Normal pulses. Heart sounds: Normal heart sounds.  No murmur heard.   No friction rub. No gallop. Pulmonary:      Effort: Pulmonary effort is normal. No respiratory distress. Breath sounds: Rales (Bilateral upper bases) present. No wheezing or rhonchi. Abdominal:      General: Abdomen is flat. Bowel sounds are normal. There is no distension. Palpations: Abdomen is soft. Tenderness: There is no abdominal tenderness. Musculoskeletal:         General: No swelling. Normal range of motion. Cervical back: Normal range of motion and neck supple. Right lower leg: No edema. Left lower leg: No edema. Skin:     General: Skin is warm and dry. Coloration: Skin is not jaundiced. Findings: No erythema, lesion or rash. Neurological:      General: No focal deficit present. Mental Status: She is alert and oriented to person, place, and time. Mental status is at baseline. Cranial Nerves: No cranial nerve deficit. Sensory: No sensory deficit. Motor: No weakness. Psychiatric:         Mood and Affect: Mood normal.         Behavior: Behavior normal.         Thought Content:  Thought content normal.         Judgment: Judgment normal.          Diagnostic Data:  CBC:  Recent Labs     09/18/21 1806 09/20/21 1955   WBC 7.4 7.4   HGB 15.6 15.5   HCT 48.1* 49.2*    307     BMP:  Recent Labs     09/18/21 1806 09/20/21 1955   * 139   K 3.9 4.3   CL 98 100   CO2 26 29   BUN 19 18   CREATININE 1.2* 1.2*   CALCIUM 9.3 9.2     Recent Labs     09/18/21 1806 09/20/21 1955   AST 14 13   ALT 14 14   BILITOT 0.3 0.3   ALKPHOS 113* 109*     ABGs:  Lab Results   Component Value Date    PHART 7.430 02/23/2018    PO2ART 85.0 02/23/2018    ITN2VEP 41.0 02/23/2018     Urinalysis:  Lab Results   Component Value Date    NITRU Negative 09/20/2021    WBCUA 3-5 01/08/2018    WBCUA 26 01/08/2018    WBCUA 3-5 01/08/2018    BACTERIA 1+ 01/08/2018    BACTERIA trace 01/08/2018    RBCUA 0-1 08/07/2016    BLOODU Negative 09/20/2021    SPECGRAV 1.025 09/20/2021    GLUCOSEU =>1000 09/20/2021       CT ABDOMEN PELVIS W IV CONTRAST Additional Contrast? None    Result Date: 9/20/2021  EXAM: CT Abdomen and Pelvis with contrast      9/20/2021 10:46 PM INDICATION: Epigastric pain COMPARISON: None TECHNIQUE: Abdomen and pelvis were scanned utilizing a multidetector helical scanner from the diaphragm to the ischial tuberosities after administration of IV contrast. Coronal and sagittal reformations were obtained. [Routine protocol is performed.] Radiation dose equals DLP 1906 mGy-cm. Automated exposure control dose reduction technique was implemented. FINDINGS: LINES and TUBES: None. LOWER THORAX: No focal consolidation, pleural effusion or pneumothorax. HEPATOBILIARY:  No focal hepatic lesions. No liver laceration. No biliary ductal dilatation. GALLBLADDER:  No radiopaque stones or sludge. No wall thickening. SPLEEN:  No splenomegaly. No splenic laceration. PANCREAS:  No focal masses or ductal dilatation. There is mild fat stranding associated with the pancreatic head and neck which may be seen in early acute pancreatitis. No associated necrosis or hemorrhage. ADRENALS:  No adrenal nodules. KIDNEYS/URETERS:  Kidneys enhance symmetrically. No hydronephrosis. No cystic or solid mass lesions. No stones. GI TRACT:  No abnormal distention, wall thickening, or evidence of bowel obstruction. No acute appendicitis. Ciarra Jennifer PELVIC ORGANS/BLADDER:  Unremarkable. LYMPH NODES:  No lymphadenopathy. VESSELS:  Unremarkable. PERITONEUM / RETROPERITONEUM:  No free air or fluid. BONES:  Unremarkable. SOFT TISSUES:  Unremarkable. Mild fat stranding associated with pancreatic head and neck, may be seen in acute pancreatitis. No associated necrosis or hemorrhage. No fluid collection.    Signed by Dr Devon Sanford      Assessment/Plan:  Principal Problem:    Pancreatitis, gallstone   -Admit med/surg-full code   -NPO   -Consult GI   -Labs in the AM with Amylase,

## 2021-09-21 NOTE — ED PROVIDER NOTES
[facility] EMERGENCY DEPT  EMERGENCY DEPARTMENT ENCOUNTER      Pt Name: Fantasma Gore  MRN: [id number]  [place] 1975  Date of evaluation: 9/20/2021  Provider: Placido Vallecillo MD    CHIEF COMPLAINT       Chief Complaint   Patient presents with    Abdominal Pain     diffuse         HISTORY OF PRESENT ILLNESS   (Location/Symptom, Timing/Onset,Context/Setting, Quality, Duration, Modifying Factors, Severity)  Note limiting factors. Fantasma Gore is a 55 y.o. female who presents to the emergency department with complaint of abdominal pain worse in the epigastric region radiating to the back. Pain has been present for several days. She was seen here several days ago and had work-up including CT scan that were all unremarkable. Patient has had gallstones visualized on gallbladder ultrasound several months ago but never followed up with general surgery about it. HPI    NursingNotes were reviewed. REVIEW OF SYSTEMS    (2-9 systems for level 4, 10 or more for level 5)     Review of Systems   Constitutional: Negative for fatigue and fever. HENT: Negative for congestion, rhinorrhea and voice change. Eyes: Negative for pain and redness. Respiratory: Negative for cough and shortness of breath. Cardiovascular: Negative for chest pain. Gastrointestinal: Positive for abdominal pain and nausea. Negative for diarrhea and vomiting. Endocrine: Negative. Genitourinary: Negative. Musculoskeletal: Negative for arthralgias and gait problem. Skin: Negative for rash and wound. Neurological: Negative for weakness and headaches. Hematological: Negative. Psychiatric/Behavioral: Negative. All other systems reviewed and are negative. A complete review of systems was performed and is negative except as noted above in the HPI.        PAST MEDICAL HISTORY     Past Medical History:   Diagnosis Date    CAD (coronary artery disease)     [facility] occlusion with [facility] infarction [facility] [address]     [facility]     GERD (gastroesophageal reflux disease)     Hypertension          SURGICAL HISTORY       Past Surgical History:   Procedure Laterality Date    CARDIAC SURGERY      COLONOSCOPY N/A 09/02/2021    Dr Nemo Bernal, Sub prep [place], 4 year recall    CORONARY ARTERY BYPASS GRAFT      2016    TONSILLECTOMY      UPPER GASTROINTESTINAL ENDOSCOPY      Kami w/snow per patient    UPPER GASTROINTESTINAL ENDOSCOPY N/A 09/02/2021    Dr Nemo Bernal, BDM, (-)[place]         CURRENT MEDICATIONS       Previous Medications    ASPIRIN 81 MG TABLET    Take 81 mg by mouth daily    ATORVASTATIN (LIPITOR) 40 MG TABLET    Take 80 mg by mouth nightly     BETHANECHOL (URECHOLINE) 25 MG TABLET    Take 1 tablet by mouth daily    EXENATIDE (BYDUREON) 2 MG PEN    INJECT 0.65 MLS ONCE Q WEEK    EZETIMIBE (ZETIA) 10 MG TABLET    Take 10 mg by mouth    FUROSEMIDE (LASIX) 40 MG TABLET    Take 80 mg by mouth 2 times daily    GABAPENTIN (NEURONTIN) 600 MG TABLET    Take 600 mg by mouth 3 times daily .     INSULIN ASPART (NOVOLOG) 100 UNIT/ML INJECTION VIAL    Inject 10 Units into the skin 4 times daily (before meals and nightly) Up to 10 units ss coverage as directed    INSULIN GLARGINE (BASAGLAR KWIKPEN) 100 UNIT/ML INJECTION PEN    Inject 80 Units into the skin nightly     METFORMIN (GLUCOPHAGE) 1000 MG TABLET    Take 1 tablet by mouth daily Hold till monday    METOPROLOL SUCCINATE ER (TOPROL-XL) 25 MG XL TABLET    Take 25 mg by mouth daily    NONFORMULARY    daily apple cider gummy     NYSTATIN (MYCOSTATIN) 788246 UNIT/GM POWDER    Apply 3 times daily to lower groin, abdominal area and under bilateral breasts    ONDANSETRON (ZOFRAN ODT) 4 MG DISINTEGRATING TABLET    Take 1 tablet by mouth every 8 hours as needed for Nausea or Vomiting    PANTOPRAZOLE (PROTONIX) 40 MG TABLET    Take 1 tablet by mouth daily    PROCHLORPERAZINE (COMPAZINE) 10 MG TABLET    Take 10 mg by mouth 2 times daily as needed (headache)    SPIRONOLACTONE (ALDACTONE) 25 MG TABLET    Take 12.5 mg by mouth daily    SUCRALFATE (CARAFATE) 1 GM TABLET    Take 1 tablet by mouth 4 times daily as needed (gastric upset)    TOPIRAMATE (TOPAMAX) 50 MG TABLET    Take 1 tablet by mouth daily    VERAPAMIL (CALAN) 40 MG TABLET    Take 40 mg by mouth every 12 hours    WARFARIN (COUMADIN) 10 MG TABLET    Take 10 mg by mouth       ALLERGIES     Bactrim [sulfamethoxazole-trimethoprim]    FAMILY HISTORY       Family History   Problem Relation Age of Onset    Osteoarthritis Mother     Diabetes Mother     Hypertension Mother     Hypertension Father     Heart Attack Brother     Heart Failure Maternal Grandmother     Diabetes Maternal Grandfather     Heart Disease Maternal Grandfather     Heart Disease Paternal Grandmother     No Known Problems Paternal Grandfather     Colon Cancer Neg Hx     Colon Polyps Neg Hx     Esophageal Cancer Neg Hx     Liver Cancer Neg Hx     Liver Disease Neg Hx     Rectal Cancer Neg Hx     Stomach Cancer Neg Hx           SOCIAL HISTORY       Social History     Socioeconomic History    Marital status: Single     Spouse name: None    Number of children: None    Years of education: None    Highest education level: None   Occupational History    None   Tobacco Use    Smoking status: Never Smoker    Smokeless tobacco: Never Used   Vaping Use    Vaping Use: Never used   Substance and Sexual Activity    Alcohol use: Yes     Comment: occ    Drug use: No    Sexual activity: None   Other Topics Concern    None   Social History Narrative    None     Social Determinants of Health     Financial Resource Strain:     Difficulty of Paying Living Expenses:    Food Insecurity:     Worried About Running Out of Food in the Last Year:     Ran Out of Food in the Last Year:    Transportation Needs:     Lack of Transportation (Medical):      Lack of Transportation (Non-Medical):    Physical Activity:     Days of Exercise per Week:     Minutes of Exercise per Session:    Stress:     Feeling of Stress :    Social Connections:     Frequency of Communication with Friends and Family:     Frequency of Social Gatherings with Friends and Family:     Attends Alevism Services:     Active Member of Clubs or Organizations:     Attends Club or Organization Meetings:     Marital Status:    Intimate Partner Violence:     Fear of Current or Ex-Partner:     Emotionally Abused:     Physically Abused:     Sexually Abused:        SCREENINGS             PHYSICAL EXAM    (up to 7 for level 4, 8 or more for level 5)     ED Triage Vitals [09/20/21 1410]   BP Temp Temp Source Pulse Resp SpO2 Height Weight   123/85 98.1 °F (36.7 °C) Oral 91 18 95 % 5' 4\" (1.626 m) (!) 353 lb (160.1 kg)       Physical Exam  Vitals and nursing note reviewed. Constitutional:       General: She is not in acute distress. Appearance: Normal appearance. She is well-developed. She is not diaphoretic. HENT:      Head: Normocephalic and atraumatic. Mouth/Throat:      Pharynx: No oropharyngeal exudate. Eyes:      General: No scleral icterus. Pupils: Pupils are equal, round, and reactive to light. Neck:      Trachea: No tracheal deviation. Cardiovascular:      Rate and Rhythm: Normal rate. Pulses: Normal pulses. Heart sounds: Normal heart sounds. Pulmonary:      Effort: Pulmonary effort is normal.      Breath sounds: Normal breath sounds. No stridor. No wheezing or rhonchi. Abdominal:      General: There is no distension. Palpations: Abdomen is soft. Abdomen is not rigid. Tenderness: There is no abdominal tenderness. There is no guarding. Hernia: No hernia is present. Musculoskeletal:         General: No deformity. Cervical back: Normal range of motion. Skin:     General: Skin is warm and dry. Findings: No rash. Neurological:      Mental Status: She is alert and oriented to person, place, and time.       Cranial Nerves: No cranial nerve deficit. Coordination: Coordination normal.   Psychiatric:         Behavior: Behavior normal.         DIAGNOSTIC RESULTS     EKG: All EKG's are interpreted by the Emergency Department Physician who either signs or Co-signs this chart in the absence of a cardiologist.        RADIOLOGY:   Non-plain film images such as CT, Ultrasound and MRI are read by the radiologist. Ju Kaylene images are visualized and preliminarily interpreted by the emergency physician with the below findings:        Interpretation per the Radiologist below, if available at the time of this note:    CT ABDOMEN PELVIS W IV CONTRAST Additional Contrast? None   Final Result   Mild fat stranding associated with pancreatic head and neck, may be   seen in acute pancreatitis. No associated necrosis or hemorrhage. No   fluid collection. Signed by Dr Dianne Handley            ED BEDSIDE ULTRASOUND:   Performed by ED Physician - none    LABS:  Labs Reviewed   CBC WITH AUTO DIFFERENTIAL - Abnormal; Notable for the following components:       Result Value    Hematocrit 49.2 (*)     MCHC 31.5 (*)     RDW 14.6 (*)     All other components within normal limits   COMPREHENSIVE METABOLIC PANEL W/ REFLEX TO MG FOR LOW K - Abnormal; Notable for the following components:    CREATININE 1.2 (*)     GFR Non- 48 (*)     GFR African American 58 (*)     Alkaline Phosphatase 109 (*)     All other components within normal limits   LIPASE - Abnormal; Notable for the following components:    Lipase 141 (*)     All other components within normal limits   LIPID PANEL - Abnormal; Notable for the following components:    Cholesterol, Total 137 (*)     HDL 52 (*)     All other components within normal limits   COVID-19, RAPID   URINE RT REFLEX TO CULTURE   PREGNANCY, URINE       All other labs were within normal range or not returned as of this dictation.     Medications   lactated ringers infusion 1,000 mL (1,000 mLs IntraVENous New Bag 9/20/21 2203)   morphine (PF) injection 2 mg (2 mg IntraVENous Given 9/20/21 1959)   ondansetron (ZOFRAN-ODT) disintegrating tablet 4 mg (4 mg Oral Given 9/20/21 1959)   iopamidol (ISOVUE-370) 76 % injection 90 mL (90 mLs IntraVENous Given 9/20/21 2056)   morphine injection 4 mg (4 mg IntraVENous Given 9/20/21 2157)       EMERGENCY DEPARTMENT COURSE and DIFFERENTIALDIAGNOSIS/MDM:   Vitals:    Vitals:    09/20/21 1410 09/20/21 2000 09/20/21 2130   BP: 123/85 109/84 117/83   Pulse: 91 79 86   Resp: 18 18 22   Temp: 98.1 °F (36.7 °C)     TempSrc: Oral     SpO2: 95% 93% 95%   Weight: (!) 353 lb (160.1 kg)     Height: 5' 4\" (1.626 m)         [facility]    ED Course as of Sep 20 2226   Mon Sep 20, 2021   2027 Lipase(!): 141 [CARITO]   2155 Lipase level is elevated here. Given patient's history of gallstones with new elevation in lipase level, CT repeated tonight despite having negative CT within the last few days. Now shows radiographic findings consistent with pancreatitis as well. Suspect gallstone pancreatitis based on patient's history of previously untreated gallstones. [CARITO]      ED Course User Index  [CARITO] Marino Louis MD     Based on the evaluation and work-up here patient is felt to require further monitoring, work-up, or treatment that is available in the emergency department. Case was discussed with hospitalist who agrees for observation or admission for further management. Treatment and stabilization as necessary were provided in the emergency department prior to transfer of care to the medicine service. CONSULTS:  None    PROCEDURES:  Unless otherwise notedbelow, none     Procedures      FINAL IMPRESSION     1. Acute pancreatitis without infection or necrosis, unspecified pancreatitis type    2. History of gallstones          DISPOSITION/PLAN   DISPOSITION Admitted 09/20/2021 10:25:33 PM      PATIENT REFERRED TO:  No follow-up provider specified.     DISCHARGE MEDICATIONS:  New Prescriptions    No medications on file          (Please note that portions of this note were completed with a voice recognition program.  Efforts were made to edit the dictations butoccasionally words are mis-transcribed.)    Paddy Rogers MD (electronically signed)  AttendingEmergency Physician          Paddy Rudd MD  09/20/21 [id number]

## 2021-09-21 NOTE — PROGRESS NOTES
Pharmacy Consult      Silvia Dickson is a 55 y.o. female for whom pharmacy has been consulted to evaluate patient's home medications for potential causes of pancreatitis. Patient Active Problem List   Diagnosis    Atherosclerosis of native coronary artery of native heart with unstable angina pectoris ([facility] [address]    Essential hypertension    Diabetes mellitus ([facility] [address]    Mixed hyperlipidemia    NAYAN (obstructive sleep apnea)    Morbid obesity due to excess calories (HCC)    Acute blood loss anemia    [facility] occlusion with cerebral infarction [facility])    Type 2 diabetes mellitus without complication, with long-term current use of insulin (HCC)    Surgical wound dehiscence    Chest pain    Gastroesophageal reflux disease without esophagitis    Acute pancreatitis    History of gallstones       Allergies:  Bactrim [sulfamethoxazole-trimethoprim]     Recent Labs     09/20/21 1955 09/21/21  0139   CREATININE 1.2* 1.0*  1.1*       Ht/Wt:   Ht Readings from Last 1 Encounters:   09/21/21 5' 4\" (1.626 m)        Wt Readings from Last 1 Encounters:   09/21/21 (!) 353 lb 9 oz (160.4 kg)         Estimated Creatinine Clearance: 108 mL/min (A) (based on SCr of 1 mg/dL (H)). Assessment/Plan:    Drugs reported on the patient's home med list that have the potential to cause pancreatitis:     Atorvastatin (rare incidence)  Exenatide (pancreatitis is an increase risk for patient's who take incretin mimetics)  Ezetimibe  Furosemide  Pantoprazole  Topiramate (low incidence)    Thank you for the consult.  .    Electronically signed by Delio Small [facility] on 9/21/2021 at 6:35 PM

## 2021-09-21 NOTE — PROGRESS NOTES
Daily Progress Note    Date:2021  Patient: Onofre Mixon  : 1975  UPN:[id number]  CODE:Full Code No additional code details  PCP:Genny Montiel DO    Admit Date: 2021  6:47 PM   LOS: 1 day       Subjective:    Events noted. Patient continues with epigastric abdominal pain with some nausea, no emesis. 49-year-old female with morbid obesity, CAD, history of gallstones, history of stroke with residual speech deficit, presented with epigastric abdominal pain with radiation to the back over several days, with associated nausea. Had recent CT abdomen that was unremarkable without gallstones or evidence of cholecystitis. Noted to have elevated lipase and admitted with concern for acute pancreatitis. Obtain abdominal ultrasound confirming no gallstones or signs of cholecystitis. Managed with IV fluids and pain control. GI consulted.         Review of Systems    Comprehensive ROS completed and is negative except as otherwise noted        Objective:      Vital signs in last 24 hours:  Patient Vitals for the past 24 hrs:   BP Temp Temp src Pulse Resp SpO2 Height Weight   21 1115 111/81 97.6 °F (36.4 °C) -- 80 16 96 % -- --   21 0630 119/78 97.2 °F (36.2 °C) -- 81 16 96 % -- --   21 0000 122/86 96.3 °F (35.7 °C) Temporal 81 16 97 % 5' 4\" (1.626 m) (!) 353 lb 9 oz (160.4 kg)   21 2300 98/66 -- -- 80 15 95 % -- --   21 2130 117/83 -- -- 86 22 95 % -- --   21 109/84 -- -- 79 18 93 % -- --         Physical exam    General: Resting in no acute distress  HEENT: Normocephalic/atraumatic, no scleral icterus  Cardiovascular: Regular rhythm, pulses 2+ and equal  Respiratory: Diminished breath sounds bilaterally, no wheezes  Abdomen: Epigastric tenderness without guarding or rebound, bowel sounds present  Neurologic: No focal lateralizing weakness  Extremities: No clubbing or cyanosis  Skin: Warm and dry      Lab Review   Recent Results (from the past 24 hour(s))   CBC Auto Differential    Collection Time: 09/20/21  7:55 PM   Result Value Ref Range    WBC 7.4 4.8 - 10.8 K/uL    RBC 5.24 4.20 - 5.40 M/uL    Hemoglobin 15.5 12.0 - 16.0 g/dL    Hematocrit 49.2 (H) 37.0 - 47.0 %    MCV 93.9 81.0 - 99.0 fL    MCH 29.6 27.0 - 31.0 pg    MCHC 31.5 (L) 33.0 - 37.0 g/dL    RDW 14.6 (H) 11.5 - 14.5 %    Platelets 727 769 - 760 K/uL    MPV 11.5 9.4 - 12.3 fL    Neutrophils % 64.6 50.0 - 65.0 %    Lymphocytes % 23.9 20.0 - 40.0 %    Monocytes % 7.5 0.0 - 10.0 %    Eosinophils % 3.4 0.0 - 5.0 %    Basophils % 0.3 0.0 - 1.0 %    Neutrophils Absolute 4.8 1.5 - 7.5 K/uL    Immature Granulocytes # 0.0 K/uL    Lymphocytes Absolute 1.8 1.1 - 4.5 K/uL    Monocytes Absolute 0.60 0.00 - 0.90 K/uL    Eosinophils Absolute 0.30 0.00 - 0.60 K/uL    Basophils Absolute 0.00 0.00 - 0.20 K/uL   Comprehensive Metabolic Panel w/ Reflex to MG    Collection Time: 09/20/21  7:55 PM   Result Value Ref Range    Sodium 139 136 - 145 mmol/L    Potassium reflex Magnesium 4.3 3.5 - 5.0 mmol/L    Chloride 100 98 - 111 mmol/L    CO2 29 22 - 29 mmol/L    Anion Gap 10 7 - 19 mmol/L    Glucose 93 74 - 109 mg/dL    BUN 18 6 - 20 mg/dL    CREATININE 1.2 (H) 0.5 - 0.9 mg/dL    GFR Non- 48 (A) >60    GFR  58 (L) >59    Calcium 9.2 8.6 - 10.0 mg/dL    Total Protein 8.0 6.6 - 8.7 g/dL    Albumin 4.0 3.5 - 5.2 g/dL    Total Bilirubin 0.3 0.2 - 1.2 mg/dL    Alkaline Phosphatase 109 (H) 35 - 104 U/L    ALT 14 5 - 33 U/L    AST 13 5 - 32 U/L   Lipase    Collection Time: 09/20/21  7:55 PM   Result Value Ref Range    Lipase 141 (H) 13 - 60 U/L   LIPID PANEL    Collection Time: 09/20/21  7:55 PM   Result Value Ref Range    Cholesterol, Total 137 (L) 160 - 199 mg/dL    Triglycerides 126 0 - 149 mg/dL    HDL 52 (L) 65 - 121 mg/dL    LDL Calculated 60 <100 mg/dL   Urinalysis Reflex to Culture    Collection Time: 09/20/21  9:19 PM    Specimen: Urine, clean catch   Result Value Ref Range    Color, UA YELLOW Straw/Yellow    Clarity, UA Clear Clear    Glucose, Ur =>1000 Negative mg/dL    Bilirubin Urine Negative Negative    Ketones, Urine Negative Negative mg/dL    Specific Gravity, UA 1.025 1.005 - 1.030    Blood, Urine Negative Negative    pH, UA 7.0 5.0 - 8.0    Protein, UA Negative Negative mg/dL    Urobilinogen, Urine 1.0 <2.0 E.U./dL    Nitrite, Urine Negative Negative    Leukocyte Esterase, Urine Negative Negative   Pregnancy, Urine    Collection Time: 09/20/21  9:19 PM   Result Value Ref Range    HCG(Urine) Pregnancy Test Negative    COVID-19, Rapid    Collection Time: 09/20/21 10:04 PM    Specimen: Nasopharyngeal Swab   Result Value Ref Range    SARS-CoV-2, NAAT Not Detected Not Detected   Comprehensive Metabolic Panel    Collection Time: 09/21/21  1:39 AM   Result Value Ref Range    Sodium 138 136 - 145 mmol/L    Potassium 3.8 3.5 - 5.0 mmol/L    Chloride 103 98 - 111 mmol/L    CO2 24 22 - 29 mmol/L    Anion Gap 11 7 - 19 mmol/L    Glucose 84 74 - 109 mg/dL    BUN 18 6 - 20 mg/dL    CREATININE 1.0 (H) 0.5 - 0.9 mg/dL    GFR Non-African American 60 (A) >60    GFR African American >59 >59    Calcium 8.7 8.6 - 10.0 mg/dL    Total Protein 7.1 6.6 - 8.7 g/dL    Albumin 3.3 (L) 3.5 - 5.2 g/dL    Total Bilirubin 0.3 0.2 - 1.2 mg/dL    Alkaline Phosphatase 94 35 - 104 U/L    ALT 12 5 - 33 U/L    AST 11 5 - 32 U/L   Amylase    Collection Time: 09/21/21  1:39 AM   Result Value Ref Range    Amylase 83 28 - 100 U/L   CBC Auto Differential    Collection Time: 09/21/21  1:39 AM   Result Value Ref Range    WBC 7.5 4.8 - 10.8 K/uL    RBC 4.83 4.20 - 5.40 M/uL    Hemoglobin 14.3 12.0 - 16.0 g/dL    Hematocrit 44.8 37.0 - 47.0 %    MCV 92.8 81.0 - 99.0 fL    MCH 29.6 27.0 - 31.0 pg    MCHC 31.9 (L) 33.0 - 37.0 g/dL    RDW 14.5 11.5 - 14.5 %    Platelets 694 254 - 465 K/uL    MPV 11.6 9.4 - 12.3 fL    Neutrophils % 61.2 50.0 - 65.0 %    Lymphocytes % 28.0 20.0 - 40.0 %    Monocytes % 7.2 0.0 - 10.0 %    Eosinophils % 3.1 0.0 - 5.0 %    Basophils % 0.1 0.0 - 1.0 %    Neutrophils Absolute 4.6 1.5 - 7.5 K/uL    Immature Granulocytes # 0.0 K/uL    Lymphocytes Absolute 2.1 1.1 - 4.5 K/uL    Monocytes Absolute 0.50 0.00 - 0.90 K/uL    Eosinophils Absolute 0.20 0.00 - 0.60 K/uL    Basophils Absolute 0.00 0.00 - 0.20 K/uL   Lipase    Collection Time: 09/21/21  1:39 AM   Result Value Ref Range    Lipase 113 (H) 13 - 60 U/L   Protime-INR    Collection Time: 09/21/21  1:39 AM   Result Value Ref Range    Protime 39.4 (H) 12.0 - 14.6 sec    INR 4.20 (H) 0.88 - 1.18   Comprehensive Metabolic Panel w/ Reflex to MG    Collection Time: 09/21/21  1:39 AM   Result Value Ref Range    Sodium 140 136 - 145 mmol/L    Potassium reflex Magnesium 3.8 3.5 - 5.0 mmol/L    Chloride 103 98 - 111 mmol/L    CO2 26 22 - 29 mmol/L    Anion Gap 11 7 - 19 mmol/L    Glucose 82 74 - 109 mg/dL    BUN 18 6 - 20 mg/dL    CREATININE 1.1 (H) 0.5 - 0.9 mg/dL    GFR Non- 53 (A) >60    GFR African American >59 >59    Calcium 8.7 8.6 - 10.0 mg/dL    Total Protein 6.7 6.6 - 8.7 g/dL    Albumin 3.7 3.5 - 5.2 g/dL    Total Bilirubin 0.3 0.2 - 1.2 mg/dL    Alkaline Phosphatase 97 35 - 104 U/L    ALT 12 5 - 33 U/L    AST 11 5 - 32 U/L   Protime-INR    Collection Time: 09/21/21  5:30 AM   Result Value Ref Range    Protime 34.1 (H) 12.0 - 14.6 sec    INR 3.46 (H) 0.88 - 1.18   POCT Glucose    Collection Time: 09/21/21  7:56 AM   Result Value Ref Range    POC Glucose 68 (L) 70 - 99 mg/dl    Performed on [employer]    POCT Glucose    Collection Time: 09/21/21 11:30 AM   Result Value Ref Range    POC Glucose 66 (L) 70 - 99 mg/dl    Performed on [employer]    POCT Glucose    Collection Time: 09/21/21  4:51 PM   Result Value Ref Range    POC Glucose 62 (L) 70 - 99 mg/dl    Performed on [employer]        I/O last 3 completed shifts:   In: 5 [P.O.:540]  Out: -   I/O this shift:  In: 60 [P.O.:60]  Out: -       Current Facility-Administered Medications:     [Held by provider] aspirin chewable tablet 81 mg, 81 mg, Oral, Daily, Elnoria Stallion, APRN    [Held by provider] atorvastatin (LIPITOR) tablet 80 mg, 80 mg, Oral, Nightly, Elnoria Stallion, APRN    [Held by provider] bethanechol (URECHOLINE) tablet 25 mg, 25 mg, Oral, Daily, Elnoria Stallion, APRN    [Held by provider] ezetimibe (ZETIA) tablet 10 mg, 10 mg, Oral, Nightly, Elnoria Stallion, APRN    [Held by provider] furosemide (LASIX) tablet 80 mg, 80 mg, Oral, BID, Elnoria Stallion, APRN    [Held by provider] gabapentin (NEURONTIN) tablet 600 mg, 600 mg, Oral, TID, Elnoria Stallion, APRN    [Held by provider] metoprolol succinate (TOPROL XL) extended release tablet 25 mg, 25 mg, Oral, Daily, Elnoria Stallion, APRN    [Held by provider] spironolactone (ALDACTONE) tablet 12.5 mg, 12.5 mg, Oral, Daily, Elnoria Stallion, APRN    [Held by provider] topiramate (TOPAMAX) tablet 50 mg, 50 mg, Oral, Daily, Elnoria Stallion, APRN    [Held by provider] verapamil (CALAN) tablet 40 mg, 40 mg, Oral, Q12H, Elnoria Stallion, APRN    sodium chloride flush 0.9 % injection 5-40 mL, 5-40 mL, IntraVENous, 2 times per day, Elnoria Stallion, APRN    sodium chloride flush 0.9 % injection 5-40 mL, 5-40 mL, IntraVENous, PRN, Elnoria Stallion, APRN    0.9 % sodium chloride infusion, 25 mL, IntraVENous, PRN, Elnoria Stallion, APRN    acetaminophen (TYLENOL) tablet 650 mg, 650 mg, Oral, Q4H PRN, Elnoria Stallion, APRN    potassium chloride (KLOR-CON M) extended release tablet 40 mEq, 40 mEq, Oral, PRN **OR** potassium bicarb-citric acid (EFFER-K) effervescent tablet 40 mEq, 40 mEq, Oral, PRN **OR** potassium chloride 10 mEq/100 mL IVPB (Peripheral Line), 10 mEq, IntraVENous, PRN, Elnoria Stallion, APRN    magnesium sulfate 2000 mg in 50 mL IVPB premix, 2,000 mg, IntraVENous, PRN, Elnoria Stallion, APRN    morphine (PF) injection 2 mg, 2 mg, IntraVENous, Q4H PRN, Elnoria Stallion, APRN    [Held by provider] enoxaparin (LOVENOX) injection 40 mg, 40 mg, SubCUTAneous, Daily, Bri Hunt MD    glucose (GLUTOSE) 40 % oral gel 15 g, 15 g, Oral, PRN, Libertad Cristobal MD    dextrose 50 % IV solution, 12.5 g, IntraVENous, PRN, Libertad Cristobal MD, 12.5 g at 09/21/21 1144    glucagon (rDNA) injection 1 mg, 1 mg, IntraMUSCular, PRN, Libertad Cristobal MD    dextrose 5 % solution, 100 mL/hr, IntraVENous, PRN, Libertad Cristobal MD    pantoprazole (PROTONIX) injection 40 mg, 40 mg, IntraVENous, Daily, 40 mg at 09/21/21 0914 **AND** sodium chloride (PF) 0.9 % injection 10 mL, 10 mL, IntraVENous, Daily, Libertad Cristobal MD, 10 mL at 09/21/21 0918    dextrose 5 % in lactated ringers infusion, , IntraVENous, Continuous, Libertad Cristobal MD, Last Rate: 150 mL/hr at 09/21/21 1741, New Bag at 09/21/21 1741    sodium chloride flush 0.9 % injection 5-40 mL, 5-40 mL, IntraVENous, 2 times per day, Bri Hunt MD, 10 mL at 09/21/21 0100    sodium chloride flush 0.9 % injection 5-40 mL, 5-40 mL, IntraVENous, PRN, Bri Hunt MD    0.9 % sodium chloride infusion, 25 mL, IntraVENous, PRN, Bri Hunt MD    ondansetron (ZOFRAN-ODT) disintegrating tablet 4 mg, 4 mg, Oral, Q8H PRN **OR** ondansetron (ZOFRAN) injection 4 mg, 4 mg, IntraVENous, Q6H PRN, Bri Hunt MD    polyethylene glycol (GLYCOLAX) packet 17 g, 17 g, Oral, Daily PRN, Bri Hunt MD    acetaminophen (TYLENOL) tablet 650 mg, 650 mg, Oral, Q6H PRN **OR** acetaminophen (TYLENOL) suppository 650 mg, 650 mg, Rectal, Q6H PRN, Bri Hunt MD    morphine injection 4 mg, 4 mg, IntraVENous, Q4H PRN, Bri Hunt MD, 4 mg at 09/21/21 1741    insulin lispro (HUMALOG) injection vial 0-6 Units, 0-6 Units, SubCUTAneous, TID WC, Bri Hunt MD    insulin lispro (HUMALOG) injection vial 0-3 Units, 0-3 Units, SubCUTAneous, Nightly, Bri Hunt MD          Assessment/plan  Principal Problem:

## 2021-09-21 NOTE — PROGRESS NOTES
4 Eyes Skin Assessment    Joel Gresham is being assessed upon: Admission    I agree that I, Jennifer Mckeon LPN, along with Jennifer Henriquez RN CN (either 2 RN's or 1 LPN and 1 RN) have performed a thorough Head to Toe Skin Assessment on the patient. ALL assessment sites listed below have been assessed. Areas assessed by both nurses:     [x]   Head, Face, and Ears   [x]   Shoulders, Back, and Chest  [x]   Arms, Elbows, and Hands   [x]   Coccyx, Sacrum, and Ischium  [x]   Legs, Feet, and Heels    Does the Patient have Skin Breakdown?  No    Vikas Prevention initiated: NA  Wound Care Orders initiated: NA    [facility] nurse consulted for Pressure Injury (Stage 3,4, Unstageable, DTI, NWPT, and Complex wounds) and New or Established Ostomies: NA        Primary Nurse eSignature: Jennifer Mckeon LPN on 5/62/2383 at 4:69 AM      Co-Signer eSignature: Electronically signed by Jennifer Henriquez RN on 9/21/21 at 4:54 AM CDT

## 2021-09-21 NOTE — PLAN OF CARE
Problem: Pain:  Goal: Pain level will decrease  Description: Pain level will decrease  Outcome: Ongoing  Goal: Control of acute pain  Description: Control of acute pain  Outcome: Ongoing  Goal: Control of chronic pain  Description: Control of chronic pain  Outcome: Ongoing     Problem: Coping:  Goal: Ability to verbalize feelings will improve  Description: Ability to verbalize feelings will improve  Outcome: Ongoing  Goal: Level of anxiety will decrease  Description: Level of anxiety will decrease  Outcome: Ongoing     Problem: Fluid Volume:  Goal: Will maintain adequate fluid volume  Description: Will maintain adequate fluid volume  Outcome: Ongoing  Goal: Maintenance of adequate hydration will improve  Description: Maintenance of adequate hydration will improve  Outcome: Ongoing     Problem: Health Behavior:  Goal: Ability to identify changes in lifestyle to reduce recurrence of condition will improve  Description: Ability to identify changes in lifestyle to reduce recurrence of condition will improve  Outcome: Ongoing  Goal: Ability to state signs and symptoms to report to health care provider will improve  Description: Ability to state signs and symptoms to report to health care provider will improve  Outcome: Ongoing     Problem: Nutritional:  Goal: Ability to achieve adequate nutritional intake will improve  Description: Ability to achieve adequate nutritional intake will improve  Outcome: Ongoing     Problem: Physical Regulation:  Goal: Complications related to the disease process, condition or treatment will be avoided or minimized  Description: Complications related to the disease process, condition or treatment will be avoided or minimized  Outcome: Ongoing  Goal: Hemodynamic stability will improve  Description: Hemodynamic stability will improve  Outcome: Ongoing  Goal: Ability to maintain clinical measurements within normal limits will improve  Description: Ability to maintain clinical measurements

## 2021-09-22 LAB
ALBUMIN SERPL-MCNC: 3.3 G/DL (ref 3.5–5.2)
ALP BLD-CCNC: 96 U/L (ref 35–104)
ALT SERPL-CCNC: 13 U/L (ref 5–33)
ANION GAP SERPL CALCULATED.3IONS-SCNC: 9 MMOL/L (ref 7–19)
AST SERPL-CCNC: 16 U/L (ref 5–32)
BASOPHILS ABSOLUTE: 0 K/UL (ref 0–0.2)
BASOPHILS RELATIVE PERCENT: 0.2 % (ref 0–1)
BILIRUB SERPL-MCNC: 0.5 MG/DL (ref 0.2–1.2)
BUN BLDV-MCNC: 12 MG/DL (ref 6–20)
CALCIUM SERPL-MCNC: 8.7 MG/DL (ref 8.6–10)
CHLORIDE BLD-SCNC: 104 MMOL/L (ref 98–111)
CO2: 25 MMOL/L (ref 22–29)
CREAT SERPL-MCNC: 1 MG/DL (ref 0.5–0.9)
EOSINOPHILS ABSOLUTE: 0.3 K/UL (ref 0–0.6)
EOSINOPHILS RELATIVE PERCENT: 5 % (ref 0–5)
GFR AFRICAN AMERICAN: >59
GFR NON-AFRICAN AMERICAN: 60
GLUCOSE BLD-MCNC: 126 MG/DL (ref 74–109)
GLUCOSE BLD-MCNC: 131 MG/DL (ref 70–99)
GLUCOSE BLD-MCNC: 154 MG/DL (ref 70–99)
GLUCOSE BLD-MCNC: 87 MG/DL (ref 70–99)
GLUCOSE BLD-MCNC: 92 MG/DL (ref 70–99)
HCT VFR BLD CALC: 45.8 % (ref 37–47)
HEMOGLOBIN: 14.2 G/DL (ref 12–16)
IMMATURE GRANULOCYTES #: 0 K/UL
INR BLD: 3.22 (ref 0.88–1.18)
LIPASE: 91 U/L (ref 13–60)
LYMPHOCYTES ABSOLUTE: 2.1 K/UL (ref 1.1–4.5)
LYMPHOCYTES RELATIVE PERCENT: 35.7 % (ref 20–40)
MCH RBC QN AUTO: 29.6 PG (ref 27–31)
MCHC RBC AUTO-ENTMCNC: 31 G/DL (ref 33–37)
MCV RBC AUTO: 95.6 FL (ref 81–99)
MONOCYTES ABSOLUTE: 0.4 K/UL (ref 0–0.9)
MONOCYTES RELATIVE PERCENT: 7.1 % (ref 0–10)
NEUTROPHILS ABSOLUTE: 3 K/UL (ref 1.5–7.5)
NEUTROPHILS RELATIVE PERCENT: 51.7 % (ref 50–65)
PDW BLD-RTO: 14.4 % (ref 11.5–14.5)
PERFORMED ON: ABNORMAL
PERFORMED ON: ABNORMAL
PERFORMED ON: NORMAL
PERFORMED ON: NORMAL
PLATELET # BLD: 284 K/UL (ref 130–400)
PMV BLD AUTO: 11.8 FL (ref 9.4–12.3)
POTASSIUM SERPL-SCNC: 4.3 MMOL/L (ref 3.5–5)
PROTHROMBIN TIME: 32.3 SEC (ref 12–14.6)
RBC # BLD: 4.79 M/UL (ref 4.2–5.4)
SODIUM BLD-SCNC: 138 MMOL/L (ref 136–145)
TOTAL PROTEIN: 6.3 G/DL (ref 6.6–8.7)
WBC # BLD: 5.8 K/UL (ref 4.8–10.8)

## 2021-09-22 PROCEDURE — 82947 ASSAY GLUCOSE BLOOD QUANT: CPT

## 2021-09-22 PROCEDURE — 36415 COLL VENOUS BLD VENIPUNCTURE: CPT

## 2021-09-22 PROCEDURE — 6360000002 HC RX W HCPCS: Performed by: HOSPITALIST

## 2021-09-22 PROCEDURE — 80053 COMPREHEN METABOLIC PANEL: CPT

## 2021-09-22 PROCEDURE — 6370000000 HC RX 637 (ALT 250 FOR IP): Performed by: STUDENT IN AN ORGANIZED HEALTH CARE EDUCATION/TRAINING PROGRAM

## 2021-09-22 PROCEDURE — 2580000003 HC RX 258: Performed by: NURSE PRACTITIONER

## 2021-09-22 PROCEDURE — 83690 ASSAY OF LIPASE: CPT

## 2021-09-22 PROCEDURE — 99232 SBSQ HOSP IP/OBS MODERATE 35: CPT | Performed by: INTERNAL MEDICINE

## 2021-09-22 PROCEDURE — 85610 PROTHROMBIN TIME: CPT

## 2021-09-22 PROCEDURE — 2580000003 HC RX 258: Performed by: HOSPITALIST

## 2021-09-22 PROCEDURE — 1210000000 HC MED SURG R&B

## 2021-09-22 PROCEDURE — 85025 COMPLETE CBC W/AUTO DIFF WBC: CPT

## 2021-09-22 PROCEDURE — 2580000003 HC RX 258: Performed by: STUDENT IN AN ORGANIZED HEALTH CARE EDUCATION/TRAINING PROGRAM

## 2021-09-22 PROCEDURE — 6370000000 HC RX 637 (ALT 250 FOR IP): Performed by: NURSE PRACTITIONER

## 2021-09-22 RX ORDER — SODIUM CHLORIDE, SODIUM LACTATE, POTASSIUM CHLORIDE, CALCIUM CHLORIDE 600; 310; 30; 20 MG/100ML; MG/100ML; MG/100ML; MG/100ML
INJECTION, SOLUTION INTRAVENOUS CONTINUOUS
Status: DISCONTINUED | OUTPATIENT
Start: 2021-09-22 | End: 2021-09-24 | Stop reason: HOSPADM

## 2021-09-22 RX ORDER — PANTOPRAZOLE SODIUM 40 MG/1
40 TABLET, DELAYED RELEASE ORAL
Status: DISCONTINUED | OUTPATIENT
Start: 2021-09-22 | End: 2021-09-24 | Stop reason: HOSPADM

## 2021-09-22 RX ADMIN — MORPHINE SULFATE 4 MG: 4 INJECTION, SOLUTION INTRAMUSCULAR; INTRAVENOUS at 04:58

## 2021-09-22 RX ADMIN — METOPROLOL SUCCINATE 25 MG: 25 TABLET, EXTENDED RELEASE ORAL at 08:36

## 2021-09-22 RX ADMIN — SODIUM CHLORIDE, SODIUM LACTATE, POTASSIUM CHLORIDE, CALCIUM CHLORIDE AND DEXTROSE MONOHYDRATE: 5; 600; 310; 30; 20 INJECTION, SOLUTION INTRAVENOUS at 04:58

## 2021-09-22 RX ADMIN — TOPIRAMATE 50 MG: 50 TABLET, FILM COATED ORAL at 08:36

## 2021-09-22 RX ADMIN — VERAPAMIL HYDROCHLORIDE 40 MG: 80 TABLET ORAL at 20:12

## 2021-09-22 RX ADMIN — SODIUM CHLORIDE, POTASSIUM CHLORIDE, SODIUM LACTATE AND CALCIUM CHLORIDE: 600; 310; 30; 20 INJECTION, SOLUTION INTRAVENOUS at 08:37

## 2021-09-22 RX ADMIN — MORPHINE SULFATE 4 MG: 4 INJECTION, SOLUTION INTRAMUSCULAR; INTRAVENOUS at 20:12

## 2021-09-22 RX ADMIN — SODIUM CHLORIDE, PRESERVATIVE FREE 10 ML: 5 INJECTION INTRAVENOUS at 20:16

## 2021-09-22 RX ADMIN — VERAPAMIL HYDROCHLORIDE 40 MG: 80 TABLET ORAL at 08:36

## 2021-09-22 RX ADMIN — SODIUM CHLORIDE, PRESERVATIVE FREE 10 ML: 5 INJECTION INTRAVENOUS at 20:15

## 2021-09-22 RX ADMIN — SODIUM CHLORIDE, POTASSIUM CHLORIDE, SODIUM LACTATE AND CALCIUM CHLORIDE: 600; 310; 30; 20 INJECTION, SOLUTION INTRAVENOUS at 16:05

## 2021-09-22 RX ADMIN — BETHANECHOL CHLORIDE 25 MG: 25 TABLET ORAL at 08:36

## 2021-09-22 RX ADMIN — MORPHINE SULFATE 4 MG: 4 INJECTION, SOLUTION INTRAMUSCULAR; INTRAVENOUS at 16:05

## 2021-09-22 RX ADMIN — MORPHINE SULFATE 4 MG: 4 INJECTION, SOLUTION INTRAMUSCULAR; INTRAVENOUS at 10:03

## 2021-09-22 RX ADMIN — SODIUM CHLORIDE, PRESERVATIVE FREE 10 ML: 5 INJECTION INTRAVENOUS at 08:37

## 2021-09-22 RX ADMIN — PANTOPRAZOLE SODIUM 40 MG: 40 TABLET, DELAYED RELEASE ORAL at 08:37

## 2021-09-22 RX ADMIN — ENOXAPARIN SODIUM 40 MG: 40 INJECTION SUBCUTANEOUS at 08:37

## 2021-09-22 RX ADMIN — ASPIRIN 81 MG: 81 TABLET, CHEWABLE ORAL at 08:36

## 2021-09-22 ASSESSMENT — PAIN SCALES - GENERAL
PAINLEVEL_OUTOF10: 1
PAINLEVEL_OUTOF10: 7
PAINLEVEL_OUTOF10: 4
PAINLEVEL_OUTOF10: 7

## 2021-09-22 NOTE — PROGRESS NOTES
Daily Progress Note    Date:2021  Patient: Linda Horner  : 1975  YFU:[id number]  CODE:Full Code No additional code details  PCP:Genny Montiel DO    Admit Date: 2021  6:47 PM   LOS: 2 days       Subjective:    Events noted. Still with some epigastric pain, but improved. No emesis. No black or bloody stools. 14-year-old female with morbid obesity, CAD, history of gallstones, history of stroke with residual speech deficit, presented with epigastric abdominal pain with radiation to the back over several days, with associated nausea. Had recent CT abdomen that was unremarkable without gallstones or evidence of cholecystitis. Noted to have elevated lipase and admitted with concern for acute pancreatitis. Obtained abdominal ultrasound confirming no gallstones or signs of cholecystitis. Managed with IV fluids and pain control. GI consulted. Nuclear medicine hepatobiliary study obtained.         Review of Systems    Comprehensive ROS completed and is negative except as otherwise noted        Objective:      Vital signs in last 24 hours:  Patient Vitals for the past 24 hrs:   BP Temp Temp src Pulse Resp SpO2   21 1115 137/84 97.2 °F (36.2 °C) -- 78 16 99 %   21 0945 116/81 97.5 °F (36.4 °C) -- 81 18 100 %   21 0630 125/89 97.9 °F (36.6 °C) -- 87 16 99 %   21 0143 (!) 119/91 97.1 °F (36.2 °C) Temporal 90 18 99 %   21 1940 (!) 154/94 96.5 °F (35.8 °C) Temporal 89 18 99 %         Physical exam    General: Resting in no acute distress  HEENT: Normocephalic/atraumatic, no scleral icterus  Cardiovascular: Regular rhythm, pulses 2+ and equal  Respiratory: Diminished breath sounds bilaterally, no wheezes  Abdomen: Epigastric tenderness without guarding or rebound, bowel sounds present  Neurologic: No focal lateralizing weakness  Extremities: No clubbing or cyanosis  Skin: Warm and dry      Lab Review   Recent Results (from the past 24 hour(s))   POCT Glucose    Collection Time: 09/21/21  8:21 PM   Result Value Ref Range    POC Glucose 164 (H) 70 - 99 mg/dl    Performed on AccuChek    Protime-INR    Collection Time: 09/22/21  2:37 AM   Result Value Ref Range    Protime 32.3 (H) 12.0 - 14.6 sec    INR 3.22 (H) 0.88 - 1.18   Comprehensive Metabolic Panel    Collection Time: 09/22/21  2:37 AM   Result Value Ref Range    Sodium 138 136 - 145 mmol/L    Potassium 4.3 3.5 - 5.0 mmol/L    Chloride 104 98 - 111 mmol/L    CO2 25 22 - 29 mmol/L    Anion Gap 9 7 - 19 mmol/L    Glucose 126 (H) 74 - 109 mg/dL    BUN 12 6 - 20 mg/dL    CREATININE 1.0 (H) 0.5 - 0.9 mg/dL    GFR Non-African American 60 (A) >60    GFR African American >59 >59    Calcium 8.7 8.6 - 10.0 mg/dL    Total Protein 6.3 (L) 6.6 - 8.7 g/dL    Albumin 3.3 (L) 3.5 - 5.2 g/dL    Total Bilirubin 0.5 0.2 - 1.2 mg/dL    Alkaline Phosphatase 96 35 - 104 U/L    ALT 13 5 - 33 U/L    AST 16 5 - 32 U/L   CBC auto differential    Collection Time: 09/22/21  2:37 AM   Result Value Ref Range    WBC 5.8 4.8 - 10.8 K/uL    RBC 4.79 4.20 - 5.40 M/uL    Hemoglobin 14.2 12.0 - 16.0 g/dL    Hematocrit 45.8 37.0 - 47.0 %    MCV 95.6 81.0 - 99.0 fL    MCH 29.6 27.0 - 31.0 pg    MCHC 31.0 (L) 33.0 - 37.0 g/dL    RDW 14.4 11.5 - 14.5 %    Platelets 338 690 - 922 K/uL    MPV 11.8 9.4 - 12.3 fL    Neutrophils % 51.7 50.0 - 65.0 %    Lymphocytes % 35.7 20.0 - 40.0 %    Monocytes % 7.1 0.0 - 10.0 %    Eosinophils % 5.0 0.0 - 5.0 %    Basophils % 0.2 0.0 - 1.0 %    Neutrophils Absolute 3.0 1.5 - 7.5 K/uL    Immature Granulocytes # 0.0 K/uL    Lymphocytes Absolute 2.1 1.1 - 4.5 K/uL    Monocytes Absolute 0.40 0.00 - 0.90 K/uL    Eosinophils Absolute 0.30 0.00 - 0.60 K/uL    Basophils Absolute 0.00 0.00 - 0.20 K/uL   Lipase    Collection Time: 09/22/21  2:37 AM   Result Value Ref Range    Lipase 91 (H) 13 - 60 U/L   POCT Glucose    Collection Time: 09/22/21  6:39 AM   Result Value Ref Range    POC Glucose 131 (H) 70 - 99 mg/dl    Performed on AccuChek    POCT Glucose    Collection Time: 09/22/21 11:25 AM   Result Value Ref Range    POC Glucose 154 (H) 70 - 99 mg/dl    Performed on AccuChek    POCT Glucose    Collection Time: 09/22/21  5:04 PM   Result Value Ref Range    POC Glucose 87 70 - 99 mg/dl    Performed on AccuChek        I/O last 3 completed shifts: In: 2040 [P.O.:2040]  Out: -   No intake/output data recorded.       Current Facility-Administered Medications:     lactated ringers infusion, , IntraVENous, Continuous, Addy Cartwright MD, Last Rate: 125 mL/hr at 09/22/21 1605, New Bag at 09/22/21 1605    pantoprazole (PROTONIX) tablet 40 mg, 40 mg, Oral, QAM AC, Addy Cartwright MD, 40 mg at 09/22/21 5922    aspirin chewable tablet 81 mg, 81 mg, Oral, Daily, Tivis Hodges, APRN, 81 mg at 09/22/21 0836    [Held by provider] atorvastatin (LIPITOR) tablet 80 mg, 80 mg, Oral, Nightly, Tivis Hodges, APRN    bethanechol (URECHOLINE) tablet 25 mg, 25 mg, Oral, Daily, Tivis Hodges, APRN, 25 mg at 09/22/21 5481    ezetimibe (ZETIA) tablet 10 mg, 10 mg, Oral, Nightly, Tivis Hodges, APRN, 10 mg at 09/21/21 1954    [Held by provider] furosemide (LASIX) tablet 80 mg, 80 mg, Oral, BID, Tivis Hodges, APRN    [Held by provider] gabapentin (NEURONTIN) tablet 600 mg, 600 mg, Oral, TID, Tivis Hodges, APRN    metoprolol succinate (TOPROL XL) extended release tablet 25 mg, 25 mg, Oral, Daily, Tivis Hodges, APRN, 25 mg at 09/22/21 0836    [Held by provider] spironolactone (ALDACTONE) tablet 12.5 mg, 12.5 mg, Oral, Daily, Tivis Hodges, APRN    topiramate (TOPAMAX) tablet 50 mg, 50 mg, Oral, Daily, Tivis Hodges, APRN, 50 mg at 09/22/21 0836    sodium chloride flush 0.9 % injection 5-40 mL, 5-40 mL, IntraVENous, 2 times per day, Tivis Hodges, APRN, 10 mL at 09/22/21 0837    sodium chloride flush 0.9 % injection 5-40 mL, 5-40 mL, IntraVENous, PRN, YARIEL Mix    0.9 % sodium chloride infusion, 25 mL, IntraVENous, PRN, [facility] Malady, APRN    acetaminophen (TYLENOL) tablet 650 mg, 650 mg, Oral, Q4H PRN, [facility] Malady, APRN    potassium chloride (KLOR-CON M) extended release tablet 40 mEq, 40 mEq, Oral, PRN **OR** potassium bicarb-citric acid (EFFER-K) effervescent tablet 40 mEq, 40 mEq, Oral, PRN **OR** potassium chloride 10 mEq/100 mL IVPB (Peripheral Line), 10 mEq, IntraVENous, PRN, Diomedes Malady, APRN    magnesium sulfate 2000 mg in 50 mL IVPB premix, 2,000 mg, IntraVENous, PRN, [facility] Malady, APRN    morphine (PF) injection 2 mg, 2 mg, IntraVENous, Q4H PRN, Diomedes Malady, APRN    enoxaparin (LOVENOX) injection 40 mg, 40 mg, SubCUTAneous, Daily, Zina Schilder, MD, 40 mg at 09/22/21 0837    glucose (GLUTOSE) 40 % oral gel 15 g, 15 g, Oral, PRN, Delroy Booker MD    dextrose 50 % IV solution, 12.5 g, IntraVENous, PRN, Delroy Booker MD, 12.5 g at 09/21/21 1144    glucagon (rDNA) injection 1 mg, 1 mg, IntraMUSCular, PRN, Delroy Booker MD    dextrose 5 % solution, 100 mL/hr, IntraVENous, PRN, Delroy Booker MD    verapamil (CALAN) tablet 40 mg, 40 mg, Oral, Q12H, Delroy Booker MD, 40 mg at 09/22/21 0836    sodium chloride flush 0.9 % injection 5-40 mL, 5-40 mL, IntraVENous, 2 times per day, Zina Schilder, MD, 10 mL at 09/21/21 1957    sodium chloride flush 0.9 % injection 5-40 mL, 5-40 mL, IntraVENous, PRN, Zina Schilder, MD    0.9 % sodium chloride infusion, 25 mL, IntraVENous, PRN, Zina Schilder, MD    ondansetron (ZOFRAN-ODT) disintegrating tablet 4 mg, 4 mg, Oral, Q8H PRN **OR** ondansetron (ZOFRAN) injection 4 mg, 4 mg, IntraVENous, Q6H PRN, Zina Schilder, MD    polyethylene glycol (GLYCOLAX) packet 17 g, 17 g, Oral, Daily PRN, Zina Schilder, MD    acetaminophen (TYLENOL) tablet 650 mg, 650 mg, Oral, Q6H PRN **OR** acetaminophen (TYLENOL) suppository 650 mg, 650 mg, Rectal, Q6H PRN, Zina Schilder, MD    morphine injection 4 mg, 4 mg, IntraVENous, Q4H PRN, Cristo Silverio MD, 4 mg at 09/22/21 1605    insulin lispro (HUMALOG) injection vial 0-6 Units, 0-6 Units, SubCUTAneous, TID WC, Cristo Silverio MD    insulin lispro (HUMALOG) injection vial 0-3 Units, 0-3 Units, SubCUTAneous, Nightly, Cristo Silverio MD          Assessment/plan  Principal Problem:    Acute pancreatitis  Active Problems:    Essential hypertension    Diabetes mellitus (HCC)    Mixed hyperlipidemia    NAYAN (obstructive sleep apnea)    Morbid obesity due to excess calories (HCC)    [facility] occlusion with cerebral infarction ([facility] [address]    Type 2 diabetes mellitus without complication, with long-term current use of insulin ([facility] [address]    History of gallstones  Resolved Problems:    * No resolved hospital problems. *      Acute pancreatitis,?   Possibly drug/medication-induced  History of gallstones but no gallstones or signs of cholecystitis on CT or on abdominal ultrasound  No recent alcohol use  Normal triglycerides  --Advance diet as tolerated  -IV fluid resuscitation  -Pain control  -GI following  --Follow-up nuclear medicine hepatobiliary study    Review of home medicines for possible cause of drug-induced pancreatitis:  Atorvastatin (rare incidence)  Exenatide (pancreatitis is an increase risk for patient's who take incretin mimetics)  Ezetimibe  Furosemide  Pantoprazole  Topiramate (low incidence)      DM   POC glucose monitoring  Hypoglycemia treatment orders as needed  Sliding scale insulin for hyperglycemia    Hypertension  Continue home regimen with exception of diuretics, monitor BP and adjust regimen accordingly     continue appropriate home meds for other chronic issues    Supratherapeutic INR  Resume warfarin when INR therapeutic, pharmacy to dose  If INR subtherapeutic, add Lovenox for bridging        Amira De Jesus MD 9/22/2021 5:39 PM

## 2021-09-22 NOTE — PROGRESS NOTES
GI  - PROGRESS NOTE    Admit Date: 9/20/2021             Chief Complaint: still with mild epi pain. Lipase trending downward. Patient being seen for:  pancreatitis   ADULT DIET; Clear Liquid; 3 carb choices (45 gm/meal)  Diet NPO Exceptions are: Ice Chips, Sips of Water with Meds                         Bowel movement: no black or bloody stools    Pain:Mild  Nausea:None    Intake/Output Summary (Last 24 hours) at 9/22/2021 1308  Last data filed at 9/22/2021 0915  Gross per 24 hour   Intake 1440 ml   Output --   Net 1440 ml               Lab /Radiology:   Scheduled Meds: Reviewed          -----------------------------------------------------------------          Recent Labs     09/20/21 1955 09/21/21 0139 09/22/21 0237   WBC 7.4 7.5 5.8   RBC 5.24 4.83 4.79   HGB 15.5 14.3 14.2   HCT 49.2* 44.8 45.8    309 284     Recent Labs     09/20/21 1955 09/20/21 1955 09/21/21 0139 09/22/21 0237     --  138  140 138   K 4.3   < > 3.8  3.8 4.3   ANIONGAP 10  --  11  11 9     --  103  103 104   CO2 29  --  24  26 25   BUN 18  --  18  18 12   CREATININE 1.2*  --  1.0*  1.1* 1.0*   GLUCOSE 93  --  84  82 126*   CALCIUM 9.2  --  8.7  8.7 8.7    < > = values in this interval not displayed. Recent Labs     09/20/21 1955 09/21/21 0139 09/22/21 0237   AST 13 11  11 16   ALT 14 12  12 13   BILITOT 0.3 0.3  0.3 0.5   ALKPHOS 109* 94  97 96     Recent Labs     09/20/21 1955 09/21/21 0139 09/22/21 0237   AMYLASE  --  83  --    LIPASE 141* 113* 91*     Troponin T: No results for input(s): TROPONINI in the last 72 hours. Pro-BNP: No results for input(s): BNP in the last 72 hours.   INR:   Recent Labs     09/21/21  0139 09/21/21  0530 09/22/21  0237   INR 4.20* 3.46* 3.22*             Physical Exam:   Vitals: /84   Pulse 78   Temp 97.2 °F (36.2 °C)   Resp 16   Ht 5' 4\" (1.626 m)   Wt (!) 353 lb 9 oz (160.4 kg)   SpO2 99%   BMI 60.69 kg/m²     HEENT: Pupils equal, round, reactive to light       Neck supple. Trachea midline. No crepitus  Lungs: breath sounds bilaterally. Normal excursion  Heart[de-identified]    RRR without heave or thrill  Abdomen: abnormal findings:  tenderness mild in the epigastrium. No encarcerated hernia detected. No organomegaly detected  Extremities: no cyanosis or clubbing  Lymphatic: No significant lymph node enlargement papable in the neck , groin or umbilicus  Neurologic: alert. oriented        Impression: 1. Pancreatitis improving. . Prev. Gallstone , not confirmed on present US. Normal LFT     2. Essentially Normal egd/colon     3.morbid obesity     4. Elevated INR . Home coumadin on hold. Plan: 1. Nuc med GES with EF 8  hrs off PAIN MEDS   2. Discussed case with DR Bubba Webster. He will review   3. Need for bridge tx per admitting service. Annabel Fuentes M.D.   Gastroenterology

## 2021-09-22 NOTE — CONSULTS
[facility] ALEXANDRA [address], [facility]                                  CONSULTATION    PATIENT NAME: Rosibel Kemp                        :        1975  MED REC NO:   [id number]                              ROOM:       [facility]  ACCOUNT NO:   [de-identified]                           ADMIT DATE: 2021  PROVIDER:     Jabier Grossman MD    CONSULT DATE:  2021    GI CONSULTATION    ASSESSMENT:  Chronic mild recurrent pancreatitis, manifested by  epigastric right upper quadrant pain, sometimes radiating through to the  back and current elevated serum lipase of 113, but normal CBC and liver  function tests. Prior ultrasound, 2021, reported small gallstones  within the gallbladder; however, current ultrasound done today reports  no stones in the gallbladder but a 7 mm in the common bile duct, query  choledocholithiasis. RECOMMENDATIONS:  1.  IV fluids. 2.  Recheck serum lipase and GGT tomorrow morning. 3.  Recheck elevated protime of 34.18 and INR of 3.46 while allegedly  not taking Coumadin for the last 2 weeks since her EGD, which was  normal.  4.  Dr. Ashwini Arredondo, gastroenterologist, will assume GI call tomorrow  morning to see the patient and possibly consider endoscopic ultrasound  to evaluate for small common bile duct stones. The patient has morbid  obesity and therefore, ERCP may be difficult to perform. HISTORY OF PRESENT ILLNESS:  This morbidly obese 59-year-old   American female has a background history of coronary artery disease with  prior CABG in 2016, hypertension, hyperlipidemia, type 2 diabetes  mellitus and prior stroke for which she had been maintained on Coumadin  therapy. She was seen in GI consultation for chronic recurrent epigastric right  upper quadrant abdominal pain, sometimes radiating through to the back  with associated nausea.   The current pain is characterized as both  burning and colicky in nature with associated nausea, but no history of  jaundice, fever or chills. She originally underwent a right upper  quadrant ultrasound on 03/12/2021, which clearly reported small stones  in the gallbladder. She was eventually seen in the GI Clinic by Dr. Tom Menjivar and underwent an EGD on 09/02/2021, which was entirely normal.   There was also a screening colonoscopy without any obvious polyps,  although bowel prep was poor. She is now admitted with an epigastric  right upper quadrant abdominal pain and nausea, which is characterized  as 10/10 in intensity. The pain is not brought on by exertion or  relieved by rest.  There are no symptoms of hematemesis or melena. She  does not take any NSAIDs or drink alcohol. The diagnosis of  pancreatitis is based on elevated serum lipase of 113 and mild stranding  around the head of the pancreas on CT but no pancreatic tumor, common  bile duct dilatation or hemorrhagic change of the pancreas. CBC and  liver function tests are normal.  There is a prolonged protime of 34.1  and INR of 3.46. Conceivably, the first and second ultrasound could be  interpreted as small gallstones originally in the gallbladder migrating  to the common bile duct, which is currently 7 mm in diameter. PAST MEDICAL HISTORY:  Coronary artery disease with CABG in 2016,  hypertension, hyperlipidemia, type 2 diabetes mellitus, CVA with  cerebral arterial occlusion and cerebral infarct previously. PAST SURGICAL HISTORY:  EGD and colonoscopy by Dr. Tom Menjivar, 09/02/2021. MEDICATIONS FROM HOME:  See admission medication reconciliation list.    ALLERGIES TO MEDICATIONS:  SULFA DRUGS. SOCIAL HISTORY:  No habits of alcohol or tobacco.    FAMILY HISTORY:  No familial history of liver disease, pancreatitis or  GI malignancies. PHYSICAL EXAMINATION:  VITAL SIGNS:  Height 5 feet 4 inches tall, weight 353 pounds or 160 kg. Afebrile. Vital signs stable.   GENERAL APPEARANCE:  Of no acute distress, morbidly obese. HEENT:  Sclerae white. Conjunctivae pink. Buccal mucosa moist.   Midline protrusion of the tongue. Symmetrical smile. LUNGS:  Clear. HEART:  No S3, murmur or rub. ABDOMEN:  Obese abdomen. Nonobstructive bowel sounds. Minimal  epigastric or right upper quadrant tenderness. Unable to palpate  internal organs for size. Dr. Britt Martins, gastroenterologist, will see the patient in followup  tomorrow.         Eli Jovel MD    D: 09/21/2021 19:43:12      T: 09/21/2021 19:50:27     [email or web address]  Job#: [id number]     Doc#: [id number]    CC:

## 2021-09-23 ENCOUNTER — APPOINTMENT (OUTPATIENT)
Dept: NUCLEAR MEDICINE | Age: 46
DRG: 439 | End: 2021-09-23
Payer: MEDICARE

## 2021-09-23 LAB
ANION GAP SERPL CALCULATED.3IONS-SCNC: 8 MMOL/L (ref 7–19)
BASOPHILS ABSOLUTE: 0 K/UL (ref 0–0.2)
BASOPHILS RELATIVE PERCENT: 0.6 % (ref 0–1)
BUN BLDV-MCNC: 9 MG/DL (ref 6–20)
CALCIUM SERPL-MCNC: 9.1 MG/DL (ref 8.6–10)
CHLORIDE BLD-SCNC: 105 MMOL/L (ref 98–111)
CO2: 25 MMOL/L (ref 22–29)
CREAT SERPL-MCNC: 0.9 MG/DL (ref 0.5–0.9)
EOSINOPHILS ABSOLUTE: 0.2 K/UL (ref 0–0.6)
EOSINOPHILS RELATIVE PERCENT: 4.6 % (ref 0–5)
GAMMA GLUTAMYL TRANSFERASE: 18 U/L (ref 7–54)
GFR AFRICAN AMERICAN: >59
GFR NON-AFRICAN AMERICAN: >60
GLUCOSE BLD-MCNC: 121 MG/DL (ref 70–99)
GLUCOSE BLD-MCNC: 131 MG/DL (ref 70–99)
GLUCOSE BLD-MCNC: 206 MG/DL (ref 70–99)
GLUCOSE BLD-MCNC: 84 MG/DL (ref 74–109)
GLUCOSE BLD-MCNC: 88 MG/DL (ref 70–99)
HCT VFR BLD CALC: 46.2 % (ref 37–47)
HEMOGLOBIN: 14.5 G/DL (ref 12–16)
IMMATURE GRANULOCYTES #: 0 K/UL
INR BLD: 2.74 (ref 0.88–1.18)
LIPASE: 71 U/L (ref 13–60)
LYMPHOCYTES ABSOLUTE: 1.9 K/UL (ref 1.1–4.5)
LYMPHOCYTES RELATIVE PERCENT: 37.4 % (ref 20–40)
MCH RBC QN AUTO: 29.7 PG (ref 27–31)
MCHC RBC AUTO-ENTMCNC: 31.4 G/DL (ref 33–37)
MCV RBC AUTO: 94.7 FL (ref 81–99)
MONOCYTES ABSOLUTE: 0.4 K/UL (ref 0–0.9)
MONOCYTES RELATIVE PERCENT: 7.5 % (ref 0–10)
NEUTROPHILS ABSOLUTE: 2.5 K/UL (ref 1.5–7.5)
NEUTROPHILS RELATIVE PERCENT: 49.5 % (ref 50–65)
PDW BLD-RTO: 14.2 % (ref 11.5–14.5)
PERFORMED ON: ABNORMAL
PERFORMED ON: NORMAL
PLATELET # BLD: 294 K/UL (ref 130–400)
PMV BLD AUTO: 11.4 FL (ref 9.4–12.3)
POTASSIUM REFLEX MAGNESIUM: 4.1 MMOL/L (ref 3.5–5)
PROTHROMBIN TIME: 28.4 SEC (ref 12–14.6)
RBC # BLD: 4.88 M/UL (ref 4.2–5.4)
SODIUM BLD-SCNC: 138 MMOL/L (ref 136–145)
WBC # BLD: 5 K/UL (ref 4.8–10.8)

## 2021-09-23 PROCEDURE — 36415 COLL VENOUS BLD VENIPUNCTURE: CPT

## 2021-09-23 PROCEDURE — 6370000000 HC RX 637 (ALT 250 FOR IP): Performed by: HOSPITALIST

## 2021-09-23 PROCEDURE — 78226 HEPATOBILIARY SYSTEM IMAGING: CPT

## 2021-09-23 PROCEDURE — 85610 PROTHROMBIN TIME: CPT

## 2021-09-23 PROCEDURE — 6370000000 HC RX 637 (ALT 250 FOR IP): Performed by: NURSE PRACTITIONER

## 2021-09-23 PROCEDURE — 82947 ASSAY GLUCOSE BLOOD QUANT: CPT

## 2021-09-23 PROCEDURE — 85025 COMPLETE CBC W/AUTO DIFF WBC: CPT

## 2021-09-23 PROCEDURE — 80048 BASIC METABOLIC PNL TOTAL CA: CPT

## 2021-09-23 PROCEDURE — 1210000000 HC MED SURG R&B

## 2021-09-23 PROCEDURE — 3430000000 HC RX DIAGNOSTIC RADIOPHARMACEUTICAL: Performed by: INTERNAL MEDICINE

## 2021-09-23 PROCEDURE — 2580000003 HC RX 258: Performed by: HOSPITALIST

## 2021-09-23 PROCEDURE — 6370000000 HC RX 637 (ALT 250 FOR IP): Performed by: STUDENT IN AN ORGANIZED HEALTH CARE EDUCATION/TRAINING PROGRAM

## 2021-09-23 PROCEDURE — 82977 ASSAY OF GGT: CPT

## 2021-09-23 PROCEDURE — 83690 ASSAY OF LIPASE: CPT

## 2021-09-23 PROCEDURE — 99222 1ST HOSP IP/OBS MODERATE 55: CPT | Performed by: SURGERY

## 2021-09-23 PROCEDURE — 2580000003 HC RX 258: Performed by: STUDENT IN AN ORGANIZED HEALTH CARE EDUCATION/TRAINING PROGRAM

## 2021-09-23 PROCEDURE — 2580000003 HC RX 258: Performed by: NURSE PRACTITIONER

## 2021-09-23 PROCEDURE — A9537 TC99M MEBROFENIN: HCPCS | Performed by: INTERNAL MEDICINE

## 2021-09-23 PROCEDURE — 6360000002 HC RX W HCPCS: Performed by: HOSPITALIST

## 2021-09-23 RX ORDER — HYDROCODONE BITARTRATE AND ACETAMINOPHEN 10; 325 MG/1; MG/1
1 TABLET ORAL EVERY 4 HOURS PRN
Status: DISCONTINUED | OUTPATIENT
Start: 2021-09-23 | End: 2021-09-24 | Stop reason: HOSPADM

## 2021-09-23 RX ADMIN — INSULIN LISPRO 1 UNITS: 100 INJECTION, SOLUTION INTRAVENOUS; SUBCUTANEOUS at 21:17

## 2021-09-23 RX ADMIN — SODIUM CHLORIDE, POTASSIUM CHLORIDE, SODIUM LACTATE AND CALCIUM CHLORIDE: 600; 310; 30; 20 INJECTION, SOLUTION INTRAVENOUS at 10:14

## 2021-09-23 RX ADMIN — MORPHINE SULFATE 4 MG: 4 INJECTION, SOLUTION INTRAMUSCULAR; INTRAVENOUS at 10:15

## 2021-09-23 RX ADMIN — BETHANECHOL CHLORIDE 25 MG: 25 TABLET ORAL at 10:14

## 2021-09-23 RX ADMIN — TOPIRAMATE 50 MG: 50 TABLET, FILM COATED ORAL at 10:14

## 2021-09-23 RX ADMIN — VERAPAMIL HYDROCHLORIDE 40 MG: 80 TABLET ORAL at 10:14

## 2021-09-23 RX ADMIN — MORPHINE SULFATE 4 MG: 4 INJECTION, SOLUTION INTRAMUSCULAR; INTRAVENOUS at 00:16

## 2021-09-23 RX ADMIN — METOPROLOL SUCCINATE 25 MG: 25 TABLET, EXTENDED RELEASE ORAL at 10:14

## 2021-09-23 RX ADMIN — Medication 5 MILLICURIE: at 10:25

## 2021-09-23 RX ADMIN — ASPIRIN 81 MG: 81 TABLET, CHEWABLE ORAL at 10:14

## 2021-09-23 RX ADMIN — SODIUM CHLORIDE, POTASSIUM CHLORIDE, SODIUM LACTATE AND CALCIUM CHLORIDE: 600; 310; 30; 20 INJECTION, SOLUTION INTRAVENOUS at 17:44

## 2021-09-23 RX ADMIN — SODIUM CHLORIDE, POTASSIUM CHLORIDE, SODIUM LACTATE AND CALCIUM CHLORIDE: 600; 310; 30; 20 INJECTION, SOLUTION INTRAVENOUS at 00:16

## 2021-09-23 RX ADMIN — SODIUM CHLORIDE, PRESERVATIVE FREE 10 ML: 5 INJECTION INTRAVENOUS at 10:16

## 2021-09-23 RX ADMIN — HYDROCODONE BITARTRATE AND ACETAMINOPHEN 1 TABLET: 10; 325 TABLET ORAL at 17:43

## 2021-09-23 RX ADMIN — VERAPAMIL HYDROCHLORIDE 40 MG: 80 TABLET ORAL at 21:17

## 2021-09-23 RX ADMIN — SODIUM CHLORIDE, PRESERVATIVE FREE 10 ML: 5 INJECTION INTRAVENOUS at 10:15

## 2021-09-23 ASSESSMENT — ENCOUNTER SYMPTOMS
COLOR CHANGE: 0
VOMITING: 0
DIARRHEA: 0
COUGH: 0
SHORTNESS OF BREATH: 0
NAUSEA: 1
ABDOMINAL PAIN: 1
ABDOMINAL DISTENTION: 0
CONSTIPATION: 0

## 2021-09-23 ASSESSMENT — PAIN DESCRIPTION - LOCATION: LOCATION: ABDOMEN

## 2021-09-23 ASSESSMENT — PAIN DESCRIPTION - PROGRESSION: CLINICAL_PROGRESSION: NOT CHANGED

## 2021-09-23 ASSESSMENT — PAIN DESCRIPTION - FREQUENCY: FREQUENCY: CONTINUOUS

## 2021-09-23 ASSESSMENT — PAIN DESCRIPTION - ORIENTATION: ORIENTATION: RIGHT;UPPER

## 2021-09-23 ASSESSMENT — PAIN SCALES - GENERAL
PAINLEVEL_OUTOF10: 5
PAINLEVEL_OUTOF10: 5
PAINLEVEL_OUTOF10: 7
PAINLEVEL_OUTOF10: 7
PAINLEVEL_OUTOF10: 3

## 2021-09-23 ASSESSMENT — PAIN DESCRIPTION - DESCRIPTORS: DESCRIPTORS: CRAMPING

## 2021-09-23 ASSESSMENT — PAIN - FUNCTIONAL ASSESSMENT: PAIN_FUNCTIONAL_ASSESSMENT: PREVENTS OR INTERFERES SOME ACTIVE ACTIVITIES AND ADLS

## 2021-09-23 ASSESSMENT — PAIN DESCRIPTION - PAIN TYPE: TYPE: ACUTE PAIN

## 2021-09-23 ASSESSMENT — PAIN DESCRIPTION - ONSET: ONSET: ON-GOING

## 2021-09-23 NOTE — CARE COORDINATION
Date / Time of Evaluation: 9/23/2021 4:56 PM  Assessment Completed by: BALJIT Ruth    Patient Admission Status: Inpatient [101]    [address]    [phone] (home)   Telephone Information:   Mobile [phone]       (Best Practice:  Have patient / caregiver verify above address and phone number by stating out loud their current address and reachable phone number.)  Is above information correct? yes      Current PCP:  Ryan Odom, DO  PCP verified? yes    Initial Assessment Completed at bedside with:  Pt; laying in bed; resting;    Emergency Contacts:  Extended Emergency Contact Information  Primary Emergency Contact: Lauren Storey [address] Phone: [phone]  Mobile Phone: [phone]  Relation: Parent    Advance Directives: Code Status:  Full Code    Financial:  Payor: Gilda Moore / Plan: Gilda Moore  / Product Type: *No Product type* /     Pre-Cert required for SNF:  yes    Have [address] [place] [address] Avenue:  no    Pharmacy:   [id number], [address] [phone] - f [phone]  [facility] [address] [facility]  [address] [place] [address]  Phone: [phone] Fax: [phone]    [id number] [address], [address] [phone] - F [phone]  [address] [place] [address] [address] [place] [address]  Phone: [phone] Fax: [phone]      Potential assistance purchasing medications?   no    ADLS:  Support System:   has help from her dad and two cousins that live with her    Current Home Environment:  Home with dad and two cousins  Steps:  no    Plans to RETURN to current housing: yes  Barriers to RETURNING to current housing:  none    Currently ACTIVE with [address]:  no  [address]:  no    DME Provider:  no    Has a pulse oximetry unit at home: yes    Had [address] [facility] prior to admission:  No-cpap only  Jacky Jernigan [address]:  She doesn't know which one provided cpap  I    Active with HD/PD prior to admission: no  Nephrologist:  no  HD Center:  no    Transition Plan:  home with father and two cousins    Transportation PLAN for Discharge:  Private vehicle vs EMS? Factors facilitating achievement of predicted outcomes: medical stability    Barriers to discharge: Additional CM/SW Notes: Pt stated she lives at home with her father and two cousins that help her with needs; she stated she has a cpap only as far as DME; no HH at current; doesn't think she needs it. [place] and/or her family were provided with choice of provider.         Maxine Abad, [address] [place] [address] Management Department  Ph:  [phone]   Fax: [phone]  Electronically signed by BALJIT Gallardo on 9/23/2021 at 5:00 PM

## 2021-09-23 NOTE — PROGRESS NOTES
Daily Progress Note    Date:2021  Patient: Vy Lynn  : 1975  ARTIE:[id number]  CODE:Full Code No additional code details  PCP:Genny Montiel DO    Admit Date: 2021  6:47 PM   LOS: 3 days       Subjective:    Events noted. Still with some epigastric pain, but improved. No emesis. No black or bloody stools. 63-year-old female with morbid obesity, CAD, history of gallstones, history of stroke with residual speech deficit, presented with epigastric abdominal pain with radiation to the back over several days, with associated nausea. Had recent CT abdomen that was unremarkable without gallstones or evidence of cholecystitis. Noted to have elevated lipase and admitted with concern for acute pancreatitis. Obtained abdominal ultrasound confirming no gallstones or signs of cholecystitis. Managed with IV fluids and pain control. GI consulted. Nuclear medicine hepatobiliary study obtained, which showed low gallbladder EF, general surgery consulted.         Review of Systems    Comprehensive ROS completed and is negative except as otherwise noted        Objective:      Vital signs in last 24 hours:  Patient Vitals for the past 24 hrs:   BP Temp Temp src Pulse Resp SpO2   21 1054 133/86 95.9 °F (35.5 °C) Temporal 85 18 99 %   21 1015 127/81 97.3 °F (36.3 °C) Temporal 85 18 99 %   21 0741 125/80 97.5 °F (36.4 °C) Oral 81 18 100 %   21 0110 (!) 141/85 96.5 °F (35.8 °C) Temporal 86 18 99 %   21 2038 (!) 135/95 95.9 °F (35.5 °C) Temporal 82 16 98 %         Physical exam    General: Resting in no acute distress  HEENT: Normocephalic/atraumatic, no scleral icterus  Cardiovascular: Regular rhythm, pulses 2+ and equal  Respiratory: Diminished breath sounds bilaterally, no wheezes  Abdomen: Epigastric tenderness without guarding or rebound, bowel sounds present  Neurologic: No focal lateralizing weakness  Extremities: No clubbing or cyanosis  Skin: Warm and dry      Lab Review Recent Results (from the past 24 hour(s))   POCT Glucose    Collection Time: 09/22/21  5:04 PM   Result Value Ref Range    POC Glucose 87 70 - 99 mg/dl    Performed on AccuChek    POCT Glucose    Collection Time: 09/22/21  9:46 PM   Result Value Ref Range    POC Glucose 92 70 - 99 mg/dl    Performed on AccuChek    Protime-INR    Collection Time: 09/23/21  2:09 AM   Result Value Ref Range    Protime 28.4 (H) 12.0 - 14.6 sec    INR 2.74 (H) 0.88 - 1.18   CBC auto differential    Collection Time: 09/23/21  2:09 AM   Result Value Ref Range    WBC 5.0 4.8 - 10.8 K/uL    RBC 4.88 4.20 - 5.40 M/uL    Hemoglobin 14.5 12.0 - 16.0 g/dL    Hematocrit 46.2 37.0 - 47.0 %    MCV 94.7 81.0 - 99.0 fL    MCH 29.7 27.0 - 31.0 pg    MCHC 31.4 (L) 33.0 - 37.0 g/dL    RDW 14.2 11.5 - 14.5 %    Platelets 424 801 - 925 K/uL    MPV 11.4 9.4 - 12.3 fL    Neutrophils % 49.5 (L) 50.0 - 65.0 %    Lymphocytes % 37.4 20.0 - 40.0 %    Monocytes % 7.5 0.0 - 10.0 %    Eosinophils % 4.6 0.0 - 5.0 %    Basophils % 0.6 0.0 - 1.0 %    Neutrophils Absolute 2.5 1.5 - 7.5 K/uL    Immature Granulocytes # 0.0 K/uL    Lymphocytes Absolute 1.9 1.1 - 4.5 K/uL    Monocytes Absolute 0.40 0.00 - 0.90 K/uL    Eosinophils Absolute 0.20 0.00 - 0.60 K/uL    Basophils Absolute 0.00 0.00 - 0.20 K/uL   Basic Metabolic Panel w/ Reflex to MG    Collection Time: 09/23/21  2:09 AM   Result Value Ref Range    Sodium 138 136 - 145 mmol/L    Potassium reflex Magnesium 4.1 3.5 - 5.0 mmol/L    Chloride 105 98 - 111 mmol/L    CO2 25 22 - 29 mmol/L    Anion Gap 8 7 - 19 mmol/L    Glucose 84 74 - 109 mg/dL    BUN 9 6 - 20 mg/dL    CREATININE 0.9 0.5 - 0.9 mg/dL    GFR Non-African American >60 >60    GFR African American >59 >59    Calcium 9.1 8.6 - 10.0 mg/dL   POCT Glucose    Collection Time: 09/23/21 11:56 AM   Result Value Ref Range    POC Glucose 121 (H) 70 - 99 mg/dl    Performed on AccuChek    POCT Glucose    Collection Time: 09/23/21  1:10 PM   Result Value Ref Range POC Glucose 131 (H) 70 - 99 mg/dl    Performed on AccuChek        No intake/output data recorded. No intake/output data recorded.       Current Facility-Administered Medications:     lactated ringers infusion, , IntraVENous, Continuous, Bea Mosqueda MD, Last Rate: 125 mL/hr at 09/23/21 1014, New Bag at 09/23/21 1014    pantoprazole (PROTONIX) tablet 40 mg, 40 mg, Oral, QAM AC, Bea Mosqueda MD, 40 mg at 09/22/21 0526    aspirin chewable tablet 81 mg, 81 mg, Oral, Daily, Rhea Economy, APRN, 81 mg at 09/23/21 1014    [Held by provider] atorvastatin (LIPITOR) tablet 80 mg, 80 mg, Oral, Nightly, Rhea Economy, APRN    bethanechol (URECHOLINE) tablet 25 mg, 25 mg, Oral, Daily, Rhea Economy, APRN, 25 mg at 09/23/21 1014    [Held by provider] ezetimibe (ZETIA) tablet 10 mg, 10 mg, Oral, Nightly, Rhea Economy, APRN, 10 mg at 09/21/21 1954    [Held by provider] furosemide (LASIX) tablet 80 mg, 80 mg, Oral, BID, Rhea Economy, APRN    [Held by provider] gabapentin (NEURONTIN) tablet 600 mg, 600 mg, Oral, TID, Rhea Economy, APRN    metoprolol succinate (TOPROL XL) extended release tablet 25 mg, 25 mg, Oral, Daily, Rhea Economy, APRN, 25 mg at 09/23/21 1014    [Held by provider] spironolactone (ALDACTONE) tablet 12.5 mg, 12.5 mg, Oral, Daily, Rhea Economy, APRN    topiramate (TOPAMAX) tablet 50 mg, 50 mg, Oral, Daily, Rhea Economy, APRN, 50 mg at 09/23/21 1014    sodium chloride flush 0.9 % injection 5-40 mL, 5-40 mL, IntraVENous, 2 times per day, Rhea Economy, APRN, 10 mL at 09/23/21 1015    sodium chloride flush 0.9 % injection 5-40 mL, 5-40 mL, IntraVENous, PRN, Rhea Economy, APRN    0.9 % sodium chloride infusion, 25 mL, IntraVENous, PRN, Rhea Economy, YARIEL    acetaminophen (TYLENOL) tablet 650 mg, 650 mg, Oral, Q4H PRN, Rhea Rosenbaum, YARIEL    potassium chloride (KLOR-CON M) extended release tablet 40 mEq, 40 mEq, Oral, PRN **OR** potassium bicarb-citric acid (EFFER-K) effervescent tablet 40 mEq, 40 mEq, Oral, PRN **OR** potassium chloride 10 mEq/100 mL IVPB (Peripheral Line), 10 mEq, IntraVENous, PRN, Jeanne Frances, APRN    magnesium sulfate 2000 mg in 50 mL IVPB premix, 2,000 mg, IntraVENous, PRN, Jeanne LaSalle, APRN    morphine (PF) injection 2 mg, 2 mg, IntraVENous, Q4H PRN, Jeannerebecca Frances, APRN    glucose (GLUTOSE) 40 % oral gel 15 g, 15 g, Oral, PRN, Mikey King MD    dextrose 50 % IV solution, 12.5 g, IntraVENous, PRN, Mikey King MD, 12.5 g at 09/21/21 1144    glucagon (rDNA) injection 1 mg, 1 mg, IntraMUSCular, PRN, Mikey King MD    dextrose 5 % solution, 100 mL/hr, IntraVENous, PRN, Mikey King MD    verapamil (CALAN) tablet 40 mg, 40 mg, Oral, Q12H, Mikey King MD, 40 mg at 09/23/21 1014    sodium chloride flush 0.9 % injection 5-40 mL, 5-40 mL, IntraVENous, 2 times per day, Graciela Garcia MD, 10 mL at 09/23/21 1016    sodium chloride flush 0.9 % injection 5-40 mL, 5-40 mL, IntraVENous, PRN, Graciela Garcia MD    0.9 % sodium chloride infusion, 25 mL, IntraVENous, PRN, Graciela Garcia MD    ondansetron (ZOFRAN-ODT) disintegrating tablet 4 mg, 4 mg, Oral, Q8H PRN **OR** ondansetron (ZOFRAN) injection 4 mg, 4 mg, IntraVENous, Q6H PRN, Graciela Garcia MD    polyethylene glycol (GLYCOLAX) packet 17 g, 17 g, Oral, Daily PRN, Graciela Garcia MD    acetaminophen (TYLENOL) tablet 650 mg, 650 mg, Oral, Q6H PRN **OR** acetaminophen (TYLENOL) suppository 650 mg, 650 mg, Rectal, Q6H PRN, Graciela Garcia MD    morphine injection 4 mg, 4 mg, IntraVENous, Q4H PRN, Graciela Garcia MD, 4 mg at 09/23/21 1015    insulin lispro (HUMALOG) injection vial 0-6 Units, 0-6 Units, SubCUTAneous, TID WC, Graciela Garcia MD    insulin lispro (HUMALOG) injection vial 0-3 Units, 0-3 Units, SubCUTAneous, Nightly, Deepa Corrales, MD          Assessment/plan  Principal Problem:    Acute pancreatitis  Active Problems:    Essential hypertension    Diabetes mellitus ([facility] [address]    Mixed hyperlipidemia    NAYAN (obstructive sleep apnea)    Morbid obesity due to excess calories (HCC)    [facility] occlusion with cerebral infarction ([facility] [address]    Type 2 diabetes mellitus without complication, with long-term current use of insulin ([facility] [address]    History of gallstones  Resolved Problems:    * No resolved hospital problems. *      Acute pancreatitis,?  Possibly drug/medication-induced  History of gallstones but no gallstones or signs of cholecystitis on CT or on abdominal ultrasound  No recent alcohol use  Normal triglycerides  --Advance diet per GI  -IV fluids  -Pain control  -GI following  --General surgery consult for possible gallbladder dysfunction (low gallbladder EF on nuclear medicine hepatobiliary study)    Review of home medicines for possible cause of drug-induced pancreatitis:  Atorvastatin (rare incidence)  Exenatide (pancreatitis is an increase risk for patient's who take incretin mimetics)  Ezetimibe  Furosemide  Pantoprazole  Topiramate (low incidence)      DM   POC glucose monitoring  Hypoglycemia treatment orders as needed  Sliding scale insulin for hyperglycemia    Hypertension  Continue home regimen with exception of diuretics, monitor BP and adjust regimen accordingly     continue appropriate home meds for other chronic issues    Warfarin for anticoagulation, therapeutic INR  Monitor INR daily, if becomes subtherapeutic add Lovenox bridging      Maeve Cardona MD 9/23/2021 3:46 PM

## 2021-09-23 NOTE — PROGRESS NOTES
Feels some better,GB ej fx 17%. Uncertain if this is source of discomfort and pancreatitis( sludge, crystals) will ask surg. To evaluate.

## 2021-09-24 VITALS
TEMPERATURE: 96.1 F | HEIGHT: 64 IN | WEIGHT: 293 LBS | HEART RATE: 93 BPM | BODY MASS INDEX: 50.02 KG/M2 | SYSTOLIC BLOOD PRESSURE: 155 MMHG | OXYGEN SATURATION: 100 % | DIASTOLIC BLOOD PRESSURE: 88 MMHG | RESPIRATION RATE: 18 BRPM

## 2021-09-24 PROBLEM — K85.90 ACUTE PANCREATITIS: Status: RESOLVED | Noted: 2021-09-20 | Resolved: 2021-09-24

## 2021-09-24 PROBLEM — Z79.01 CHRONIC ANTICOAGULATION: Status: ACTIVE | Noted: 2021-09-24

## 2021-09-24 PROBLEM — K80.20 GALLSTONES: Status: ACTIVE | Noted: 2021-09-24

## 2021-09-24 PROBLEM — R94.8 ABNORMAL BILIARY HIDA SCAN: Status: ACTIVE | Noted: 2021-09-24

## 2021-09-24 LAB
ANION GAP SERPL CALCULATED.3IONS-SCNC: 14 MMOL/L (ref 7–19)
BASOPHILS ABSOLUTE: 0 K/UL (ref 0–0.2)
BASOPHILS RELATIVE PERCENT: 0.3 % (ref 0–1)
BUN BLDV-MCNC: 14 MG/DL (ref 6–20)
CALCIUM SERPL-MCNC: 9.1 MG/DL (ref 8.6–10)
CHLORIDE BLD-SCNC: 103 MMOL/L (ref 98–111)
CO2: 19 MMOL/L (ref 22–29)
CREAT SERPL-MCNC: 1 MG/DL (ref 0.5–0.9)
EOSINOPHILS ABSOLUTE: 0.2 K/UL (ref 0–0.6)
EOSINOPHILS RELATIVE PERCENT: 2.8 % (ref 0–5)
GFR AFRICAN AMERICAN: >59
GFR NON-AFRICAN AMERICAN: 60
GLUCOSE BLD-MCNC: 151 MG/DL (ref 70–99)
GLUCOSE BLD-MCNC: 171 MG/DL (ref 70–99)
GLUCOSE BLD-MCNC: 188 MG/DL (ref 70–99)
GLUCOSE BLD-MCNC: 198 MG/DL (ref 74–109)
HCT VFR BLD CALC: 45.2 % (ref 37–47)
HEMOGLOBIN: 14.4 G/DL (ref 12–16)
IMMATURE GRANULOCYTES #: 0 K/UL
INR BLD: 1.67 (ref 0.88–1.18)
LYMPHOCYTES ABSOLUTE: 2.1 K/UL (ref 1.1–4.5)
LYMPHOCYTES RELATIVE PERCENT: 31 % (ref 20–40)
MCH RBC QN AUTO: 29.8 PG (ref 27–31)
MCHC RBC AUTO-ENTMCNC: 31.9 G/DL (ref 33–37)
MCV RBC AUTO: 93.6 FL (ref 81–99)
MONOCYTES ABSOLUTE: 0.5 K/UL (ref 0–0.9)
MONOCYTES RELATIVE PERCENT: 8.1 % (ref 0–10)
NEUTROPHILS ABSOLUTE: 3.8 K/UL (ref 1.5–7.5)
NEUTROPHILS RELATIVE PERCENT: 57.2 % (ref 50–65)
PDW BLD-RTO: 14 % (ref 11.5–14.5)
PERFORMED ON: ABNORMAL
PLATELET # BLD: 315 K/UL (ref 130–400)
PMV BLD AUTO: 11.6 FL (ref 9.4–12.3)
POTASSIUM REFLEX MAGNESIUM: 4.3 MMOL/L (ref 3.5–5)
PROTHROMBIN TIME: 19.7 SEC (ref 12–14.6)
RBC # BLD: 4.83 M/UL (ref 4.2–5.4)
SODIUM BLD-SCNC: 136 MMOL/L (ref 136–145)
WBC # BLD: 6.7 K/UL (ref 4.8–10.8)

## 2021-09-24 PROCEDURE — 2580000003 HC RX 258: Performed by: HOSPITALIST

## 2021-09-24 PROCEDURE — 2580000003 HC RX 258: Performed by: STUDENT IN AN ORGANIZED HEALTH CARE EDUCATION/TRAINING PROGRAM

## 2021-09-24 PROCEDURE — 97535 SELF CARE MNGMENT TRAINING: CPT

## 2021-09-24 PROCEDURE — 6360000002 HC RX W HCPCS: Performed by: HOSPITALIST

## 2021-09-24 PROCEDURE — 97165 OT EVAL LOW COMPLEX 30 MIN: CPT

## 2021-09-24 PROCEDURE — 85025 COMPLETE CBC W/AUTO DIFF WBC: CPT

## 2021-09-24 PROCEDURE — 6370000000 HC RX 637 (ALT 250 FOR IP): Performed by: HOSPITALIST

## 2021-09-24 PROCEDURE — 85610 PROTHROMBIN TIME: CPT

## 2021-09-24 PROCEDURE — 6370000000 HC RX 637 (ALT 250 FOR IP): Performed by: STUDENT IN AN ORGANIZED HEALTH CARE EDUCATION/TRAINING PROGRAM

## 2021-09-24 PROCEDURE — 99231 SBSQ HOSP IP/OBS SF/LOW 25: CPT | Performed by: SURGERY

## 2021-09-24 PROCEDURE — 82947 ASSAY GLUCOSE BLOOD QUANT: CPT

## 2021-09-24 PROCEDURE — 6370000000 HC RX 637 (ALT 250 FOR IP): Performed by: NURSE PRACTITIONER

## 2021-09-24 PROCEDURE — 80048 BASIC METABOLIC PNL TOTAL CA: CPT

## 2021-09-24 PROCEDURE — 2580000003 HC RX 258: Performed by: NURSE PRACTITIONER

## 2021-09-24 PROCEDURE — 36415 COLL VENOUS BLD VENIPUNCTURE: CPT

## 2021-09-24 RX ORDER — HYDROCODONE BITARTRATE AND ACETAMINOPHEN 10; 325 MG/1; MG/1
1 TABLET ORAL EVERY 6 HOURS PRN
Qty: 12 TABLET | Refills: 0 | Status: SHIPPED | OUTPATIENT
Start: 2021-09-24 | End: 2021-09-27

## 2021-09-24 RX ORDER — WARFARIN SODIUM 10 MG/1
7.5 TABLET ORAL DAILY
Qty: 30 TABLET | Refills: 0 | Status: SHIPPED | OUTPATIENT
Start: 2021-09-25 | End: 2021-09-24 | Stop reason: HOSPADM

## 2021-09-24 RX ORDER — WARFARIN SODIUM 7.5 MG/1
7.5 TABLET ORAL
Status: COMPLETED | OUTPATIENT
Start: 2021-09-24 | End: 2021-09-24

## 2021-09-24 RX ORDER — FUROSEMIDE 40 MG/1
80 TABLET ORAL DAILY
Qty: 60 TABLET | Refills: 0 | Status: SHIPPED | OUTPATIENT
Start: 2021-09-24 | End: 2022-09-09

## 2021-09-24 RX ORDER — WARFARIN SODIUM 7.5 MG/1
7.5 TABLET ORAL DAILY
Qty: 30 TABLET | Refills: 0 | Status: SHIPPED | OUTPATIENT
Start: 2021-09-25 | End: 2021-10-05 | Stop reason: ALTCHOICE

## 2021-09-24 RX ORDER — GABAPENTIN 300 MG/1
300 CAPSULE ORAL 2 TIMES DAILY PRN
Qty: 6 CAPSULE | Refills: 0 | Status: SHIPPED | OUTPATIENT
Start: 2021-09-24 | End: 2022-09-09

## 2021-09-24 RX ORDER — HYDRALAZINE HYDROCHLORIDE 20 MG/ML
10 INJECTION INTRAMUSCULAR; INTRAVENOUS ONCE
Status: COMPLETED | OUTPATIENT
Start: 2021-09-24 | End: 2021-09-24

## 2021-09-24 RX ADMIN — SODIUM CHLORIDE, POTASSIUM CHLORIDE, SODIUM LACTATE AND CALCIUM CHLORIDE: 600; 310; 30; 20 INJECTION, SOLUTION INTRAVENOUS at 01:48

## 2021-09-24 RX ADMIN — SODIUM CHLORIDE, PRESERVATIVE FREE 10 ML: 5 INJECTION INTRAVENOUS at 09:53

## 2021-09-24 RX ADMIN — ASPIRIN 81 MG: 81 TABLET, CHEWABLE ORAL at 09:51

## 2021-09-24 RX ADMIN — VERAPAMIL HYDROCHLORIDE 40 MG: 80 TABLET ORAL at 09:51

## 2021-09-24 RX ADMIN — HYDROCODONE BITARTRATE AND ACETAMINOPHEN 1 TABLET: 10; 325 TABLET ORAL at 09:51

## 2021-09-24 RX ADMIN — METOPROLOL SUCCINATE 25 MG: 25 TABLET, EXTENDED RELEASE ORAL at 09:51

## 2021-09-24 RX ADMIN — HYDROCODONE BITARTRATE AND ACETAMINOPHEN 1 TABLET: 10; 325 TABLET ORAL at 01:47

## 2021-09-24 RX ADMIN — WARFARIN 7.5 MG: 7.5 TABLET ORAL at 16:47

## 2021-09-24 RX ADMIN — TOPIRAMATE 50 MG: 50 TABLET, FILM COATED ORAL at 09:51

## 2021-09-24 RX ADMIN — PANTOPRAZOLE SODIUM 40 MG: 40 TABLET, DELAYED RELEASE ORAL at 05:42

## 2021-09-24 RX ADMIN — BETHANECHOL CHLORIDE 25 MG: 25 TABLET ORAL at 09:51

## 2021-09-24 RX ADMIN — HYDROCODONE BITARTRATE AND ACETAMINOPHEN 1 TABLET: 10; 325 TABLET ORAL at 16:46

## 2021-09-24 RX ADMIN — HYDRALAZINE HYDROCHLORIDE 10 MG: 20 INJECTION INTRAMUSCULAR; INTRAVENOUS at 12:14

## 2021-09-24 ASSESSMENT — PAIN DESCRIPTION - DESCRIPTORS: DESCRIPTORS: DISCOMFORT;CRAMPING

## 2021-09-24 ASSESSMENT — PAIN SCALES - GENERAL
PAINLEVEL_OUTOF10: 7
PAINLEVEL_OUTOF10: 6
PAINLEVEL_OUTOF10: 0
PAINLEVEL_OUTOF10: 5
PAINLEVEL_OUTOF10: 5
PAINLEVEL_OUTOF10: 4
PAINLEVEL_OUTOF10: 4

## 2021-09-24 ASSESSMENT — PAIN DESCRIPTION - LOCATION: LOCATION: ABDOMEN

## 2021-09-24 ASSESSMENT — PAIN DESCRIPTION - ORIENTATION: ORIENTATION: RIGHT;UPPER

## 2021-09-24 ASSESSMENT — PAIN - FUNCTIONAL ASSESSMENT: PAIN_FUNCTIONAL_ASSESSMENT: ACTIVITIES ARE NOT PREVENTED

## 2021-09-24 ASSESSMENT — PAIN DESCRIPTION - PAIN TYPE: TYPE: ACUTE PAIN

## 2021-09-24 NOTE — PROGRESS NOTES
Reviewed discharge instructions, follow-up appointments, and medications with patient. Patient verbalized understanding and is agreeable to discharge. Patient discharged with [facility] services. Patient discharged home via private vehicle.

## 2021-09-24 NOTE — DISCHARGE INSTR - DIET

## 2021-09-24 NOTE — PLAN OF CARE
Problem: Pain:  Goal: Pain level will decrease  Description: Pain level will decrease  Outcome: Ongoing  Goal: Control of acute pain  Description: Control of acute pain  Outcome: Ongoing  Goal: Control of chronic pain  Description: Control of chronic pain  Outcome: Ongoing     Problem: Coping:  Goal: Ability to verbalize feelings will improve  Description: Ability to verbalize feelings will improve  Outcome: Ongoing  Goal: Level of anxiety will decrease  Description: Level of anxiety will decrease  Outcome: Ongoing     Problem: Fluid Volume:  Goal: Will maintain adequate fluid volume  Description: Will maintain adequate fluid volume  Outcome: Ongoing  Goal: Maintenance of adequate hydration will improve  Description: Maintenance of adequate hydration will improve  Outcome: Ongoing     Problem: Health Behavior:  Goal: Ability to identify changes in lifestyle to reduce recurrence of condition will improve  Description: Ability to identify changes in lifestyle to reduce recurrence of condition will improve  Outcome: Ongoing  Goal: Ability to state signs and symptoms to report to health care provider will improve  Description: Ability to state signs and symptoms to report to health care provider will improve  Outcome: Ongoing     Problem: Nutritional:  Goal: Ability to achieve adequate nutritional intake will improve  Description: Ability to achieve adequate nutritional intake will improve  Outcome: Ongoing     Problem: Physical Regulation:  Goal: Complications related to the disease process, condition or treatment will be avoided or minimized  Description: Complications related to the disease process, condition or treatment will be avoided or minimized  Outcome: Ongoing  Goal: Hemodynamic stability will improve  Description: Hemodynamic stability will improve  Outcome: Ongoing  Goal: Ability to maintain clinical measurements within normal limits will improve  Description: Ability to maintain clinical measurements within normal limits will improve  Outcome: Ongoing     Problem: Respiratory:  Goal: Ability to achieve and maintain a regular respiratory rate will improve  Description: Ability to achieve and maintain a regular respiratory rate will improve  Outcome: Ongoing     Problem: Sensory:  Goal: Pain level will decrease  Description: Pain level will decrease  Outcome: Ongoing  Goal: General experience of comfort will improve  Description: General experience of comfort will improve  Outcome: Ongoing  Goal: Ability to identify factors that increase the pain will improve  Description: Ability to identify factors that increase the pain will improve  Outcome: Ongoing  Goal: Ability to notify healthcare provider of pain before it becomes unmanageable or unbearable will improve  Description: Ability to notify healthcare provider of pain before it becomes unmanageable or unbearable will improve  Outcome: Ongoing     Problem: Skin Integrity:  Goal: Skin integrity will be maintained  Description: Skin integrity will be maintained  Outcome: Ongoing     Problem: Activity:  Goal: Risk for activity intolerance will decrease  Description: Risk for activity intolerance will decrease  Outcome: Ongoing     Problem:  Bowel/Gastric:  Goal: Bowel function will improve  Description: Bowel function will improve  Outcome: Ongoing  Goal: Diagnostic test results will improve  Description: Diagnostic test results will improve  Outcome: Ongoing  Goal: Occurrences of nausea will decrease  Description: Occurrences of nausea will decrease  Outcome: Ongoing  Goal: Occurrences of vomiting will decrease  Description: Occurrences of vomiting will decrease  Outcome: Ongoing

## 2021-09-24 NOTE — PROGRESS NOTES
[facility] General Surgery    Progress Note      Subjective:    Feeling well today. Notes minimal epigastric pain. Tolerating her diet. Would like to go home. Objective:    Vitals:    09/23/21 1054 09/23/21 1842 09/24/21 0039 09/24/21 0648   BP: 133/86 135/89 100/70 123/80   Pulse: 85 86 85 56   Resp: 18 18 19 16   Temp: 95.9 °F (35.5 °C) 96.1 °F (35.6 °C) 98.1 °F (36.7 °C) 96.6 °F (35.9 °C)   TempSrc: Temporal Temporal Oral Temporal   SpO2: 99% 100% 98% 98%   Weight:   (!) 356 lb 9 oz (161.7 kg)    Height:         I/O last 3 completed shifts: In: 3905.6 [P.O.:600; I.V.:3305.6]  Out: -      Abdomen is morbidly obese but soft. No tenderness noted. No mass appreciated. Bowel sounds normal.    LABS:   CBC:   Recent Labs     09/22/21 0237 09/23/21  0209 09/24/21  0210   WBC 5.8 5.0 6.7   RBC 4.79 4.88 4.83   HGB 14.2 14.5 14.4   HCT 45.8 46.2 45.2    294 315   LYMPHOPCT 35.7 37.4 31.0   MONOPCT 7.1 7.5 8.1   BASOPCT 0.2 0.6 0.3   MONOSABS 0.40 0.40 0.50   LYMPHSABS 2.1 1.9 2.1   EOSABS 0.30 0.20 0.20   BASOSABS 0.00 0.00 0.00      BMP:   Recent Labs     09/22/21 0237 09/23/21  0209 09/24/21  0210    138 136   K 4.3 4.1 4.3    105 103   CO2 25 25 19*   ANIONGAP 9 8 14   GLUCOSE 126* 84 198*   CREATININE 1.0* 0.9 1.0*   LABGLOM 60* >60 60*   CALCIUM 8.7 9.1 9.1     LFT:   Recent Labs     09/22/21 0237   PROT 6.3*   LABALBU 3.3*   BILITOT 0.5   ALKPHOS 96   ALT 13   AST 16       Assessment:    1. Mild pancreatitis, possibly biliary in origin, seems resolved. 2.  Gallstones on ultrasound in March of this year but no gallstones or wall thickening on ultrasound done during this admission. Raises the possibility that she passed the stones and that these in turn caused her mild pancreatitis. 3.  Super morbid obesity with BMI of 60  4. Need for ongoing anticoagulation related to a previous stroke. Plan:    1.   Would not proceed with laparoscopic cholecystectomy at this time.  With no stones within the gallbladder or evidence of cholecystitis I do not think the  benefit from the surgery would outweigh the surgical risks. 2.  I reviewed returning home with follow-up in our office in about 10 days to 2 weeks. If she remains asymptomatic would not plan for surgery. If she develops recurrent symptoms then would reevaluate for surgery at that time.   3.  I will recheck tomorrow morning if she still in hospital.

## 2021-09-24 NOTE — PROGRESS NOTES
Occupational Therapy Initial Assessment  Date: 2021   Patient Name: Ange Fritz  MRN: [id number]     : 1975    Date of Service: 2021    Discharge Recommendations:  24 hour supervision or assist (Initial supervision suggestion)  OT Equipment Recommendations  Equipment Needed:  (Pt could benefit from tub bench for energy conservation)    Assessment   Assessment: OT evaluation and single treatment completed. Currently pt does not demo further need for skilled services in this setting. Overall deconditioned state can be addressed through non-medical avenues. No barriers to D/C home with initial  assist/supervision. OT Education: Energy Conservation;IADL Safety;Home Exercise Program;Precautions; ADL Adaptive Strategies; Equipment;Transfer Training  Patient Education: Pt acknowledged education  No Skilled OT: Safe to return home  REQUIRES OT FOLLOW UP: No (one-time tx only)  Activity Tolerance  Activity Tolerance: Patient Tolerated treatment well  Safety Devices  Safety Devices in place: Yes  Type of devices: Call light within reach; Left in chair (Pt is up ad radha in room per ns; [facility]           Patient Diagnosis(es): The primary encounter diagnosis was Acute pancreatitis without infection or necrosis, unspecified pancreatitis type. A diagnosis of History of gallstones was also pertinent to this visit. has a past medical history of CAD (coronary artery disease), [facility] occlusion with cerebral infarction ([address] Diabetes mellitus ([facility] [address] GERD (gastroesophageal reflux disease), and Hypertension. has a past surgical history that includes Cardiac surgery; Tonsillectomy; Coronary artery bypass graft; Upper gastrointestinal endoscopy; Upper gastrointestinal endoscopy (N/A, 2021); and Colonoscopy (N/A, 2021).     Subjective   General  Chart Reviewed: Yes  Patient assessed for rehabilitation services?: Yes  Additional Pertinent Hx: Gallstones (yousif ); DM; CVA; HTN; CAD; HLD  Family / Caregiver Present: No  Diagnosis: acute pancreatitis  Patient Currently in Pain: Yes  Pain Assessment  Pain Assessment: 0-10  Pain Level: 4  Pain Type: Acute pain  Pain Location: Abdomen  Pain Orientation: Right;Upper  Pain Descriptors: Discomfort;Cramping  Functional Pain Assessment: Activities are not prevented  Non-Pharmaceutical Pain Intervention(s): Ambulation/Increased Activity;Repositioned  Response to Pain Intervention: Patient Satisfied  Vital Signs  Patient Currently in Pain: Yes  Social/Functional History  Social/Functional History  Lives With: Family  Type of Home: House  Home Layout: One level  Home Access: Stairs to enter with rails  Entrance Stairs - Number of Steps: 2  Bathroom Shower/Tub: Tub/Shower unit  ADL Assistance: Independent  Homemaking Assistance: Independent  Ambulation Assistance: Independent  Transfer Assistance: Independent     Objective   Vision: Within Functional Limits  Hearing: Within functional limits    Orientation  Overall Orientation Status: Within Functional Limits     Balance  Sitting Balance: Independent  Standing Balance: Supervision  Functional Mobility  Functional - Mobility Device: No device  Assist Level: Supervision (for safety)  Toilet Transfers  Toilet - Technique: Stand step  Equipment Used: Raised toilet seat with rails  Toilet Transfer: Supervision  ADL  Feeding: Independent;Setup  Grooming: Independent;Setup  UE Bathing: Independent  LE Bathing: Modified independent   UE Dressing: Independent  LE Dressing: Modified independent   Toileting: Independent  Coordination  Movements Are Fluid And Coordinated: Yes     Bed mobility  Rolling to Right: Independent  Supine to Sit: Independent  Sit to Supine: Independent  Scooting: Independent  Transfers  Stand Step Transfers: Supervision     Cognition  Overall Cognitive Status: WFL  Cognition Comment: Pt requires extra time to answer questioning due to leftover speech deficits from stroke; all answers are appropriate content        Sensation  Overall Sensation Status: WFL (for BUE)  Additional Comments: Reports some numbness for BLE      LUE AROM (degrees)  LUE AROM : WFL  RUE AROM (degrees)  RUE AROM : WFL  LUE Strength  Gross LUE Strength: WFL  LUE Strength Comment: Strength is functional for BADLs and lower-demand IADLs; could benefit from a conditioning program to improve overall health appropriate for age  [de-identified] Strength  Gross RUE Strength: WFL  RUE Strength Comment: Strength is functional for BADLs and lower-demand IADLs; could benefit from a conditioning program to improve overall health appropriate for age              [de-identified] Completed: Tx consisted of bed/functional mobility; balance challenges; dressing simulation; and pt education on: DME options, energy conservation, fall prevention, IADL strategies, preventative positioning, and resources to increase endurance (total time: 30 min). Plan   Plan  Plan Comment: N-A    Goals  Short term goals  Short term goal 1: Pt will verbalize/demo recommended therapeutic activities; AE/DME options for energy preservation; preventative positioning; and energy conservation techniques.  (Goal met)     Therapy Time   Individual Concurrent Group Co-treatment   Time In           Time Out           Minutes                 RACHEL Dorman/L  Electronically signed by RIVER Dorman on 9/24/2021 at 1:47 PM

## 2021-09-24 NOTE — DISCHARGE SUMMARY
[facility], [address] [place], [place] [address]IST MEDICINE      DISCHARGE SUMMARY:      PATIENT NAME:  Lissette Bernal  :  1975  MRN:  [id number]    Admission Date:   2021  6:47 PM Attending: Connie Dove MD   Discharge Date:   2021              PCP: Malathi Sullivan DO  Length of Stay: 4 days     Chief Complaint on Admission:   Chief Complaint   Patient presents with    Abdominal Pain     diffuse       Consultants:     IP CONSULT TO GENERAL SURGERY  IP CONSULT TO CASE MANAGEMENT  IP CONSULT TO HOME CARE NEEDS       Discharge Problem List:   Principal Problem (Resolved):    Acute pancreatitis  Active Problems:    Atherosclerosis of native coronary artery of native heart with unstable angina pectoris ([address]    Hypertension    Type 2 diabetes mellitus with diabetic polyneuropathy, with long-term current use of insulin (HCC)    Mixed hyperlipidemia    NAYAN (obstructive sleep apnea)    Morbid obesity due to excess calories (HCC)    [facility] occlusion with cerebral infarction ([address]    Gastroesophageal reflux disease without esophagitis    History of gallstones    Gallstones    Abnormal biliary HIDA scan    Chronic anticoagulation    CUMULATIVE  HOSPITAL  COURSE  AND  TREATMENT:     78-year-old female with morbid obesity, CAD, history of gallstones, history of stroke with residual speech deficit, presented with epigastric abdominal pain with radiation to the back over several days, with associated nausea. Had recent CT abdomen that was unremarkable without gallstones or evidence of cholecystitis. Noted to have elevated lipase and admitted with concern for acute pancreatitis. Obtained abdominal ultrasound confirming no gallstones or signs of cholecystitis. Managed with IV fluids and pain control. Lipase level are downtrending nicely and almost at baseline. GI consulted.   Nuclear medicine hepatobiliary study obtained, which showed low gallbladder EF, general surgery consulted who evaluated patient and made recommendations as below:    General surgery recommendations . .. 9/23/2021:      9/24/2021  She has tolerated her diet well. We will cut down on her gabapentin to 300 mg p.o. twice daily as needed as needed and will discharge her on pain medications for 3 days. We will also cut down on her Lasix to once a day. She is advised to follow-up with her PCP within 2 business days. Her INR has decreased from 4.2 on admission to 1.67 today. She was taking 65 mg Coumadin per week before coming to the hospital, which has been cut down by 20% and her new Coumadin dosage will be 7.5 mg p.o. daily starting today. I gave her prescription for her PT/INR to be drawn every Monday Wednesday Friday with results to be faxed over to patient's PCP Governor Romulo DO for follow-up and any adjustments in Coumadin dose . .. Until patient is seen and evaluated in PCP office. Strictly advised patient to watch diet, exercise regularly, lose a few pounds as well as adopt and maintain healthy habits. OBJECTIVE:  BP (!) 155/100 Comment: Reported to nurse  Pulse 62   Temp 97.4 °F (36.3 °C) (Temporal)   Resp 16   Ht 5' 4\" (1.626 m)   Wt (!) 356 lb 9 oz (161.7 kg)   SpO2 97%   BMI 61.20 kg/m²       Heart: RRR   Lungs: Bilateral decreased air entry   Abdomen: Soft, mild abdominal tenderness on deep palpation   Extremities: No edema   Neurologic: Alert and oriented   Skin: Warm and dry          Laboratory Data:  Recent Labs     09/22/21 0237 09/23/21  0209 09/24/21  0210   WBC 5.8 5.0 6.7   HGB 14.2 14.5 14.4    294 315     Recent Labs     09/22/21 0237 09/23/21  0209 09/24/21  0210    138 136   K 4.3 4.1 4.3    105 103   CO2 25 25 19*   BUN 12 9 14   CREATININE 1.0* 0.9 1.0*   GLUCOSE 126* 84 198*     Recent Labs     09/22/21 0237   AST 16   ALT 13   BILITOT 0.5   ALKPHOS 96     Troponin T: No results for input(s): TROPONINI in the last 72 hours.   Pro-BNP: No results for input(s): BNP in the last 72 hours. INR:   Recent Labs     09/22/21  0237 09/23/21  0209 09/24/21  0210   INR 3.22* 2.74* 1.67*     UA:No results for input(s): NITRITE, COLORU, PHUR, LABCAST, WBCUA, RBCUA, MUCUS, TRICHOMONAS, YEAST, BACTERIA, CLARITYU, SPECGRAV, LEUKOCYTESUR, UROBILINOGEN, BILIRUBINUR, BLOODU, GLUCOSEU, AMORPHOUS in the last 72 hours. Invalid input(s): Geremias Pozo  A1C: No results for input(s): LABA1C in the last 72 hours. ABG:No results for input(s): PHART, WGZ2NWT, PO2ART, DXZ4UCU, BEART, HGBAE, E5IOHBJB, CARBOXHGBART in the last 72 hours. Impressions of imaging performed in 48 hours before discharge:    NM HEPATOBILIARY    Result Date: 9/23/2021  1. Patent cystic and common bile ducts. 2. Decreased ejection fraction of 17%. Signed by Dr Bee Munoz, [address] [facility] Medication Instructions LMZ:[id number]    Printed on:09/24/21 2717   Medication Information                      aspirin 81 MG tablet  Take 81 mg by mouth daily             atorvastatin (LIPITOR) 40 MG tablet  Take 80 mg by mouth nightly              bethanechol (URECHOLINE) 25 MG tablet  Take 1 tablet by mouth daily             Exenatide (BYDUREON) 2 MG PEN  INJECT 0.65 MLS ONCE Q WEEK             ezetimibe (ZETIA) 10 MG tablet  Take 10 mg by mouth             furosemide (LASIX) 40 MG tablet  Take 2 tablets by mouth daily             gabapentin (NEURONTIN) 300 MG capsule  Take 1 capsule by mouth 2 times daily as needed (Neuropathy/nerve pains) for up to 3 days. Intended supply: 30 days             HYDROcodone-acetaminophen (NORCO)  MG per tablet  Take 1 tablet by mouth every 6 hours as needed for Pain for up to 3 days.              insulin aspart (NOVOLOG) 100 UNIT/ML injection vial  Inject 10 Units into the skin 4 times daily (before meals and nightly) Up to 10 units ss coverage as directed             insulin glargine (BASAGLAR KWIKPEN) 100 UNIT/ML injection pen  Inject 80 Units into the skin nightly metFORMIN (GLUCOPHAGE) 1000 MG tablet  Take 1 tablet by mouth daily Hold till monday             metoprolol succinate ER (TOPROL-XL) 25 MG XL tablet  Take 25 mg by mouth daily             NONFORMULARY  daily apple cider gummy              nystatin (MYCOSTATIN) 634388 UNIT/GM powder  Apply 3 times daily to lower groin, abdominal area and under bilateral breasts             ondansetron (ZOFRAN ODT) 4 MG disintegrating tablet  Take 1 tablet by mouth every 8 hours as needed for Nausea or Vomiting             pantoprazole (PROTONIX) 40 MG tablet  Take 1 tablet by mouth daily             prochlorperazine (COMPAZINE) 10 MG tablet  Take 10 mg by mouth 2 times daily as needed (headache)             spironolactone (ALDACTONE) 25 MG tablet  Take 12.5 mg by mouth daily             sucralfate (CARAFATE) 1 GM tablet  Take 1 tablet by mouth 4 times daily as needed (gastric upset)             topiramate (TOPAMAX) 50 MG tablet  Take 1 tablet by mouth daily             verapamil (CALAN) 40 MG tablet  Take 40 mg by mouth every 12 hours             warfarin (COUMADIN) 7.5 MG tablet  Take 1 tablet by mouth daily                   ISSUES TO BE ADDRESSED AT HOSPITAL FOLLOW-UP VISIT:    Advised patient to follow-up closely with PCP upon discharge for management of chronic medical issues  Please see the detailed discharge directions delivered from earlier today! Condition on Discharge: gradually improving  Discharge Disposition: Home with [address] [place] [address]    Recommended Follow Up:  [facility] [place] [address]  [phone]    On 10/1/2021  Hospital follow up for 10/1/21 11:15 a.m. Belgica Wild PA-C  [address] [facility] [address] [facility]  [phone]    Go on 9/30/2021  Hospital Follow up for 9/30/21 @ 1:30 p.m.     Sami Gifford MD  [address] [place] [address]  [facility] [facility] [phone]    In 2 weeks  Office will call and schedule a hospital follow up    Followup Appointments Scheduled at Time of Discharge:  Future Appointments   Date Time Provider Shu Flowers   9/30/2021  1:30 PM Spenser Bailey PA-C [facility]   10/5/2021 11:40 AM [facility]        Discharge Instructions:   Please see the discharge paperwork. Patient was seen at bedside today, and the examination shows improvement since yesterday. Detailed discharge directions delivered to the patient by myself and our nursing staff, who verbalizes understanding and is very happy and satisfied with the plan. Patient has been advised to continue all medications as prescribed and advised, and f/u with PCP within 1 week. Patient is stable from medical standpoint to be discharged. Total time spent during patient evaluation and assessment, discussion with the nurse/family, addressing discharge medications/scripts and coordination of care for safe discharge was in excess of 55 minutes.       Signed Electronically:    Shantel Rivers MD  5:40 PM 9/24/2021

## 2021-09-24 NOTE — PROGRESS NOTES
Doing very well, Balwinder. Reg. Diet. No further abd. Pain. Lipase near normal now. OK for D/C from GI standpoint with 2 week OP GI F/U. Placed in D/C instr.

## 2021-09-24 NOTE — CARE COORDINATION
Spoke with patient regarding MD orders for [place] services. Patient agreeable and has chosen [facility]. Referral Faxed. [facility] [phone]. [phone]. Please notify [facility] when patient discharges and fax DC Summary,  DC med list and any new [place] orders. The Patient and/or patient representative was provided with a choice of provider and agrees   with the discharge plan. [x] Yes [] No    Freedom of choice list was provided with basic dialogue that supports the patient's individualized plan of care/goals, treatment preferences and shares the quality data associated with the providers.  [x] Yes [] No  Electronically signed by Sonny Smith on 9/24/2021 at 12:13 PM

## 2021-09-27 ENCOUNTER — CARE COORDINATION (OUTPATIENT)
Dept: CASE MANAGEMENT | Age: 46
End: 2021-09-27

## 2021-09-27 NOTE — CARE COORDINATION
[facility] Transitions Initial Follow Up Call    Call within 2 business days of discharge: Yes    Patient: Fantasma Gore Patient : 1975   MRN: [id number]  Reason for Admission:   Discharge Date: 21 RARS: Readmission Risk Score: 18      Last Discharge [address]       Complaint Diagnosis Description Type Department Provider    21 Abdominal Pain Acute pancreatitis without infection or necrosis, unspecified pancreatitis type . .. ED to Hosp-Admission (Discharged) (ADMITTED) Fermín Bermudez MD; Placido Vallecillo. .. Spoke with: [address] Decision Maker:    Primary Decision Maker: [facility] - [phone]    Transitions of Care Initial Call    Challenges to be reviewed by the provider   Additional needs identified to be addressed with provider: No  none             Method of communication with provider : none      Advance Care Planning:   Does patient have an Advance Directive: not on file; education provided. Was this a readmission? No  Patient stated reason for admission: abdominal pain  Patients top risk factors for readmission: functional physical ability, medical condition-abdominal pain and medication management    Care Transition Nurse (CTN) contacted the patient by telephone to perform post hospital discharge assessment. Verified name and  with patient as identifiers. Provided introduction to self, and explanation of the CTN role. CTN reviewed discharge instructions, medical action plan and red flags with patient who verbalized understanding. Patient given an opportunity to ask questions and does not have any further questions or concerns at this time. Were discharge instructions available to patient? Yes. Reviewed appropriate site of care based on symptoms and resources available to patient including: PCP, Specialist and Home health.  The patient agrees to contact the PCP office for questions related to their healthcare. Medication reconciliation was performed with patient, who verbalizes understanding of administration of home medications. Advised obtaining a 90-day supply of all daily and as-needed medications. Covid Risk Education     Educated patient about risk for severe COVID-19 due to risk factors according to CDC guidelines. CTN reviewed discharge instructions, medical action plan and red flag symptoms with the patient who verbalized understanding. Discussed COVID vaccination status: Yes. Education provided on COVID-19 vaccination as appropriate. Discussed exposure protocols and quarantine with CDC Guidelines. Patient was given an opportunity to verbalize any questions and concerns and agrees to contact CTN or health care provider for questions related to their healthcare. Reviewed and educated patient on any new and changed medications related to discharge diagnosis. Was patient discharged with a pulse oximeter? No Discussed and confirmed pulse oximeter discharge instructions and when to notify provider or seek emergency care. CTN provided contact information. No further follow-up call indicated based on severity of symptoms and risk factors. Care Transitions 24 Hour Call    Do you have any ongoing symptoms?: No  Do you have a copy of your discharge instructions?: Yes  Do you have all of your prescriptions and are they filled?: Yes  Have you been contacted by a [address]?: No  Have you scheduled your follow up appointment?: Yes  How are you going to get to your appointment?: Car - family or friend to transport  Were you discharged with any Home Care or Post Acute Services: Yes  Post Acute Services: [facility] [address] you feel like you have everything you need to keep you well at home?: Yes  Care Transitions Interventions         Follow Up : Spoke with patient today for initial CT call after discharge from [place]. She says she is doing good.  No new medications, but some changes to her existing meds, did review them today. She has HFU with PCP on 10/1, surgeon on 9/30 and GI on 10/5. She is going today to have her INR checked. She will have it checked on Wednesday as well and faxed to her PCP. She says she has a mild pain but nothing like before she came into hospital. She is eating and drinking ok. She has a Dexcom and said her blood sugar this morning was 160. She is able to give her own insulin. Home Health did come out over the weekend she says, but since she is not home bound they did not pick her up as a patient. She has had the Covid vaccine, listed her Dad as PHCDM but does not have LW. She seems to be convalescing well, no issues or problems currently. No further follow up warranted at this time.    Future Appointments   Date Time Provider Shu Flowers   9/30/2021  1:30 PM Fabienne Stallings PA-C N [facility]   10/5/2021 11:40 AM YARIEL Freitas NP [facility]       Abdiel Turner RN

## 2021-09-30 ENCOUNTER — HOSPITAL ENCOUNTER (OUTPATIENT)
Dept: LAB | Age: 46
Discharge: HOME | End: 2021-09-30
Payer: MEDICARE

## 2021-09-30 ENCOUNTER — OFFICE VISIT (OUTPATIENT)
Dept: SURGERY | Age: 46
End: 2021-09-30
Payer: MEDICARE

## 2021-09-30 VITALS
HEART RATE: 104 BPM | TEMPERATURE: 97 F | WEIGHT: 293 LBS | BODY MASS INDEX: 50.02 KG/M2 | HEIGHT: 64 IN | OXYGEN SATURATION: 98 %

## 2021-09-30 DIAGNOSIS — Z79.4 TYPE 2 DIABETES MELLITUS WITH DIABETIC POLYNEUROPATHY, WITH LONG-TERM CURRENT USE OF INSULIN (HCC): ICD-10-CM

## 2021-09-30 DIAGNOSIS — E66.01 MORBID OBESITY DUE TO EXCESS CALORIES (HCC): ICD-10-CM

## 2021-09-30 DIAGNOSIS — K85.10 ACUTE BILIARY PANCREATITIS WITHOUT INFECTION OR NECROSIS: Primary | ICD-10-CM

## 2021-09-30 DIAGNOSIS — G47.33 OSA (OBSTRUCTIVE SLEEP APNEA): ICD-10-CM

## 2021-09-30 DIAGNOSIS — I63.50 CEREBRAL ARTERY OCCLUSION WITH CEREBRAL INFARCTION (HCC): ICD-10-CM

## 2021-09-30 DIAGNOSIS — E11.42 TYPE 2 DIABETES MELLITUS WITH DIABETIC POLYNEUROPATHY, WITH LONG-TERM CURRENT USE OF INSULIN (HCC): ICD-10-CM

## 2021-09-30 DIAGNOSIS — K82.9 GALLBLADDER DISEASE: ICD-10-CM

## 2021-09-30 LAB
INR BLD: 1.88 (ref 0.88–1.18)
PROTHROMBIN TIME: 21.5 SEC (ref 12–14.6)

## 2021-09-30 PROCEDURE — 36415 COLL VENOUS BLD VENIPUNCTURE: CPT

## 2021-09-30 PROCEDURE — 85610 PROTHROMBIN TIME: CPT

## 2021-09-30 PROCEDURE — 99212 OFFICE O/P EST SF 10 MIN: CPT | Performed by: PHYSICIAN ASSISTANT

## 2021-09-30 NOTE — LETTER
LOW FAT DIET: Regular    FOODS ALLOWED FOODS TO AVOID   BEVERAGES: skim milk, buttermilk made from skim   milk, coffee, tea, carbonated beverages BEVERAGES:  Whole milk, evaporated milk, buttermilk made from whole milk, chocolate beverages. BREAD: White, whole wheat, rye, Indonesian or [place], rolls BREAD:  Biscuits, muffins, cornbread, waffles, pancakes, sweet rolls  any made with large quantities of fat. CEREAL: All except those containing chocolate CEREAL:  None except those containing chocolate. DESSERTS: Fruits allowed, sherbets and puddings   made with, skim milk, gelatin desserts, manish food   cake, sugar wafers, popsicles, hard candy DESSERTS:  Ice cream, pies, butter cakes, sponge cakes, rich cookies, doughnuts, any others not listed under foods allowed. FATS:  Little or none FATS:  More than days allowance, others not listed, all fried foods. FRUIT: All except avocado and coconut, all fruit juices FRUIT:  Avocado and coconut; melons if not tolerated by patient, pineapple if not tolerated by patient. MEAT & MEAT SUBSTITUTE:  Lean meat, fish &   fowl, dry cottage cheese, 3 egg yolks a week. MEAT AND MEAT SUBSTITUTE:  All meat high in fat such as pork, maier, ham, goose, duck, fatty fish, sausage, bologna, ham-cheese loaf, etc.  cream and hard cheese, peanut butter, more egg yolks than allowance. POTATO OR SUBSTITUTE:  Baked, mashed or   boiled white potato, macaroni, rice, spaghetti, noodles. POTATO OR SUBSTITUTE:  Fried potatoes, creamed or escalloped potatoes, potato chips, corn chips; sweet potatoes if not tolerated by patient. SOUPS:  Fat free broths, cream soups made with   skim milk, vegetable soup made with vegetables   tolerated by patient. SOUPS:  Fatty broths, cream soups made with whole milk. VEGETABLES:  All vegetables, raw or cooked,   except gas forming vegetables not tolerated by patient   listed under Foods to Avoid.   (Green beans, green peas,  carrots, beets, squash, spinach, asparagus, tomatoes, lettuce) VEGETABLES:  Gas forming vegetables  if not tolerated by patient  such as broccoli, brussels sprouts, cabbage, cauliflower, green peppers, dried beans, dried peas, lima beans, turnips, radishes, onions, cucumbers, etc.    MISCELLANEOUS:  Sugar, jelly, honey, syrup,   mild tomato catsup, herbs, salt, vinegar-if tolerated. MISCELLANEOUS:  Pepper, spices, horseradish, mustard, hot catsup, olives, nuts, gravies, chocolate; highly seasoned foods (pizza and chili).

## 2021-10-01 ASSESSMENT — ENCOUNTER SYMPTOMS
DIARRHEA: 0
BLOOD IN STOOL: 0
ABDOMINAL DISTENTION: 0
NAUSEA: 1
ABDOMINAL PAIN: 1
SHORTNESS OF BREATH: 0
VOMITING: 1

## 2021-10-05 ENCOUNTER — OFFICE VISIT (OUTPATIENT)
Dept: GASTROENTEROLOGY | Age: 46
End: 2021-10-05
Payer: MEDICARE

## 2021-10-05 VITALS
HEART RATE: 78 BPM | OXYGEN SATURATION: 94 % | BODY MASS INDEX: 50.02 KG/M2 | HEIGHT: 64 IN | SYSTOLIC BLOOD PRESSURE: 136 MMHG | DIASTOLIC BLOOD PRESSURE: 74 MMHG | WEIGHT: 293 LBS

## 2021-10-05 DIAGNOSIS — Z87.19 HX OF ACUTE PANCREATITIS: ICD-10-CM

## 2021-10-05 DIAGNOSIS — R94.8 ABNORMAL BILIARY HIDA SCAN: Primary | ICD-10-CM

## 2021-10-05 PROCEDURE — 99213 OFFICE O/P EST LOW 20 MIN: CPT | Performed by: NURSE PRACTITIONER

## 2021-10-05 RX ORDER — WARFARIN SODIUM 10 MG/1
10 TABLET ORAL
COMMUNITY

## 2021-10-05 RX ORDER — SUCRALFATE ORAL 1 G/10ML
1 SUSPENSION ORAL 4 TIMES DAILY
Qty: 1200 ML | Refills: 3 | Status: SHIPPED | OUTPATIENT
Start: 2021-10-05 | End: 2022-08-30 | Stop reason: SDUPTHER

## 2021-10-05 ASSESSMENT — ENCOUNTER SYMPTOMS
SHORTNESS OF BREATH: 0
BLOOD IN STOOL: 0
ANAL BLEEDING: 0
DIARRHEA: 0
TROUBLE SWALLOWING: 0
ABDOMINAL DISTENTION: 0
CONSTIPATION: 1
VOMITING: 0
COUGH: 0
NAUSEA: 0
ABDOMINAL PAIN: 1
RECTAL PAIN: 0
CHOKING: 0

## 2021-10-05 NOTE — PROGRESS NOTES
Subjective:     Patient ID: Alex Enrique is a 55 y.o. female  PCP: María Bustillos DO  Referring Provider: No ref. provider found    HPI  Patient presents to the office today with the following complaints: Follow Up After Procedure      Alex Enrique is here for follow up after EGD & Colonoscopy on 9/2/2021. During her last visit, she had c/o abdominal pain, nausea. Since last visit, pt was admitted on 9/18/2021 for acute pancreatitis with normal LFTs. Gallbladder EF decreased, she is to follow up with Gen Surg. She is taking Protonix 40 mg po daily and Carafate daily. She is requesting liquid version of the Carafate. Today, she states abdominal pain has greatly improved. She is watching her diet. Reports continued issues with constipation. Taking Miralax daily. Colonoscopy Findings 9/2/2021:   NO large polyps or masses or strictures or colitis. Suboptimal exam due to prep quality as described above. Where it was clearly visible, the mucosa appeared normal throughout the entire examined colon  Retroflexion in the rectum was otherwise normal and revealed no further abnormalities   No obvious lesions to explain the patient's abdominal symptoms were discovered on this exam.  She may have IBS or a non-GI source of her symptoms.   Recommendations:  1. Repeat colonoscopy: At age 48 for colorectal cancer screening  2. - Resume previous meds and diet  - GI clinic f/u 4-6 weeks with Ms. Jason Thompson scheduled f/u appts with other MDs     EGD Findings/IMPRESSION 9/2/2021:  Esophagus: normal; EG junction at 38 cm also was normal.  NO erosions or ulcers or nodules or strictures or webs or rings or mass lesions or extrinsic compression or diverticula noted. There is no obvious hiatal hernia present. Stomach:  Normal.  NO ulcers or masses or gastric outlet obstruction or retained food or fluid. Rugae were normal and lumen distended well with insufflation.  Retroflexed views otherwise revealed a normal GE junction, fundus and cardia as well. Duodenum: Normal. Random biopsies were taken to check for Celiac disease and other causes of villous atrophy. Clear-cut lesions to explain the patient's abdominal pain or nausea were discovered on this exam  RECOMMENDATIONS:    1. Await path results, the patient will be contacted in 7-10 days with biopsy results. 2.  Small frequent meals  3. Patient will benefit from weight loss through a regimen of low calorie diet and moderate exercise as feasible. FINAL DIAGNOSIS:   Small intestine, duodenal biopsy:   Benign duodenal mucosa with mild chronic inflammation, negative for evidence of sprue. Narrative   EXAM: CT Abdomen and Pelvis with contrast      9/20/2021 10:46 PM   INDICATION: Epigastric pain   COMPARISON: None   TECHNIQUE:   Abdomen and pelvis were scanned utilizing a multidetector helical   scanner from the diaphragm to the ischial tuberosities after   administration of IV contrast. Coronal and sagittal reformations were   obtained. [Routine protocol is performed.]   Radiation dose equals DLP 1906 mGy-cm.  Automated exposure control   dose reduction technique was implemented. FINDINGS:   LINES and TUBES: None. LOWER THORAX: No focal consolidation, pleural effusion or   pneumothorax. HEPATOBILIARY:  No focal hepatic lesions.   No liver laceration.   No   biliary ductal dilatation. GALLBLADDER:  No radiopaque stones or sludge.   No wall thickening. SPLEEN:  No splenomegaly.    No splenic laceration. PANCREAS:  No focal masses or ductal dilatation. There is mild fat   stranding associated with the pancreatic head and neck which may be   seen in early acute pancreatitis. No associated necrosis or   hemorrhage. ADRENALS:  No adrenal nodules. KIDNEYS/URETERS:  Kidneys enhance symmetrically.   No hydronephrosis.     No cystic or solid mass lesions.  No stones. GI TRACT:  No abnormal distention, wall thickening, or evidence of   bowel obstruction.   No acute appendicitis. Lenora Red PELVIC ORGANS/BLADDER:  Unremarkable. LYMPH NODES:  No lymphadenopathy. VESSELS:  Unremarkable. PERITONEUM / RETROPERITONEUM:  No free air or fluid. BONES:  Unremarkable. SOFT TISSUES:  Unremarkable.        Impression   Mild fat stranding associated with pancreatic head and neck, may be   seen in acute pancreatitis. No associated necrosis or hemorrhage. No   fluid collection. Signed by Dr Cook Plain     Narrative   Examination. US ABDOMEN LIMITED 9/21/2021 10:52 AM   History: History of gallstones. The pancreatitis. Ultrasound examination of the abdomen is performed. The comparison is   made with the previous study dated 3/12/2021. The correlation is made   with CT scan of the abdomen dated 9/20/2021. The gallbladder is normal. No gallstones. The previously seen   gallstones are not identified in this study. The gallbladder wall   measures 3 mm in thickness. A borderline dilatation of common bile   duct is noted measuring 7 mm. The liver appears normal. No intrinsic abnormality. The pancreas is not optimally visualized due to bowel gas. Right kidney measures 14.1 x 5 x 5.2 cm no evidence of stenosis. A normal abdominal aorta and IVC are seen.       Impression   No gallstones. No finding to suggest cholecystitis. The pancreas is not optimally visualized for evaluation. The liver appears normal.   Signed by Dr Rosendo Rueda       All scope and pathology reports were reviewed and discussed with patient. All questions answered, pt verbalized understanding. Assessment:     1. Abnormal biliary HIDA scan    2. Hx of acute pancreatitis            Plan:   - Ok to increase Miralax  - Keep follow up with Gen Surg  - Continue current medications. Carafate tablets switched to suspension with refills  - Call with any questions or concerns      Orders  No orders of the defined types were placed in this encounter.     Medications  Orders Placed This Encounter Medications    sucralfate (CARAFATE) 1 GM/10ML suspension     Sig: Take 10 mLs by mouth 4 times daily     Dispense:  1200 mL     Refill:  3         Patient History:     Past Medical History:   Diagnosis Date    CAD (coronary artery disease)     [facility]     Diabetes mellitus ([facility]     GERD (gastroesophageal reflux disease)     Hypertension        Past Surgical History:   Procedure Laterality Date    CARDIAC SURGERY      COLONOSCOPY N/A 09/02/2021    Dr Edgard Samuel, Sub prep [place], 4 year recall    CORONARY ARTERY BYPASS GRAFT      2016    TONSILLECTOMY      UPPER GASTROINTESTINAL ENDOSCOPY      [place], w/dil per patient    UPPER GASTROINTESTINAL ENDOSCOPY N/A 09/02/2021    Dr Edgard Samuel, [facility], (-[place]       Family History   Problem Relation Age of Onset    Osteoarthritis Mother     Diabetes Mother     Hypertension Mother     Hypertension Father     Heart Attack Brother     Heart Failure Maternal Grandmother     Diabetes Maternal Grandfather     Heart Disease Maternal Grandfather     Heart Disease Paternal Grandmother     No Known Problems Paternal Grandfather     Colon Cancer Neg Hx     Colon Polyps Neg Hx     Esophageal Cancer Neg Hx     Liver Cancer Neg Hx     Liver Disease Neg Hx     Rectal Cancer Neg Hx     Stomach Cancer Neg Hx        Social History     Socioeconomic History    Marital status: Single     Spouse name: None    Number of children: None    Years of education: None    Highest education level: None   Occupational History    None   Tobacco Use    Smoking status: Never Smoker    Smokeless tobacco: Never Used   Vaping Use    Vaping Use: Never used   Substance and Sexual Activity    Alcohol use: Yes     Comment: occ    Drug use: No    Sexual activity: None   Other Topics Concern    None   Social History Narrative    None     Social Determinants of Health     Financial Resource Strain:     Difficulty of Paying Living Expenses:    Food Insecurity:     Worried About [facility] in the Last Year:     [facility] [facility] in the Last Year:    Transportation Needs:     Lack of Transportation (Medical):  Lack of Transportation (Non-Medical):    Physical Activity:     Days of Exercise per Week:     Minutes of Exercise per Session:    Stress:     Feeling of Stress :    Social Connections:     Frequency of Communication with Friends and Family:     Frequency of Social Gatherings with Friends and Family:     Attends Sikh Services:     Active Member of Clubs or Organizations:     Attends Club or Organization Meetings:     Marital Status:    Intimate Partner Violence:     Fear of Current or Ex-Partner:     Emotionally Abused:     Physically Abused:     Sexually Abused:        Current Outpatient Medications   Medication Sig Dispense Refill    warfarin (COUMADIN) 10 MG tablet Take 10 mg by mouth 10mg 6 days and 15mg one day weekly      sucralfate (CARAFATE) 1 GM/10ML suspension Take 10 mLs by mouth 4 times daily 1200 mL 3    furosemide (LASIX) 40 MG tablet Take 2 tablets by mouth daily 60 tablet 0    gabapentin (NEURONTIN) 300 MG capsule Take 1 capsule by mouth 2 times daily as needed (Neuropathy/nerve pains) for up to 3 days.  Intended supply: 30 days 6 capsule 0    ondansetron (ZOFRAN ODT) 4 MG disintegrating tablet Take 1 tablet by mouth every 8 hours as needed for Nausea or Vomiting 15 tablet 0    nystatin (MYCOSTATIN) 974364 UNIT/GM powder Apply 3 times daily to lower groin, abdominal area and under bilateral breasts 1 Bottle 0    NONFORMULARY daily apple cider gummy       pantoprazole (PROTONIX) 40 MG tablet Take 1 tablet by mouth daily 90 tablet 3    ezetimibe (ZETIA) 10 MG tablet Take 10 mg by mouth daily       Exenatide (BYDUREON) 2 MG PEN INJECT 0.65 MLS ONCE Q WEEK      insulin glargine (BASAGLAR KWIKPEN) 100 UNIT/ML injection pen Inject 80 Units into the skin nightly       bethanechol discharge. Cardiovascular:      Rate and Rhythm: Normal rate and regular rhythm. Heart sounds: No murmur heard. Pulmonary:      Effort: Pulmonary effort is normal. No respiratory distress. Breath sounds: Normal breath sounds. Abdominal:      General: Bowel sounds are normal. There is no distension. Palpations: Abdomen is soft. There is no mass. Tenderness: There is no abdominal tenderness. There is no guarding or rebound. Musculoskeletal:         General: Normal range of motion. Cervical back: Normal range of motion. Skin:     General: Skin is warm and dry. Neurological:      Mental Status: She is alert and oriented to person, place, and time.    Psychiatric:         Behavior: Behavior normal.

## 2021-10-07 ENCOUNTER — HOSPITAL ENCOUNTER (OUTPATIENT)
Dept: LAB | Age: 46
Discharge: HOME | End: 2021-10-07
Payer: MEDICARE

## 2021-10-07 LAB
INR BLD: 2.65 (ref 0.88–1.18)
PROTHROMBIN TIME: 27.7 SEC (ref 12–14.6)

## 2021-10-14 ENCOUNTER — HOSPITAL ENCOUNTER (OUTPATIENT)
Dept: LAB | Age: 46
Discharge: HOME | End: 2021-10-14
Payer: MEDICARE

## 2021-10-14 LAB
INR BLD: 1.66 (ref 0.88–1.18)
PROTHROMBIN TIME: 19.6 SEC (ref 12–14.6)

## 2021-10-14 PROCEDURE — 36415 COLL VENOUS BLD VENIPUNCTURE: CPT

## 2021-10-14 PROCEDURE — 85610 PROTHROMBIN TIME: CPT

## 2021-10-21 ENCOUNTER — OFFICE VISIT (OUTPATIENT)
Dept: SURGERY | Age: 46
End: 2021-10-21
Payer: MEDICARE

## 2021-10-21 ENCOUNTER — HOSPITAL ENCOUNTER (OUTPATIENT)
Dept: LAB | Age: 46
Discharge: HOME | End: 2021-10-21
Payer: MEDICARE

## 2021-10-21 VITALS — DIASTOLIC BLOOD PRESSURE: 84 MMHG | SYSTOLIC BLOOD PRESSURE: 118 MMHG | HEART RATE: 76 BPM

## 2021-10-21 DIAGNOSIS — E66.01 MORBID OBESITY DUE TO EXCESS CALORIES (HCC): ICD-10-CM

## 2021-10-21 DIAGNOSIS — K85.10 ACUTE BILIARY PANCREATITIS WITHOUT INFECTION OR NECROSIS: ICD-10-CM

## 2021-10-21 DIAGNOSIS — K82.9 GALLBLADDER DISEASE: Primary | ICD-10-CM

## 2021-10-21 LAB
INR BLD: 3.02 (ref 0.88–1.18)
PROTHROMBIN TIME: 30.5 SEC (ref 12–14.6)

## 2021-10-21 PROCEDURE — 99212 OFFICE O/P EST SF 10 MIN: CPT | Performed by: PHYSICIAN ASSISTANT

## 2021-10-23 NOTE — PROGRESS NOTES
HISTORY OF PRESENT ILLNESS:  Kelvin Toney is a 49-year-old female who presents after a recent hospitalization for acute pancreatitis. She was seen by the gastroenterology service and also seen by the general surgery service. Of note, she had an ultrasound performed as an outpatient that showed a gallstone in March 2021. Most recent ultrasound showed no evidence of gallstones. She had a HIDA scan that showed a 70% ejection fraction. CT scan showed some stranding around the pancreas and some mild elevation in her lipase suggestive of pancreatitis. Overall, she states that she is feeling somewhat better. Her breathing is somewhat labored. She is eating okay. She is had no abdominal complaints. She has had no jaundice or scleral icterus.     Patient Active Problem List    Diagnosis Date Noted    Gallstones 09/24/2021    Abnormal biliary HIDA scan 09/24/2021    Chronic anticoagulation 09/24/2021    History of gallstones 09/21/2021    Chest pain 02/24/2018    Gastroesophageal reflux disease without esophagitis     Surgical wound dehiscence     Atherosclerosis of native coronary artery of native heart with unstable angina pectoris ([address] 08/04/2016    Hypertension 08/04/2016    Type 2 diabetes mellitus with diabetic polyneuropathy, with long-term current use of insulin ([address] 08/04/2016    Mixed hyperlipidemia 08/04/2016    NAYAN (obstructive sleep apnea) 08/04/2016    Morbid obesity due to excess calories ([address] 08/04/2016    Acute blood loss anemia 08/04/2016    [facility] occlusion with [facility] infarction [address] 08/04/2016     Current Outpatient Medications   Medication Sig Dispense Refill    furosemide (LASIX) 40 MG tablet Take 2 tablets by mouth daily 60 tablet 0    ondansetron (ZOFRAN ODT) 4 MG disintegrating tablet Take 1 tablet by mouth every 8 hours as needed for Nausea or Vomiting 15 tablet 0    nystatin (MYCOSTATIN) 726123 UNIT/GM powder Apply 3 times daily to lower groin, abdominal area and under bilateral breasts 1 Bottle 0    NONFORMULARY daily apple cider gummy       pantoprazole (PROTONIX) 40 MG tablet Take 1 tablet by mouth daily 90 tablet 3    ezetimibe (ZETIA) 10 MG tablet Take 10 mg by mouth daily       Exenatide (BYDUREON) 2 MG PEN INJECT 0.65 MLS ONCE Q WEEK      insulin glargine (BASAGLAR KWIKPEN) 100 UNIT/ML injection pen Inject 80 Units into the skin nightly       bethanechol (URECHOLINE) 25 MG tablet Take 1 tablet by mouth daily      metFORMIN (GLUCOPHAGE) 1000 MG tablet Take 1 tablet by mouth daily Hold till monday      insulin aspart (NOVOLOG) 100 UNIT/ML injection vial Inject 10 Units into the skin 4 times daily (before meals and nightly) Up to 10 units ss coverage as directed      spironolactone (ALDACTONE) 25 MG tablet Take 12.5 mg by mouth daily      aspirin 81 MG tablet Take 81 mg by mouth daily      metoprolol succinate ER (TOPROL-XL) 25 MG XL tablet Take 25 mg by mouth daily      verapamil (CALAN) 40 MG tablet Take 40 mg by mouth every 12 hours      atorvastatin (LIPITOR) 40 MG tablet Take 80 mg by mouth nightly       warfarin (COUMADIN) 10 MG tablet Take 10 mg by mouth 10mg 6 days and 15mg one day weekly      sucralfate (CARAFATE) 1 GM/10ML suspension Take 10 mLs by mouth 4 times daily 1200 mL 3    gabapentin (NEURONTIN) 300 MG capsule Take 1 capsule by mouth 2 times daily as needed (Neuropathy/nerve pains) for up to 3 days. Intended supply: 30 days 6 capsule 0    topiramate (TOPAMAX) 50 MG tablet Take 1 tablet by mouth daily       prochlorperazine (COMPAZINE) 10 MG tablet Take 10 mg by mouth 2 times daily as needed (headache)        No current facility-administered medications for this visit.      Allergies: Bactrim [sulfamethoxazole-trimethoprim]  Past Medical History:   Diagnosis Date    CAD (coronary artery disease)     [address]     Diabetes mellitus ([address]     GERD (gastroesophageal reflux disease)  Hypertension      Past Surgical History:   Procedure Laterality Date    CARDIAC SURGERY      COLONOSCOPY N/A 09/02/2021    Dr Camron Weiss, 4 year recall    CORONARY ARTERY BYPASS GRAFT      2016    TONSILLECTOMY      UPPER GASTROINTESTINAL ENDOSCOPY      sujatha Brian/[place] per patient    UPPER GASTROINTESTINAL ENDOSCOPY N/A 09/02/2021    Dr Amber Turcios, [facility], (-[place]     Family History   Problem Relation Age of Onset    Osteoarthritis Mother     Diabetes Mother     Hypertension Mother     Hypertension Father     Heart Attack Brother     Heart Failure Maternal Grandmother     Diabetes Maternal Grandfather     Heart Disease Maternal Grandfather     Heart Disease Paternal Grandmother     No Known Problems Paternal Grandfather     Colon Cancer Neg Hx     Colon Polyps Neg Hx     Esophageal Cancer Neg Hx     Liver Cancer Neg Hx     Liver Disease Neg Hx     Rectal Cancer Neg Hx     Stomach Cancer Neg Hx      Social History     Tobacco Use    Smoking status: Never Smoker    Smokeless tobacco: Never Used   Substance Use Topics    Alcohol use: Yes     Comment: occ       Review of Systems  Reviewed and positive for the above. All other systems noted to be negative. Physical Exam  Pulse 104, temperature 97 °F (36.1 °C), temperature source Temporal, height 5' 4\" (1.626 m), weight (!) 351 lb (159.2 kg), SpO2 98 %. GENERAL:  Reveals an obese 55 y.o. female that  appears to be in no acute distress. Her breathing today seems to be a bit labored. CHEST:   Chest is clear to auscultation. HEART:  Heart demonstrated a regular rhythm and rate with no cardiac murmurs, gallops or rubs noted to auscultation. ABDOMEN:  The abdomen is protuberant. There is no appreciable palpable masses. There is no appreciable right upper quadrant tenderness on today's exam.    EXTREMITIES:  Extremities demonstrated no cyanosis or pitting edema bilaterally.     PSYCHIATRIC:  Patient is oriented to time, place and person. The patient's mood and affect are normal.      IMPRESSION:  1)Recent hospitalization for pancreatitis, resolved  2) Prior cholelithiasis, now ultrasound showing no evidence of gallstones      Diagnosis Date    CAD (coronary artery disease)     [address]     Diabetes mellitus ([address]     GERD (gastroesophageal reflux disease)     Hypertension          PLAN: At this point, I think we should hold off on surgery. We will continue to do a low-fat diet. She will notify us if her symptoms worsen. I will plan to see her back in about 2 to 3 weeks.

## 2021-10-28 ENCOUNTER — HOSPITAL ENCOUNTER (OUTPATIENT)
Dept: LAB | Age: 46
Discharge: HOME | End: 2021-10-28
Payer: MEDICARE

## 2021-10-28 LAB
INR BLD: 2.3 (ref 0.88–1.18)
PROTHROMBIN TIME: 25.1 SEC (ref 12–14.6)

## 2021-10-28 PROCEDURE — 36415 COLL VENOUS BLD VENIPUNCTURE: CPT

## 2021-10-28 PROCEDURE — 85610 PROTHROMBIN TIME: CPT

## 2021-11-04 ENCOUNTER — HOSPITAL ENCOUNTER (OUTPATIENT)
Dept: LAB | Age: 46
Discharge: HOME | End: 2021-11-04
Payer: MEDICARE

## 2021-11-04 LAB
INR BLD: 3.56 (ref 0.88–1.18)
PROTHROMBIN TIME: 34.5 SEC (ref 12–14.6)

## 2021-11-04 PROCEDURE — 85610 PROTHROMBIN TIME: CPT

## 2021-11-04 PROCEDURE — 36415 COLL VENOUS BLD VENIPUNCTURE: CPT

## 2021-11-11 ENCOUNTER — HOSPITAL ENCOUNTER (OUTPATIENT)
Dept: LAB | Age: 46
Discharge: HOME | End: 2021-11-11
Payer: MEDICARE

## 2021-11-11 LAB
INR BLD: 3.45 (ref 0.88–1.18)
PROTHROMBIN TIME: 34 SEC (ref 12–14.6)

## 2021-11-11 PROCEDURE — 85610 PROTHROMBIN TIME: CPT

## 2021-11-11 PROCEDURE — 36415 COLL VENOUS BLD VENIPUNCTURE: CPT

## 2021-11-11 NOTE — PROGRESS NOTES
HISTORY OF PRESENT ILLNESS:  Jaimie Garcia is a 59-year-old female who presents after a recent hospitalization for acute pancreatitis. Of note, she had an ultrasound performed as an outpatient that showed a gallstone in March 2021. Most recent ultrasound showed no evidence of gallstones. She had a HIDA scan that showed a 70% ejection fraction. CT scan showed some stranding around the pancreas and some mild elevation in her lipase suggestive of pancreatitis. At her last visit, she has some fatigue and difficulty breathing which now is greatly improved. Her diet is improving. No change in bowel habits.     Patient Active Problem List    Diagnosis Date Noted    Gallstones 09/24/2021    Abnormal biliary HIDA scan 09/24/2021    Chronic anticoagulation 09/24/2021    History of gallstones 09/21/2021    Chest pain 02/24/2018    Gastroesophageal reflux disease without esophagitis     Surgical wound dehiscence     Atherosclerosis of native coronary artery of native heart with unstable angina pectoris ([address] 08/04/2016    Hypertension 08/04/2016    Type 2 diabetes mellitus with diabetic polyneuropathy, with long-term current use of insulin ([address] 08/04/2016    Mixed hyperlipidemia 08/04/2016    NAYAN (obstructive sleep apnea) 08/04/2016    Morbid obesity due to excess calories ([address] 08/04/2016    Acute blood loss anemia 08/04/2016    [facility] occlusion with cerebral infarction ([address] 08/04/2016     Current Outpatient Medications   Medication Sig Dispense Refill    warfarin (COUMADIN) 10 MG tablet Take 10 mg by mouth 10mg 6 days and 15mg one day weekly      sucralfate (CARAFATE) 1 GM/10ML suspension Take 10 mLs by mouth 4 times daily 1200 mL 3    furosemide (LASIX) 40 MG tablet Take 2 tablets by mouth daily 60 tablet 0    ondansetron (ZOFRAN ODT) 4 MG disintegrating tablet Take 1 tablet by mouth every 8 hours as needed for Nausea or Vomiting 15 tablet 0    nystatin (MYCOSTATIN) 730857 UNIT/GM powder Apply 3 times daily to lower groin, abdominal area and under bilateral breasts 1 Bottle 0    NONFORMULARY daily apple cider gummy       pantoprazole (PROTONIX) 40 MG tablet Take 1 tablet by mouth daily 90 tablet 3    ezetimibe (ZETIA) 10 MG tablet Take 10 mg by mouth daily       Exenatide (BYDUREON) 2 MG PEN INJECT 0.65 MLS ONCE Q WEEK      insulin glargine (BASAGLAR KWIKPEN) 100 UNIT/ML injection pen Inject 80 Units into the skin nightly       bethanechol (URECHOLINE) 25 MG tablet Take 1 tablet by mouth daily      metFORMIN (GLUCOPHAGE) 1000 MG tablet Take 1 tablet by mouth daily Hold till monday      insulin aspart (NOVOLOG) 100 UNIT/ML injection vial Inject 10 Units into the skin 4 times daily (before meals and nightly) Up to 10 units ss coverage as directed      spironolactone (ALDACTONE) 25 MG tablet Take 12.5 mg by mouth daily      topiramate (TOPAMAX) 50 MG tablet Take 1 tablet by mouth daily       aspirin 81 MG tablet Take 81 mg by mouth daily      metoprolol succinate ER (TOPROL-XL) 25 MG XL tablet Take 25 mg by mouth daily      prochlorperazine (COMPAZINE) 10 MG tablet Take 10 mg by mouth 2 times daily as needed (headache)       verapamil (CALAN) 40 MG tablet Take 40 mg by mouth every 12 hours      atorvastatin (LIPITOR) 40 MG tablet Take 80 mg by mouth nightly       gabapentin (NEURONTIN) 300 MG capsule Take 1 capsule by mouth 2 times daily as needed (Neuropathy/nerve pains) for up to 3 days. Intended supply: 30 days 6 capsule 0     No current facility-administered medications for this visit.      Allergies: Bactrim [sulfamethoxazole-trimethoprim]  Past Medical History:   Diagnosis Date    CAD (coronary artery disease)     [address]     Diabetes mellitus [address]     GERD (gastroesophageal reflux disease)     Hypertension      Past Surgical History:   Procedure Laterality Date    CARDIAC SURGERY      COLONOSCOPY N/A 09/02/2021     Boaz, [place] prep [place], 4 year recall    CORONARY ARTERY BYPASS GRAFT      2016    TONSILLECTOMY      UPPER GASTROINTESTINAL ENDOSCOPY      sujatha Jacob/snow per patient    UPPER GASTROINTESTINAL ENDOSCOPY N/A 09/02/2021    Dr Elizabeth Ayala, [facility], [facility]     Family History   Problem Relation Age of Onset    Osteoarthritis Mother     Diabetes Mother     Hypertension Mother     Hypertension Father     Heart Attack Brother     Heart Failure Maternal Grandmother     Diabetes Maternal Grandfather     Heart Disease Maternal Grandfather     Heart Disease Paternal Grandmother     No Known Problems Paternal Grandfather     Colon Cancer Neg Hx     Colon Polyps Neg Hx     Esophageal Cancer Neg Hx     Liver Cancer Neg Hx     Liver Disease Neg Hx     Rectal Cancer Neg Hx     Stomach Cancer Neg Hx      Social History     Tobacco Use    Smoking status: Never Smoker    Smokeless tobacco: Never Used   Substance Use Topics    Alcohol use: Yes     Comment: occ       Review of Systems  Reviewed and positive for the above. All other systems noted to be negative. Physical Exam  Blood pressure 118/84, pulse 76. GENERAL:  Reveals an obese 55 y.o. female that  appears to be in no acute distress. Her breathing today seems to be a bit labored. CHEST:   Chest is clear to auscultation. HEART:  Heart demonstrated a regular rhythm and rate with no cardiac murmurs, gallops or rubs noted to auscultation. ABDOMEN:  The abdomen is protuberant. There is no appreciable palpable masses. There is no appreciable right upper quadrant tenderness on today's exam.    EXTREMITIES:  Extremities demonstrated no cyanosis or pitting edema bilaterally. PSYCHIATRIC:  Patient is oriented to time, place and person.    The patient's mood and affect are normal.      IMPRESSION:  1)Recent hospitalization for pancreatitis, resolved  2) Prior cholelithiasis, now ultrasound showing no evidence of gallstones  3) morbid obesity        Diagnosis Date    CAD (coronary artery disease)     [address]     Diabetes mellitus ([address]     GERD (gastroesophageal reflux disease)     Hypertension          PLAN:   I still believe we need to hold off on surgery. I will have her continue to do a low-fat diet and plan to see her back next month.

## 2021-11-19 ENCOUNTER — HOSPITAL ENCOUNTER (OUTPATIENT)
Dept: LAB | Age: 46
Discharge: HOME | End: 2021-11-19

## 2021-11-19 LAB
INR BLD: 3.09 (ref 0.88–1.18)
PROTHROMBIN TIME: 31.1 SEC (ref 12–14.6)

## 2021-11-24 ENCOUNTER — HOSPITAL ENCOUNTER (OUTPATIENT)
Dept: LAB | Age: 46
Discharge: HOME | End: 2021-11-24
Payer: MEDICARE

## 2021-11-24 LAB
INR BLD: 2.76 (ref 0.88–1.18)
PROTHROMBIN TIME: 28.6 SEC (ref 12–14.6)

## 2021-11-24 PROCEDURE — 36415 COLL VENOUS BLD VENIPUNCTURE: CPT

## 2021-11-24 PROCEDURE — 85610 PROTHROMBIN TIME: CPT

## 2021-11-29 ENCOUNTER — OFFICE VISIT (OUTPATIENT)
Dept: URGENT CARE | Age: 46
End: 2021-11-29
Payer: MEDICARE

## 2021-11-29 VITALS
WEIGHT: 293 LBS | DIASTOLIC BLOOD PRESSURE: 92 MMHG | OXYGEN SATURATION: 100 % | SYSTOLIC BLOOD PRESSURE: 148 MMHG | TEMPERATURE: 97.4 F | RESPIRATION RATE: 24 BRPM | HEART RATE: 101 BPM | BODY MASS INDEX: 61.97 KG/M2

## 2021-11-29 DIAGNOSIS — R05.9 COUGH: ICD-10-CM

## 2021-11-29 DIAGNOSIS — Z11.59 SCREENING FOR VIRAL DISEASE: ICD-10-CM

## 2021-11-29 DIAGNOSIS — J20.9 ACUTE BRONCHITIS, UNSPECIFIED ORGANISM: Primary | ICD-10-CM

## 2021-11-29 DIAGNOSIS — J02.9 PHARYNGITIS, UNSPECIFIED ETIOLOGY: ICD-10-CM

## 2021-11-29 LAB
INFLUENZA A ANTIBODY: NEGATIVE
INFLUENZA B ANTIBODY: NEGATIVE
S PYO AG THROAT QL: NORMAL
SARS-COV-2, PCR: NOT DETECTED

## 2021-11-29 PROCEDURE — 99214 OFFICE O/P EST MOD 30 MIN: CPT | Performed by: NURSE PRACTITIONER

## 2021-11-29 PROCEDURE — 87880 STREP A ASSAY W/OPTIC: CPT | Performed by: NURSE PRACTITIONER

## 2021-11-29 PROCEDURE — 87804 INFLUENZA ASSAY W/OPTIC: CPT | Performed by: NURSE PRACTITIONER

## 2021-11-29 RX ORDER — AMLODIPINE BESYLATE 5 MG/1
5 TABLET ORAL DAILY
COMMUNITY

## 2021-11-29 RX ORDER — METHYLPREDNISOLONE 4 MG/1
TABLET ORAL
Qty: 1 KIT | Refills: 0 | Status: SHIPPED | OUTPATIENT
Start: 2021-11-29 | End: 2022-04-05

## 2021-11-29 RX ORDER — DEXTROMETHORPHAN HYDROBROMIDE AND PROMETHAZINE HYDROCHLORIDE 15; 6.25 MG/5ML; MG/5ML
5 SYRUP ORAL NIGHTLY PRN
Qty: 120 ML | Refills: 0 | Status: SHIPPED | OUTPATIENT
Start: 2021-11-29

## 2021-11-29 RX ORDER — LISINOPRIL 2.5 MG/1
1 TABLET ORAL DAILY
COMMUNITY
Start: 2021-10-09

## 2021-11-29 RX ORDER — BENZONATATE 100 MG/1
100 CAPSULE ORAL 3 TIMES DAILY PRN
Qty: 21 CAPSULE | Refills: 0 | Status: SHIPPED | OUTPATIENT
Start: 2021-11-29 | End: 2021-12-06

## 2021-11-29 RX ORDER — ERGOCALCIFEROL 1.25 MG/1
1 CAPSULE ORAL WEEKLY
COMMUNITY
Start: 2021-09-26

## 2021-11-29 RX ORDER — CEFDINIR 300 MG/1
300 CAPSULE ORAL 2 TIMES DAILY
Qty: 20 CAPSULE | Refills: 0 | Status: SHIPPED | OUTPATIENT
Start: 2021-11-29 | End: 2021-12-09

## 2021-11-29 ASSESSMENT — ENCOUNTER SYMPTOMS
VOMITING: 1
WHEEZING: 1
COUGH: 1
SORE THROAT: 1

## 2021-11-29 NOTE — PATIENT INSTRUCTIONS
Patient Education        Bronchitis: Care Instructions  Your Care Instructions     Bronchitis is inflammation of the bronchial tubes, which carry air to the lungs. The tubes swell and produce mucus, or phlegm. The mucus and inflamed bronchial tubes make you cough. You may have trouble breathing. Most cases of bronchitis are caused by viruses like those that cause colds. Antibiotics usually do not help and they may be harmful. Bronchitis usually develops rapidly and lasts about 2 to 3 weeks in otherwise healthy people. Follow-up care is a key part of your treatment and safety. Be sure to make and go to all appointments, and call your doctor if you are having problems. It's also a good idea to know your test results and keep a list of the medicines you take. How can you care for yourself at home? · Take all medicines exactly as prescribed. Call your doctor if you think you are having a problem with your medicine. · Get some extra rest.  · Take an over-the-counter pain medicine, such as acetaminophen (Tylenol), ibuprofen (Advil, Motrin), or naproxen (Aleve) to reduce fever and relieve body aches. Read and follow all instructions on the label. · Do not take two or more pain medicines at the same time unless the doctor told you to. Many pain medicines have acetaminophen, which is Tylenol. Too much acetaminophen (Tylenol) can be harmful. · Take an over-the-counter cough medicine to help quiet a dry, hacking cough so that you can sleep. Avoid cough medicines that have more than one active ingredient. Read and follow all instructions on the label. · Do not smoke. Smoking can make bronchitis worse. If you need help quitting, talk to your doctor about stop-smoking programs and medicines. These can increase your chances of quitting for good. When should you call for help? Call 911 anytime you think you may need emergency care. For example, call if:    · You have severe trouble breathing.    Call your doctor now or seek immediate medical care if:    · You have new or worse trouble breathing.     · You cough up dark brown or bloody mucus (sputum).     · You have a new or higher fever.     · You have a new rash. Watch closely for changes in your health, and be sure to contact your doctor if:    · You cough more deeply or more often, especially if you notice more mucus or a change in the color of your mucus.     · You are not getting better as expected. Where can you learn more? Go to https://FNZ.MeetDoctor. org and sign in to your [employer] account. Enter H333 in the [employer] box to learn more about \"Bronchitis: Care Instructions. \"     If you do not have an account, please click on the \"Sign Up Now\" link. Current as of: July 6, 2021               Content Version: 13.0  © 5418-3362 [employer]. Care instructions adapted under license by [facility] [place] ([facility]). If you have questions about a medical condition or this instruction, always ask your healthcare professional. [place] [address] any warranty or liability for your use of this information. Patient Education        Learning About Coronavirus [phone])  What is coronavirus (COVID-19)? COVID-19 is a disease caused by a type of coronavirus. This illness was first found in December 2019. It has since spread worldwide. Coronaviruses are a large group of viruses. They cause the common cold. They also cause more serious illnesses like [place] respiratory syndrome (MERS) and severe acute respiratory syndrome (SARS). COVID-19 is caused by a novel coronavirus. That means it's a new type that has not been seen in people before. What are the symptoms? COVID-19 symptoms may include:  · Fever. · Cough. · Trouble breathing. · Chills or repeated shaking with chills. · Muscle and body aches. · Headache. · Sore throat. · New loss of taste or smell. · Vomiting. · Diarrhea.   In severe cases, COVID-19 can cause pneumonia and make it hard to breathe without help from a machine. It can cause death. How is it diagnosed? COVID-19 is diagnosed with a viral test. This may also be called a PCR test or antigen test. It looks for evidence of the virus in your breathing passages or lungs (respiratory system). The test is most often done on a sample from the nose, throat, or lungs. It's sometimes done on a sample of saliva. One way a sample is collected is by putting a long swab into the back of your nose. How is it treated? Mild cases of COVID-19 can be treated at home. Serious cases need treatment in the hospital. Treatment may include medicines to reduce symptoms, plus breathing support such as oxygen therapy or a ventilator. Some people may be placed on their belly to help their oxygen levels. Treatments that may help people who have COVID-19 include:  Antiviral medicines. These medicines treat viral infections. Remdesivir is an example. Immune-based therapy. These medicines help the immune system fight COVID-19. Examples include monoclonal antibodies. Blood thinners. These medicines help prevent blood clots. People with severe illness are at risk for blood clots. How can you protect yourself and others? The best way to protect yourself from getting sick is to:  · Get vaccinated. · Avoid sick people. · If you are not fully vaccinated:  ? Wear a mask if you have to go to public areas. ? Avoid crowds and try to stay at least 6 feet away from other people. · Cover your mouth with a tissue when you cough or sneeze. · Wash your hands often, especially after you cough or sneeze. Use soap and water, and scrub for at least 20 seconds. If soap and water aren't available, use an alcohol-based hand . · Avoid touching your mouth, nose, and eyes. To help avoid spreading the virus to others:  · Get vaccinated. · Cover your mouth with a tissue when you cough or sneeze.   · Wash your hands often, especially after you cough or sneeze. Use soap and water, and scrub for at least 20 seconds. If soap and water aren't available, use an alcohol-based hand . · If you have been exposed to the virus and are not fully vaccinated:  ? Stay home. Don't go to school, work, or public areas. And don't use public transportation, ride-shares, or taxis unless you have no choice. ? Wear a mask if you have to go to public areas, like the pharmacy. · If you're sick:  ? Leave your home only if you need to get medical care. But call the doctor's office first so they know you're coming. And wear a mask. ? Wear a mask whenever you're around other people. ? Limit contact with pets and people in your home. If possible, stay in a separate bedroom and use a separate bathroom. ? Clean and disinfect your home every day. Use household  and disinfectant wipes or sprays. Take special care to clean things that you touch with your hands. How can you self-isolate when you have COVID-19? If you have COVID-19, there are things you can do to help avoid spreading the virus to others. · Limit contact with people in your home. If possible, stay in a separate bedroom and use a separate bathroom. · Wear a mask when you are around other people. · If you have to leave home, avoid crowds and try to stay at least 6 feet away from other people. · Avoid contact with pets and other animals. · Cover your mouth and nose with a tissue when you cough or sneeze. Then throw it in the trash right away. · Wash your hands often, especially after you cough or sneeze. Use soap and water, and scrub for at least 20 seconds. If soap and water aren't available, use an alcohol-based hand . · Don't share personal household items. These include bedding, towels, cups and glasses, and eating utensils. · [address] in the warmest water allowed for the fabric type, and dry it completely. It's okay to wash other people's laundry with yours.   · Clean and disinfect your home. Use household  and disinfectant wipes or sprays. When should you call for help? Call 911 anytime you think you may need emergency care. For example, call if you have life-threatening symptoms, such as:    · You have severe trouble breathing. (You can't talk at all.)     · You have constant chest pain or pressure.     · You are severely dizzy or lightheaded.     · You are confused or can't think clearly.     · You have pale, gray, or blue-colored skin or lips.     · You pass out (lose consciousness) or are very hard to wake up. Call your doctor now or seek immediate medical care if:    · You have moderate trouble breathing. (You can't speak a full sentence.)     · You are coughing up blood (more than about 1 teaspoon).     · You have signs of low blood pressure. These include feeling lightheaded; being too weak to stand; and having cold, pale, clammy skin. Watch closely for changes in your health, and be sure to contact your doctor if:    · Your symptoms get worse.     · You are not getting better as expected.     · You have new or worse symptoms of anxiety, depression, nightmares, or flashbacks. Call before you go to the doctor's office. Follow their instructions. And wear a mask. Current as of: July 1, 2021               Content Version: 13.0  © 2006-2021 [employer]. Care instructions adapted under license by [facility] ([facility]). If you have questions about a medical condition or this instruction, always ask your healthcare professional. [place] [address] any warranty or liability for your use of this information. 1. Take cefdinir for full course  2. Medrol dose pack - closely monitor your blood sugard  3. Take tessalon and promethazine as needed for coughing  4. Monitor fever and treat as needed with Tylenol/Motrin  5. If patient is not improving or developing any new/worsening symptoms then return to clinic as needed or go to ER.   Patient is to follow up with PCP as needed.

## 2021-11-29 NOTE — PROGRESS NOTES
[address] [facility]  [address] [address] [place] [id number]  Dept: [phone]  Dept Fax: Ludy Oliveira: [phone]    Sujit Campoverde is a 55 y.o. female who presents today for her medical conditions/complaintsas noted below. Sujit Campoverde is c/o of Shortness of Breath, Cough, Emesis, Chills, and Generalized Body Aches        HPI:     Cough  This is a new problem. The current episode started in the past 7 days. The problem has been unchanged. The problem occurs constantly. The cough is non-productive. Associated symptoms include ear pain, myalgias, nasal congestion, a sore throat and wheezing. Pertinent negatives include no chills or fever. Associated symptoms comments: Vomiting from coughing so hard  . She has tried OTC cough suppressant for the symptoms. The treatment provided mild relief. Emesis   Associated symptoms include coughing and myalgias. Pertinent negatives include no chills or fever. Generalized Body Aches  Associated symptoms include congestion, coughing, myalgias and a sore throat. Pertinent negatives include no chills or fever.        Past Medical History:   Diagnosis Date    CAD (coronary artery disease)     [facility] occlusion with cerebral infarction ([address]     Diabetes mellitus ([address]     GERD (gastroesophageal reflux disease)     Hypertension      Past Surgical History:   Procedure Laterality Date    CARDIAC SURGERY      COLONOSCOPY N/A 09/02/2021    Dr Acosta , 4 year recall    CORONARY ARTERY BYPASS GRAFT      2016    TONSILLECTOMY      UPPER GASTROINTESTINAL ENDOSCOPY      Den Montaño, w/dil per patient    UPPER GASTROINTESTINAL ENDOSCOPY N/A 09/02/2021    Dr Levi Felix, [facility], (-[facility]       Family History   Problem Relation Age of Onset    Osteoarthritis Mother     Diabetes Mother     Hypertension Mother     Hypertension Father     Heart Attack Brother     Heart Failure Maternal Grandmother     Diabetes Maternal Grandfather     Heart Disease Maternal Grandfather     Heart Disease Paternal Grandmother     No Known Problems Paternal Grandfather     Colon Cancer Neg Hx     Colon Polyps Neg Hx     Esophageal Cancer Neg Hx     Liver Cancer Neg Hx     Liver Disease Neg Hx     Rectal Cancer Neg Hx     Stomach Cancer Neg Hx        Social History     Tobacco Use    Smoking status: Never Smoker    Smokeless tobacco: Never Used   Substance Use Topics    Alcohol use: Yes     Comment: occ      Current Outpatient Medications   Medication Sig Dispense Refill    amLODIPine (NORVASC) 5 MG tablet Take 5 mg by mouth daily      dapagliflozin (FARXIGA) 10 MG tablet Take 1 tablet by mouth daily      vitamin D (ERGOCALCIFEROL) 1.25 MG (43818 UT) CAPS capsule Take 1 capsule by mouth once a week      lisinopril (PRINIVIL;ZESTRIL) 2.5 MG tablet Take 1 tablet by mouth daily      methylPREDNISolone (MEDROL DOSEPACK) 4 MG tablet Take by mouth. 1 kit 0    promethazine-dextromethorphan (PROMETHAZINE-DM) 6.25-15 MG/5ML syrup Take 5 mLs by mouth nightly as needed for Cough 120 mL 0    benzonatate (TESSALON) 100 MG capsule Take 1 capsule by mouth 3 times daily as needed for Cough 21 capsule 0    cefdinir (OMNICEF) 300 MG capsule Take 1 capsule by mouth 2 times daily for 10 days 20 capsule 0    warfarin (COUMADIN) 10 MG tablet Take 10 mg by mouth 10mg 6 days and 15mg one day weekly      sucralfate (CARAFATE) 1 GM/10ML suspension Take 10 mLs by mouth 4 times daily 1200 mL 3    furosemide (LASIX) 40 MG tablet Take 2 tablets by mouth daily 60 tablet 0    gabapentin (NEURONTIN) 300 MG capsule Take 1 capsule by mouth 2 times daily as needed (Neuropathy/nerve pains) for up to 3 days.  Intended supply: 30 days 6 capsule 0    ondansetron (ZOFRAN ODT) 4 MG disintegrating tablet Take 1 tablet by mouth every 8 hours as needed for Nausea or Vomiting 15 tablet 0    nystatin (MYCOSTATIN) 069761 UNIT/GM powder Apply 3 times daily to lower groin, abdominal area and under bilateral breasts 1 Bottle 0    NONFORMULARY daily apple cider gummy       pantoprazole (PROTONIX) 40 MG tablet Take 1 tablet by mouth daily 90 tablet 3    ezetimibe (ZETIA) 10 MG tablet Take 10 mg by mouth daily       Exenatide (BYDUREON) 2 MG PEN INJECT 0.65 MLS ONCE Q WEEK      insulin glargine (BASAGLAR KWIKPEN) 100 UNIT/ML injection pen Inject 80 Units into the skin nightly       bethanechol (URECHOLINE) 25 MG tablet Take 1 tablet by mouth daily      metFORMIN (GLUCOPHAGE) 1000 MG tablet Take 1 tablet by mouth daily Hold till monday      insulin aspart (NOVOLOG) 100 UNIT/ML injection vial Inject 10 Units into the skin 4 times daily (before meals and nightly) Up to 10 units ss coverage as directed      spironolactone (ALDACTONE) 25 MG tablet Take 12.5 mg by mouth daily      topiramate (TOPAMAX) 50 MG tablet Take 1 tablet by mouth daily       aspirin 81 MG tablet Take 81 mg by mouth daily      metoprolol succinate ER (TOPROL-XL) 25 MG XL tablet Take 25 mg by mouth daily      prochlorperazine (COMPAZINE) 10 MG tablet Take 10 mg by mouth 2 times daily as needed (headache)       verapamil (CALAN) 40 MG tablet Take 40 mg by mouth every 12 hours      atorvastatin (LIPITOR) 40 MG tablet Take 80 mg by mouth nightly        No current facility-administered medications for this visit.      Allergies   Allergen Reactions    Bactrim [Sulfamethoxazole-Trimethoprim] Other (See Comments)     Yeast inf       Health Maintenance   Topic Date Due    Hepatitis C screen  Never done    Diabetic foot exam  Never done    A1C test (Diabetic or Prediabetic)  Never done    Diabetic retinal exam  Never done    HIV screen  Never done    Diabetic microalbuminuria test  Never done    Hepatitis B vaccine (1 of 3 - Risk 3-dose series) Never done    Cervical cancer screen  Never done    Flu vaccine (1) 09/01/2021    Lipid screen  09/20/2022    Potassium monitoring  09/24/2022    Creatinine monitoring  09/24/2022    Colon cancer screen colonoscopy  09/02/2025    DTaP/Tdap/Td vaccine (2 - Td or Tdap) 10/10/2027    Pneumococcal 0-64 years Vaccine (2 of 2 - PPSV23) 05/02/2040    COVID-19 Vaccine  Completed    Hepatitis A vaccine  Aged Out    Hib vaccine  Aged Out    Meningococcal (ACWY) vaccine  Aged Out       Subjective:     Review of Systems   Constitutional: Negative for chills and fever. HENT: Positive for congestion, ear pain and sore throat. Respiratory: Positive for cough and wheezing. Musculoskeletal: Positive for myalgias. All other systems reviewed and are negative.      :Objective      Physical Exam  Vitals and nursing note reviewed. Constitutional:       General: She is not in acute distress. Appearance: Normal appearance. She is well-developed. She is ill-appearing. She is not diaphoretic. HENT:      Head: Normocephalic and atraumatic. Right Ear: Tympanic membrane, ear canal and external ear normal.      Left Ear: Tympanic membrane, ear canal and external ear normal.      Nose: Nose normal.      Mouth/Throat:      Mouth: Mucous membranes are moist.      Pharynx: Oropharynx is clear. Posterior oropharyngeal erythema present. Eyes:      Conjunctiva/sclera: Conjunctivae normal.      Pupils: Pupils are equal, round, and reactive to light. Cardiovascular:      Rate and Rhythm: Normal rate and regular rhythm. Heart sounds: Normal heart sounds. No murmur heard. Pulmonary:      Effort: Pulmonary effort is normal. No respiratory distress. Breath sounds: Wheezing present. Musculoskeletal:      Cervical back: Normal range of motion. Skin:     General: Skin is warm and dry. Findings: No rash. Neurological:      Mental Status: She is alert and oriented to person, place, and time.    Psychiatric:         Mood and Affect: Mood normal.         Behavior: Behavior normal.       BP (!) 148/92   Pulse 101   Temp 97.4 °F (36.3 °C) (Temporal) Resp 24   Wt (!) 361 lb (163.7 kg)   LMP 08/15/2021   SpO2 100%   BMI 61.97 kg/m²     :Assessment       Diagnosis Orders   1. Acute bronchitis, unspecified organism  methylPREDNISolone (MEDROL DOSEPACK) 4 MG tablet    promethazine-dextromethorphan (PROMETHAZINE-DM) 6.25-15 MG/5ML syrup    benzonatate (TESSALON) 100 MG capsule   2. Cough  POCT rapid strep A    POCT Influenza A/B   3. Screening for viral disease  COVID-19   4. Pharyngitis, unspecified etiology  cefdinir (OMNICEF) 300 MG capsule       :Plan    1. Take cefdinir for full course  2. Medrol dose pack - closely monitor your blood sugard  3. Take tessalon and promethazine as needed for coughing  4. Monitor fever and treat as needed with Tylenol/Motrin  5. If patient is not improving or developing any new/worsening symptoms then return to clinic as needed or go to ER. Patient is to follow up with PCP as needed. 6. covid testing     Orders Placed This Encounter   Procedures    COVID-19     Scheduling Instructions:      1) Due to current limited availability of the COVID-19 test, tests will be prioritized based on responses to questions above. Testing may be delayed due to volume. 2) Print and instruct patient to adhere to CDC home isolation program. (Link Above)              3) Set up or refer patient for a monitoring program.              4) Have patient sign up for and leverage MyChart (if not previously done). Order Specific Question:   Is this test for diagnosis or screening? Answer:   Screening     Order Specific Question:   Symptomatic for COVID-19 as defined by CDC? Answer:   No     Order Specific Question:   Date of Symptom Onset     Answer:   N/A     Order Specific Question:   Hospitalized for COVID-19? Answer:   No     Order Specific Question:   Admitted to ICU for COVID-19? Answer:   No     Order Specific Question:   Employed in healthcare setting?      Answer:   Unknown     Order Specific Question:   Resident in a congregate (group) care setting? Answer:   Unknown     Order Specific Question:   Pregnant? Answer:   Unknown     Order Specific Question:   Previously tested for COVID-19? Answer: Yes    COVID-19    COVID-19    POCT rapid strep A    POCT Influenza A/B     Results for orders placed or performed in visit on 11/29/21   POCT rapid strep A   Result Value Ref Range    Strep A Ag None Detected None Detected   POCT Influenza A/B   Result Value Ref Range    Influenza A Ab negative     Influenza B Ab negative          No follow-ups on file. Orders Placed This Encounter   Medications    methylPREDNISolone (MEDROL DOSEPACK) 4 MG tablet     Sig: Take by mouth. Dispense:  1 kit     Refill:  0    promethazine-dextromethorphan (PROMETHAZINE-DM) 6.25-15 MG/5ML syrup     Sig: Take 5 mLs by mouth nightly as needed for Cough     Dispense:  120 mL     Refill:  0    benzonatate (TESSALON) 100 MG capsule     Sig: Take 1 capsule by mouth 3 times daily as needed for Cough     Dispense:  21 capsule     Refill:  0    cefdinir (OMNICEF) 300 MG capsule     Sig: Take 1 capsule by mouth 2 times daily for 10 days     Dispense:  20 capsule     Refill:  0       Patient given educational materials- see patient instructions. Discussed use, benefit, and side effects of prescribedmedications. All patient questions answered. Pt voiced understanding. Patient Instructions       Patient Education        Bronchitis: Care Instructions  Your Care Instructions     Bronchitis is inflammation of the bronchial tubes, which carry air to the lungs. The tubes swell and produce mucus, or phlegm. The mucus and inflamed bronchial tubes make you cough. You may have trouble breathing. Most cases of bronchitis are caused by viruses like those that cause colds. Antibiotics usually do not help and they may be harmful. Bronchitis usually develops rapidly and lasts about 2 to 3 weeks in otherwise healthy people.   Follow-up care is a key part of your treatment and safety. Be sure to make and go to all appointments, and call your doctor if you are having problems. It's also a good idea to know your test results and keep a list of the medicines you take. How can you care for yourself at home? · Take all medicines exactly as prescribed. Call your doctor if you think you are having a problem with your medicine. · Get some extra rest.  · Take an over-the-counter pain medicine, such as acetaminophen (Tylenol), ibuprofen (Advil, Motrin), or naproxen (Aleve) to reduce fever and relieve body aches. Read and follow all instructions on the label. · Do not take two or more pain medicines at the same time unless the doctor told you to. Many pain medicines have acetaminophen, which is Tylenol. Too much acetaminophen (Tylenol) can be harmful. · Take an over-the-counter cough medicine to help quiet a dry, hacking cough so that you can sleep. Avoid cough medicines that have more than one active ingredient. Read and follow all instructions on the label. · Do not smoke. Smoking can make bronchitis worse. If you need help quitting, talk to your doctor about stop-smoking programs and medicines. These can increase your chances of quitting for good. When should you call for help? Call 911 anytime you think you may need emergency care. For example, call if:    · You have severe trouble breathing. Call your doctor now or seek immediate medical care if:    · You have new or worse trouble breathing.     · You cough up dark brown or bloody mucus (sputum).     · You have a new or higher fever.     · You have a new rash. Watch closely for changes in your health, and be sure to contact your doctor if:    · You cough more deeply or more often, especially if you notice more mucus or a change in the color of your mucus.     · You are not getting better as expected. Where can you learn more? Go to [email or web address] org and sign in to your MyChart account. Enter H333 in the [facility] box to learn more about \"Bronchitis: Care Instructions. \"     If you do not have an account, please click on the \"Sign Up Now\" link. Current as of: July 6, 2021               Content Version: 13.0  © 3149-7669 Healthwise, [employer]. Care instructions adapted under license by [facility] ([facility]). If you have questions about a medical condition or this instruction, always ask your healthcare professional. [address] any warranty or liability for your use of this information. Patient Education        Learning About Coronavirus [phone])  What is coronavirus (COVID-19)? COVID-19 is a disease caused by a type of coronavirus. This illness was first found in December 2019. It has since spread worldwide. Coronaviruses are a large group of viruses. They cause the common cold. They also cause more serious illnesses like [place] respiratory syndrome (MERS) and severe acute respiratory syndrome (SARS). COVID-19 is caused by a novel coronavirus. That means it's a new type that has not been seen in people before. What are the symptoms? COVID-19 symptoms may include:  · Fever. · Cough. · Trouble breathing. · Chills or repeated shaking with chills. · Muscle and body aches. · Headache. · Sore throat. · New loss of taste or smell. · Vomiting. · Diarrhea. In severe cases, COVID-19 can cause pneumonia and make it hard to breathe without help from a machine. It can cause death. How is it diagnosed? COVID-19 is diagnosed with a viral test. This may also be called a PCR test or antigen test. It looks for evidence of the virus in your breathing passages or lungs (respiratory system). The test is most often done on a sample from the nose, throat, or lungs. It's sometimes done on a sample of saliva. One way a sample is collected is by putting a long swab into the back of your nose. How is it treated?   Mild cases of COVID-19 can be treated at home. Serious cases need treatment in the hospital. Treatment may include medicines to reduce symptoms, plus breathing support such as oxygen therapy or a ventilator. Some people may be placed on their belly to help their oxygen levels. Treatments that may help people who have COVID-19 include:  Antiviral medicines. These medicines treat viral infections. Remdesivir is an example. Immune-based therapy. These medicines help the immune system fight COVID-19. Examples include monoclonal antibodies. Blood thinners. These medicines help prevent blood clots. People with severe illness are at risk for blood clots. How can you protect yourself and others? The best way to protect yourself from getting sick is to:  · Get vaccinated. · Avoid sick people. · If you are not fully vaccinated:  ? Wear a mask if you have to go to public areas. ? Avoid crowds and try to stay at least 6 feet away from other people. · Cover your mouth with a tissue when you cough or sneeze. · Wash your hands often, especially after you cough or sneeze. Use soap and water, and scrub for at least 20 seconds. If soap and water aren't available, use an alcohol-based hand . · Avoid touching your mouth, nose, and eyes. To help avoid spreading the virus to others:  · Get vaccinated. · Cover your mouth with a tissue when you cough or sneeze. · Wash your hands often, especially after you cough or sneeze. Use soap and water, and scrub for at least 20 seconds. If soap and water aren't available, use an alcohol-based hand . · If you have been exposed to the virus and are not fully vaccinated:  ? Stay home. Don't go to school, work, or public areas. And don't use public transportation, ride-shares, or taxis unless you have no choice. ? Wear a mask if you have to go to public areas, like the pharmacy. · If you're sick:  ? Leave your home only if you need to get medical care.  But call the doctor's office first so they know you're coming. And wear a mask. ? Wear a mask whenever you're around other people. ? Limit contact with pets and people in your home. If possible, stay in a separate bedroom and use a separate bathroom. ? Clean and disinfect your home every day. Use household  and disinfectant wipes or sprays. Take special care to clean things that you touch with your hands. How can you self-isolate when you have COVID-19? If you have COVID-19, there are things you can do to help avoid spreading the virus to others. · Limit contact with people in your home. If possible, stay in a separate bedroom and use a separate bathroom. · Wear a mask when you are around other people. · If you have to leave home, avoid crowds and try to stay at least 6 feet away from other people. · Avoid contact with pets and other animals. · Cover your mouth and nose with a tissue when you cough or sneeze. Then throw it in the trash right away. · Wash your hands often, especially after you cough or sneeze. Use soap and water, and scrub for at least 20 seconds. If soap and water aren't available, use an alcohol-based hand . · Don't share personal household items. These include bedding, towels, cups and glasses, and eating utensils. · [address] in the warmest water allowed for the fabric type, and dry it completely. It's okay to wash other people's laundry with yours. · Clean and disinfect your home. Use household  and disinfectant wipes or sprays. When should you call for help? Call 911 anytime you think you may need emergency care. For example, call if you have life-threatening symptoms, such as:    · You have severe trouble breathing.  (You can't talk at all.)     · You have constant chest pain or pressure.     · You are severely dizzy or lightheaded.     · You are confused or can't think clearly.     · You have pale, gray, or blue-colored skin or lips.     · You pass out (lose consciousness) or are very

## 2021-12-02 ENCOUNTER — HOSPITAL ENCOUNTER (OUTPATIENT)
Dept: LAB | Age: 46
Discharge: HOME | End: 2021-12-02
Payer: MEDICARE

## 2021-12-02 LAB
INR BLD: 2.25 (ref 0.88–1.18)
PROTHROMBIN TIME: 24.5 SEC (ref 12–14.6)

## 2021-12-02 PROCEDURE — 36415 COLL VENOUS BLD VENIPUNCTURE: CPT

## 2021-12-02 PROCEDURE — 85610 PROTHROMBIN TIME: CPT

## 2021-12-07 ENCOUNTER — HOSPITAL ENCOUNTER (OUTPATIENT)
Dept: LAB | Age: 46
Discharge: HOME | End: 2021-12-07
Payer: MEDICARE

## 2021-12-07 LAB
INR BLD: 3.29 (ref 0.88–1.18)
PROTHROMBIN TIME: 32.6 SEC (ref 12–14.6)

## 2021-12-07 PROCEDURE — 36415 COLL VENOUS BLD VENIPUNCTURE: CPT

## 2021-12-07 PROCEDURE — 85610 PROTHROMBIN TIME: CPT

## 2021-12-15 ENCOUNTER — OFFICE VISIT (OUTPATIENT)
Dept: SURGERY | Age: 46
End: 2021-12-15
Payer: MEDICARE

## 2021-12-15 VITALS
TEMPERATURE: 97 F | DIASTOLIC BLOOD PRESSURE: 82 MMHG | HEIGHT: 64 IN | HEART RATE: 101 BPM | BODY MASS INDEX: 50.02 KG/M2 | WEIGHT: 293 LBS | SYSTOLIC BLOOD PRESSURE: 124 MMHG | OXYGEN SATURATION: 99 %

## 2021-12-15 DIAGNOSIS — E66.01 MORBID OBESITY DUE TO EXCESS CALORIES (HCC): Primary | ICD-10-CM

## 2021-12-15 DIAGNOSIS — K81.9 ACALCULOUS CHOLECYSTITIS: ICD-10-CM

## 2021-12-15 PROCEDURE — 99212 OFFICE O/P EST SF 10 MIN: CPT | Performed by: PHYSICIAN ASSISTANT

## 2021-12-15 NOTE — PROGRESS NOTES
HISTORY OF PRESENT ILLNESS:  Lavon Schmidt is a 55-year-old female who presents with a history of pancreatitis. Of note, she had an ultrasound performed as an outpatient that showed a gallstone in March 2021. Most recent ultrasound showed no evidence of gallstones. She had a HIDA scan that showed a 70% ejection fraction. CT scan showed some stranding around the pancreas and some mild elevation in her lipase suggestive of pancreatitis. She now reports a recent episode of bronchitis which caused her to have severe coughing and she is having some abdominal wall muscle pain. She has some mild nausea no vomiting no change in bowel habits. Patient Active Problem List    Diagnosis Date Noted    Gallstones 09/24/2021    Abnormal biliary HIDA scan 09/24/2021    Chronic anticoagulation 09/24/2021    History of gallstones 09/21/2021    Chest pain 02/24/2018    Gastroesophageal reflux disease without esophagitis     Surgical wound dehiscence     Atherosclerosis of native coronary artery of native heart with unstable angina pectoris ([facility] 08/04/2016    Hypertension 08/04/2016    Type 2 diabetes mellitus with diabetic polyneuropathy, with long-term current use of insulin ([facility] 08/04/2016    Mixed hyperlipidemia 08/04/2016    NAYAN (obstructive sleep apnea) 08/04/2016    Morbid obesity due to excess calories ([facility] 08/04/2016    Acute blood loss anemia 08/04/2016    [facility] occlusion with cerebral infarction ([facility] 08/04/2016     Current Outpatient Medications   Medication Sig Dispense Refill    amLODIPine (NORVASC) 5 MG tablet Take 5 mg by mouth daily      dapagliflozin (FARXIGA) 10 MG tablet Take 1 tablet by mouth daily      vitamin D (ERGOCALCIFEROL) 1.25 MG (90812 UT) CAPS capsule Take 1 capsule by mouth once a week      lisinopril (PRINIVIL;ZESTRIL) 2.5 MG tablet Take 1 tablet by mouth daily      methylPREDNISolone (MEDROL DOSEPACK) 4 MG tablet Take by mouth.  1 kit 0    promethazine-dextromethorphan (PROMETHAZINE-DM) 6.25-15 MG/5ML syrup Take 5 mLs by mouth nightly as needed for Cough 120 mL 0    warfarin (COUMADIN) 10 MG tablet Take 10 mg by mouth 10mg 6 days and 15mg one day weekly      sucralfate (CARAFATE) 1 GM/10ML suspension Take 10 mLs by mouth 4 times daily 1200 mL 3    ondansetron (ZOFRAN ODT) 4 MG disintegrating tablet Take 1 tablet by mouth every 8 hours as needed for Nausea or Vomiting 15 tablet 0    nystatin (MYCOSTATIN) 373044 UNIT/GM powder Apply 3 times daily to lower groin, abdominal area and under bilateral breasts 1 Bottle 0    NONFORMULARY daily apple cider gummy       pantoprazole (PROTONIX) 40 MG tablet Take 1 tablet by mouth daily 90 tablet 3    ezetimibe (ZETIA) 10 MG tablet Take 10 mg by mouth daily       Exenatide (BYDUREON) 2 MG PEN INJECT 0.65 MLS ONCE Q WEEK      insulin glargine (BASAGLAR KWIKPEN) 100 UNIT/ML injection pen Inject 80 Units into the skin nightly       bethanechol (URECHOLINE) 25 MG tablet Take 1 tablet by mouth daily      metFORMIN (GLUCOPHAGE) 1000 MG tablet Take 1 tablet by mouth daily Hold till monday      insulin aspart (NOVOLOG) 100 UNIT/ML injection vial Inject 10 Units into the skin 4 times daily (before meals and nightly) Up to 10 units ss coverage as directed      spironolactone (ALDACTONE) 25 MG tablet Take 12.5 mg by mouth daily      topiramate (TOPAMAX) 50 MG tablet Take 1 tablet by mouth daily       aspirin 81 MG tablet Take 81 mg by mouth daily      metoprolol succinate ER (TOPROL-XL) 25 MG XL tablet Take 25 mg by mouth daily      prochlorperazine (COMPAZINE) 10 MG tablet Take 10 mg by mouth 2 times daily as needed (headache)       verapamil (CALAN) 40 MG tablet Take 40 mg by mouth every 12 hours      atorvastatin (LIPITOR) 40 MG tablet Take 80 mg by mouth nightly       furosemide (LASIX) 40 MG tablet Take 2 tablets by mouth daily 60 tablet 0    gabapentin (NEURONTIN) 300 MG capsule Take 1 capsule by mouth 2 times daily as needed (Neuropathy/nerve pains) for up to 3 days. Intended supply: 30 days 6 capsule 0     No current facility-administered medications for this visit. Allergies: Bactrim [sulfamethoxazole-trimethoprim]  Past Medical History:   Diagnosis Date    CAD (coronary artery disease)     [facility] occlusion [facility] infarction [address]     Diabetes mellitus [facility]     GERD (gastroesophageal reflux disease)     Hypertension      Past Surgical History:   Procedure Laterality Date    CARDIAC SURGERY      COLONOSCOPY N/A 09/02/2021    Dr Tari Lucas, Sub prep [place], 4 year recall    CORONARY ARTERY BYPASS GRAFT      2016    TONSILLECTOMY      UPPER GASTROINTESTINAL ENDOSCOPY      [place], w/dil per patient    UPPER GASTROINTESTINAL ENDOSCOPY N/A 09/02/2021    Dr Tari Lucas, BDM, (-)[place]     Family History   Problem Relation Age of Onset    Osteoarthritis Mother     Diabetes Mother     Hypertension Mother     Hypertension Father     Heart Attack Brother     Heart Failure Maternal Grandmother     Diabetes Maternal Grandfather     Heart Disease Maternal Grandfather     Heart Disease Paternal Grandmother     No Known Problems Paternal Grandfather     Colon Cancer Neg Hx     Colon Polyps Neg Hx     Esophageal Cancer Neg Hx     Liver Cancer Neg Hx     Liver Disease Neg Hx     Rectal Cancer Neg Hx     Stomach Cancer Neg Hx      Social History     Tobacco Use    Smoking status: Never Smoker    Smokeless tobacco: Never Used   Substance Use Topics    Alcohol use: Yes     Comment: occ       Review of Systems  Reviewed and positive for the above. All other systems noted to be negative. Physical Exam  Blood pressure 124/82, pulse 101, temperature 97 °F (36.1 °C), height 5' 4\" (1.626 m), weight (!) 363 lb (164.7 kg), SpO2 99 %. GENERAL:  Reveals an obese 55 y.o. female that  appears to be in no acute distress.   Her breathing today seems to be a bit labored. CHEST:   Chest is clear to auscultation anteriorly and posteriorly. HEART:  Heart demonstrated a regular rhythm and rate with no cardiac murmurs, gallops or rubs noted to auscultation. ABDOMEN:  The abdomen is protuberant. There is no appreciable palpable masses. There is no appreciable right upper quadrant tenderness on today's exam.    EXTREMITIES:  Extremities demonstrated no cyanosis or pitting edema bilaterally. PSYCHIATRIC:  Patient is oriented to time, place and person. The patient's mood and affect are normal.      IMPRESSION:  1)Recent hospitalization for pancreatitis, resolved  2) Prior cholelithiasis, now ultrasound showing no evidence of gallstones  3) morbid obesity        Diagnosis Date    CAD (coronary artery disease)     [facility] occlusion with cerebral infarction ([address]     Diabetes mellitus ([address]     GERD (gastroesophageal reflux disease)     Hypertension          PLAN:   I believe it would be a good idea to refer Ms. Galan to Dr. Mechelle Sandoval at [facility] to discuss possible cholecystectomy and also to discuss bariatric procedures. We will see her back as needed.

## 2021-12-16 ENCOUNTER — HOSPITAL ENCOUNTER (OUTPATIENT)
Dept: LAB | Age: 46
Discharge: HOME | End: 2021-12-16
Payer: MEDICARE

## 2021-12-16 LAB
INR BLD: 3.45 (ref 0.88–1.18)
PROTHROMBIN TIME: 33.8 SEC (ref 12–14.6)

## 2021-12-16 PROCEDURE — 85610 PROTHROMBIN TIME: CPT

## 2021-12-16 PROCEDURE — 36415 COLL VENOUS BLD VENIPUNCTURE: CPT

## 2021-12-17 ENCOUNTER — TELEPHONE (OUTPATIENT)
Dept: SURGERY | Age: 46
End: 2021-12-17

## 2021-12-22 ENCOUNTER — HOSPITAL ENCOUNTER (OUTPATIENT)
Dept: LAB | Age: 46
Discharge: HOME | End: 2021-12-22
Payer: MEDICARE

## 2021-12-22 LAB
INR BLD: 3.64 (ref 0.88–1.18)
PROTHROMBIN TIME: 35.1 SEC (ref 12–14.6)

## 2021-12-22 PROCEDURE — 85610 PROTHROMBIN TIME: CPT

## 2021-12-22 PROCEDURE — 36415 COLL VENOUS BLD VENIPUNCTURE: CPT

## 2021-12-28 ENCOUNTER — OFFICE VISIT (OUTPATIENT)
Dept: BARIATRICS/WEIGHT MGMT | Facility: CLINIC | Age: 46
End: 2021-12-28

## 2021-12-28 VITALS
BODY MASS INDEX: 48.82 KG/M2 | SYSTOLIC BLOOD PRESSURE: 140 MMHG | WEIGHT: 293 LBS | DIASTOLIC BLOOD PRESSURE: 82 MMHG | TEMPERATURE: 97 F | HEIGHT: 65 IN | HEART RATE: 106 BPM | OXYGEN SATURATION: 96 %

## 2021-12-28 DIAGNOSIS — Z79.4 TYPE 2 DIABETES MELLITUS WITH OTHER SPECIFIED COMPLICATION, WITH LONG-TERM CURRENT USE OF INSULIN (HCC): ICD-10-CM

## 2021-12-28 DIAGNOSIS — I10 PRIMARY HYPERTENSION: ICD-10-CM

## 2021-12-28 DIAGNOSIS — E11.69 TYPE 2 DIABETES MELLITUS WITH OTHER SPECIFIED COMPLICATION, WITH LONG-TERM CURRENT USE OF INSULIN (HCC): ICD-10-CM

## 2021-12-28 DIAGNOSIS — E66.01 CLASS 3 SEVERE OBESITY DUE TO EXCESS CALORIES WITH SERIOUS COMORBIDITY AND BODY MASS INDEX (BMI) OF 60.0 TO 69.9 IN ADULT (HCC): Primary | ICD-10-CM

## 2021-12-28 PROCEDURE — 99214 OFFICE O/P EST MOD 30 MIN: CPT | Performed by: NURSE PRACTITIONER

## 2021-12-28 RX ORDER — GABAPENTIN 600 MG/1
600 TABLET ORAL 3 TIMES DAILY
COMMUNITY

## 2021-12-28 RX ORDER — NYSTATIN 100000 [USP'U]/G
POWDER TOPICAL 4 TIMES DAILY
COMMUNITY

## 2021-12-28 RX ORDER — ERGOCALCIFEROL 1.25 MG/1
50000 CAPSULE ORAL WEEKLY
COMMUNITY

## 2021-12-28 NOTE — PROGRESS NOTES
Patient Care Team:  Fátima Connor DO as PCP - General (Family Medicine)      Subjective     Patient is a 46 y.o. female presents with morbid obesity and her Body mass index is 62.02 kg/m².     She is here for discussion of surgical weight loss options.  She stated she has been with the disease of obesity for year(s).  She stated she suffers from diabetes, heart disease, MARTHA, hyperlipiedmia and morbid obesity due to her weight gain.  She stated that weight loss helps alleviate these symptoms.   She stated that she has tried other diet regimens such as calorie counting, weight watchers and slim fast to help with weight loss.  She stated that she has attempted these conservative methods for weight loss without maintaining long term success.  Today she would like to discuss surgical weight loss options such as the Laparoscopic Sleeve Gastrectomy or the Laparoscopic R - Y Gastric Bypass.     Review of Systems   Constitutional: Negative.    Respiratory: Negative.    Cardiovascular: Negative.    Gastrointestinal: Negative.    Endocrine: Negative.    Genitourinary: Positive for menstrual problem.   Musculoskeletal: Negative.    Skin: Positive for rash.   Neurological: Positive for numbness and headache.   Psychiatric/Behavioral: Negative.         History  Past Medical History:   Diagnosis Date   • Diabetes mellitus (HCC)    • Hypertension    • Stroke (HCC)       Past Surgical History:   Procedure Laterality Date   • CARDIAC SURGERY     • TONSILLECTOMY        Social History     Socioeconomic History   • Marital status: Single   Tobacco Use   • Smoking status: Never Smoker   • Smokeless tobacco: Never Used   Substance and Sexual Activity   • Alcohol use: Never      Family History   Problem Relation Age of Onset   • Diabetes Mother    • Hypertension Mother    • Heart disease Mother    • Obesity Mother    • Heart disease Father       Allergies   Allergen Reactions   • Sulfamethoxazole-Trimethoprim Unknown - Low  Severity     Gives her a Yeast infection          Current Outpatient Medications:   •  albuterol sulfate  (90 Base) MCG/ACT inhaler, Inhale 2 puffs Every 4 (Four) Hours As Needed for Wheezing., Disp: 3.1 g, Rfl: 0  •  amLODIPine (NORVASC) 5 MG tablet, Take 5 mg by mouth 1 (One) Time As Needed., Disp: , Rfl:   •  Aspirin Buf,CaCarb-MgCarb-MgO, 81 MG tablet, Take 81 mg by mouth Daily., Disp: , Rfl:   •  atorvastatin (LIPITOR) 80 MG tablet, Take 80 mg by mouth Daily., Disp: , Rfl:   •  bethanechol (URECHOLINE) 25 MG tablet, Take 25 mg by mouth Daily., Disp: , Rfl:   •  exenatide er (BYDUREON) 2 MG pen-injector injection, Inject 2 mg under the skin into the appropriate area as directed 1 (One) Time Per Week., Disp: , Rfl:   •  ezetimibe (ZETIA) 10 MG tablet, Take 10 mg by mouth Daily., Disp: , Rfl:   •  furosemide (LASIX) 40 MG tablet, Take 80 mg by mouth Daily., Disp: , Rfl:   •  gabapentin (NEURONTIN) 600 MG tablet, Take 600 mg by mouth 3 (Three) Times a Day., Disp: , Rfl:   •  insulin aspart (NovoLOG) 100 UNIT/ML injection, Inject  under the skin into the appropriate area as directed 3 (Three) Times a Day Before Meals., Disp: , Rfl:   •  Insulin Glargine, 1 Unit Dial, (Toujeo SoloStar) 300 UNIT/ML solution pen-injector injection, Toujeo SoloStar U-300 Insulin 300 unit/mL (1.5 mL) subcutaneous pen, Disp: , Rfl:   •  lisinopril (PRINIVIL,ZESTRIL) 2.5 MG tablet, Take 40 mg by mouth Daily., Disp: , Rfl:   •  metFORMIN ER (GLUCOPHAGE-XR) 500 MG 24 hr tablet, Take 500 mg by mouth Daily., Disp: , Rfl:   •  metoprolol succinate XL (TOPROL-XL) 25 MG 24 hr tablet, Take 25 mg by mouth Daily., Disp: , Rfl:   •  nystatin (MYCOSTATIN) 797463 UNIT/GM powder, Apply  topically to the appropriate area as directed 4 (Four) Times a Day., Disp: , Rfl:   •  pantoprazole (PROTONIX) 40 MG EC tablet, Take 40 mg by mouth Daily., Disp: , Rfl:   •  prochlorperazine (COMPAZINE) 10 MG tablet, Take 10 mg by mouth Daily., Disp: , Rfl:   •   spironolactone (ALDACTONE) 25 MG tablet, Take 25 mg by mouth Daily., Disp: , Rfl:   •  sucralfate (Carafate) 1 GM/10ML suspension, 10 mL Every 6 (Six) Hours., Disp: , Rfl:   •  topiramate (TOPAMAX) 100 MG tablet, Take 100 mg by mouth Daily., Disp: , Rfl:   •  verapamil (CALAN) 40 MG tablet, Take 40 mg by mouth., Disp: , Rfl:   •  vitamin D (ERGOCALCIFEROL) 1.25 MG (45810 UT) capsule capsule, Take 50,000 Units by mouth 1 (One) Time Per Week., Disp: , Rfl:   •  warfarin (COUMADIN) 10 MG tablet, Take 15 mg by mouth Every Other Day., Disp: , Rfl:   •  clindamycin (CLEOCIN) 300 MG capsule, Take 1 capsule by mouth 3 (Three) Times a Day., Disp: 30 capsule, Rfl: 0  •  cloNIDine (CATAPRES) 0.1 MG tablet, Take 0.1 mg by mouth Daily., Disp: , Rfl:   •  Dapagliflozin Propanediol 10 MG tablet, Take 10 mg by mouth Daily., Disp: , Rfl:   •  meloxicam (MOBIC) 15 MG tablet, Take 15 mg by mouth Daily., Disp: , Rfl:   •  traMADol (ULTRAM) 50 MG tablet, Take 50 mg by mouth Daily., Disp: , Rfl:     Objective     Vital Signs  Temp:  [97 °F (36.1 °C)] 97 °F (36.1 °C)  Heart Rate:  [106] 106  BP: (140)/(82) 140/82  Body mass index is 62.02 kg/m².      12/28/21  0911   Weight: (!) 166 kg (367 lb)       Physical Exam  Vitals reviewed.   Constitutional:       Appearance: She is obese.   Cardiovascular:      Rate and Rhythm: Normal rate and regular rhythm.   Pulmonary:      Effort: Pulmonary effort is normal.   Abdominal:      General: Bowel sounds are normal.      Palpations: Abdomen is soft.   Musculoskeletal:         General: Normal range of motion.   Skin:     General: Skin is warm and dry.   Neurological:      Mental Status: She is alert and oriented to person, place, and time.   Psychiatric:         Mood and Affect: Mood normal.         Behavior: Behavior normal.            Results Review:   I reviewed the patient's new clinical results.      Patient's Body mass index is 62.02 kg/m². indicating that she is morbidly obese (BMI > 40 or >  35 with obesity - related health condition). Obesity-related health conditions include the following: obstructive sleep apnea, hypertension, coronary heart disease, diabetes mellitus, dyslipidemias and GERD. Obesity is worsening. BMI is is above average; BMI management plan is completed. We discussed portion control, increasing exercise and consulting a Bariatric surgeon..      Assessment/Plan   Diagnoses and all orders for this visit:    1. Class 3 severe obesity due to excess calories with serious comorbidity and body mass index (BMI) of 60.0 to 69.9 in adult (HCC) (Primary)  -     Bariatric Nutritional Counseling    2. Primary hypertension    3. Type 2 diabetes mellitus with other specified complication, with long-term current use of insulin ([facility])          I discussed the patient's findings and my recommendations with patient.     I have also recommended that she obtain a cardiac risk assessment and psychiatric evaluation prior to surgery consideration.  1. Patient is a 46 y.o. female who has morbid obesity with Body mass index is 62.02 kg/m². and desires surgical weight loss. Patient has been advised that this surgery is considered to be elective major surgery that is typically done laparoscopically. Patient is a potentially good surgical candidate. Patient will need to meet with the Bariatric Surgeon to further discuss surgical options. Patient has been advised that they will need to have a work-up prior to surgery. This work-up will include but is not limited to an EKG, an EGD to assess for H. Pylori and Rivero's esophagus, and a psychological evaluation, with additional testing as necessary. Pre-op testing will be ordered at next visit. Today the patient  received the 4 meals/day diet prescription, which was explained to patient.  Patient will see the dietitian today to further discuss goals for diet, exercise, and lifestyle. Patient has received intensive behavioral therapy for obesity today. I explained  the pathophysiology of the disease and its storage component. We also discussed Dr. Hernandez' pearls of the program. Nutrition counseling ordered today.     2. Current comorbid condition of diabetes, HTN, hyperlipidemia, MARTHA, heart disease associated with her morbid obesity is reported to be stable on her current treatment regimen and medications. We anticipate the comorbid condition to improve as we address her morbid obesity.          Pre-op testing:     Seminar - Completed  EGD - Needed, but not yet ordered  H. Pylori - Will be done with EGD   H. Pylori Stool -  N/A    Psychological Evaluation - Needed, but not yet ordered    EKG - Needed, but not yet ordered    Cardiology - If EKG abnormal, cardiology will be ordered     TSH - Needed, but not yet ordered  Nicotine - N/A    Pulmonary Clearance - N/A   Drug Tests - N/A    Dietitian - Ordered    She has been provided a structured dietary regimen based off of her behavior.  I discussed with the patient the etiology of the disease of obesity and the potential comorbid conditions associated with this disease.  She was instructed to follow the dietary regimen and follow-up with our program in 1 month's time with any additional questions as they may arise during this time.  We emphasized on focusing on proteins and meals high in fiber as well as adequate hydration that exceed 64 ounces of water daily.    I explained that I anticipate the patient to lose weight prior to her next monthly visit.       Alize Kaiser, APRN     12/28/21  12:38 CST

## 2021-12-30 ENCOUNTER — HOSPITAL ENCOUNTER (OUTPATIENT)
Dept: LAB | Age: 46
Discharge: HOME | End: 2021-12-30
Payer: MEDICARE

## 2021-12-30 LAB
INR BLD: 2.89 (ref 0.88–1.18)
PROTHROMBIN TIME: 29.6 SEC (ref 12–14.6)

## 2021-12-30 PROCEDURE — 36415 COLL VENOUS BLD VENIPUNCTURE: CPT

## 2021-12-30 PROCEDURE — 85610 PROTHROMBIN TIME: CPT

## 2022-01-11 ENCOUNTER — HOSPITAL ENCOUNTER (OUTPATIENT)
Dept: LAB | Age: 47
Discharge: HOME | End: 2022-01-11
Payer: MEDICARE

## 2022-01-11 LAB
INR BLD: 1.9 (ref 0.88–1.18)
PROTHROMBIN TIME: 21.7 SEC (ref 12–14.6)

## 2022-01-11 PROCEDURE — 36415 COLL VENOUS BLD VENIPUNCTURE: CPT

## 2022-01-11 PROCEDURE — 85610 PROTHROMBIN TIME: CPT

## 2022-01-20 ENCOUNTER — HOSPITAL ENCOUNTER (OUTPATIENT)
Dept: LAB | Age: 47
Discharge: HOME | End: 2022-01-20
Payer: MEDICARE

## 2022-01-20 LAB
INR BLD: 2.6 (ref 0.88–1.18)
PROTHROMBIN TIME: 27.5 SEC (ref 12–14.6)

## 2022-01-20 PROCEDURE — 85610 PROTHROMBIN TIME: CPT

## 2022-01-20 PROCEDURE — 36415 COLL VENOUS BLD VENIPUNCTURE: CPT

## 2022-01-25 ENCOUNTER — OFFICE VISIT (OUTPATIENT)
Dept: BARIATRICS/WEIGHT MGMT | Facility: CLINIC | Age: 47
End: 2022-01-25

## 2022-01-25 VITALS
SYSTOLIC BLOOD PRESSURE: 114 MMHG | HEIGHT: 65 IN | WEIGHT: 293 LBS | HEART RATE: 98 BPM | BODY MASS INDEX: 48.82 KG/M2 | OXYGEN SATURATION: 97 % | DIASTOLIC BLOOD PRESSURE: 74 MMHG | TEMPERATURE: 97.9 F

## 2022-01-25 DIAGNOSIS — E66.01 CLASS 3 SEVERE OBESITY DUE TO EXCESS CALORIES WITH SERIOUS COMORBIDITY AND BODY MASS INDEX (BMI) OF 60.0 TO 69.9 IN ADULT: Primary | ICD-10-CM

## 2022-01-25 DIAGNOSIS — E11.69 TYPE 2 DIABETES MELLITUS WITH OTHER SPECIFIED COMPLICATION, WITH LONG-TERM CURRENT USE OF INSULIN: ICD-10-CM

## 2022-01-25 DIAGNOSIS — I10 PRIMARY HYPERTENSION: ICD-10-CM

## 2022-01-25 DIAGNOSIS — Z79.4 TYPE 2 DIABETES MELLITUS WITH OTHER SPECIFIED COMPLICATION, WITH LONG-TERM CURRENT USE OF INSULIN: ICD-10-CM

## 2022-01-25 PROCEDURE — 99213 OFFICE O/P EST LOW 20 MIN: CPT | Performed by: NURSE PRACTITIONER

## 2022-01-25 NOTE — PROGRESS NOTES
"Patient Care Team:  Fátima Connor DO as PCP - General (Family Medicine)    Reason for Visit:  Surgical Weight loss    Subjective   Cassidy Tellez is a 46 y.o. female.     Cassidy is here for follow up and continued medical management of her morbid obesity.  She is currently on a prescription diet.  Cassidy previously was to apply dietary changes such as following the meal plan as directed.  She admits to eating 2 meals per day.  As a result she gained weight since her last visit.    She admits to weaning off of soda. She states she is drinking 1-2 per day.  She states that she walks for exercise.  She admits to eating 2 meals per day and denies nicotine use.  She has gained 6 pounds over the past month.    Review Of Systems:  Review of Systems   Constitutional: Positive for fatigue.   Respiratory: Negative.    Cardiovascular: Negative.    Gastrointestinal: Negative.    Endocrine: Negative.    Musculoskeletal: Negative.    Neurological: Positive for headache.   Psychiatric/Behavioral: Negative.          The following portions of the patient's history were reviewed and updated as appropriate: allergies, current medications, past family history, past medical history, past social history, past surgical history, and problem list.    Objective   /74 (BP Location: Right arm, Patient Position: Sitting, Cuff Size: Thigh Adult)   Pulse 98   Temp 97.9 °F (36.6 °C)   Ht 163.8 cm (64.5\")   Wt (!) 169 kg (373 lb 6.4 oz)   SpO2 97%   BMI 63.10 kg/m²       01/25/22  1115   Weight: (!) 169 kg (373 lb 6.4 oz)       Physical Exam  Vitals reviewed.   Constitutional:       Appearance: She is obese.   Cardiovascular:      Rate and Rhythm: Normal rate and regular rhythm.   Pulmonary:      Effort: Pulmonary effort is normal.   Abdominal:      General: Bowel sounds are normal.      Palpations: Abdomen is soft.   Musculoskeletal:         General: Normal range of motion.   Skin:     General: Skin is warm and dry. "   Neurological:      Mental Status: She is alert and oriented to person, place, and time.   Psychiatric:         Mood and Affect: Mood normal.         Behavior: Behavior normal.         Patient's Body mass index is 63.1 kg/m². indicating that she is morbidly obese (BMI > 40 or > 35 with obesity - related health condition). Obesity-related health conditions include the following: obstructive sleep apnea, hypertension, diabetes mellitus and dyslipidemias. Obesity is worsening. BMI is is above average; BMI management plan is completed. We discussed portion control and increasing exercise..     Assessment/Plan   Diagnoses and all orders for this visit:    1. Class 3 severe obesity due to excess calories with serious comorbidity and body mass index (BMI) of 60.0 to 69.9 in adult ([facility]) (Primary)  -     Ambulatory Referral to Psychiatry    2. Type 2 diabetes mellitus with other specified complication, with long-term current use of insulin ([facility])  Comments:  Nutritional counseling provided.  Follow-up with PCP for changes if needed.  Patient encouraged to continue to monitor glucose levels.    3. Primary hypertension  Comments:  Nutritional counseling provided.  Follow-up with PCP for med changes if needed.         Cassidy Tellez was seen today for follow-up, obesity, nutrition counseling and weight loss.  She has gained weight since her last visit.  Today we discussed healthy changes in lifestyle, diet, and exercise. Dietician consultation obtained.  Cassidy Tellez had received handouts to her explaining the recommendation on portion sizes/appetite control/reading nutrition labels.   Intensive behavioral therapy for obesity was done today as well.     Goals for this month are:   1. Due to weight gain and BMI greater than 60 I am going to hold on adding additional pre surgery workup at this time. I will place referral for psychiatry.  Patient aware that she needs to have an appointment scheduled with them before her next visit  with us.  2. Eliminate soda and lemonade unless lemonade is sugar free   3. Increase meals from 2 to 4 per day, discussed goals which includes 3 separate goals.  Patient advised to focus on eating 4 meals per day and when she achieves this goal to then add a vegetable at each meal.  Patient was advised when she accomplishes goal 1 and go 2 to then begin eliminating group C after her second meal.      Follow up in one month for a weight recheck.A total of 20 minutes was spent face to face with this patient and over half of the time was spent on counseling and coordination of care for the disease of obesity. We specifically reviewed the dietary prescription and I made recommendations toward increasing exercise as tolerated as well as focusing on training their behavior toward storing less.

## 2022-02-02 ENCOUNTER — HOSPITAL ENCOUNTER (OUTPATIENT)
Dept: LAB | Age: 47
Discharge: HOME | End: 2022-02-02
Payer: MEDICARE

## 2022-02-02 LAB
INR BLD: 2.6 (ref 0.88–1.18)
PROTHROMBIN TIME: 27.5 SEC (ref 12–14.6)

## 2022-02-02 PROCEDURE — 36415 COLL VENOUS BLD VENIPUNCTURE: CPT

## 2022-02-02 PROCEDURE — 85610 PROTHROMBIN TIME: CPT

## 2022-02-17 ENCOUNTER — HOSPITAL ENCOUNTER (OUTPATIENT)
Dept: LAB | Age: 47
Discharge: HOME | End: 2022-02-17
Payer: MEDICARE

## 2022-02-17 LAB
INR BLD: 3.32 (ref 0.88–1.18)
PROTHROMBIN TIME: 32.8 SEC (ref 12–14.6)

## 2022-02-17 PROCEDURE — 85610 PROTHROMBIN TIME: CPT

## 2022-02-17 PROCEDURE — 36415 COLL VENOUS BLD VENIPUNCTURE: CPT

## 2022-02-21 ENCOUNTER — OFFICE VISIT (OUTPATIENT)
Dept: BARIATRICS/WEIGHT MGMT | Facility: CLINIC | Age: 47
End: 2022-02-21

## 2022-02-21 VITALS
HEIGHT: 65 IN | BODY MASS INDEX: 48.82 KG/M2 | DIASTOLIC BLOOD PRESSURE: 82 MMHG | OXYGEN SATURATION: 96 % | TEMPERATURE: 97.4 F | HEART RATE: 96 BPM | WEIGHT: 293 LBS | SYSTOLIC BLOOD PRESSURE: 141 MMHG

## 2022-02-21 DIAGNOSIS — Z79.4 DIABETES MELLITUS DUE TO UNDERLYING CONDITION WITH HYPOGLYCEMIA WITHOUT COMA, WITH LONG-TERM CURRENT USE OF INSULIN: ICD-10-CM

## 2022-02-21 DIAGNOSIS — I10 PRIMARY HYPERTENSION: ICD-10-CM

## 2022-02-21 DIAGNOSIS — E08.649 DIABETES MELLITUS DUE TO UNDERLYING CONDITION WITH HYPOGLYCEMIA WITHOUT COMA, WITH LONG-TERM CURRENT USE OF INSULIN: ICD-10-CM

## 2022-02-21 DIAGNOSIS — E11.69 TYPE 2 DIABETES MELLITUS WITH OTHER SPECIFIED COMPLICATION, WITH LONG-TERM CURRENT USE OF INSULIN: ICD-10-CM

## 2022-02-21 DIAGNOSIS — Z79.4 TYPE 2 DIABETES MELLITUS WITH OTHER SPECIFIED COMPLICATION, WITH LONG-TERM CURRENT USE OF INSULIN: ICD-10-CM

## 2022-02-21 DIAGNOSIS — E66.01 CLASS 3 SEVERE OBESITY DUE TO EXCESS CALORIES WITH SERIOUS COMORBIDITY AND BODY MASS INDEX (BMI) OF 60.0 TO 69.9 IN ADULT: Primary | ICD-10-CM

## 2022-02-21 PROBLEM — E66.813 CLASS 3 SEVERE OBESITY DUE TO EXCESS CALORIES WITH SERIOUS COMORBIDITY AND BODY MASS INDEX (BMI) OF 60.0 TO 69.9 IN ADULT: Status: ACTIVE | Noted: 2022-02-21

## 2022-02-21 PROCEDURE — 99214 OFFICE O/P EST MOD 30 MIN: CPT | Performed by: SURGERY

## 2022-02-21 NOTE — PROGRESS NOTES
Patient Care Team:  Fátima Connor DO as PCP - General (Family Medicine)    Reason for Visit:  Surgical Weight loss      Subjective     Cassidy Tellez is a pleasant 46 y.o. female and presents with morbid obesity with her Body mass index is 62.09 kg/m².    She is here for discussion of the upper endoscopic procedure.  She stated she has been with the disease of obesity for year(s).  She stated she suffers from hypertension, diabetes type 2 and morbid obesity due to her weight gain.  She stated that weight loss helps alleviate these symptoms.   She stated that she has tried multiple diet regimens including currently participating in a medically supervised weight loss program to help with weight loss.  She stated that she has attempted these conservative methods for weight loss without maintaining long term success.  Today sheand myself will discuss surgical weight loss options such as the Laparoscopic Sleeve Gastrectomy or the Laparoscopic R - Y Gastric Bypass.        Review of Systems  General ROS: positive for  - fatigue  Respiratory ROS: positive for - cough  negative for - hemoptysis, sputum changes, stridor, tachypnea or wheezing  Cardiovascular ROS: no chest pain or dyspnea on exertion  Gastrointestinal ROS: no abdominal pain, change in bowel habits, or black or bloody stools    History  Past Medical History:   Diagnosis Date   • Diabetes mellitus (HCC)    • Hypertension    • Stroke (HCC)      Past Surgical History:   Procedure Laterality Date   • CARDIAC SURGERY     • TONSILLECTOMY       Family History   Problem Relation Age of Onset   • Diabetes Mother    • Hypertension Mother    • Heart disease Mother    • Obesity Mother    • Heart disease Father      Social History     Tobacco Use   • Smoking status: Never Smoker   • Smokeless tobacco: Never Used   Substance Use Topics   • Alcohol use: Never   • Drug use: Not on file     E-cigarette/Vaping     E-cigarette/Vaping Substances     (Not in a  hospital admission)    Allergies:  Sulfamethoxazole-trimethoprim      Current Outpatient Medications:   •  albuterol sulfate  (90 Base) MCG/ACT inhaler, Inhale 2 puffs Every 4 (Four) Hours As Needed for Wheezing., Disp: 3.1 g, Rfl: 0  •  amLODIPine (NORVASC) 5 MG tablet, Take 5 mg by mouth 1 (One) Time As Needed., Disp: , Rfl:   •  Aspirin Buf,CaCarb-MgCarb-MgO, 81 MG tablet, Take 81 mg by mouth Daily., Disp: , Rfl:   •  atorvastatin (LIPITOR) 80 MG tablet, Take 80 mg by mouth Daily., Disp: , Rfl:   •  bethanechol (URECHOLINE) 25 MG tablet, Take 25 mg by mouth Daily., Disp: , Rfl:   •  cloNIDine (CATAPRES) 0.1 MG tablet, Take 0.1 mg by mouth Daily., Disp: , Rfl:   •  ezetimibe (ZETIA) 10 MG tablet, Take 10 mg by mouth Daily., Disp: , Rfl:   •  furosemide (LASIX) 40 MG tablet, Take 80 mg by mouth Daily., Disp: , Rfl:   •  gabapentin (NEURONTIN) 600 MG tablet, Take 600 mg by mouth 3 (Three) Times a Day., Disp: , Rfl:   •  insulin aspart (NovoLOG) 100 UNIT/ML injection, Inject  under the skin into the appropriate area as directed 3 (Three) Times a Day Before Meals., Disp: , Rfl:   •  lisinopril (PRINIVIL,ZESTRIL) 2.5 MG tablet, Take 40 mg by mouth Daily., Disp: , Rfl:   •  meloxicam (MOBIC) 15 MG tablet, Take 15 mg by mouth Daily., Disp: , Rfl:   •  metFORMIN ER (GLUCOPHAGE-XR) 500 MG 24 hr tablet, Take 500 mg by mouth Daily., Disp: , Rfl:   •  metoprolol succinate XL (TOPROL-XL) 25 MG 24 hr tablet, Take 25 mg by mouth Daily., Disp: , Rfl:   •  nystatin (MYCOSTATIN) 338065 UNIT/GM powder, Apply  topically to the appropriate area as directed 4 (Four) Times a Day., Disp: , Rfl:   •  pantoprazole (PROTONIX) 40 MG EC tablet, Take 40 mg by mouth Daily., Disp: , Rfl:   •  prochlorperazine (COMPAZINE) 10 MG tablet, Take 10 mg by mouth Daily., Disp: , Rfl:   •  spironolactone (ALDACTONE) 25 MG tablet, Take 25 mg by mouth Daily., Disp: , Rfl:   •  sucralfate (Carafate) 1 GM/10ML suspension, 10 mL Every 6 (Six) Hours.,  Disp: , Rfl:   •  topiramate (TOPAMAX) 100 MG tablet, Take 100 mg by mouth Daily., Disp: , Rfl:   •  traMADol (ULTRAM) 50 MG tablet, Take 50 mg by mouth Daily., Disp: , Rfl:   •  verapamil (CALAN) 40 MG tablet, Take 40 mg by mouth., Disp: , Rfl:   •  vitamin D (ERGOCALCIFEROL) 1.25 MG (97963 UT) capsule capsule, Take 50,000 Units by mouth 1 (One) Time Per Week., Disp: , Rfl:   •  warfarin (COUMADIN) 10 MG tablet, Take 15 mg by mouth Every Other Day., Disp: , Rfl:   •  Dapagliflozin Propanediol 10 MG tablet, Take 10 mg by mouth Daily., Disp: , Rfl:   •  exenatide er (BYDUREON) 2 MG pen-injector injection, Inject 2 mg under the skin into the appropriate area as directed 1 (One) Time Per Week., Disp: , Rfl:   •  Insulin Glargine, 1 Unit Dial, (Toujeo SoloStar) 300 UNIT/ML solution pen-injector injection, Toujeo SoloStar U-300 Insulin 300 unit/mL (1.5 mL) subcutaneous pen, Disp: , Rfl:     Objective     Vital Signs  Temp:  [97.4 °F (36.3 °C)] 97.4 °F (36.3 °C)  Heart Rate:  [96] 96  BP: (141)/(82) 141/82  Body mass index is 62.09 kg/m².      02/21/22  1057   Weight: (!) 167 kg (367 lb 6.4 oz)       Physical Exam:      HEENT: extra ocular movement intact and sclera clear, anicteric  Respiratory: appears well, vitals normal, no respiratory distress, acyanotic, normal RR, chest clear, no wheezing, crepitations, rhonchi, normal symmetric air entry  Cardiovascular: Regular rate and rhythm, S1, S2 normal, no murmur, click, rub or gallop  GI: Soft, non-tender, normal bowel sounds; no bruits, organomegaly or masses.  Abnormal shape: obese  Musculoskeletal: inspection - no abnormality  Neurologic: alert, oriented, normal speech, no focal findings or movement disorder noted       Results Review:   None        Assessment/Plan   Encounter Diagnoses   Name Primary?   • Class 3 severe obesity due to excess calories with serious comorbidity and body mass index (BMI) of 60.0 to 69.9 in adult (HCC) Yes   • Type 2 diabetes mellitus with  other specified complication, with long-term current use of insulin (HCC)    • Primary hypertension            1.  I believe this patient will be a good candidate for weight loss surgery.  I have discussed the Kelvin - Y Gastric Bypass, laparoscopic sleeve gastrectomy and the Laparoscopic Gastric Band procedures.  We discussed the benefits of the surgeries including the benefit of weight loss and the possible reversal of co-morbid conditions associated with morbid obesity. I explained to the patient that prior to making a definitive decision on the type of surgery she will require an esophagogastroduodenoscopy with biopsies to assess for any contraindications for surgical weight loss.  I explained the alternatives  include not doing anything, or pursuing an UGI series which only offers a diagnosis with potential less accuracy compared to EGD, the benefits of the EGD such as identifying the pathology and anatomy of the upper GI system, and the risks and complications of the endoscopy were discussed in detail as well. I discussed the risk of perforation (one out of 3949-0746, riskier with dilation), bleeding (one out of 500), and the rare risks of infection, adverse reaction to anesthesia, respiratory failure, cardiac failure including MI and adverse reaction to medications such as allergic reactions. We discussed consequences that could occur if a risk were to develop such as the need for hospitalization, blood transfusion, surgical intervention, medications, pain, disability and death. The patient verbalizes understanding and agrees to proceed. such as bleeding, perforation, swallowing difficulties and gas bloat can occur after this procedure.    Upon completion of our discussion and addressing and answering her questions to her satisfation, informed consent was obtained.  She will be scheduled accordingly for the esophagogastroduodenoscopy procedure.    2.  With regards to the patient's diabetes the patient states  that her blood sugars will drop in the evening and her alarm system that monitors her blood sugars will alert her at night of her low blood sugars.  As a result she will consume soda to increase her blood sugar.  I explained to the patient that our dietary regimen if performed consistently will help regulate her blood sugars more consistently.  However she does need to be consistent with following the dietary prescription.  I have also recommended that she outline her blood sugars for review with her primary care provider.  With consistent reversal of her morbid obesity her insulin requirements will change and it is important that she continues to monitor her dosages and record these levels to be reviewed with her primary care provider.    I discussed the patients findings and my recommendations with patient.     Dr. Robert Hernandez MD FACS    02/21/22  11:52 CST  Patient Care Team:  Fátima Connor DO as PCP - General (Family Medicine)

## 2022-03-03 NOTE — PATIENT INSTRUCTIONS
Elevate right leg as much as possible  OTC Tylenol as needed for discomfort  Referral to Ortho for Baker's cyst  Nystatin powder as directed, keep skin as dry as possible groin, under breasts  Follow up with PCP or return to Urgent Care for worsening or unresolved symptoms. Patient Education        Yeast Skin Infection: Care Instructions  Your Care Instructions     Yeast normally lives on your skin. Sometimes too much yeast can overgrow in certain areas of the skin and cause an infection. The infection causes red, scaly, moist patches on your skin that may itch. Common areas for skin yeast infections are skin folds under the breasts or belly area. The warm and moist areas in the skin folds can make it easier for yeast to overgrow. Yeast infections also can be found on other parts of the body such as the groin or armpits. You will probably get a cream or ointment that contains an antifungal medicine. Examples of these are miconazole and clotrimazole. You put it on your skin to treat the infection. Your doctor may give you a prescription for the cream or ointment. Or you may be able to buy it without a prescription at most drugstores. If the infection is severe, the doctor will prescribe antifungal pills. A yeast infection usually goes away after about a week of treatment. But it's important to use the medicine for as long as your doctor tells you to. Follow-up care is a key part of your treatment and safety. Be sure to make and go to all appointments, and call your doctor if you are having problems. It's also a good idea to know your test results and keep a list of the medicines you take. How can you care for yourself at home? · Be safe with medicines. Take your medicines exactly as prescribed. Call your doctor if you think you are having a problem with your medicine. · Keep your skin clean and dry. Your doctor may suggest using powder that contains an antifungal medicine in the skin folds.   · Wear loose clothing. When should you call for help? Call your doctor now or seek immediate medical care if:    · You have symptoms of infection, such as:  ? Increased pain, swelling, warmth, or redness. ? Red streaks leading from the area. ? Pus draining from the area. ? A fever. Watch closely for changes in your health, and be sure to contact your doctor if:    · You do not get better as expected. Where can you learn more? Go to https://[employer]FÃ[employer]cil.[employer]. org and sign in to your [employer] account. Enter W175 in the Ziebel box to learn more about \"Yeast Skin Infection: Care Instructions. \"     If you do not have an account, please click on the \"Sign Up Now\" link. Current as of: July 2, 2020               Content Version: 12.8  © 2006-2021 [id number]. Care instructions adapted under license by [facility] ([facility]). If you have questions about a medical condition or this instruction, always ask your healthcare professional. [place] [address] any warranty or liability for your use of this information. Patient Education        Henriquez's Cyst: Care Instructions  Your Care Instructions     A Baker's cyst is a swelling behind the knee. It may cause pain or stiffness when you bend your knee or straighten it all the way. Baker's cysts are also called popliteal cysts. If you have arthritis or another condition that is the cause of the Baker's cyst, your doctor may treat that condition. A Baker's cyst may go away on its own. If not, or if it is causing a lot of discomfort, your doctor may drain the fluid that has built up behind the knee. In some cases, a Baker's cyst is removed in surgery. There are things you can do at home, such as staying off your leg, to reduce the swelling and pain. Follow-up care is a key part of your treatment and safety. Be sure to make and go to all appointments, and call your doctor if you are having problems.  It's also a good idea to know your test results and keep a list of the medicines you take. How can you care for yourself at home? · Rest your knee as much as possible. · Ask your doctor if you can take an over-the-counter pain medicine, such as acetaminophen (Tylenol), ibuprofen (Advil, Motrin), or naproxen (Aleve). Be safe with medicines. Read and follow all instructions on the label. · Use a cane, a crutch, a walker, or another device if you need help to get around. These can help rest your knees. · If you have an elastic bandage, make sure it is snug but not so tight that your leg is numb, tingles, or swells below the bandage. Ask your doctor if you can loosen the bandage if it is too tight. · Follow your doctor's instructions about how much weight you can put on your knee. · Stay at a healthy weight. Being overweight puts extra strain on your knee. When should you call for help? Call 911 anytime you think you may need emergency care. For example, call if:    · You have chest pain, are short of breath, or you cough up blood. Call your doctor now or seek immediate medical care if:    · You have new or worse pain.     · Your foot is cool or pale or changes color.     · You have tingling, weakness, or numbness in your foot or toes.     · You have signs of a blood clot in your leg (called a deep vein thrombosis), such as:  ? Pain in your calf, back of the knee, thigh, or groin. ? Redness or swelling in your leg. Watch closely for changes in your health, and be sure to contact your doctor if:    · You do not get better as expected. Where can you learn more? Go to https://Beaming.[employer]. org and sign in to your [employer] account. Enter G557 in the [employer] box to learn more about \"Baker's Cyst: Care Instructions. \"     If you do not have an account, please click on the \"Sign Up Now\" link. Current as of: November 16, 2020               Content Version: 12.8  © 5645-2730 Healthwise, [facility].    Care No change

## 2022-03-07 ENCOUNTER — HOSPITAL ENCOUNTER (OUTPATIENT)
Dept: LAB | Age: 47
Discharge: HOME | End: 2022-03-07

## 2022-03-07 LAB
INR BLD: 2.79 (ref 0.88–1.18)
PROTHROMBIN TIME: 28.8 SEC (ref 12–14.6)

## 2022-03-08 ENCOUNTER — TELEPHONE (OUTPATIENT)
Dept: BARIATRICS/WEIGHT MGMT | Facility: CLINIC | Age: 47
End: 2022-03-08

## 2022-03-08 ENCOUNTER — LAB (OUTPATIENT)
Dept: LAB | Facility: HOSPITAL | Age: 47
End: 2022-03-08

## 2022-03-08 DIAGNOSIS — E66.01 CLASS 3 SEVERE OBESITY DUE TO EXCESS CALORIES WITH SERIOUS COMORBIDITY AND BODY MASS INDEX (BMI) OF 60.0 TO 69.9 IN ADULT: ICD-10-CM

## 2022-03-08 LAB — SARS-COV-2 ORF1AB RESP QL NAA+PROBE: NOT DETECTED

## 2022-03-08 PROCEDURE — U0004 COV-19 TEST NON-CDC HGH THRU: HCPCS

## 2022-03-08 PROCEDURE — C9803 HOPD COVID-19 SPEC COLLECT: HCPCS

## 2022-03-09 ENCOUNTER — TELEPHONE (OUTPATIENT)
Dept: BARIATRICS/WEIGHT MGMT | Facility: CLINIC | Age: 47
End: 2022-03-09

## 2022-03-09 NOTE — TELEPHONE ENCOUNTER
Coumadin clinic contacted office due to patient's high risk status and requested patient either not stop coumadin or requested lovenox be given to bridge gap if coumadin is d/c for 5 days.     I contacted Dr Hernandez and he would like to know lovenox dosage before deciding.     I called coumadin clinic back and she states she cannot dose lovenox and will contact me to neurologist. Page was placed and I was called by his office staff for more information. I explained what was needed and she states she will page him and serafin it as high priority and will request he contact myself via cell phone.     Patient did not take her coumadin last night. After speaking with Dr. Hernandez he recommended the patient take her coumadin tonight and we will follow up with neurologist to further discuss once he contacts us.

## 2022-03-11 NOTE — TELEPHONE ENCOUNTER
I spoke with patient's neurologist and she states that patient from a stroke perspective is very high risk.  He would like for patient to not stop warfarin and if she stops warfarin to be bridged with Lovenox.  At this time Dr. Hernandez is going to cancel patient's endoscopy due to BMI being greater than 60 and her increased risk due to blood clotting history.  He states that he will further evaluate her history and discussed with patient plans to proceed.

## 2022-03-15 ENCOUNTER — HOSPITAL ENCOUNTER (OUTPATIENT)
Dept: LAB | Age: 47
Discharge: HOME | End: 2022-03-15

## 2022-03-15 LAB
INR BLD: 2.84 (ref 0.88–1.18)
PROTHROMBIN TIME: 29.4 SEC (ref 12–14.6)

## 2022-03-21 ENCOUNTER — OFFICE VISIT (OUTPATIENT)
Dept: BARIATRICS/WEIGHT MGMT | Facility: CLINIC | Age: 47
End: 2022-03-21

## 2022-03-21 VITALS
SYSTOLIC BLOOD PRESSURE: 124 MMHG | DIASTOLIC BLOOD PRESSURE: 88 MMHG | OXYGEN SATURATION: 97 % | WEIGHT: 293 LBS | TEMPERATURE: 98.7 F | BODY MASS INDEX: 48.82 KG/M2 | HEIGHT: 65 IN | HEART RATE: 98 BPM

## 2022-03-21 DIAGNOSIS — E11.69 TYPE 2 DIABETES MELLITUS WITH OTHER SPECIFIED COMPLICATION, WITH LONG-TERM CURRENT USE OF INSULIN: ICD-10-CM

## 2022-03-21 DIAGNOSIS — Z79.4 TYPE 2 DIABETES MELLITUS WITH OTHER SPECIFIED COMPLICATION, WITH LONG-TERM CURRENT USE OF INSULIN: ICD-10-CM

## 2022-03-21 DIAGNOSIS — E66.01 CLASS 3 SEVERE OBESITY DUE TO EXCESS CALORIES WITH SERIOUS COMORBIDITY AND BODY MASS INDEX (BMI) OF 60.0 TO 69.9 IN ADULT: Primary | ICD-10-CM

## 2022-03-21 DIAGNOSIS — I10 PRIMARY HYPERTENSION: ICD-10-CM

## 2022-03-21 PROCEDURE — 99213 OFFICE O/P EST LOW 20 MIN: CPT | Performed by: NURSE PRACTITIONER

## 2022-03-21 NOTE — ASSESSMENT & PLAN NOTE
Patient's (Body mass index is 62.02 kg/m².) indicates that they are morbidly obese (BMI > 40 or > 35 with obesity - related health condition) with health conditions that include hypertension . Weight is improving with treatment. BMI is is above average; BMI management plan is completed. We discussed portion control and increasing exercise.

## 2022-03-21 NOTE — ASSESSMENT & PLAN NOTE
Diabetes is unchanged.   Continue current treatment regimen.  Diabetes will be reassessed with PCP.

## 2022-03-21 NOTE — PROGRESS NOTES
"Patient Care Team:  Fátima Connor DO as PCP - General (Family Medicine)    Reason for Visit:  Surgical Weight loss    Subjective   Cassidy Tellez is a 46 y.o. female.     Cassidy is here for follow-up and continued medical management of her morbid obesity.  She is currently on a prescription diet.  Cassidy previously was to apply dietary changes such as following the meal plan as directed.  She admits to eating 2-3 meals per day.  As a result she remained the same weight since her last visit.    She admits to drinking 1 soda per week. She denies nicotine use.       Review Of Systems:  Review of Systems   Constitutional: Negative.    Respiratory: Positive for cough.    Cardiovascular: Negative.    Gastrointestinal: Negative.    Endocrine: Negative.    Musculoskeletal: Positive for arthralgias.   Psychiatric/Behavioral: Positive for sleep disturbance.         The following portions of the patient's history were reviewed and updated as appropriate: allergies, current medications, past family history, past medical history, past social history, past surgical history, and problem list.    Objective   /88 (BP Location: Right arm, Patient Position: Sitting, Cuff Size: Adult)   Pulse 98   Temp 98.7 °F (37.1 °C)   Ht 163.8 cm (64.5\")   Wt (!) 166 kg (367 lb)   SpO2 97%   BMI 62.02 kg/m²       03/21/22  1103   Weight: (!) 166 kg (367 lb)       Physical Exam  Vitals reviewed.   Constitutional:       Appearance: She is obese.   Cardiovascular:      Rate and Rhythm: Normal rate and regular rhythm.   Pulmonary:      Effort: Pulmonary effort is normal.   Skin:     General: Skin is warm and dry.   Neurological:      Mental Status: She is alert and oriented to person, place, and time.   Psychiatric:         Mood and Affect: Mood normal.         Behavior: Behavior normal.         Patient's Body mass index is 62.02 kg/m². indicating that she is morbidly obese (BMI > 40 or > 35 with obesity - related health " condition). Obesity-related health conditions include the following: hypertension and diabetes mellitus. Obesity is improving with treatment. BMI is is above average; BMI management plan is completed. We discussed portion control and increasing exercise..     Assessment/Plan   Diagnoses and all orders for this visit:    1. Class 3 severe obesity due to excess calories with serious comorbidity and body mass index (BMI) of 60.0 to 69.9 in adult ([facility]) (Primary)  Assessment & Plan:  Patient's (Body mass index is 62.02 kg/m².) indicates that they are morbidly obese (BMI > 40 or > 35 with obesity - related health condition) with health conditions that include hypertension . Weight is improving with treatment. BMI is is above average; BMI management plan is completed. We discussed portion control and increasing exercise.       2. Primary hypertension  Assessment & Plan:  Hypertension is unchanged.  Continue current treatment regimen.  Dietary sodium restriction.  Weight loss.  Blood pressure will be reassessed at the next regular appointment.      3. Type 2 diabetes mellitus with other specified complication, with long-term current use of insulin ([facility])  Assessment & Plan:  Diabetes is unchanged.   Continue current treatment regimen.  Diabetes will be reassessed with PCP.         Cassidy Tellez was seen today for follow-up, obesity, nutrition counseling and weight loss.  She has remained the same weight since her last visit.  Today we discussed healthy changes in lifestyle, diet, and exercise. Dietician consultation obtained.  Cassidy Tellez had received handouts to her explaining the recommendation on portion sizes/appetite control/reading nutrition labels.   Intensive behavioral therapy for obesity was done today as well.         Goals for this month are:   1.  Patient currently sees [facility] due to history of stroke and for anticoagulation management.  Patient's endoscopy was rescheduled due to her being a high risk  patient.  Dr. Hernandez is going to review patient's history to determine next steps in our program.  2.  Patient is not following prescription meal plan well.  Patient brought a food journal in for review and she has been encouraged to increase vegetable intake and decrease group C after her second meal.  Patient is getting 2-3 meals and per day and I have encouraged her to set a timer to assist her with getting for meals and each day.  Patient verbalizes understanding.  3.  Fully eliminate soda intake.  Patient has made significant improvement with this and is down to 1 soda per week.    Patient is aware that her BMI must be under 60 before proceeding for sleeve gastrectomy.  At this time surgical work-up is pending until further review patient's history is complete.  Patient verbalizes understanding and has been encouraged to follow prescription of plan more closely.  Encourage patient to attend our monthly support groups.    Follow up in one month for a weight recheck.

## 2022-03-26 ENCOUNTER — APPOINTMENT (OUTPATIENT)
Dept: GENERAL RADIOLOGY | Age: 47
End: 2022-03-26
Payer: MEDICARE

## 2022-03-26 ENCOUNTER — HOSPITAL ENCOUNTER (EMERGENCY)
Age: 47
Discharge: HOME | End: 2022-03-26
Payer: MEDICARE

## 2022-03-26 VITALS
TEMPERATURE: 98.2 F | DIASTOLIC BLOOD PRESSURE: 92 MMHG | SYSTOLIC BLOOD PRESSURE: 126 MMHG | RESPIRATION RATE: 16 BRPM | HEART RATE: 99 BPM | OXYGEN SATURATION: 98 %

## 2022-03-26 DIAGNOSIS — S01.511A LIP LACERATION, INITIAL ENCOUNTER: Primary | ICD-10-CM

## 2022-03-26 PROCEDURE — 70150 X-RAY EXAM OF FACIAL BONES: CPT

## 2022-03-26 PROCEDURE — 99284 EMERGENCY DEPT VISIT MOD MDM: CPT

## 2022-03-26 ASSESSMENT — ENCOUNTER SYMPTOMS: SHORTNESS OF BREATH: 0

## 2022-03-26 NOTE — ED PROVIDER NOTES
[facility] EMERGENCY DEPT  eMERGENCY dEPARTMENT eNCOUnter      Pt Name: Sushma Hope  MRN: [id number]  [place] 1975  Date of evaluation: 3/26/2022  Provider: Sunshine Garza [address] [place] [address]       Chief Complaint   Patient presents with    Laceration     inner bottom lip          HISTORY OF PRESENT ILLNESS   (Location/Symptom, Timing/Onset,Context/Setting, Quality, Duration, Modifying Factors, Severity)  Note limiting factors. Sushma Hope is a 55 y.o. female with history of atherosclerosis, type 2 diabetes, hypertension, hyperlipidemia, obesity, obstructive sleep apnea, and chronic anticoagulation who presents to the emergency department with a lip laceration. She notes the inside of her bottom lip had a cut after she fell and hit her face on the nightstand beside her bed. She explains a mechanical fall where she rolled off the bed and hit her face. She denies any associated head injury or change in level of consciousness. She denies any neck pain associated. She denies any broken or loose teeth. She originally went to the urgent care due to the laceration but they sent her to the ER for further evaluation. She is up-to-date on her tetanus shot. HPI    NursingNotes were reviewed. REVIEW OF SYSTEMS    (2-9 systems for level 4, 10 or more for level 5)     Review of Systems   Constitutional: Negative for chills and fever. HENT: Negative for dental problem. Respiratory: Negative for shortness of breath. Cardiovascular: Negative for chest pain. Musculoskeletal: Negative for neck pain. Skin: Positive for wound. Neurological: Negative for dizziness and headaches. All other systems reviewed and are negative.            PAST MEDICALHISTORY     Past Medical History:   Diagnosis Date    CAD (coronary artery disease)     [facility] occlusion with [facility] infarction ([facility] [address]     Diabetes mellitus ([facility] [address]     GERD (gastroesophageal reflux disease)     Hypertension SURGICAL HISTORY       Past Surgical History:   Procedure Laterality Date    CARDIAC SURGERY      COLONOSCOPY N/A 09/02/2021    Dr Carson Dias, 4 year recall    CORONARY ARTERY BYPASS GRAFT      2016    TONSILLECTOMY      UPPER GASTROINTESTINAL ENDOSCOPY      sujatha Mcclure/snow per patient    UPPER GASTROINTESTINAL ENDOSCOPY N/A 09/02/2021    Dr Dayanna Bustamante, [facility], (-)[place]         CURRENT MEDICATIONS     Previous Medications    AMLODIPINE (NORVASC) 5 MG TABLET    Take 5 mg by mouth daily    ASPIRIN 81 MG TABLET    Take 81 mg by mouth daily    ATORVASTATIN (LIPITOR) 40 MG TABLET    Take 80 mg by mouth nightly     BETHANECHOL (URECHOLINE) 25 MG TABLET    Take 1 tablet by mouth daily    DAPAGLIFLOZIN (FARXIGA) 10 MG TABLET    Take 1 tablet by mouth daily    EXENATIDE (BYDUREON) 2 MG PEN    INJECT 0.65 MLS ONCE Q WEEK    EZETIMIBE (ZETIA) 10 MG TABLET    Take 10 mg by mouth daily     FUROSEMIDE (LASIX) 40 MG TABLET    Take 2 tablets by mouth daily    GABAPENTIN (NEURONTIN) 300 MG CAPSULE    Take 1 capsule by mouth 2 times daily as needed (Neuropathy/nerve pains) for up to 3 days. Intended supply: 30 days    INSULIN ASPART (NOVOLOG) 100 UNIT/ML INJECTION VIAL    Inject 10 Units into the skin 4 times daily (before meals and nightly) Up to 10 units ss coverage as directed    INSULIN GLARGINE (BASAGLAR KWIKPEN) 100 UNIT/ML INJECTION PEN    Inject 80 Units into the skin nightly     LISINOPRIL (PRINIVIL;ZESTRIL) 2.5 MG TABLET    Take 1 tablet by mouth daily    METFORMIN (GLUCOPHAGE) 1000 MG TABLET    Take 1 tablet by mouth daily Hold till monday    METHYLPREDNISOLONE (MEDROL DOSEPACK) 4 MG TABLET    Take by mouth.     METOPROLOL SUCCINATE ER (TOPROL-XL) 25 MG XL TABLET    Take 25 mg by mouth daily    NONFORMULARY    daily apple cider gummy     NYSTATIN (MYCOSTATIN) 242953 UNIT/GM POWDER    Apply 3 times daily to lower groin, abdominal area and under bilateral breasts    ONDANSETRON (ZOFRAN ODT) 4 MG DISINTEGRATING TABLET    Take 1 tablet by mouth every 8 hours as needed for Nausea or Vomiting    PANTOPRAZOLE (PROTONIX) 40 MG TABLET    Take 1 tablet by mouth daily    PROCHLORPERAZINE (COMPAZINE) 10 MG TABLET    Take 10 mg by mouth 2 times daily as needed (headache)     PROMETHAZINE-DEXTROMETHORPHAN (PROMETHAZINE-DM) 6.25-15 MG/5ML SYRUP    Take 5 mLs by mouth nightly as needed for Cough    SPIRONOLACTONE (ALDACTONE) 25 MG TABLET    Take 12.5 mg by mouth daily    SUCRALFATE (CARAFATE) 1 GM/10ML SUSPENSION    Take 10 mLs by mouth 4 times daily    TOPIRAMATE (TOPAMAX) 50 MG TABLET    Take 1 tablet by mouth daily     VERAPAMIL (CALAN) 40 MG TABLET    Take 40 mg by mouth every 12 hours    VITAMIN D (ERGOCALCIFEROL) 1.25 MG (79566 UT) CAPS CAPSULE    Take 1 capsule by mouth once a week    WARFARIN (COUMADIN) 10 MG TABLET    Take 10 mg by mouth 10mg 6 days and 15mg one day weekly       ALLERGIES     Bactrim [sulfamethoxazole-trimethoprim]    FAMILY HISTORY       Family History   Problem Relation Age of Onset    Osteoarthritis Mother     Diabetes Mother     Hypertension Mother     Hypertension Father     Heart Attack Brother     Heart Failure Maternal Grandmother     Diabetes Maternal Grandfather     Heart Disease Maternal Grandfather     Heart Disease Paternal Grandmother     No Known Problems Paternal Grandfather     Colon Cancer Neg Hx     Colon Polyps Neg Hx     Esophageal Cancer Neg Hx     Liver Cancer Neg Hx     Liver Disease Neg Hx     Rectal Cancer Neg Hx     Stomach Cancer Neg Hx           SOCIAL HISTORY       Social History     Socioeconomic History    Marital status: Single     Spouse name: Not on file    Number of children: Not on file    Years of education: Not on file    Highest education level: Not on file   Occupational History    Not on file   Tobacco Use    Smoking status: Never Smoker    Smokeless tobacco: Never Used   Vaping Use    Vaping Use: Never used   Substance and Sexual Activity    Alcohol use: Yes     Comment: occ    Drug use: No    Sexual activity: Not on file   Other Topics Concern    Not on file   Social History Narrative    Not on file     Social Determinants of Health     Financial Resource Strain:     Difficulty of Paying Living Expenses: Not on file   Food Insecurity:     Worried About Running Out of Food in the Last Year: Not on file    Jaxon of Food in the Last Year: Not on file   Transportation Needs:     Lack of Transportation (Medical): Not on file    Lack of Transportation (Non-Medical): Not on file   Physical Activity:     Days of Exercise per Week: Not on file    Minutes of Exercise per Session: Not on file   Stress:     Feeling of Stress : Not on file   Social Connections:     Frequency of Communication with Friends and Family: Not on file    Frequency of Social Gatherings with Friends and Family: Not on file    Attends Shinto Services: Not on file    Active Member of [address] [place] [address] or Organizations: Not on file    Attends Club or Organization Meetings: Not on file    Marital Status: Not on file   Intimate Partner Violence:     Fear of Current or Ex-Partner: Not on file    Emotionally Abused: Not on file    Physically Abused: Not on file    Sexually Abused: Not on file   Housing Stability:     Unable to Pay for Housing in the Last Year: Not on file    Number of [place] in the Last Year: Not on file    Unstable Housing in the Last Year: Not on file       SCREENINGS    [place] Coma Scale  Eye Opening: Spontaneous  Best Verbal Response: Oriented  Best Motor Response: Obeys commands  Remigio Coma Scale Score: 15        PHYSICAL EXAM    (up to 7 for level 4, 8 or more for level 5)     ED Triage Vitals [03/26/22 1151]   BP Temp Temp Source Pulse Resp SpO2 Height Weight   (!) 126/92 98.2 °F (36.8 °C) Oral 99 16 98 % -- --       Physical Exam  Vitals and nursing note reviewed.    Constitutional:       General: She is not in acute distress. Appearance: Normal appearance. She is obese. She is not ill-appearing or toxic-appearing. HENT:      Head: Normocephalic and atraumatic. Comments: No scalp tenderness, no facial tenderness, inside the lower lip line there is a superficial laceration approx 3 cm, not actively bleeding. Non tender, no obvious foreign body or signs of dental trauma  Eyes:      Extraocular Movements: Extraocular movements intact. Conjunctiva/sclera: Conjunctivae normal.      Pupils: Pupils are equal, round, and reactive to light. Cardiovascular:      Rate and Rhythm: Normal rate and regular rhythm. Pulses: Normal pulses. Pulmonary:      Effort: Pulmonary effort is normal.   Musculoskeletal:      Cervical back: Normal range of motion and neck supple. No rigidity or tenderness. Lymphadenopathy:      Cervical: No cervical adenopathy. Skin:     General: Skin is warm and dry. Neurological:      General: No focal deficit present. Mental Status: She is alert and oriented to person, place, and time. DIAGNOSTIC RESULTS     RADIOLOGY:  Non-plain film images such as CT, Ultrasound and MRI are read by the radiologist. Plain radiographic images are visualized and preliminarily interpreted bythe emergency physician with the below findings:      XR FACIAL BONES (MIN 3 VIEWS )   Final Result   1. No appreciable facial fracture. Mandible appears intact. Signed by Dr Suzanne Kraus:  Labs Reviewed - No data to display    All other labs were within normal range or not returned as of this dictation. EMERGENCY DEPARTMENT COURSE and DIFFERENTIAL DIAGNOSIS/MDM:   Vitals:    Vitals:    03/26/22 1151   BP: (!) 126/92   Pulse: 99   Resp: 16   Temp: 98.2 °F (36.8 °C)   TempSrc: Oral   SpO2: 98%       MDM  Patient is a 40-year-old female presents to the ER with complaint of a lip laceration of the inner lower lip.   On physical exam, this is relatively superficial and would not benefit from laceration repair at this time. There is no associated active bleeding or concern for the puncture through the entire lip. There is no signs of dental trauma. Her Nexus C-spine is negative no CT imaging of the neck was warranted. She was offered CT of the head and neck but since she only had pain in the lower jawline, x-rays were obtained. These were negative for any signs of acute bony fracture of the mandible. We discussed wound management outpatient follow-up with primary care to ensure improvement. She is agreeable to treatment plan. Return cautions were given to her and she verbalized understanding. FINAL IMPRESSION      1.  Lip laceration, initial encounter          DISPOSITION/PLAN   DISPOSITION Decision To Discharge 03/26/2022 01:08:12 PM      PATIENT REFERRED TO:  Janeth Berger DO  [address] [facility]           (Please note that portions of this note were completed with a voice recognition program.  Efforts were made to edit thedictations but occasionally words are mis-transcribed.)    MICHELE Paulino (electronically signed)     Sunshine Garza, [id number]  03/26/22 4220

## 2022-03-26 NOTE — Clinical Note
Yoselin Sanford was seen and treated in our emergency department on 3/26/2022. She may return to work on 03/29/2022. If you have any questions or concerns, please don't hesitate to call.       Jannet Winchester, [phone]

## 2022-04-01 ENCOUNTER — HOSPITAL ENCOUNTER (OUTPATIENT)
Dept: LAB | Age: 47
Discharge: HOME | End: 2022-04-01
Payer: MEDICARE

## 2022-04-01 LAB
INR BLD: 3.19 (ref 0.88–1.18)
PROTHROMBIN TIME: 33.9 SEC (ref 12–14.6)

## 2022-04-01 PROCEDURE — 36415 COLL VENOUS BLD VENIPUNCTURE: CPT

## 2022-04-01 PROCEDURE — 85610 PROTHROMBIN TIME: CPT

## 2022-04-02 ENCOUNTER — OFFICE VISIT (OUTPATIENT)
Age: 47
End: 2022-04-02
Payer: MEDICARE

## 2022-04-02 VITALS
HEIGHT: 64 IN | SYSTOLIC BLOOD PRESSURE: 110 MMHG | HEART RATE: 98 BPM | DIASTOLIC BLOOD PRESSURE: 80 MMHG | BODY MASS INDEX: 50.02 KG/M2 | TEMPERATURE: 96.2 F | RESPIRATION RATE: 20 BRPM | OXYGEN SATURATION: 100 % | WEIGHT: 293 LBS

## 2022-04-02 DIAGNOSIS — S01.511A INFECTED LIP LACERATION, INITIAL ENCOUNTER: Primary | ICD-10-CM

## 2022-04-02 DIAGNOSIS — L08.9 INFECTED LIP LACERATION, INITIAL ENCOUNTER: Primary | ICD-10-CM

## 2022-04-02 PROCEDURE — 99214 OFFICE O/P EST MOD 30 MIN: CPT

## 2022-04-02 RX ORDER — AMOXICILLIN AND CLAVULANATE POTASSIUM 875; 125 MG/1; MG/1
1 TABLET, FILM COATED ORAL 2 TIMES DAILY
Qty: 20 TABLET | Refills: 0 | Status: SHIPPED | OUTPATIENT
Start: 2022-04-02 | End: 2022-04-12

## 2022-04-02 NOTE — PROGRESS NOTES
[address]  [address] [address] [place] [address]  Dept: [phone]  Dept Fax: Umberto Pulse: [phone]    Fadi Lucia is a 55 y.o. female who presents today for her medical conditions/complaints as noted below. Fadi Lucia is c/o of Fall (busted lip hit lip last week and now has puss)        HPI:     HPI  Boby Dean presents with complaints of drainage from a complex laceration to her lip that occurred from a fall out of bed last Saturday. She reports that she has had subjective fever at home. No notable redness or swelling. The drainage began yesterday.      Past Medical History:   Diagnosis Date    CAD (coronary artery disease)     [facility] occlusion with cerebral infarction ([address]     Diabetes mellitus ([address]     GERD (gastroesophageal reflux disease)     Hypertension      Past Surgical History:   Procedure Laterality Date    CARDIAC SURGERY      COLONOSCOPY N/A 09/02/2021    Dr Tari Goodell, Sub prep [place], 4 year recall    CORONARY ARTERY BYPASS GRAFT      2016    TONSILLECTOMY      UPPER GASTROINTESTINAL ENDOSCOPY      sujatha Chance/snow per patient    UPPER GASTROINTESTINAL ENDOSCOPY N/A 09/02/2021    Dr Tari Goodell, BD, (-)[place]       Family History   Problem Relation Age of Onset    Osteoarthritis Mother     Diabetes Mother     Hypertension Mother     Hypertension Father     Heart Attack Brother     Heart Failure Maternal Grandmother     Diabetes Maternal Grandfather     Heart Disease Maternal Grandfather     Heart Disease Paternal Grandmother     No Known Problems Paternal Grandfather     Colon Cancer Neg Hx     Colon Polyps Neg Hx     Esophageal Cancer Neg Hx     Liver Cancer Neg Hx     Liver Disease Neg Hx     Rectal Cancer Neg Hx     Stomach Cancer Neg Hx        Social History     Tobacco Use    Smoking status: Never Smoker    Smokeless tobacco: Never Used   Substance Use Topics    Alcohol use: Yes     Comment: occ      Current Outpatient Medications   Medication Sig Dispense Refill    amoxicillin-clavulanate (AUGMENTIN) 875-125 MG per tablet Take 1 tablet by mouth 2 times daily for 10 days 20 tablet 0    vitamin D (ERGOCALCIFEROL) 1.25 MG (29717 UT) CAPS capsule Take 1 capsule by mouth once a week      warfarin (COUMADIN) 10 MG tablet Take 10 mg by mouth 10mg 6 days and 15mg one day weekly      pantoprazole (PROTONIX) 40 MG tablet Take 1 tablet by mouth daily 90 tablet 3    Exenatide (BYDUREON) 2 MG PEN INJECT 0.65 MLS ONCE Q WEEK      bethanechol (URECHOLINE) 25 MG tablet Take 1 tablet by mouth daily      metFORMIN (GLUCOPHAGE) 1000 MG tablet Take 1 tablet by mouth daily Hold till monday      insulin aspart (NOVOLOG) 100 UNIT/ML injection vial Inject 10 Units into the skin 4 times daily (before meals and nightly) Up to 10 units ss coverage as directed      spironolactone (ALDACTONE) 25 MG tablet Take 12.5 mg by mouth daily      topiramate (TOPAMAX) 50 MG tablet Take 1 tablet by mouth daily       aspirin 81 MG tablet Take 81 mg by mouth daily      prochlorperazine (COMPAZINE) 10 MG tablet Take 10 mg by mouth 2 times daily as needed (headache)       verapamil (CALAN) 40 MG tablet Take 40 mg by mouth every 12 hours      atorvastatin (LIPITOR) 40 MG tablet Take 80 mg by mouth nightly       amLODIPine (NORVASC) 5 MG tablet Take 5 mg by mouth daily (Patient not taking: Reported on 4/2/2022)      dapagliflozin (FARXIGA) 10 MG tablet Take 1 tablet by mouth daily (Patient not taking: Reported on 4/2/2022)      lisinopril (PRINIVIL;ZESTRIL) 2.5 MG tablet Take 1 tablet by mouth daily (Patient not taking: Reported on 4/2/2022)      methylPREDNISolone (MEDROL DOSEPACK) 4 MG tablet Take by mouth.  (Patient not taking: Reported on 4/2/2022) 1 kit 0    promethazine-dextromethorphan (PROMETHAZINE-DM) 6.25-15 MG/5ML syrup Take 5 mLs by mouth nightly as needed for Cough (Patient not taking: Reported on 4/2/2022) 120 mL 0    sucralfate (CARAFATE) 1 GM/10ML suspension Take 10 mLs by mouth 4 times daily (Patient not taking: Reported on 4/2/2022) 1200 mL 3    furosemide (LASIX) 40 MG tablet Take 2 tablets by mouth daily 60 tablet 0    gabapentin (NEURONTIN) 300 MG capsule Take 1 capsule by mouth 2 times daily as needed (Neuropathy/nerve pains) for up to 3 days. Intended supply: 30 days 6 capsule 0    ondansetron (ZOFRAN ODT) 4 MG disintegrating tablet Take 1 tablet by mouth every 8 hours as needed for Nausea or Vomiting (Patient not taking: Reported on 4/2/2022) 15 tablet 0    nystatin (MYCOSTATIN) 356943 UNIT/GM powder Apply 3 times daily to lower groin, abdominal area and under bilateral breasts (Patient not taking: Reported on 4/2/2022) 1 Bottle 0    NONFORMULARY daily apple cider gummy  (Patient not taking: Reported on 4/2/2022)      ezetimibe (ZETIA) 10 MG tablet Take 10 mg by mouth daily  (Patient not taking: Reported on 4/2/2022)      insulin glargine (BASAGLAR KWIKPEN) 100 UNIT/ML injection pen Inject 80 Units into the skin nightly  (Patient not taking: Reported on 4/2/2022)      metoprolol succinate ER (TOPROL-XL) 25 MG XL tablet Take 25 mg by mouth daily (Patient not taking: Reported on 4/2/2022)       No current facility-administered medications for this visit.      Allergies   Allergen Reactions    Bactrim [Sulfamethoxazole-Trimethoprim] Other (See Comments)     Yeast inf       Health Maintenance   Topic Date Due    Hepatitis C screen  Never done    Diabetic foot exam  Never done    A1C test (Diabetic or Prediabetic)  Never done    Depression Screen  Never done    HIV screen  Never done    Diabetic microalbuminuria test  Never done    Diabetic retinal exam  Never done    Hepatitis B vaccine (1 of 3 - Risk 3-dose series) Never done    Cervical cancer screen  Never done    Lipid screen  09/20/2022    Potassium monitoring  09/24/2022    Creatinine monitoring  09/24/2022    Colorectal Cancer Screen  09/02/2025    DTaP/Tdap/Td vaccine (2 - Td or Tdap) 10/10/2027    Pneumococcal 0-64 years Vaccine (2 of 2 - PPSV23) 05/02/2040    Flu vaccine  Completed    COVID-19 Vaccine  Completed    Hepatitis A vaccine  Aged Out    Hib vaccine  Aged Out    Meningococcal (ACWY) vaccine  Aged Out       Subjective:     Review of Systems   Constitutional: Positive for fever. Skin: Positive for wound. Negative for rash. Neurological: Negative for syncope.       :Objective      Physical Exam  Constitutional:       General: She is not in acute distress. Appearance: Normal appearance. She is not toxic-appearing. HENT:      Head: Normocephalic and atraumatic. Right Ear: Tympanic membrane, ear canal and external ear normal.      Left Ear: Tympanic membrane, ear canal and external ear normal.      Nose: Nose normal.      Mouth/Throat:      Mouth: Mucous membranes are moist.        Comments: Seropurulent drainage noted form the wound bed. No surrounding erythema or edema. There is a foul odor noted. Eyes:      General:         Right eye: No discharge. Left eye: No discharge. Conjunctiva/sclera: Conjunctivae normal.   Cardiovascular:      Rate and Rhythm: Normal rate and regular rhythm. Pulmonary:      Effort: Pulmonary effort is normal. No respiratory distress. Abdominal:      General: Abdomen is flat. Palpations: Abdomen is soft. Musculoskeletal:         General: Normal range of motion. Cervical back: Normal range of motion. Lymphadenopathy:      Cervical: No cervical adenopathy. Skin:     General: Skin is warm and dry. Capillary Refill: Capillary refill takes less than 2 seconds. Findings: No rash. Neurological:      General: No focal deficit present. Mental Status: She is alert.    Psychiatric:         Mood and Affect: Mood normal.       /80 (Site: Left Upper Arm, Position: Sitting, Cuff Size: Large Adult)   Pulse 98   Temp 96.2 °F (35.7 °C) (Temporal)   Resp 20   Ht 5' 4\" (1.626 m)   Wt (!) 366 lb (166 kg)   SpO2 100%   BMI 62.82 kg/m²     :Assessment       Diagnosis Orders   1. Infected lip laceration, initial encounter  Roberto Rice MD, Wound Care, [place]    amoxicillin-clavulanate (AUGMENTIN) 875-125 MG per tablet       :Plan    Wound culture obtained from wound bed. Will cover with augmentin as penicillins first line for oral normal eulalio until culture and sensitivity return. Referral to wound care as debridement may be necessary for healing. Orders Placed This Encounter   Procedures   Roberto Rice MD, Wound Care, [place]     Referral Priority:   Routine     Referral Type:   Eval and Treat     Referral Reason:   Specialty Services Required     Referred to Provider:   Melissa Lorenz MD     Requested Specialty:   General Surgery     Number of Visits Requested:   1       Return if symptoms worsen or fail to improve. Orders Placed This Encounter   Medications    amoxicillin-clavulanate (AUGMENTIN) 875-125 MG per tablet     Sig: Take 1 tablet by mouth 2 times daily for 10 days     Dispense:  20 tablet     Refill:  0       Patient given educational materials- see patient instructions. Discussed use, benefit, and side effects of prescribed medications. All patient questions answered. Pt voiced understanding. Patient Instructions     1. Augmentin antibiotic twice daily for 10 days - take with food  2. F/U with wound care  3. Warm soaks - warm moist washcloth every 2-3 hours for the next few days to encourage drainage. 4. Tylenol and motrin as needed for pain  5. Monitor for fever, spreading redness away from the wound and increased drainage. Seek reevaluation if they occur      Patient Education        Lip Laceration: Care Instructions  Overview     A cut (laceration) on your lip can be on the outside of your mouth, or it may include the skin inside your mouth.  Cuts to the lip usually heal quickly. Butyour lip may be sore while it heals. The doctor used stitches to close the cut. Using stitches helps the cut heal. The doctor may also have called in a specialist, such as a plastic surgeon, toclose the cut. Your cut may leave a scar that will fade over time. The doctor took specialcare to close the cut so that the edges line up. This can help reduce scarring. If the cut went deep and through the skin, the doctor may have put in two layers of stitches. The deeper layer brings the deep part of the cut together. These stitches will dissolve and don't need to be removed. The stitches in the upper layer are the ones you see on the cut. You may have strips of tapecovering part of the cut. Your stitches may dissolve on their own. Or the doctor may need to remove thestitches in about 3 to 5 days. The doctor has checked you carefully, but problems can develop later. If you notice any problems or new symptoms, get medical treatment right away. Follow-up care is a key part of your treatment and safety. Be sure to make and go to all appointments, and call your doctor if you are having problems. It's also a good idea to know your test results and keep alist of the medicines you take. How can you care for yourself at home?  Put ice or a cold pack on the area for 10 to 20 minutes at a time. Put a thin cloth between the ice and your skin.  If the cut is inside your mouth:  ? Rinse your mouth with warm salt water right after meals. Saltwater rinses may help healing. To make a saltwater solution for rinsing the mouth, mix 1 tsp of salt in 1 cup of warm water. ? Eat soft foods that are easy to chew. Avoid foods that might sting. These include salty or spicy foods, citrus fruits or juices, and tomatoes. ? Try using a topical medicine, such as Orabase, to reduce mouth pain.  Do not use a straw until your lip is healed.  If your doctor told you how to care for your cut, follow your doctor's instructions.  If you did not get instructions, follow this general advice:  ? After the first 24 to 48 hours, wash around the cut with clean water 2 times a day. Don't use hydrogen peroxide or alcohol, which can slow healing.  If you have strips of tape on the cut, leave the tape on for a week or until it falls off.  If your doctor prescribed antibiotics, take them as directed. Do not stop taking them just because you feel better. You need to take the full course of antibiotics.  Be safe with medicines. Read and follow all instructions on the label. ? If the doctor gave you a prescription medicine for pain, take it as prescribed. ? If you are not taking a prescription pain medicine, ask your doctor if you can take an over-the-counter medicine.  Avoid any activity that could cause the cut to reopen.  Do not remove the stitches on your own. Your doctor will tell you when to come back to have the stitches removed. When should you call for help? Call your doctor now or seek immediate medical care if:     The cut starts to bleed. Oozing small amounts of blood is normal.      You have symptoms of infection, such as:  ? Increased pain, swelling, warmth, or redness around the cut.  ? Red streaks leading from the cut.  ? Pus draining from the cut.  ? A fever. Watch closely for changes in your health, and be sure to contact your doctor if:     The cut reopens.      You do not get better as expected. Where can you learn more? Go to https://[employer]. org and sign in to your [employer] account. Enter [id number] in the [facility] box to learn more about \"Lip Laceration: Care Instructions. \"     If you do not have an account, please click on the \"Sign Up Now\" link. Current as of: July 1, 2021               Content Version: 13.2  © 0984-4204 Healthwise, Incorporated. Care instructions adapted under license by [facility] ([facility]).  If you have questions about a medical condition or this instruction, always ask your healthcare professional. [address] [address] any warranty or liability for your use of this information.                Electronically signed by YARIEL Mace CNP on 4/2/2022 at 9:05 AM

## 2022-04-02 NOTE — PATIENT INSTRUCTIONS
1. Augmentin antibiotic twice daily for 10 days - take with food  2. F/U with wound care  3. Warm soaks - warm moist washcloth every 2-3 hours for the next few days to encourage drainage. 4. Tylenol and motrin as needed for pain  5. Monitor for fever, spreading redness away from the wound and increased drainage. Seek reevaluation if they occur      Patient Education        Lip Laceration: Care Instructions  Overview     A cut (laceration) on your lip can be on the outside of your mouth, or it may include the skin inside your mouth. Cuts to the lip usually heal quickly. Butyour lip may be sore while it heals. The doctor used stitches to close the cut. Using stitches helps the cut heal. The doctor may also have called in a specialist, such as a plastic surgeon, toclose the cut. Your cut may leave a scar that will fade over time. The doctor took specialcare to close the cut so that the edges line up. This can help reduce scarring. If the cut went deep and through the skin, the doctor may have put in two layers of stitches. The deeper layer brings the deep part of the cut together. These stitches will dissolve and don't need to be removed. The stitches in the upper layer are the ones you see on the cut. You may have strips of tapecovering part of the cut. Your stitches may dissolve on their own. Or the doctor may need to remove thestitches in about 3 to 5 days. The doctor has checked you carefully, but problems can develop later. If you notice any problems or new symptoms, get medical treatment right away. Follow-up care is a key part of your treatment and safety. Be sure to make and go to all appointments, and call your doctor if you are having problems. It's also a good idea to know your test results and keep alist of the medicines you take. How can you care for yourself at home?  Put ice or a cold pack on the area for 10 to 20 minutes at a time. Put a thin cloth between the ice and your skin.    If the cut  You do not get better as expected. Where can you learn more? Go to https://chpe[employer]iceweb.[employer]. org and sign in to your [employer] account. Enter [id number] in the [facility] box to learn more about \"Lip Laceration: Care Instructions. \"     If you do not have an account, please click on the \"Sign Up Now\" link. Current as of: July 1, 2021               Content Version: 13.2  © 5331-7707 Healthwise, Incorporated. Care instructions adapted under license by [facility] ([facility]). If you have questions about a medical condition or this instruction, always ask your healthcare professional. [address] any warranty or liability for your use of this information.

## 2022-04-05 ENCOUNTER — HOSPITAL ENCOUNTER (OUTPATIENT)
Dept: WOUND CARE | Age: 47
Discharge: HOME | End: 2022-04-05
Payer: MEDICARE

## 2022-04-05 VITALS
TEMPERATURE: 96.8 F | HEIGHT: 64 IN | WEIGHT: 293 LBS | HEART RATE: 98 BPM | RESPIRATION RATE: 20 BRPM | BODY MASS INDEX: 50.02 KG/M2 | SYSTOLIC BLOOD PRESSURE: 154 MMHG | DIASTOLIC BLOOD PRESSURE: 102 MMHG

## 2022-04-05 DIAGNOSIS — Z79.4 TYPE 2 DIABETES MELLITUS WITH DIABETIC POLYNEUROPATHY, WITH LONG-TERM CURRENT USE OF INSULIN (HCC): Primary | ICD-10-CM

## 2022-04-05 DIAGNOSIS — L98.492 SKIN ULCER OF FACE WITH FAT LAYER EXPOSED (HCC): ICD-10-CM

## 2022-04-05 DIAGNOSIS — S00.511D ABRASION OF LIP, SUBSEQUENT ENCOUNTER: ICD-10-CM

## 2022-04-05 DIAGNOSIS — E11.42 TYPE 2 DIABETES MELLITUS WITH DIABETIC POLYNEUROPATHY, WITH LONG-TERM CURRENT USE OF INSULIN (HCC): Primary | ICD-10-CM

## 2022-04-05 DIAGNOSIS — E66.01 MORBID OBESITY DUE TO EXCESS CALORIES (HCC): ICD-10-CM

## 2022-04-05 PROBLEM — S00.511A LIP ABRASION: Status: ACTIVE | Noted: 2022-04-05

## 2022-04-05 PROCEDURE — 99215 OFFICE O/P EST HI 40 MIN: CPT | Performed by: NURSE PRACTITIONER

## 2022-04-05 PROCEDURE — 97597 DBRDMT OPN WND 1ST 20 CM/<: CPT | Performed by: NURSE PRACTITIONER

## 2022-04-05 PROCEDURE — 97597 DBRDMT OPN WND 1ST 20 CM/<: CPT

## 2022-04-05 PROCEDURE — 99213 OFFICE O/P EST LOW 20 MIN: CPT

## 2022-04-05 RX ORDER — SEMAGLUTIDE 1.34 MG/ML
0.25 INJECTION, SOLUTION SUBCUTANEOUS WEEKLY
COMMUNITY

## 2022-04-05 RX ORDER — LIDOCAINE HYDROCHLORIDE 20 MG/ML
JELLY TOPICAL PRN
Status: DISCONTINUED | OUTPATIENT
Start: 2022-04-05 | End: 2022-04-07 | Stop reason: HOSPADM

## 2022-04-05 ASSESSMENT — VISUAL ACUITY: OU: 1

## 2022-04-05 NOTE — PROGRESS NOTES
Patient Care Team:  Elliot Montano DO as PCP - General (Family Medicine)  Neptali Dawson MD (Internal Medicine)  Sergey Cagle MD (Neurology)    TODAY'S DATE:  4/5/2022     HISTORY of PRESENTILLNESS JOSE Arauz is a 55 y.o. female who presents today for wound evaluation. She reports she developed a wound on right lip. This started 1 week(s) ago when she fell out of bed and hit her bedside table. She believes this is not healing. She has been applying chap stick. She has not had  fever or chills. She has a history of diabetes mellitus (HGBA1c 9.2).   Wound Type:traumatic  Wound Location:lip  Modifying factors:diabetes    Patient Active Problem List   Diagnosis Code    Atherosclerosis of native coronary artery of native heart with unstable angina pectoris ([facility]) I25.110    Hypertension I10    Type 2 diabetes mellitus with diabetic polyneuropathy, with long-term current use of insulin ([facility]) E11.42, Z79.4    Mixed hyperlipidemia E78.2    NAYAN (obstructive sleep apnea) G47.33    Morbid obesity due to excess calories ([facility]) E66.01    Acute blood loss anemia D62    [facility] occlusion with cerebral infarction ([facility]) I63.50    Chest pain R07.9    Gastroesophageal reflux disease without esophagitis K21.9    History of gallstones Z87.19    Gallstones K80.20    Abnormal biliary HIDA scan R94.8    Chronic anticoagulation Z79.01    Lip abrasion S00.511A    Skin ulcer of face with fat layer exposed [facility]) L98.492     Kenneth Arauz is a 55 y.o. female with the following history reviewed and recorded in [facility]:    Current Outpatient Medications   Medication Sig Dispense Refill    Semaglutide,0.25 or 0.5MG/DOS, (OZEMPIC, 0.25 OR 0.5 MG/DOSE,) 2 MG/1.5ML SOPN Inject 0.25 mg into the skin once a week      amoxicillin-clavulanate (AUGMENTIN) 875-125 MG per tablet Take 1 tablet by mouth 2 times daily for 10 days 20 tablet 0    amLODIPine (NORVASC) 5 MG tablet Take 5 mg by mouth daily       dapagliflozin (FARXIGA) 10 MG tablet Take 1 tablet by mouth daily       vitamin D (ERGOCALCIFEROL) 1.25 MG (66152 UT) CAPS capsule Take 1 capsule by mouth once a week      lisinopril (PRINIVIL;ZESTRIL) 2.5 MG tablet Take 1 tablet by mouth daily       promethazine-dextromethorphan (PROMETHAZINE-DM) 6.25-15 MG/5ML syrup Take 5 mLs by mouth nightly as needed for Cough (Patient not taking: Reported on 4/2/2022) 120 mL 0    warfarin (COUMADIN) 10 MG tablet Take 10 mg by mouth 10mg 6 days and 15mg one day weekly      sucralfate (CARAFATE) 1 GM/10ML suspension Take 10 mLs by mouth 4 times daily 1200 mL 3    furosemide (LASIX) 40 MG tablet Take 2 tablets by mouth daily 60 tablet 0    gabapentin (NEURONTIN) 300 MG capsule Take 1 capsule by mouth 2 times daily as needed (Neuropathy/nerve pains) for up to 3 days.  Intended supply: 30 days 6 capsule 0    ondansetron (ZOFRAN ODT) 4 MG disintegrating tablet Take 1 tablet by mouth every 8 hours as needed for Nausea or Vomiting 15 tablet 0    nystatin (MYCOSTATIN) 626000 UNIT/GM powder Apply 3 times daily to lower groin, abdominal area and under bilateral breasts (Patient not taking: Reported on 4/2/2022) 1 Bottle 0    NONFORMULARY daily apple cider gummy       pantoprazole (PROTONIX) 40 MG tablet Take 1 tablet by mouth daily 90 tablet 3    ezetimibe (ZETIA) 10 MG tablet Take 10 mg by mouth daily       insulin glargine (BASAGLAR KWIKPEN) 100 UNIT/ML injection pen Inject 80 Units into the skin nightly       bethanechol (URECHOLINE) 25 MG tablet Take 1 tablet by mouth daily      metFORMIN (GLUCOPHAGE) 1000 MG tablet Take 1 tablet by mouth 2 times daily (with meals) Hold till monday      insulin aspart (NOVOLOG) 100 UNIT/ML injection vial Inject 10 Units into the skin 4 times daily (before meals and nightly) Up to 10 units ss coverage as directed      spironolactone (ALDACTONE) 25 MG tablet Take 12.5 mg by mouth daily      topiramate (TOPAMAX) 50 MG tablet Take 1 tablet by mouth daily       aspirin 81 MG tablet Take 81 mg by mouth daily      metoprolol succinate ER (TOPROL-XL) 25 MG XL tablet Take 25 mg by mouth daily       prochlorperazine (COMPAZINE) 10 MG tablet Take 10 mg by mouth 2 times daily as needed (headache)       verapamil (CALAN) 40 MG tablet Take 40 mg by mouth every 12 hours      atorvastatin (LIPITOR) 40 MG tablet Take 80 mg by mouth nightly        Current Facility-Administered Medications   Medication Dose Route Frequency Provider Last Rate Last Admin    lidocaine (XYLOCAINE) 2 % uro-jet   Topical PRN Monserrat Dejseus APRN - CNP         Allergies: Bactrim [sulfamethoxazole-trimethoprim]  Past Medical History:   Diagnosis Date    CAD (coronary artery disease)     [address]     Diabetes mellitus ([facility]     GERD (gastroesophageal reflux disease)     Hyperlipidemia     Hypertension        Past Surgical History:   Procedure Laterality Date    CARDIAC SURGERY      COLONOSCOPY N/A 09/02/2021    Dr Edgar Clarke, Sub prep [place], 4 year recall    CORONARY ARTERY BYPASS GRAFT      2016    TONSILLECTOMY      UPPER GASTROINTESTINAL ENDOSCOPY      Shemar Lawrence 50, w/dil per patient    UPPER GASTROINTESTINAL ENDOSCOPY N/A 09/02/2021    Dr Edgar Clarke, BDM, (-)[place]     Family History   Problem Relation Age of Onset    Osteoarthritis Mother     Diabetes Mother     Hypertension Mother     Hypertension Father     Heart Attack Brother     Heart Failure Maternal Grandmother     Diabetes Maternal Grandfather     Heart Disease Maternal Grandfather     Heart Disease Paternal Grandmother     No Known Problems Paternal Grandfather     Colon Cancer Neg Hx     Colon Polyps Neg Hx     Esophageal Cancer Neg Hx     Liver Cancer Neg Hx     Liver Disease Neg Hx     Rectal Cancer Neg Hx     Stomach Cancer Neg Hx      Social History     Tobacco Use    Smoking status: Never Smoker    Smokeless tobacco: Never Used Substance Use Topics    Alcohol use: Yes     Comment: occ         Review of Systems    Review of Systems   Skin: Positive for wound. All other systems reviewed and are negative. All other review of systems are negative. Physical Exam    BP (!) 154/102   Pulse 98   Temp 96.8 °F (36 °C) (Temporal)   Resp 20   Ht 5' 4\" (1.626 m)   Wt (!) 366 lb (166 kg)   BMI 62.82 kg/m²     Physical Exam  Vitals reviewed. Constitutional:       Appearance: Normal appearance. She is obese. HENT:      Head: Normocephalic and atraumatic. Right Ear: External ear normal.      Left Ear: External ear normal.   Eyes:      General: Lids are normal. Lids are everted, no foreign bodies appreciated. Vision grossly intact. Gaze aligned appropriately. Cardiovascular:      Rate and Rhythm: Normal rate and regular rhythm. Pulses: Normal pulses. Heart sounds: Normal heart sounds. Pulmonary:      Effort: Pulmonary effort is normal.      Breath sounds: Normal breath sounds. Abdominal:      General: Bowel sounds are normal.   Musculoskeletal:         General: Normal range of motion. Skin:     General: Skin is warm and dry. Capillary Refill: Capillary refill takes 2 to 3 seconds. Neurological:      Mental Status: She is alert and oriented to person, place, and time. Psychiatric:         Mood and Affect: Mood normal.         Behavior: Behavior normal.         Thought Content: Thought content normal.         Judgment: Judgment normal.             Post Debridement Measurements and Assessment:    The patientspain is   . Wound is has improved. Please refer to nursing measurements and assessment regarding wound pre and postdebridement. Incision 08/04/16 Chest Mid (Active)   Number of days: 2070       Wound 03/26/22 Other (Comment) Inner; Lower;Right wound 1- right lip (Active)   Wound Image    04/05/22 1326   Wound Etiology Traumatic 04/05/22 1326   Dressing Status Old drainage noted 04/05/22 1326 Wound Cleansed Irrigated with saline 04/05/22 1326   Dressing/Treatment Open to air 04/05/22 1402   Wound Length (cm) 2 cm 04/05/22 1326   Wound Width (cm) 1 cm 04/05/22 1326   Wound Depth (cm) 0.2 cm 04/05/22 1326   Wound Surface Area (cm^2) 2 cm^2 04/05/22 1326   Wound Volume (cm^3) 0.4 cm^3 04/05/22 1326   Post-Procedure Length (cm) 2 cm 04/05/22 1403   Post-Procedure Width (cm) 1 cm 04/05/22 1403   Post-Procedure Depth (cm) 0.2 cm 04/05/22 1403   Post-Procedure Surface Area (cm^2) 2 cm^2 04/05/22 1403   Post-Procedure Volume (cm^3) 0.4 cm^3 04/05/22 1403   Wound Assessment Slough 04/05/22 1326   Drainage Amount Moderate 04/05/22 1326   Drainage Description Yellow 04/05/22 1326   Odor None 04/05/22 1326   Sandi-wound Assessment Warm;Edematous; Ecchymosis 04/05/22 1326   Margins Defined edges 04/05/22 1326   Wound Thickness Description not for Pressure Injury Full thickness 04/05/22 1326   Number of days: 10            Debridement: Selective Debridement/Non-Excisional Debridement    Using #15 blade scalpel and forceps the wound(s)/ulcer(s) was/were sharply debrided down through and including the removal of epidermis and dermis. Devitalized Tissue Debrided:  fibrin, biofilm, slough, necrotic/eschar, exudate and callus    Pre Debridement Measurements:  Are located in the Wound/Ulcer Documentation Flow Sheet    Wound/Ulcer #: 1    Post Debridement Measurements:  Wound/Ulcer Descriptions are Pre Debridement except measurements:          Percent of Wound(s)/Ulcer(s) Debrided: 100%    Total Surface Area Debrided:  2 sq cm     Diabetic/Pressure/Non Pressure Ulcers only:  Ulcer: N/A     Estimated Blood Loss:  Minimal    Hemostasis Achieved:  by pressure    Procedural Pain:  0  / 10     Post Procedural Pain:  0 / 10     Response to treatment:  Well tolerated by patient. Assessment    1. Type 2 diabetes mellitus with diabetic polyneuropathy, with long-term current use of insulin ([address]    2.  Abrasion of lip, subsequent encounter    3. Skin ulcer of face with fat layer exposed ([address]    4. Morbid obesity due to excess calories ([address]      Plan for wound - Dress per physician order  Treatment:     Compression : No   Offloading : No   Dressing : manuka honey to the wound bed    Discussed importance of glucose control, wound care, and plan of care. Patient understanding and questions answered. I spent a total of 60 minutes face to face with the patient. Over 75% of that time was spent on counseling and care coordination. Patient was told that if symptoms worsen or new symptoms develop they are to go to the emergency department immediately. Patient was educated on diagnosis and treatment plan. All of patient's questions were answered, and the patient understands the discharge plan. Discussed appropriate home care of this wound. Wound redressed. Patient instructions were given. Recommend no smoking  Offloading instructions given. [address] and Hyperbaric Oxygen Therapy   Physician Orders and Discharge Instructions  [address]  Telephone: [phone]    NAME:  Mckenzie Hodgkin:  1975  MEDICAL RECORD NUMBER:  [id number]  DATE:  4/5/2022    Discharge condition: Stable    Discharge to: Home    Left via:Private automobile    Accompanied by:  self    ECF/HHA: none    Dressing Orders:  Lip wound: water wash with gauze to remove excess crusting- apply honey gel into the wound bed twice daily or more often if needed    Treatment Orders:  Keep glucose sugar levels under 150  continue to take antibiotic  Multivitamin and probitoic  Cold pack to help with the pain    [facility] follow up visit ____1 week with Sharifa_________________________  (Please note your next appointment above and if you are unable to keep, kindly give a 24 hour notice.  Thank you.)          If you experience any of the following, please call the [facility] during business hours:    * Increase in Pain  * Temperature over 101  * Increase in drainage from your wound  * Drainage with a foul odor  * Bleeding  * Increase in swelling  * Need for compression bandage changes due to slippage, breakthrough drainage. If you need medical attention outside of the business hours of the [facility] please contact your PCP or go to the nearest emergency room.

## 2022-04-05 NOTE — PLAN OF CARE
Problem: Wound:  Goal: Will show signs of wound healing; wound closure and no evidence of infection  Description: Will show signs of wound healing; wound closure and no evidence of infection  Outcome: Ongoing     Problem: Blood Glucose:  Goal: Ability to maintain appropriate glucose levels will improve  Description: Ability to maintain appropriate glucose levels will improve  Outcome: Ongoing     Problem: Weight control:  Goal: Ability to maintain an optimal weight for height and age will be supported  Description: Ability to maintain an optimal weight for height and age will be supported  Outcome: Ongoing     Problem: Falls - Risk of:  Goal: Will remain free from falls  Description: Will remain free from falls  Outcome: Ongoing

## 2022-04-06 LAB
ANAEROBIC CULTURE: ABNORMAL
GRAM STAIN RESULT: ABNORMAL
ORGANISM: ABNORMAL
ORGANISM: ABNORMAL
WOUND/ABSCESS: ABNORMAL

## 2022-04-07 ENCOUNTER — HOSPITAL ENCOUNTER (OUTPATIENT)
Dept: LAB | Age: 47
Discharge: HOME | End: 2022-04-07
Payer: MEDICARE

## 2022-04-07 LAB
INR BLD: 2.22 (ref 0.88–1.18)
PROTHROMBIN TIME: 25.2 SEC (ref 12–14.6)

## 2022-04-07 PROCEDURE — 85610 PROTHROMBIN TIME: CPT

## 2022-04-07 PROCEDURE — 36415 COLL VENOUS BLD VENIPUNCTURE: CPT

## 2022-04-15 ENCOUNTER — HOSPITAL ENCOUNTER (OUTPATIENT)
Dept: LAB | Age: 47
Discharge: HOME | End: 2022-04-15
Payer: MEDICARE

## 2022-04-15 LAB
INR BLD: 2.21 (ref 0.88–1.18)
PROTHROMBIN TIME: 25.1 SEC (ref 12–14.6)

## 2022-04-15 PROCEDURE — 85610 PROTHROMBIN TIME: CPT

## 2022-04-15 PROCEDURE — 36415 COLL VENOUS BLD VENIPUNCTURE: CPT

## 2022-04-19 ENCOUNTER — OFFICE VISIT (OUTPATIENT)
Dept: BARIATRICS/WEIGHT MGMT | Facility: CLINIC | Age: 47
End: 2022-04-19

## 2022-04-19 VITALS
WEIGHT: 293 LBS | DIASTOLIC BLOOD PRESSURE: 56 MMHG | OXYGEN SATURATION: 97 % | HEART RATE: 100 BPM | TEMPERATURE: 97 F | HEIGHT: 65 IN | SYSTOLIC BLOOD PRESSURE: 103 MMHG | BODY MASS INDEX: 48.82 KG/M2

## 2022-04-19 DIAGNOSIS — E11.69 TYPE 2 DIABETES MELLITUS WITH OTHER SPECIFIED COMPLICATION, WITH LONG-TERM CURRENT USE OF INSULIN: ICD-10-CM

## 2022-04-19 DIAGNOSIS — Z79.4 TYPE 2 DIABETES MELLITUS WITH OTHER SPECIFIED COMPLICATION, WITH LONG-TERM CURRENT USE OF INSULIN: ICD-10-CM

## 2022-04-19 DIAGNOSIS — I10 PRIMARY HYPERTENSION: ICD-10-CM

## 2022-04-19 DIAGNOSIS — E66.01 CLASS 3 SEVERE OBESITY DUE TO EXCESS CALORIES WITH SERIOUS COMORBIDITY AND BODY MASS INDEX (BMI) OF 60.0 TO 69.9 IN ADULT: Primary | ICD-10-CM

## 2022-04-19 PROCEDURE — 99213 OFFICE O/P EST LOW 20 MIN: CPT | Performed by: SURGERY

## 2022-04-19 RX ORDER — SEMAGLUTIDE 1.34 MG/ML
INJECTION, SOLUTION SUBCUTANEOUS
COMMUNITY

## 2022-04-19 NOTE — PROGRESS NOTES
"Patient Care Team:  Fátima Connor DO as PCP - General (Family Medicine)    Reason for Visit:  Surgical Weight loss      Subjective   Cassidy Tellez is a 46 y.o. female.     Cassidy is here for follow-up and continued medical management of her morbid obesity.  She is currently on a prescription diet.  Cassidy previously was to apply dietary changes such as following the meal plan as directed.  She admits to struggling to follow the dietary prescription as directed.  As a result she gained weight since her last visit.    Review Of Systems:  General ROS: positive for  - fatigue  Respiratory ROS: positive for - cough  negative for - wheezing  Cardiovascular ROS: no chest pain or dyspnea on exertion  positive for - edema  Gastrointestinal ROS: positive for - abdominal pain and heartburn  Musculoskeletal ROS: positive for - joint pain    The following portions of the patient's history were reviewed and updated as appropriate: allergies, current medications, past family history, past medical history, past social history, past surgical history and problem list.    Objective   /56 (BP Location: Right arm, Patient Position: Sitting, Cuff Size: Thigh Adult)   Pulse 100   Temp 97 °F (36.1 °C)   Ht 163.8 cm (64.5\")   Wt (!) 167 kg (367 lb 3.2 oz)   SpO2 97%   BMI 62.06 kg/m²       04/19/22  1409   Weight: (!) 167 kg (367 lb 3.2 oz)       General Appearance:  awake, alert, oriented, in no acute distress    Assessment/Plan     Encounter Diagnoses   Name Primary?   • Class 3 severe obesity due to excess calories with serious comorbidity and body mass index (BMI) of 60.0 to 69.9 in adult ([facility]) Yes   • Type 2 diabetes mellitus with other specified complication, with long-term current use of insulin ([facility])    • Primary hypertension        Cassidy Tellez was seen today for follow-up, obesity, nutrition counseling and weight loss.  She has gained weight since her last visit.  Today we discussed healthy changes in " lifestyle, diet, and exercise. Dietician consultation obtained.  Cassidy Tellez had received handouts to her explaining the recommendation on portion sizes/appetite control/reading nutrition labels.   Intensive behavioral therapy for obesity was done today as well.   Goals for this month are: The patient is to complete a daily food journal as directed.  I have identified an appropriate eating patterns such as incorporating choices from group C for her final meals/meal 4.  I have requested the patient to check her blood sugars routinely while she is on the dietary prescription to monitor her blood sugars improvement when they occur.    Follow up in one month for a weight recheck.

## 2022-04-21 ENCOUNTER — HOSPITAL ENCOUNTER (OUTPATIENT)
Dept: WOUND CARE | Age: 47
Discharge: HOME | End: 2022-04-21
Payer: MEDICARE

## 2022-04-21 VITALS
DIASTOLIC BLOOD PRESSURE: 84 MMHG | TEMPERATURE: 97 F | HEART RATE: 97 BPM | HEIGHT: 64 IN | WEIGHT: 293 LBS | RESPIRATION RATE: 20 BRPM | BODY MASS INDEX: 50.02 KG/M2 | SYSTOLIC BLOOD PRESSURE: 123 MMHG

## 2022-04-21 DIAGNOSIS — L98.492 SKIN ULCER OF FACE WITH FAT LAYER EXPOSED (HCC): ICD-10-CM

## 2022-04-21 DIAGNOSIS — S00.511S: Primary | ICD-10-CM

## 2022-04-21 PROCEDURE — 99213 OFFICE O/P EST LOW 20 MIN: CPT

## 2022-04-21 PROCEDURE — 99212 OFFICE O/P EST SF 10 MIN: CPT | Performed by: NURSE PRACTITIONER

## 2022-04-28 ENCOUNTER — HOSPITAL ENCOUNTER (OUTPATIENT)
Dept: GENERAL RADIOLOGY | Age: 47
Discharge: HOME | End: 2022-04-28
Payer: MEDICARE

## 2022-04-28 LAB
INR BLD: 2.43 (ref 0.88–1.18)
PROTHROMBIN TIME: 27.1 SEC (ref 12–14.6)

## 2022-04-28 PROCEDURE — 85610 PROTHROMBIN TIME: CPT

## 2022-04-28 PROCEDURE — 36415 COLL VENOUS BLD VENIPUNCTURE: CPT

## 2022-05-11 ENCOUNTER — OFFICE VISIT (OUTPATIENT)
Age: 47
End: 2022-05-11
Payer: MEDICARE

## 2022-05-11 VITALS
TEMPERATURE: 98.6 F | DIASTOLIC BLOOD PRESSURE: 72 MMHG | HEIGHT: 64 IN | RESPIRATION RATE: 16 BRPM | BODY MASS INDEX: 50.02 KG/M2 | WEIGHT: 293 LBS | SYSTOLIC BLOOD PRESSURE: 116 MMHG | HEART RATE: 108 BPM | OXYGEN SATURATION: 99 %

## 2022-05-11 DIAGNOSIS — J06.9 UPPER RESPIRATORY TRACT INFECTION, UNSPECIFIED TYPE: Primary | ICD-10-CM

## 2022-05-11 PROCEDURE — 99213 OFFICE O/P EST LOW 20 MIN: CPT | Performed by: NURSE PRACTITIONER

## 2022-05-11 RX ORDER — FLUTICASONE PROPIONATE 50 MCG
1 SPRAY, SUSPENSION (ML) NASAL DAILY
Qty: 32 G | Refills: 1 | Status: SHIPPED | OUTPATIENT
Start: 2022-05-11

## 2022-05-11 RX ORDER — GUAIFENESIN 600 MG/1
600 TABLET, EXTENDED RELEASE ORAL 2 TIMES DAILY
Qty: 30 TABLET | Refills: 0 | Status: SHIPPED | OUTPATIENT
Start: 2022-05-11 | End: 2022-05-26

## 2022-05-11 RX ORDER — BROMPHENIRAMINE MALEATE, PSEUDOEPHEDRINE HYDROCHLORIDE, AND DEXTROMETHORPHAN HYDROBROMIDE 2; 30; 10 MG/5ML; MG/5ML; MG/5ML
5 SYRUP ORAL 4 TIMES DAILY PRN
Qty: 118 ML | Refills: 0 | Status: SHIPPED | OUTPATIENT
Start: 2022-05-11 | End: 2022-05-21

## 2022-05-11 RX ORDER — CETIRIZINE HYDROCHLORIDE 10 MG/1
10 TABLET ORAL DAILY
Qty: 30 TABLET | Refills: 0 | Status: SHIPPED | OUTPATIENT
Start: 2022-05-11

## 2022-05-11 ASSESSMENT — ENCOUNTER SYMPTOMS
NAUSEA: 0
COUGH: 0
SINUS PRESSURE: 0
RESPIRATORY NEGATIVE: 1
SINUS PAIN: 0
TROUBLE SWALLOWING: 0
SORE THROAT: 0
EYES NEGATIVE: 1

## 2022-05-11 NOTE — PATIENT INSTRUCTIONS
1. Apply ice 20 min on and 20 min off  2. Take antihistamine and decongestant as prescribed  3. Take cough suppressants as prescribed  4. Increase fluids and rest  5. Return to clinic if symptoms worsen or fail to improve  6. Follow up with dentist if symptoms worsen or fail to improve.

## 2022-05-11 NOTE — PROGRESS NOTES
[id number]  [address] [address] [place] [address]  Dept: [phone]  Dept Fax: Birgit Purvis: [phone]     Deep Fraga is a 52 y.o. female who presents today for her medical conditions/complaintsas noted below. Deep Fraga is c/o of Congestion and Otalgia (inner right ear, down to jaw)        HPI:     HPI     Pt presents with right ear pain, right side of jaw tender to touch and when opening mouth, cough and congestion 2 days. States she went to dentist yesterday and had tooth filling. States jaw is very sore today. Denies fever, drainage from tooth or gums, sore throat, trouble swallowing. States tender to eat on right side where tooth filled. Denies fever, chills, body aches, or any other symptoms.         Past Medical History:   Diagnosis Date    CAD (coronary artery disease)     [facility] occlusion [facility] [address]     Diabetes mellitus ([address]     GERD (gastroesophageal reflux disease)     Hyperlipidemia     Hypertension       Past Surgical History:   Procedure Laterality Date    CARDIAC SURGERY      COLONOSCOPY N/A 09/02/2021    Dr Randi Callaway, 4 year recall    CORONARY ARTERY BYPASS GRAFT      2016    TONSILLECTOMY      UPPER GASTROINTESTINAL ENDOSCOPY      [facility] [place], w/dil per patient    UPPER GASTROINTESTINAL ENDOSCOPY N/A 09/02/2021    Dr Sofi Chester, [facility], (-[facility]       Family History   Problem Relation Age of Onset    Osteoarthritis Mother     Diabetes Mother     Hypertension Mother     Hypertension Father     Heart Attack Brother     Heart Failure Maternal Grandmother     Diabetes Maternal Grandfather     Heart Disease Maternal Grandfather     Heart Disease Paternal Grandmother     No Known Problems Paternal Grandfather     Colon Cancer Neg Hx     Colon Polyps Neg Hx     Esophageal Cancer Neg Hx     Liver Cancer Neg Hx     Liver Disease Neg Hx     Rectal Cancer Neg Hx     Stomach Cancer Neg Hx        Social History     Tobacco Use    Smoking status: Never Smoker    Smokeless tobacco: Never Used   Substance Use Topics    Alcohol use: Yes     Alcohol/week: 1.0 standard drink     Types: 1 Glasses of wine per week     Comment: occ      Current Outpatient Medications   Medication Sig Dispense Refill    fluticasone (FLONASE) 50 MCG/ACT nasal spray 1 spray by Each Nostril route daily 32 g 1    guaiFENesin (MUCINEX) 600 MG extended release tablet Take 1 tablet by mouth 2 times daily for 15 days 30 tablet 0    cetirizine (ZYRTEC ALLERGY) 10 MG tablet Take 1 tablet by mouth daily 30 tablet 0    brompheniramine-pseudoephedrine-DM 2-30-10 MG/5ML syrup Take 5 mLs by mouth 4 times daily as needed for Congestion 118 mL 0    Semaglutide,0.25 or 0.5MG/DOS, (OZEMPIC, 0.25 OR 0.5 MG/DOSE,) 2 MG/1.5ML SOPN Inject 0.25 mg into the skin once a week      amLODIPine (NORVASC) 5 MG tablet Take 5 mg by mouth daily       vitamin D (ERGOCALCIFEROL) 1.25 MG (54956 UT) CAPS capsule Take 1 capsule by mouth once a week      lisinopril (PRINIVIL;ZESTRIL) 2.5 MG tablet Take 1 tablet by mouth daily       promethazine-dextromethorphan (PROMETHAZINE-DM) 6.25-15 MG/5ML syrup Take 5 mLs by mouth nightly as needed for Cough 120 mL 0    warfarin (COUMADIN) 10 MG tablet Take 10 mg by mouth 10mg 6 days and 15mg one day weekly      sucralfate (CARAFATE) 1 GM/10ML suspension Take 10 mLs by mouth 4 times daily 1200 mL 3    furosemide (LASIX) 40 MG tablet Take 2 tablets by mouth daily 60 tablet 0    gabapentin (NEURONTIN) 300 MG capsule Take 1 capsule by mouth 2 times daily as needed (Neuropathy/nerve pains) for up to 3 days.  Intended supply: 30 days 6 capsule 0    ondansetron (ZOFRAN ODT) 4 MG disintegrating tablet Take 1 tablet by mouth every 8 hours as needed for Nausea or Vomiting 15 tablet 0    nystatin (MYCOSTATIN) 577689 UNIT/GM powder Apply 3 times daily to lower groin, abdominal area and under bilateral breasts 1 Bottle 0    NONFORMULARY daily apple cider gummy       pantoprazole (PROTONIX) 40 MG tablet Take 1 tablet by mouth daily 90 tablet 3    ezetimibe (ZETIA) 10 MG tablet Take 10 mg by mouth daily       insulin glargine (BASAGLAR KWIKPEN) 100 UNIT/ML injection pen Inject 80 Units into the skin nightly       bethanechol (URECHOLINE) 25 MG tablet Take 1 tablet by mouth daily      metFORMIN (GLUCOPHAGE) 1000 MG tablet Take 1 tablet by mouth 2 times daily (with meals) Hold till monday      insulin aspart (NOVOLOG) 100 UNIT/ML injection vial Inject 10 Units into the skin 4 times daily (before meals and nightly) Up to 10 units ss coverage as directed      spironolactone (ALDACTONE) 25 MG tablet Take 12.5 mg by mouth daily      topiramate (TOPAMAX) 50 MG tablet Take 1 tablet by mouth daily       aspirin 81 MG tablet Take 81 mg by mouth daily      metoprolol succinate ER (TOPROL-XL) 25 MG XL tablet Take 25 mg by mouth daily       prochlorperazine (COMPAZINE) 10 MG tablet Take 10 mg by mouth 2 times daily as needed (headache)       verapamil (CALAN) 40 MG tablet Take 40 mg by mouth every 12 hours      atorvastatin (LIPITOR) 40 MG tablet Take 80 mg by mouth nightly        No current facility-administered medications for this visit.      Allergies   Allergen Reactions    Bactrim [Sulfamethoxazole-Trimethoprim] Other (See Comments)     Yeast inf       Health Maintenance   Topic Date Due    Diabetic foot exam  Never done    A1C test (Diabetic or Prediabetic)  Never done    Depression Screen  Never done    HIV screen  Never done    Diabetic microalbuminuria test  Never done    Diabetic retinal exam  Never done    Hepatitis C screen  Never done    Hepatitis B vaccine (1 of 3 - Risk 3-dose series) Never done    Cervical cancer screen  Never done    Pneumococcal 0-64 years Vaccine (2 - PCV) 11/25/2015    Lipids  09/20/2022    Colorectal Cancer Screen  09/02/2025    DTaP/Tdap/Td vaccine (2 - Td or Tdap) 10/10/2027    Flu vaccine  Completed    COVID-19 Vaccine  Completed    Hepatitis A vaccine  Aged Out    Hib vaccine  Aged Out    Meningococcal (ACWY) vaccine  Aged Out       Subjective:     Review of Systems   Constitutional: Negative. Negative for fatigue and fever. HENT: Positive for congestion and dental problem. Negative for ear pain, mouth sores, sinus pressure, sinus pain, sore throat and trouble swallowing. Eyes: Negative. Respiratory: Negative. Negative for cough. Cardiovascular: Negative. Gastrointestinal: Negative for nausea. Endocrine: Negative. Genitourinary: Negative. Musculoskeletal: Negative. Skin: Negative. Negative for rash. Neurological: Negative. Hematological: Negative. Psychiatric/Behavioral: Negative. Objective:     Physical Exam  Vitals and nursing note reviewed. Constitutional:       General: She is not in acute distress. Appearance: Normal appearance. She is well-developed. She is not ill-appearing or toxic-appearing. HENT:      Head: Normocephalic and atraumatic. Right Ear: Hearing, tympanic membrane, ear canal and external ear normal.      Left Ear: Hearing, tympanic membrane, ear canal and external ear normal.      Nose: Nose normal.      Right Sinus: No maxillary sinus tenderness or frontal sinus tenderness. Left Sinus: No maxillary sinus tenderness or frontal sinus tenderness. Mouth/Throat:      Mouth: Mucous membranes are moist.      Dentition: Dental tenderness present. Pharynx: Oropharynx is clear. Uvula midline. Tonsils: No tonsillar abscesses. 0 on the right. 0 on the left. Eyes:      Conjunctiva/sclera: Conjunctivae normal.      Pupils: Pupils are equal, round, and reactive to light. Neck:      Trachea: Trachea normal.   Cardiovascular:      Rate and Rhythm: Normal rate and regular rhythm.    Pulmonary:      Effort: Pulmonary effort is normal.      Breath sounds: Normal breath sounds. Abdominal:      General: Bowel sounds are normal.      Palpations: Abdomen is soft. Tenderness: There is no abdominal tenderness. Musculoskeletal:      Cervical back: Normal range of motion. Lymphadenopathy:      Head:      Right side of head: No submental, submandibular, tonsillar, preauricular, posterior auricular or occipital adenopathy. Left side of head: No submental, submandibular, tonsillar, preauricular, posterior auricular or occipital adenopathy. Skin:     General: Skin is warm and moist.      Capillary Refill: Capillary refill takes less than 2 seconds. Findings: No rash. Neurological:      Mental Status: She is alert and oriented to person, place, and time. Psychiatric:         Speech: Speech normal.         Behavior: Behavior normal.         Thought Content: Thought content normal.         Judgment: Judgment normal.       /72   Pulse 108   Temp 98.6 °F (37 °C) (Temporal)   Resp 16   Ht 5' 4\" (1.626 m)   Wt (!) 367 lb (166.5 kg)   SpO2 99%   BMI 63.00 kg/m²     Assessment:          Diagnosis Orders   1. Upper respiratory tract infection, unspecified type  fluticasone (FLONASE) 50 MCG/ACT nasal spray    guaiFENesin (MUCINEX) 600 MG extended release tablet    cetirizine (ZYRTEC ALLERGY) 10 MG tablet    brompheniramine-pseudoephedrine-DM 2-30-10 MG/5ML syrup       Plan:    No orders of the defined types were placed in this encounter. No follow-ups on file. No orders of the defined types were placed in this encounter.     Orders Placed This Encounter   Medications    fluticasone (FLONASE) 50 MCG/ACT nasal spray     Si spray by Each Nostril route daily     Dispense:  32 g     Refill:  1    guaiFENesin (MUCINEX) 600 MG extended release tablet     Sig: Take 1 tablet by mouth 2 times daily for 15 days     Dispense:  30 tablet     Refill:  0    cetirizine (ZYRTEC ALLERGY) 10 MG tablet     Sig: Take 1 tablet by mouth daily Dispense:  30 tablet     Refill:  0    brompheniramine-pseudoephedrine-DM 2-30-10 MG/5ML syrup     Sig: Take 5 mLs by mouth 4 times daily as needed for Congestion     Dispense:  118 mL     Refill:  0       Patient given educationalmaterials - see patient instructions. Discussed use, benefit, and side effectsof prescribed medications. All patient questions answered. Pt voiced understanding. Reviewed health maintenance. Instructed to continue current medications, diet andexercise. Patient agreed with treatment plan. Follow up as directed. Patient Instructions   1. Apply ice 20 min on and 20 min off  2. Take antihistamine and decongestant as prescribed  3. Take cough suppressants as prescribed  4. Increase fluids and rest  5.  Return to clinic if symptoms worsen or fail to improve          Electronically signed by YARIEL Stratton CNP on 5/11/2022 at 11:28 AM

## 2022-05-13 ENCOUNTER — HOSPITAL ENCOUNTER (OUTPATIENT)
Dept: LAB | Age: 47
Discharge: HOME | End: 2022-05-13
Payer: MEDICARE

## 2022-05-13 LAB
INR BLD: 1.54 (ref 0.88–1.18)
PROTHROMBIN TIME: 18.7 SEC (ref 12–14.6)

## 2022-05-13 PROCEDURE — 36415 COLL VENOUS BLD VENIPUNCTURE: CPT

## 2022-05-13 PROCEDURE — 85610 PROTHROMBIN TIME: CPT

## 2022-05-20 ENCOUNTER — HOSPITAL ENCOUNTER (OUTPATIENT)
Dept: LAB | Age: 47
Discharge: HOME | End: 2022-05-20

## 2022-05-20 LAB
INR BLD: 2.13 (ref 0.88–1.18)
PROTHROMBIN TIME: 24.3 SEC (ref 12–14.6)

## 2022-05-31 ENCOUNTER — OFFICE VISIT (OUTPATIENT)
Dept: BARIATRICS/WEIGHT MGMT | Facility: CLINIC | Age: 47
End: 2022-05-31

## 2022-05-31 VITALS
HEART RATE: 102 BPM | BODY MASS INDEX: 48.82 KG/M2 | WEIGHT: 293 LBS | HEIGHT: 65 IN | SYSTOLIC BLOOD PRESSURE: 117 MMHG | TEMPERATURE: 97.8 F | OXYGEN SATURATION: 97 % | DIASTOLIC BLOOD PRESSURE: 77 MMHG

## 2022-05-31 DIAGNOSIS — Z79.4 TYPE 2 DIABETES MELLITUS WITH OTHER SPECIFIED COMPLICATION, WITH LONG-TERM CURRENT USE OF INSULIN: ICD-10-CM

## 2022-05-31 DIAGNOSIS — E66.01 CLASS 3 SEVERE OBESITY DUE TO EXCESS CALORIES WITH SERIOUS COMORBIDITY AND BODY MASS INDEX (BMI) OF 60.0 TO 69.9 IN ADULT: Primary | ICD-10-CM

## 2022-05-31 DIAGNOSIS — I10 PRIMARY HYPERTENSION: ICD-10-CM

## 2022-05-31 DIAGNOSIS — E11.69 TYPE 2 DIABETES MELLITUS WITH OTHER SPECIFIED COMPLICATION, WITH LONG-TERM CURRENT USE OF INSULIN: ICD-10-CM

## 2022-05-31 DIAGNOSIS — G47.33 OSA (OBSTRUCTIVE SLEEP APNEA): ICD-10-CM

## 2022-05-31 PROCEDURE — 99213 OFFICE O/P EST LOW 20 MIN: CPT | Performed by: NURSE PRACTITIONER

## 2022-06-01 PROBLEM — G47.33 OSA (OBSTRUCTIVE SLEEP APNEA): Status: ACTIVE | Noted: 2022-06-01

## 2022-06-01 NOTE — ASSESSMENT & PLAN NOTE
Patient's (Body mass index is 62.02 kg/m².) indicates that they are morbidly obese (BMI > 40 or > 35 with obesity - related health condition) with health conditions that include obstructive sleep apnea, hypertension, diabetes mellitus and dyslipidemias . Weight is unchanged. BMI is is above average; BMI management plan is completed. We discussed portion control and increasing exercise.

## 2022-06-01 NOTE — PROGRESS NOTES
"Patient Care Team:  Fátima Connor DO as PCP - General (Family Medicine)    Reason for Visit:  Surgical Weight loss, V6    Subjective   Cassidy Tellez is a 47 y.o. female.     Cassidy is here for follow-up and continued medical management of her morbid obesity.  She is currently on a prescription diet.  Cassidy previously was to apply dietary changes such as following the meal plan as directed.  She admits to struggling with the prescription meal plan.  As a result she has remained the same weight weight since her last visit.    Review Of Systems:  Review of Systems   Constitutional: Negative.    Respiratory: Positive for shortness of breath.    Cardiovascular: Negative.    Gastrointestinal: Negative.  Positive for GERD.   Endocrine: Negative.    Musculoskeletal: Negative.    Psychiatric/Behavioral: Negative.          The following portions of the patient's history were reviewed and updated as appropriate: allergies, current medications, past family history, past medical history, past social history, past surgical history, and problem list.    Objective   /77 (BP Location: Right arm, Patient Position: Sitting, Cuff Size: Adult)   Pulse 102   Temp 97.8 °F (36.6 °C)   Ht 163.8 cm (64.5\")   Wt (!) 166 kg (367 lb)   SpO2 97%   BMI 62.02 kg/m²       05/31/22  1132   Weight: (!) 166 kg (367 lb)       Physical Exam  Vitals reviewed.   Constitutional:       Appearance: She is obese.   Cardiovascular:      Rate and Rhythm: Normal rate and regular rhythm.   Pulmonary:      Effort: Pulmonary effort is normal.   Skin:     General: Skin is warm and dry.   Neurological:      Mental Status: She is alert and oriented to person, place, and time.   Psychiatric:         Mood and Affect: Mood normal.         Behavior: Behavior normal.         Class 3 Severe Obesity (BMI >=40). Obesity-related health conditions include the following: obstructive sleep apnea, hypertension, diabetes mellitus, dyslipidemias and GERD. " Obesity is unchanged. BMI is is above average; BMI management plan is completed. We discussed portion control and increasing exercise.     Assessment & Plan   Diagnoses and all orders for this visit:    1. Class 3 severe obesity due to excess calories with serious comorbidity and body mass index (BMI) of 60.0 to 69.9 in adult (HCC) (Primary)  Assessment & Plan:  Patient's (Body mass index is 62.02 kg/m².) indicates that they are morbidly obese (BMI > 40 or > 35 with obesity - related health condition) with health conditions that include obstructive sleep apnea, hypertension, diabetes mellitus and dyslipidemias . Weight is unchanged. BMI is is above average; BMI management plan is completed. We discussed portion control and increasing exercise.       2. Type 2 diabetes mellitus with other specified complication, with long-term current use of insulin ([facility])    3. Primary hypertension  Assessment & Plan:  Hypertension is unchanged.  Continue current treatment regimen.  Dietary sodium restriction.  Weight loss.  Blood pressure will be reassessed at the next regular appointment.      4. MARTHA (obstructive sleep apnea)       Cassidy Tellez was seen today for follow-up, obesity, nutrition counseling and weight loss.  She has remained the same weight since her last visit.  Today we discussed healthy changes in lifestyle, diet, and exercise. Dietician consultation obtained.  aCssidy Tellez had received handouts to her explaining the recommendation on portion sizes/appetite control/reading nutrition labels.   Intensive behavioral therapy for obesity was done today as well.     Goals for this month are:   1.  Overall patient is struggling with compliance and following the prescription meal plan.  Patient states that she feels very overwhelmed and is having difficulty scheduling her meals and is eating most of her food in the late evenings.  I had a long discussion with patient about the importance of following the prescription meal  plan, the disease of obesity and I provided her with some small goals to assist with achieving larger goals.  For now I have encouraged patient to focus on setting a timer and eating 4 meals per day.  Once patient completes this goal we will then encourage her to eat vegetables at each meal and then will eliminate group C from her third and fourth meal.  At this time because patient feels very overwhelmed I have encouraged her to eat 4 meals per day and write down what she is eating and bring her food journal into her next appointment.  Patient needs to leave early today due to a personal matter and she will follow-up with our dietitian in 2 days.  Due to BMI being 62 and struggling with compliance her preoperative work-up is being placed on hold and we will focus on her dietary habits at this time.    Follow up in one month for a weight recheck.A total of 20 minutes was spent face to face with this patient and over half of the time was spent on counseling and coordination of care for the disease of obesity. We specifically reviewed the dietary prescription and I made recommendations toward increasing exercise as tolerated as well as focusing on training their behavior toward storing less.

## 2022-06-02 ENCOUNTER — OFFICE VISIT (OUTPATIENT)
Dept: BARIATRICS/WEIGHT MGMT | Facility: CLINIC | Age: 47
End: 2022-06-02

## 2022-06-02 VITALS — WEIGHT: 293 LBS | BODY MASS INDEX: 48.82 KG/M2 | HEIGHT: 65 IN

## 2022-06-02 NOTE — PROGRESS NOTES
"Metabolic and Bariatric Surgery Adult Nutrition Assessment    Patient Name: Cassidy Tellez   YOB: 1975   MRN: [id number]     Assessment Date:  06/02/2022     Reason for Visit: Initial Nutrition Assessment     Treatment Pathway: Preoperative Bariatric Surgery, Visit 6    Assessment    Anthropometrics   Wt Readings from Last 1 Encounters:   06/02/22 (!) 166 kg (367 lb)     Ht Readings from Last 1 Encounters:   06/02/22 163.8 cm (64.5\")     BMI Readings from Last 1 Encounters:   06/02/22 62.02 kg/m²        Initial Weight/Date: 367 (12/28/22)  Weight Changes since last visit: -0.2 lbs  Net Weight Change: no change    Past Medical History:   Diagnosis Date   • Diabetes mellitus (HCC)    • Hypertension    • Stroke (HCC)       Past Surgical History:   Procedure Laterality Date   • CARDIAC SURGERY     • TONSILLECTOMY        Current Outpatient Medications   Medication Sig Dispense Refill   • amLODIPine (NORVASC) 5 MG tablet Take 5 mg by mouth 1 (One) Time As Needed.     • Aspirin Buf,CaCarb-MgCarb-MgO, 81 MG tablet Take 81 mg by mouth Daily.     • atorvastatin (LIPITOR) 80 MG tablet Take 80 mg by mouth Daily.     • bethanechol (URECHOLINE) 25 MG tablet Take 25 mg by mouth Daily.     • cloNIDine (CATAPRES) 0.1 MG tablet Take 0.1 mg by mouth Daily.     • Dapagliflozin Propanediol 10 MG tablet Take 10 mg by mouth Daily.     • exenatide er (BYDUREON) 2 MG pen-injector injection Inject 2 mg under the skin into the appropriate area as directed 1 (One) Time Per Week.     • ezetimibe (ZETIA) 10 MG tablet Take 10 mg by mouth Daily.     • furosemide (LASIX) 40 MG tablet Take 80 mg by mouth Daily.     • gabapentin (NEURONTIN) 600 MG tablet Take 600 mg by mouth 3 (Three) Times a Day.     • insulin aspart (novoLOG) 100 UNIT/ML injection Inject  under the skin into the appropriate area as directed 3 (Three) Times a Day Before Meals.     • Insulin Glargine, 1 Unit Dial, (Toujeo SoloStar) 300 UNIT/ML solution pen-injector " injection Toujeo SoloStar U-300 Insulin 300 unit/mL (1.5 mL) subcutaneous pen     • lisinopril (PRINIVIL,ZESTRIL) 2.5 MG tablet Take 40 mg by mouth Daily.     • meloxicam (MOBIC) 15 MG tablet Take 15 mg by mouth Daily.     • metFORMIN ER (GLUCOPHAGE-XR) 500 MG 24 hr tablet Take 500 mg by mouth Daily.     • metoprolol succinate XL (TOPROL-XL) 25 MG 24 hr tablet Take 25 mg by mouth Daily.     • nystatin (MYCOSTATIN) 447878 UNIT/GM powder Apply  topically to the appropriate area as directed 4 (Four) Times a Day.     • pantoprazole (PROTONIX) 40 MG EC tablet Take 40 mg by mouth Daily.     • prochlorperazine (COMPAZINE) 10 MG tablet Take 10 mg by mouth Daily.     • Semaglutide,0.25 or 0.5MG/DOS, (Ozempic, 0.25 or 0.5 MG/DOSE,) 2 MG/1.5ML solution pen-injector Ozempic 0.25 mg or 0.5 mg (2 mg/1.5 mL) subcutaneous pen injector     • spironolactone (ALDACTONE) 25 MG tablet Take 25 mg by mouth Daily.     • sucralfate (CARAFATE) 1 GM/10ML suspension 10 mL Every 6 (Six) Hours.     • topiramate (TOPAMAX) 100 MG tablet Take 100 mg by mouth Daily.     • traMADol (ULTRAM) 50 MG tablet Take 50 mg by mouth Daily.     • verapamil (CALAN) 40 MG tablet Take 40 mg by mouth.     • vitamin D (ERGOCALCIFEROL) 1.25 MG (61852 UT) capsule capsule Take 50,000 Units by mouth 1 (One) Time Per Week.     • warfarin (COUMADIN) 10 MG tablet Take 15 mg by mouth Every Other Day.     • albuterol sulfate  (90 Base) MCG/ACT inhaler Inhale 2 puffs Every 4 (Four) Hours As Needed for Wheezing. 3.1 g 0     No current facility-administered medications for this visit.      Allergies   Allergen Reactions   • Sulfamethoxazole-Trimethoprim Unknown - Low Severity     Gives her a Yeast infection        Pertinent Social/Behavior/Environmental History: living with family; dad, cousin & multiple children. Runs several     Nutrition Recall  Usually Eating 2-3 meals daily   24 Hr Recall:   (M1) Protein Shake (Ensure Max)   (M2) yogurt   (M3) cheeseburger  (M4)  grapes  Snacking - some late night snacking- has been doing better, reaching for fruit, SF gum or pickles.  Monitoring portions- no  Calculating Protein- no  Drinking sugary/carbonated beverages- 1 drink/d. High C Orange Soda.   Fluid Intake- Water throughout the day. 4-5 bottles/d. Cirkul water bottle    Success this Month: reduced late night snacking. Has been switching out chips for fruit or pickles or gum.  Barriers: has focused on taking care of family's needs and not always focusing on her own.    Exercise: Trying to go to the gym or walk around the house.       Nutrition Intervention  Nutrition education and nutrition coaching for behavior change provided.  Strategies used included Motivational Interviewing , Problem Solving, Skill Development for writing a compliant grocery list and scheduling meal plans, Ongoing reinforcement and Provided samples of protein supplement   Review of medical weight loss prescription 4 meal/day plan and reviewed nutritional needs for Preoperative Bariatric Surgery, Visit 6.    Diet Changes  Eat 4 meals per day and Reduce snacking -use foods from free foods list only.      Primary Goal this month- Eat four meals daily (will then add on vegetables with every meal, then eliminating group C after meal 2).   Action Items this month to help reach this goal:  1. Between 8-9 am every morning have a protein shake.   2. Set timers for each meal time. Meal 2 between 12and 1pm daily. Meal 3 between 4-5 and Meal 4 Between 7 and 8 pm. Timers to be set 15 minutes before time frame starts and then senior living between scheduled meal time as a second reminder.   3. Make a grocery list each week with 3-4 items from each food group to have for all week. We did this together today and she was encouraged to call the office for assistance if she needs help doing this again.     Self-monitoring strategies such as keeping a food journal (on paper or electronically) and calculating fluid/protein intake were  discussed.    Monitoring/Evaluation Plan  Anticipate follow up per program protocol. Continue collaboration of care with physician and treatment team.     Electronically signed by  Velma Lee RDN, LD  06/02/2022 09:01 CDT.

## 2022-06-03 ENCOUNTER — HOSPITAL ENCOUNTER (OUTPATIENT)
Dept: LAB | Age: 47
Discharge: HOME | End: 2022-06-03
Payer: MEDICARE

## 2022-06-03 LAB
INR BLD: 2.88 (ref 0.88–1.18)
PROTHROMBIN TIME: 31.2 SEC (ref 12–14.6)

## 2022-06-03 PROCEDURE — 36415 COLL VENOUS BLD VENIPUNCTURE: CPT

## 2022-06-03 PROCEDURE — 85610 PROTHROMBIN TIME: CPT

## 2022-06-23 NOTE — TELEPHONE ENCOUNTER
06-23-22 2nd request from [facility] for refills on Pantoprazole 40mg qd. Called patient notified that she is due for an office visit.    Scheduled apt for 07-20-22 with [address]     Patient is ok on medication until [address] is back in the office    Routed to [address]

## 2022-06-26 RX ORDER — PANTOPRAZOLE SODIUM 40 MG/1
40 TABLET, DELAYED RELEASE ORAL DAILY
Qty: 90 TABLET | Refills: 1 | Status: SHIPPED | OUTPATIENT
Start: 2022-06-26

## 2022-06-29 ENCOUNTER — HOSPITAL ENCOUNTER (OUTPATIENT)
Dept: LAB | Age: 47
Discharge: HOME | End: 2022-06-29
Payer: MEDICARE

## 2022-06-29 LAB
INR BLD: 2.43 (ref 0.88–1.18)
PROTHROMBIN TIME: 27.1 SEC (ref 12–14.6)

## 2022-06-29 PROCEDURE — 85610 PROTHROMBIN TIME: CPT

## 2022-06-29 PROCEDURE — 36415 COLL VENOUS BLD VENIPUNCTURE: CPT

## 2022-06-30 ENCOUNTER — OFFICE VISIT (OUTPATIENT)
Dept: BARIATRICS/WEIGHT MGMT | Facility: CLINIC | Age: 47
End: 2022-06-30

## 2022-06-30 VITALS
HEIGHT: 65 IN | HEART RATE: 105 BPM | TEMPERATURE: 98.7 F | DIASTOLIC BLOOD PRESSURE: 83 MMHG | WEIGHT: 293 LBS | SYSTOLIC BLOOD PRESSURE: 131 MMHG | OXYGEN SATURATION: 96 % | BODY MASS INDEX: 48.82 KG/M2

## 2022-06-30 DIAGNOSIS — Z79.4 TYPE 2 DIABETES MELLITUS WITH OTHER SPECIFIED COMPLICATION, WITH LONG-TERM CURRENT USE OF INSULIN: ICD-10-CM

## 2022-06-30 DIAGNOSIS — G47.33 OSA (OBSTRUCTIVE SLEEP APNEA): ICD-10-CM

## 2022-06-30 DIAGNOSIS — E11.69 TYPE 2 DIABETES MELLITUS WITH OTHER SPECIFIED COMPLICATION, WITH LONG-TERM CURRENT USE OF INSULIN: ICD-10-CM

## 2022-06-30 DIAGNOSIS — E66.01 CLASS 3 SEVERE OBESITY DUE TO EXCESS CALORIES WITH SERIOUS COMORBIDITY AND BODY MASS INDEX (BMI) OF 60.0 TO 69.9 IN ADULT: Primary | ICD-10-CM

## 2022-06-30 DIAGNOSIS — I10 PRIMARY HYPERTENSION: ICD-10-CM

## 2022-06-30 PROCEDURE — 99213 OFFICE O/P EST LOW 20 MIN: CPT | Performed by: NURSE PRACTITIONER

## 2022-06-30 NOTE — PROGRESS NOTES
"Patient Care Team:  Fátima Connor DO as PCP - General (Family Medicine)    Reason for Visit:  Surgical Weight loss, V7    Subjective   Cassidy Tellez is a 47 y.o. female.     Cassidy is here for follow-up and continued medical management of her morbid obesity.  She is currently on a prescription diet.  Cassidy previously was to apply dietary changes such as following the meal plan as directed.  She admits to eating 4 meals per day.  As a result she lost weight since her last visit.    She has lost 9 lbs since her last appointment.    Review Of Systems:  Review of Systems   Constitutional: Negative.    Respiratory: Negative.    Cardiovascular: Negative.    Gastrointestinal: Negative.    Endocrine: Negative.    Musculoskeletal: Negative.    Psychiatric/Behavioral: Negative.          The following portions of the patient's history were reviewed and updated as appropriate: allergies, current medications, past family history, past medical history, past social history, past surgical history, and problem list.    Objective   /83 (BP Location: Right arm, Patient Position: Sitting, Cuff Size: Adult)   Pulse 105   Temp 98.7 °F (37.1 °C)   Ht 163.8 cm (64.5\")   Wt (!) 163 kg (358 lb 11.2 oz)   SpO2 96%   BMI 60.62 kg/m²       06/30/22  1305   Weight: (!) 163 kg (358 lb 11.2 oz)       Physical Exam  Vitals reviewed.   Constitutional:       Appearance: She is obese.   Cardiovascular:      Rate and Rhythm: Normal rate and regular rhythm.   Pulmonary:      Effort: Pulmonary effort is normal.   Abdominal:      General: Bowel sounds are normal.      Palpations: Abdomen is soft.   Musculoskeletal:         General: Normal range of motion.   Skin:     General: Skin is warm and dry.   Neurological:      Mental Status: She is alert and oriented to person, place, and time.   Psychiatric:         Mood and Affect: Mood normal.         Behavior: Behavior normal.         Class 3 Severe Obesity (BMI >=40). Obesity-related " health conditions include the following: obstructive sleep apnea, hypertension and diabetes mellitus. Obesity is unchanged. BMI is is above average; BMI management plan is completed. We discussed portion control and increasing exercise.     Assessment & Plan   Diagnoses and all orders for this visit:    1. Class 3 severe obesity due to excess calories with serious comorbidity and body mass index (BMI) of 60.0 to 69.9 in adult ([facility]) (Primary)  Assessment & Plan:  Patient's (Body mass index is 60.62 kg/m².) indicates that they are morbidly obese (BMI > 40 or > 35 with obesity - related health condition) with health conditions that include obstructive sleep apnea, hypertension and diabetes mellitus . Weight is improving with treatment. BMI is is above average; BMI management plan is completed. We discussed portion control and increasing exercise.       2. Primary hypertension  Assessment & Plan:  Hypertension is unchanged.  Continue current treatment regimen.  Dietary sodium restriction.  Weight loss.  Blood pressure will be reassessed at the next regular appointment.      3. MARTHA (obstructive sleep apnea)    4. Type 2 diabetes mellitus with other specified complication, with long-term current use of insulin ([facility])  Comments:  Follow prescription meal plan as prescribed.       Cassidy Tellez was seen today for follow-up, obesity, nutrition counseling and weight loss.  She has lost weight since her last visit.  Today we discussed healthy changes in lifestyle, diet, and exercise. Dietician consultation obtained.  Cassidy Tellez had received handouts to her explaining the recommendation on portion sizes/appetite control/reading nutrition labels.   Intensive behavioral therapy for obesity was done today as well.     Goals for this month are:   1.  Overall patient has made significant progress in following the prescription meal plan.   2.  Patient will see Dr. Hernandez next month to determine if she can proceed with endoscopy.  In  her past medical history in her chart it shows in 2014 she was diagnosed with Rivero's esophagus.  Patient is unaware of this.  I explained to patient that Dr. Hernandez could further evaluate this during her endoscopy and if she does have Rivero's esophagus we would refer her to [place] for a gastric bypass.  She is agreeable with this plan.  Patient will see dietitian today to review her food journal.    Follow up in one month for a weight recheck.

## 2022-06-30 NOTE — PROGRESS NOTES
"Metabolic and Bariatric Surgery Adult Nutrition Assessment    Patient Name: Cassidy Tellez   YOB: 1975   MRN: [id number]     Assessment Date:  06/30/2022     Reason for Visit: Follow-up Nutrition Assessment     Treatment Pathway: Preoperative Bariatric Surgery, Visit 7    Assessment    Anthropometrics   Wt Readings from Last 1 Encounters:   06/30/22 (!) 163 kg (358 lb 11.2 oz)     Ht Readings from Last 1 Encounters:   06/30/22 163.8 cm (64.5\")     BMI Readings from Last 1 Encounters:   06/30/22 60.62 kg/m²        Initial Weight/Date: 367 lbs (12/28/21)  Weight Changes since last visit: -8.3 lbs  Net Weight Change: -8.3 lbs    Past Medical History:   Diagnosis Date   • Diabetes mellitus (HCC)    • Hypertension    • Stroke (HCC)       Past Surgical History:   Procedure Laterality Date   • CARDIAC SURGERY     • TONSILLECTOMY        Current Outpatient Medications   Medication Sig Dispense Refill   • albuterol sulfate  (90 Base) MCG/ACT inhaler Inhale 2 puffs Every 4 (Four) Hours As Needed for Wheezing. 3.1 g 0   • amLODIPine (NORVASC) 5 MG tablet Take 5 mg by mouth 1 (One) Time As Needed.     • Aspirin Buf,CaCarb-MgCarb-MgO, 81 MG tablet Take 81 mg by mouth Daily.     • atorvastatin (LIPITOR) 80 MG tablet Take 80 mg by mouth Daily.     • bethanechol (URECHOLINE) 25 MG tablet Take 25 mg by mouth Daily.     • cloNIDine (CATAPRES) 0.1 MG tablet Take 0.1 mg by mouth Daily.     • Dapagliflozin Propanediol 10 MG tablet Take 10 mg by mouth Daily.     • exenatide er (BYDUREON) 2 MG pen-injector injection Inject 2 mg under the skin into the appropriate area as directed 1 (One) Time Per Week.     • ezetimibe (ZETIA) 10 MG tablet Take 10 mg by mouth Daily.     • furosemide (LASIX) 40 MG tablet Take 80 mg by mouth Daily.     • gabapentin (NEURONTIN) 600 MG tablet Take 600 mg by mouth 3 (Three) Times a Day.     • insulin aspart (novoLOG) 100 UNIT/ML injection Inject  under the skin into the appropriate area as " directed 3 (Three) Times a Day Before Meals.     • Insulin Glargine, 1 Unit Dial, (Toujeo SoloStar) 300 UNIT/ML solution pen-injector injection Toujeo SoloStar U-300 Insulin 300 unit/mL (1.5 mL) subcutaneous pen     • lisinopril (PRINIVIL,ZESTRIL) 2.5 MG tablet Take 40 mg by mouth Daily.     • meloxicam (MOBIC) 15 MG tablet Take 15 mg by mouth Daily.     • metFORMIN ER (GLUCOPHAGE-XR) 500 MG 24 hr tablet Take 500 mg by mouth Daily.     • metoprolol succinate XL (TOPROL-XL) 25 MG 24 hr tablet Take 25 mg by mouth Daily.     • nystatin (MYCOSTATIN) 303220 UNIT/GM powder Apply  topically to the appropriate area as directed 4 (Four) Times a Day.     • pantoprazole (PROTONIX) 40 MG EC tablet Take 40 mg by mouth Daily.     • prochlorperazine (COMPAZINE) 10 MG tablet Take 10 mg by mouth Daily.     • Semaglutide,0.25 or 0.5MG/DOS, (Ozempic, 0.25 or 0.5 MG/DOSE,) 2 MG/1.5ML solution pen-injector Ozempic 0.25 mg or 0.5 mg (2 mg/1.5 mL) subcutaneous pen injector     • spironolactone (ALDACTONE) 25 MG tablet Take 25 mg by mouth Daily.     • sucralfate (CARAFATE) 1 GM/10ML suspension 10 mL Every 6 (Six) Hours.     • topiramate (TOPAMAX) 100 MG tablet Take 100 mg by mouth Daily.     • traMADol (ULTRAM) 50 MG tablet Take 50 mg by mouth Daily.     • verapamil (CALAN) 40 MG tablet Take 40 mg by mouth.     • vitamin D (ERGOCALCIFEROL) 1.25 MG (26092 UT) capsule capsule Take 50,000 Units by mouth 1 (One) Time Per Week.     • warfarin (COUMADIN) 10 MG tablet Take 15 mg by mouth Every Other Day.       No current facility-administered medications for this visit.      Allergies   Allergen Reactions   • Sulfamethoxazole-Trimethoprim Unknown - Low Severity     Gives her a Yeast infection          Nutrition Recall  Eating 4 meals daily   (M1) protein shake sometimes with vegetables sometimes not.   (M2) half turkey sandwich with carrots or beef roast carrots, potatoes, brusel sprouts  (M3) yogurt and cucumbers or brussel sprouts  (M4) yogurt,  dill pickles OR sliced turkey & carrots OR small salad & carrots.   Snacking - sometimes eats an extra yogurt or pickles or popcorn. Late night.   Monitoring portions- not currently.   Calculating Protein- not currently.  Drinking sugary/carbonated beverages- 1 weekly  Fluid Intake- unsure, not measuring amount of intake.      Success this Month: Utilizing grocery plan and doing better with 4 meals/d.   Barriers: struggles to write meals down.     Exercise: treadmill. [employer]. x3-4d/wk. 10 minutes        Nutrition Intervention  Nutrition education and nutrition coaching for behavior change provided.  Strategies used included Motivational Interviewing , Problem Solving, Skill Development for meal planning and measuring portions of food and Ongoing reinforcement  Review of medical weight loss prescription 4 meal/day plan and reviewed nutritional needs for Preoperative Bariatric Surgery, Visit 7.    Diet Changes  Eat 4 meals per day with protein and vegetables at each meal, no carbs after meal 2., Protein goal: 65 gms., Eat vegetables first at each meal., Discussed protein guidelines for shakes and bars., Reduce snacking -use foods from free foods list only., Reduce fat, sugar, and/or salt in food choices., Choose more nutrient dense foods., Choose foods with increased fiber., Monitor portion sizes using a food scale and/or measuring cup., Eliminate soda and sugar-sweetened beverages and Increase fluid intake to 64 ounces per day      Goals  1. Continue goals from last:  A-Between 8-9 am every morning have a protein shake.   B-Set timers for each meal time. Meal 2 between 12and 1pm daily. Meal 3 between 4-5 and Meal 4 Between 7 and 8 pm. Timers to be set 15 minutes before time frame starts and then [facility] between scheduled meal time as a second reminder.   C-Make a grocery list each week with 3-4 items from each food group to have for all week. We did this together today and she was encouraged to call the  office for assistance if she needs help doing this again.  2. Measure all of food.   3. Utilize free foods but identify why snacking.     Self-monitoring strategies such as keeping a food journal (on paper or electronically) and calculating fluid/protein intake were discussed.    Monitoring/Evaluation Plan  Anticipate follow up per program protocol. Continue collaboration of care with physician and treatment team.     Electronically signed by  Velma Lee RDN, LD  06/30/2022 13:32 CDT.

## 2022-06-30 NOTE — ASSESSMENT & PLAN NOTE
Patient's (Body mass index is 60.62 kg/m².) indicates that they are morbidly obese (BMI > 40 or > 35 with obesity - related health condition) with health conditions that include obstructive sleep apnea, hypertension and diabetes mellitus . Weight is improving with treatment. BMI is is above average; BMI management plan is completed. We discussed portion control and increasing exercise.

## 2022-07-26 ENCOUNTER — OFFICE VISIT (OUTPATIENT)
Dept: BARIATRICS/WEIGHT MGMT | Facility: CLINIC | Age: 47
End: 2022-07-26

## 2022-07-26 VITALS
SYSTOLIC BLOOD PRESSURE: 165 MMHG | HEIGHT: 65 IN | OXYGEN SATURATION: 97 % | BODY MASS INDEX: 48.82 KG/M2 | HEART RATE: 109 BPM | TEMPERATURE: 98.4 F | DIASTOLIC BLOOD PRESSURE: 90 MMHG | WEIGHT: 293 LBS

## 2022-07-26 DIAGNOSIS — E11.69 TYPE 2 DIABETES MELLITUS WITH OTHER SPECIFIED COMPLICATION, WITH LONG-TERM CURRENT USE OF INSULIN: ICD-10-CM

## 2022-07-26 DIAGNOSIS — I10 PRIMARY HYPERTENSION: ICD-10-CM

## 2022-07-26 DIAGNOSIS — E66.01 CLASS 3 SEVERE OBESITY DUE TO EXCESS CALORIES WITH SERIOUS COMORBIDITY AND BODY MASS INDEX (BMI) OF 60.0 TO 69.9 IN ADULT: Primary | ICD-10-CM

## 2022-07-26 DIAGNOSIS — Z79.4 TYPE 2 DIABETES MELLITUS WITH OTHER SPECIFIED COMPLICATION, WITH LONG-TERM CURRENT USE OF INSULIN: ICD-10-CM

## 2022-07-26 DIAGNOSIS — Z87.19 H/O GASTROESOPHAGEAL REFLUX (GERD): ICD-10-CM

## 2022-07-26 DIAGNOSIS — G47.33 OSA (OBSTRUCTIVE SLEEP APNEA): ICD-10-CM

## 2022-07-26 DIAGNOSIS — R63.5 ABNORMAL WEIGHT GAIN: ICD-10-CM

## 2022-07-26 PROCEDURE — 99214 OFFICE O/P EST MOD 30 MIN: CPT | Performed by: SURGERY

## 2022-07-26 RX ORDER — PROCHLORPERAZINE 25 MG/1
SUPPOSITORY RECTAL
COMMUNITY
Start: 2022-06-16

## 2022-07-26 RX ORDER — PROCHLORPERAZINE 25 MG/1
SUPPOSITORY RECTAL
COMMUNITY
Start: 2022-07-23

## 2022-07-26 NOTE — PROGRESS NOTES
Patient Care Team:  Fátima Connor DO as PCP - General (Family Medicine)    Reason for Visit:  Surgical Weight loss      Subjective     Cassidy Tellez is a pleasant 47 y.o. female and presents with morbid obesity with her Body mass index is 60.43 kg/m².    She is here for discussion of the upper endoscopic procedure.  She stated she has been with the disease of obesity for year(s).  She stated she suffers from GERD, obstructive sleep apnea, diabetes and morbid obesity due to her weight gain.  She stated that weight loss helps alleviate these symptoms.   She stated that she has tried multiple diet regimens to help with weight loss.  She stated that she has attempted these conservative methods for weight loss without maintaining long term success.  Today sheand myself will discuss surgical weight loss options such as the Laparoscopic Sleeve Gastrectomy or the Laparoscopic R - Y Gastric Bypass.        Review of Systems  General ROS: negative  Psychological ROS: negative  Respiratory ROS: positive for - wheezing  Cardiovascular ROS: positive for - dyspnea on exertion  Gastrointestinal ROS: no abdominal pain, change in bowel habits, or black or bloody stools    History  Past Medical History:   Diagnosis Date   • Diabetes mellitus (HCC)    • Hypertension    • Stroke (HCC)      Past Surgical History:   Procedure Laterality Date   • CARDIAC SURGERY     • TONSILLECTOMY       Family History   Problem Relation Age of Onset   • Diabetes Mother    • Hypertension Mother    • Heart disease Mother    • Obesity Mother    • Heart disease Father      Social History     Tobacco Use   • Smoking status: Never Smoker   • Smokeless tobacco: Never Used   Substance Use Topics   • Alcohol use: Never     E-cigarette/Vaping     E-cigarette/Vaping Substances     (Not in a hospital admission)    Allergies:  Sulfamethoxazole-trimethoprim      Current Outpatient Medications:   •  albuterol sulfate  (90 Base) MCG/ACT inhaler,  Inhale 2 puffs Every 4 (Four) Hours As Needed for Wheezing., Disp: 3.1 g, Rfl: 0  •  amLODIPine (NORVASC) 5 MG tablet, Take 5 mg by mouth 1 (One) Time As Needed., Disp: , Rfl:   •  Aspirin Buf,CaCarb-MgCarb-MgO, 81 MG tablet, Take 81 mg by mouth Daily., Disp: , Rfl:   •  atorvastatin (LIPITOR) 80 MG tablet, Take 80 mg by mouth Daily., Disp: , Rfl:   •  bethanechol (URECHOLINE) 25 MG tablet, Take 25 mg by mouth Daily., Disp: , Rfl:   •  cloNIDine (CATAPRES) 0.1 MG tablet, Take 0.1 mg by mouth Daily., Disp: , Rfl:   •  Dapagliflozin Propanediol 10 MG tablet, Take 10 mg by mouth Daily., Disp: , Rfl:   •  exenatide er (BYDUREON) 2 MG pen-injector injection, Inject 2 mg under the skin into the appropriate area as directed 1 (One) Time Per Week., Disp: , Rfl:   •  ezetimibe (ZETIA) 10 MG tablet, Take 10 mg by mouth Daily., Disp: , Rfl:   •  furosemide (LASIX) 40 MG tablet, Take 80 mg by mouth Daily., Disp: , Rfl:   •  gabapentin (NEURONTIN) 600 MG tablet, Take 600 mg by mouth 3 (Three) Times a Day., Disp: , Rfl:   •  insulin aspart (novoLOG) 100 UNIT/ML injection, Inject  under the skin into the appropriate area as directed 3 (Three) Times a Day Before Meals., Disp: , Rfl:   •  Insulin Glargine, 1 Unit Dial, (Toujeo SoloStar) 300 UNIT/ML solution pen-injector injection, Toujeo SoloStar U-300 Insulin 300 unit/mL (1.5 mL) subcutaneous pen, Disp: , Rfl:   •  lisinopril (PRINIVIL,ZESTRIL) 2.5 MG tablet, Take 40 mg by mouth Daily., Disp: , Rfl:   •  meloxicam (MOBIC) 15 MG tablet, Take 15 mg by mouth Daily., Disp: , Rfl:   •  metFORMIN ER (GLUCOPHAGE-XR) 500 MG 24 hr tablet, Take 500 mg by mouth Daily., Disp: , Rfl:   •  metoprolol succinate XL (TOPROL-XL) 25 MG 24 hr tablet, Take 25 mg by mouth Daily., Disp: , Rfl:   •  nystatin (MYCOSTATIN) 050053 UNIT/GM powder, Apply  topically to the appropriate area as directed 4 (Four) Times a Day., Disp: , Rfl:   •  pantoprazole (PROTONIX) 40 MG EC tablet, Take 40 mg by mouth Daily.,  Disp: , Rfl:   •  prochlorperazine (COMPAZINE) 10 MG tablet, Take 10 mg by mouth Daily., Disp: , Rfl:   •  Semaglutide,0.25 or 0.5MG/DOS, (Ozempic, 0.25 or 0.5 MG/DOSE,) 2 MG/1.5ML solution pen-injector, Ozempic 0.25 mg or 0.5 mg (2 mg/1.5 mL) subcutaneous pen injector, Disp: , Rfl:   •  spironolactone (ALDACTONE) 25 MG tablet, Take 25 mg by mouth Daily., Disp: , Rfl:   •  sucralfate (CARAFATE) 1 GM/10ML suspension, 10 mL Every 6 (Six) Hours., Disp: , Rfl:   •  topiramate (TOPAMAX) 100 MG tablet, Take 100 mg by mouth Daily., Disp: , Rfl:   •  traMADol (ULTRAM) 50 MG tablet, Take 50 mg by mouth Daily., Disp: , Rfl:   •  verapamil (CALAN) 40 MG tablet, Take 40 mg by mouth., Disp: , Rfl:   •  vitamin D (ERGOCALCIFEROL) 1.25 MG (23422 UT) capsule capsule, Take 50,000 Units by mouth 1 (One) Time Per Week., Disp: , Rfl:   •  warfarin (COUMADIN) 10 MG tablet, Take 15 mg by mouth Every Other Day., Disp: , Rfl:   •  Continuous Blood Gluc Sensor (Dexcom G6 Sensor), , Disp: , Rfl:   •  Continuous Blood Gluc Transmit (Dexcom G6 Transmitter) misc, USE AS DIRECTED. CHANGE EVERY 90 DAYS, Disp: , Rfl:     Objective     Vital Signs  Temp:  [98.4 °F (36.9 °C)] 98.4 °F (36.9 °C)  Heart Rate:  [109] 109  BP: (165)/(90) 165/90  Body mass index is 60.43 kg/m².      07/26/22  1039   Weight: (!) 162 kg (357 lb 9.6 oz)       Physical Exam:      HEENT: extra ocular movement intact and sclera clear, anicteric  Respiratory: appears well, vitals normal, no respiratory distress, acyanotic, normal RR, chest clear, no wheezing, crepitations, rhonchi, normal symmetric air entry  Cardiovascular: Regular rate and rhythm, S1, S2 normal, no murmur, click, rub or gallop  GI: Soft, non-tender, normal bowel sounds; no bruits, organomegaly or masses.  Abnormal shape: obese  Musculoskeletal: inspection - no abnormality  Neurologic: alert, oriented, normal speech, no focal findings or movement disorder noted       Results Review:   None        Assessment &  Plan   Encounter Diagnoses   Name Primary?   • Class 3 severe obesity due to excess calories with serious comorbidity and body mass index (BMI) of 60.0 to 69.9 in adult ([facility]) Yes   • Primary hypertension    • MARTHA (obstructive sleep apnea)    • Type 2 diabetes mellitus with other specified complication, with long-term current use of insulin ([facility])    • H/O gastroesophageal reflux (GERD)            1.  I believe this patient will be a good candidate for weight loss surgery.  I have discussed the Kelvin - Y Gastric Bypass, laparoscopic sleeve gastrectomy and the Laparoscopic Gastric Band procedures.  We discussed the benefits of the surgeries including the benefit of weight loss and the possible reversal of co-morbid conditions associated with morbid obesity. I explained to the patient that prior to making a definitive decision on the type of surgery she will require an esophagogastroduodenoscopy with biopsies to assess for any contraindications for surgical weight loss.  I explained the alternatives  include not doing anything, or pursuing an UGI series which only offers a diagnosis with potential less accuracy compared to EGD, the benefits of the EGD such as identifying the pathology and anatomy of the upper GI system, and the risks and complications of the endoscopy were discussed in detail as well. I discussed the risk of perforation (one out of 7180-5461, riskier with dilation), bleeding (one out of 500), and the rare risks of infection, adverse reaction to anesthesia, respiratory failure, cardiac failure including MI and adverse reaction to medications such as allergic reactions. We discussed consequences that could occur if a risk were to develop such as the need for hospitalization, blood transfusion, surgical intervention, medications, pain, disability and death. The patient verbalizes understanding and agrees to proceed. such as bleeding, perforation, swallowing difficulties and gas bloat can occur after this  procedure.    Upon completion of our discussion and addressing and answering her questions to her satisfation, informed consent was obtained.  She will be scheduled accordingly for the esophagogastroduodenoscopy procedure.    I discussed the patients findings and my recommendations with patient.     Dr. Robert Hernandez MD [facility]    07/26/22  11:05 CDT  Patient Care Team:  Fátima Connor DO as PCP - General (Family Medicine)

## 2022-07-29 ENCOUNTER — HOSPITAL ENCOUNTER (OUTPATIENT)
Dept: LAB | Age: 47
Discharge: HOME | End: 2022-07-29
Payer: MEDICARE

## 2022-07-29 LAB
INR BLD: 2.31 (ref 0.88–1.18)
PROTHROMBIN TIME: 26 SEC (ref 12–14.6)

## 2022-07-29 PROCEDURE — 85610 PROTHROMBIN TIME: CPT

## 2022-07-29 PROCEDURE — 36415 COLL VENOUS BLD VENIPUNCTURE: CPT

## 2022-08-10 ENCOUNTER — APPOINTMENT (OUTPATIENT)
Dept: CT IMAGING | Age: 47
End: 2022-08-10
Payer: MEDICARE

## 2022-08-10 ENCOUNTER — HOSPITAL ENCOUNTER (OUTPATIENT)
Age: 47
Setting detail: OBSERVATION
Discharge: HOME | End: 2022-08-12
Attending: EMERGENCY MEDICINE | Admitting: HOSPITALIST
Payer: MEDICARE

## 2022-08-10 DIAGNOSIS — R07.9 CHEST PAIN, UNSPECIFIED TYPE: Primary | ICD-10-CM

## 2022-08-10 DIAGNOSIS — N17.9 AKI (ACUTE KIDNEY INJURY) (HCC): ICD-10-CM

## 2022-08-10 PROBLEM — I25.9 CHEST PAIN DUE TO MYOCARDIAL ISCHEMIA, UNSPECIFIED ISCHEMIC CHEST PAIN TYPE: Status: ACTIVE | Noted: 2022-08-10

## 2022-08-10 PROBLEM — N18.9 ACUTE KIDNEY INJURY SUPERIMPOSED ON CKD (HCC): Status: ACTIVE | Noted: 2022-08-10

## 2022-08-10 LAB
ALBUMIN SERPL-MCNC: 4 G/DL (ref 3.5–5.2)
ALP BLD-CCNC: 86 U/L (ref 35–104)
ALT SERPL-CCNC: 19 U/L (ref 5–33)
ANION GAP SERPL CALCULATED.3IONS-SCNC: 15 MMOL/L (ref 7–19)
AST SERPL-CCNC: 16 U/L (ref 5–32)
BILIRUB SERPL-MCNC: <0.2 MG/DL (ref 0.2–1.2)
BUN BLDV-MCNC: 23 MG/DL (ref 6–20)
CALCIUM SERPL-MCNC: 9.1 MG/DL (ref 8.6–10)
CHLORIDE BLD-SCNC: 101 MMOL/L (ref 98–111)
CHOLESTEROL, TOTAL: 140 MG/DL (ref 160–199)
CHP ED QC CHECK: YES
CO2: 21 MMOL/L (ref 22–29)
CREAT SERPL-MCNC: 1.6 MG/DL (ref 0.5–0.9)
GFR AFRICAN AMERICAN: 42
GFR NON-AFRICAN AMERICAN: 35
GLUCOSE BLD-MCNC: 89 MG/DL
GLUCOSE BLD-MCNC: 89 MG/DL (ref 70–99)
GLUCOSE BLD-MCNC: 90 MG/DL (ref 74–109)
HBA1C MFR BLD: 7.5 % (ref 4–6)
HCG QUALITATIVE: NEGATIVE
HCT VFR BLD CALC: 44.3 % (ref 37–47)
HDLC SERPL-MCNC: 44 MG/DL (ref 65–121)
HEMOGLOBIN: 14.6 G/DL (ref 12–16)
INR BLD: 3.29 (ref 0.88–1.18)
LDL CHOLESTEROL CALCULATED: 59 MG/DL
MAGNESIUM: 1.9 MG/DL (ref 1.6–2.6)
MCH RBC QN AUTO: 30.1 PG (ref 27–31)
MCHC RBC AUTO-ENTMCNC: 33 G/DL (ref 33–37)
MCV RBC AUTO: 91.3 FL (ref 81–99)
PDW BLD-RTO: 15 % (ref 11.5–14.5)
PERFORMED ON: NORMAL
PHOSPHORUS: 2.9 MG/DL (ref 2.5–4.5)
PLATELET # BLD: 364 K/UL (ref 130–400)
PMV BLD AUTO: 11.1 FL (ref 9.4–12.3)
POTASSIUM REFLEX MAGNESIUM: 3.9 MMOL/L (ref 3.5–5)
PRO-BNP: 38 PG/ML (ref 0–450)
PROTHROMBIN TIME: 34.8 SEC (ref 12–14.6)
RBC # BLD: 4.85 M/UL (ref 4.2–5.4)
SARS-COV-2, NAAT: NOT DETECTED
SODIUM BLD-SCNC: 137 MMOL/L (ref 136–145)
TOTAL PROTEIN: 7.3 G/DL (ref 6.6–8.7)
TRIGL SERPL-MCNC: 185 MG/DL (ref 0–149)
TROPONIN: <0.01 NG/ML (ref 0–0.03)
TROPONIN: <0.01 NG/ML (ref 0–0.03)
TSH REFLEX FT4: 1.6 UIU/ML (ref 0.35–5.5)
VITAMIN D 25-HYDROXY: 44.2 NG/ML
WBC # BLD: 6.7 K/UL (ref 4.8–10.8)

## 2022-08-10 PROCEDURE — 80053 COMPREHEN METABOLIC PANEL: CPT

## 2022-08-10 PROCEDURE — 83735 ASSAY OF MAGNESIUM: CPT

## 2022-08-10 PROCEDURE — 96376 TX/PRO/DX INJ SAME DRUG ADON: CPT

## 2022-08-10 PROCEDURE — 84703 CHORIONIC GONADOTROPIN ASSAY: CPT

## 2022-08-10 PROCEDURE — 96361 HYDRATE IV INFUSION ADD-ON: CPT

## 2022-08-10 PROCEDURE — 80061 LIPID PANEL: CPT

## 2022-08-10 PROCEDURE — 99285 EMERGENCY DEPT VISIT HI MDM: CPT

## 2022-08-10 PROCEDURE — 96374 THER/PROPH/DIAG INJ IV PUSH: CPT

## 2022-08-10 PROCEDURE — G0378 HOSPITAL OBSERVATION PER HR: HCPCS

## 2022-08-10 PROCEDURE — 84443 ASSAY THYROID STIM HORMONE: CPT

## 2022-08-10 PROCEDURE — 71275 CT ANGIOGRAPHY CHEST: CPT

## 2022-08-10 PROCEDURE — 96375 TX/PRO/DX INJ NEW DRUG ADDON: CPT

## 2022-08-10 PROCEDURE — 84100 ASSAY OF PHOSPHORUS: CPT

## 2022-08-10 PROCEDURE — 6360000004 HC RX CONTRAST MEDICATION: Performed by: EMERGENCY MEDICINE

## 2022-08-10 PROCEDURE — 36415 COLL VENOUS BLD VENIPUNCTURE: CPT

## 2022-08-10 PROCEDURE — 2580000003 HC RX 258: Performed by: EMERGENCY MEDICINE

## 2022-08-10 PROCEDURE — 87635 SARS-COV-2 COVID-19 AMP PRB: CPT

## 2022-08-10 PROCEDURE — 6360000002 HC RX W HCPCS: Performed by: EMERGENCY MEDICINE

## 2022-08-10 PROCEDURE — 82306 VITAMIN D 25 HYDROXY: CPT

## 2022-08-10 PROCEDURE — 84484 ASSAY OF TROPONIN QUANT: CPT

## 2022-08-10 PROCEDURE — 93005 ELECTROCARDIOGRAM TRACING: CPT | Performed by: EMERGENCY MEDICINE

## 2022-08-10 PROCEDURE — 85610 PROTHROMBIN TIME: CPT

## 2022-08-10 PROCEDURE — 83036 HEMOGLOBIN GLYCOSYLATED A1C: CPT

## 2022-08-10 PROCEDURE — 85027 COMPLETE CBC AUTOMATED: CPT

## 2022-08-10 PROCEDURE — 83880 ASSAY OF NATRIURETIC PEPTIDE: CPT

## 2022-08-10 RX ORDER — ASPIRIN 81 MG/1
81 TABLET, CHEWABLE ORAL DAILY
Status: DISCONTINUED | OUTPATIENT
Start: 2022-08-11 | End: 2022-08-12 | Stop reason: HOSPADM

## 2022-08-10 RX ORDER — AMLODIPINE BESYLATE 5 MG/1
5 TABLET ORAL DAILY
Status: DISCONTINUED | OUTPATIENT
Start: 2022-08-11 | End: 2022-08-12 | Stop reason: HOSPADM

## 2022-08-10 RX ORDER — INSULIN GLARGINE 100 [IU]/ML
80 INJECTION, SOLUTION SUBCUTANEOUS NIGHTLY
Status: DISCONTINUED | OUTPATIENT
Start: 2022-08-10 | End: 2022-08-12 | Stop reason: HOSPADM

## 2022-08-10 RX ORDER — LISINOPRIL 2.5 MG/1
2.5 TABLET ORAL DAILY
Status: DISCONTINUED | OUTPATIENT
Start: 2022-08-11 | End: 2022-08-12 | Stop reason: HOSPADM

## 2022-08-10 RX ORDER — SUCRALFATE 1 G/1
1 TABLET ORAL
Refills: 3 | Status: DISCONTINUED | OUTPATIENT
Start: 2022-08-11 | End: 2022-08-12 | Stop reason: HOSPADM

## 2022-08-10 RX ORDER — MORPHINE SULFATE 4 MG/ML
4 INJECTION, SOLUTION INTRAMUSCULAR; INTRAVENOUS ONCE
Status: COMPLETED | OUTPATIENT
Start: 2022-08-10 | End: 2022-08-10

## 2022-08-10 RX ORDER — ATORVASTATIN CALCIUM 40 MG/1
80 TABLET, FILM COATED ORAL NIGHTLY
Status: DISCONTINUED | OUTPATIENT
Start: 2022-08-10 | End: 2022-08-12 | Stop reason: HOSPADM

## 2022-08-10 RX ORDER — GABAPENTIN 300 MG/1
300 CAPSULE ORAL 2 TIMES DAILY PRN
Status: DISCONTINUED | OUTPATIENT
Start: 2022-08-10 | End: 2022-08-12 | Stop reason: HOSPADM

## 2022-08-10 RX ORDER — TOPIRAMATE 50 MG/1
50 TABLET, FILM COATED ORAL DAILY
Status: DISCONTINUED | OUTPATIENT
Start: 2022-08-11 | End: 2022-08-12 | Stop reason: HOSPADM

## 2022-08-10 RX ORDER — BETHANECHOL CHLORIDE 25 MG/1
25 TABLET ORAL DAILY
Status: DISCONTINUED | OUTPATIENT
Start: 2022-08-11 | End: 2022-08-12 | Stop reason: HOSPADM

## 2022-08-10 RX ORDER — INSULIN LISPRO 100 [IU]/ML
0-4 INJECTION, SOLUTION INTRAVENOUS; SUBCUTANEOUS NIGHTLY
Status: DISCONTINUED | OUTPATIENT
Start: 2022-08-10 | End: 2022-08-12 | Stop reason: HOSPADM

## 2022-08-10 RX ORDER — DEXTROMETHORPHAN HYDROBROMIDE AND PROMETHAZINE HYDROCHLORIDE 15; 6.25 MG/5ML; MG/5ML
5 SYRUP ORAL NIGHTLY PRN
Status: DISCONTINUED | OUTPATIENT
Start: 2022-08-11 | End: 2022-08-12 | Stop reason: HOSPADM

## 2022-08-10 RX ORDER — PROCHLORPERAZINE MALEATE 10 MG
10 TABLET ORAL 2 TIMES DAILY PRN
Status: DISCONTINUED | OUTPATIENT
Start: 2022-08-10 | End: 2022-08-12 | Stop reason: HOSPADM

## 2022-08-10 RX ORDER — INSULIN LISPRO 100 [IU]/ML
0-16 INJECTION, SOLUTION INTRAVENOUS; SUBCUTANEOUS
Status: DISCONTINUED | OUTPATIENT
Start: 2022-08-11 | End: 2022-08-12 | Stop reason: HOSPADM

## 2022-08-10 RX ORDER — ERGOCALCIFEROL 1.25 MG/1
50000 CAPSULE ORAL WEEKLY
Status: DISCONTINUED | OUTPATIENT
Start: 2022-08-17 | End: 2022-08-12 | Stop reason: HOSPADM

## 2022-08-10 RX ORDER — EZETIMIBE 10 MG/1
10 TABLET ORAL DAILY
Status: DISCONTINUED | OUTPATIENT
Start: 2022-08-11 | End: 2022-08-12 | Stop reason: HOSPADM

## 2022-08-10 RX ORDER — VERAPAMIL HYDROCHLORIDE 80 MG/1
40 TABLET ORAL EVERY 12 HOURS
Status: DISCONTINUED | OUTPATIENT
Start: 2022-08-10 | End: 2022-08-12 | Stop reason: HOSPADM

## 2022-08-10 RX ORDER — METOPROLOL SUCCINATE 25 MG/1
25 TABLET, EXTENDED RELEASE ORAL DAILY
Status: DISCONTINUED | OUTPATIENT
Start: 2022-08-11 | End: 2022-08-12 | Stop reason: HOSPADM

## 2022-08-10 RX ORDER — SODIUM CHLORIDE 9 MG/ML
INJECTION, SOLUTION INTRAVENOUS CONTINUOUS
Status: DISCONTINUED | OUTPATIENT
Start: 2022-08-10 | End: 2022-08-10

## 2022-08-10 RX ORDER — CETIRIZINE HYDROCHLORIDE 10 MG/1
10 TABLET ORAL DAILY
Status: DISCONTINUED | OUTPATIENT
Start: 2022-08-11 | End: 2022-08-12 | Stop reason: HOSPADM

## 2022-08-10 RX ORDER — PANTOPRAZOLE SODIUM 40 MG/1
40 TABLET, DELAYED RELEASE ORAL DAILY
Status: DISCONTINUED | OUTPATIENT
Start: 2022-08-11 | End: 2022-08-12 | Stop reason: HOSPADM

## 2022-08-10 RX ORDER — MORPHINE SULFATE 2 MG/ML
2 INJECTION, SOLUTION INTRAMUSCULAR; INTRAVENOUS ONCE
Status: COMPLETED | OUTPATIENT
Start: 2022-08-10 | End: 2022-08-10

## 2022-08-10 RX ORDER — SPIRONOLACTONE 25 MG/1
12.5 TABLET ORAL DAILY
Status: DISCONTINUED | OUTPATIENT
Start: 2022-08-11 | End: 2022-08-12 | Stop reason: HOSPADM

## 2022-08-10 RX ORDER — FUROSEMIDE 40 MG/1
80 TABLET ORAL DAILY
Status: DISCONTINUED | OUTPATIENT
Start: 2022-08-11 | End: 2022-08-12 | Stop reason: HOSPADM

## 2022-08-10 RX ORDER — FLUTICASONE PROPIONATE 50 MCG
1 SPRAY, SUSPENSION (ML) NASAL DAILY
Status: DISCONTINUED | OUTPATIENT
Start: 2022-08-11 | End: 2022-08-12 | Stop reason: HOSPADM

## 2022-08-10 RX ORDER — ONDANSETRON 2 MG/ML
4 INJECTION INTRAMUSCULAR; INTRAVENOUS ONCE
Status: COMPLETED | OUTPATIENT
Start: 2022-08-10 | End: 2022-08-10

## 2022-08-10 RX ORDER — DEXTROSE MONOHYDRATE 100 MG/ML
INJECTION, SOLUTION INTRAVENOUS CONTINUOUS PRN
Status: DISCONTINUED | OUTPATIENT
Start: 2022-08-10 | End: 2022-08-12 | Stop reason: HOSPADM

## 2022-08-10 RX ADMIN — IOPAMIDOL 70 ML: 755 INJECTION, SOLUTION INTRAVENOUS at 20:51

## 2022-08-10 RX ADMIN — SODIUM CHLORIDE: 9 INJECTION, SOLUTION INTRAVENOUS at 20:36

## 2022-08-10 RX ADMIN — ONDANSETRON 4 MG: 2 INJECTION INTRAMUSCULAR; INTRAVENOUS at 20:30

## 2022-08-10 RX ADMIN — MORPHINE SULFATE 2 MG: 2 INJECTION, SOLUTION INTRAMUSCULAR; INTRAVENOUS at 20:30

## 2022-08-10 RX ADMIN — MORPHINE SULFATE 4 MG: 4 INJECTION, SOLUTION INTRAMUSCULAR; INTRAVENOUS at 21:25

## 2022-08-10 ASSESSMENT — PAIN SCALES - GENERAL
PAINLEVEL_OUTOF10: 10
PAINLEVEL_OUTOF10: 10
PAINLEVEL_OUTOF10: 9
PAINLEVEL_OUTOF10: 7

## 2022-08-10 ASSESSMENT — PAIN DESCRIPTION - DESCRIPTORS
DESCRIPTORS: STABBING
DESCRIPTORS: SHARP
DESCRIPTORS: STABBING
DESCRIPTORS: PRESSURE

## 2022-08-10 ASSESSMENT — ENCOUNTER SYMPTOMS
GASTROINTESTINAL NEGATIVE: 1
CHEST TIGHTNESS: 1
SHORTNESS OF BREATH: 1

## 2022-08-10 ASSESSMENT — PAIN - FUNCTIONAL ASSESSMENT: PAIN_FUNCTIONAL_ASSESSMENT: 0-10

## 2022-08-10 ASSESSMENT — PAIN DESCRIPTION - FREQUENCY: FREQUENCY: CONTINUOUS

## 2022-08-10 ASSESSMENT — PAIN DESCRIPTION - LOCATION
LOCATION: CHEST

## 2022-08-10 ASSESSMENT — PAIN DESCRIPTION - ORIENTATION: ORIENTATION: MID

## 2022-08-10 ASSESSMENT — PAIN DESCRIPTION - ONSET: ONSET: ON-GOING

## 2022-08-11 LAB
EKG P AXIS: 37 DEGREES
EKG P-R INTERVAL: 150 MS
EKG Q-T INTERVAL: 344 MS
EKG QRS DURATION: 78 MS
EKG QTC CALCULATION (BAZETT): 417 MS
EKG T AXIS: 85 DEGREES
GLUCOSE BLD-MCNC: 114 MG/DL
GLUCOSE BLD-MCNC: 114 MG/DL (ref 70–99)
GLUCOSE BLD-MCNC: 123 MG/DL (ref 70–99)
GLUCOSE BLD-MCNC: 78 MG/DL (ref 70–99)
GLUCOSE BLD-MCNC: 98 MG/DL (ref 70–99)
INR BLD: 2.93 (ref 0.88–1.18)
LV EF: 50 %
LVEF MODALITY: NORMAL
PERFORMED ON: ABNORMAL
PERFORMED ON: ABNORMAL
PERFORMED ON: NORMAL
PERFORMED ON: NORMAL
PROTHROMBIN TIME: 31.6 SEC (ref 12–14.6)
TROPONIN: <0.01 NG/ML (ref 0–0.03)
TROPONIN: <0.01 NG/ML (ref 0–0.03)

## 2022-08-11 PROCEDURE — 82947 ASSAY GLUCOSE BLOOD QUANT: CPT

## 2022-08-11 PROCEDURE — G0378 HOSPITAL OBSERVATION PER HR: HCPCS

## 2022-08-11 PROCEDURE — 36415 COLL VENOUS BLD VENIPUNCTURE: CPT

## 2022-08-11 PROCEDURE — C8929 TTE W OR WO FOL WCON,DOPPLER: HCPCS

## 2022-08-11 PROCEDURE — 6360000002 HC RX W HCPCS: Performed by: HOSPITALIST

## 2022-08-11 PROCEDURE — 2580000003 HC RX 258: Performed by: HOSPITALIST

## 2022-08-11 PROCEDURE — 6370000000 HC RX 637 (ALT 250 FOR IP): Performed by: HOSPITALIST

## 2022-08-11 PROCEDURE — 6360000004 HC RX CONTRAST MEDICATION: Performed by: HOSPITALIST

## 2022-08-11 PROCEDURE — 99213 OFFICE O/P EST LOW 20 MIN: CPT | Performed by: INTERNAL MEDICINE

## 2022-08-11 PROCEDURE — 96376 TX/PRO/DX INJ SAME DRUG ADON: CPT

## 2022-08-11 PROCEDURE — 84484 ASSAY OF TROPONIN QUANT: CPT

## 2022-08-11 PROCEDURE — 85610 PROTHROMBIN TIME: CPT

## 2022-08-11 PROCEDURE — 2500000003 HC RX 250 WO HCPCS: Performed by: HOSPITALIST

## 2022-08-11 RX ORDER — SODIUM CHLORIDE 0.9 % (FLUSH) 0.9 %
5-40 SYRINGE (ML) INJECTION PRN
Status: DISCONTINUED | OUTPATIENT
Start: 2022-08-11 | End: 2022-08-12 | Stop reason: HOSPADM

## 2022-08-11 RX ORDER — MAGNESIUM SULFATE IN WATER 40 MG/ML
2000 INJECTION, SOLUTION INTRAVENOUS PRN
Status: DISCONTINUED | OUTPATIENT
Start: 2022-08-11 | End: 2022-08-12 | Stop reason: HOSPADM

## 2022-08-11 RX ORDER — MECOBALAMIN 5000 MCG
5 TABLET,DISINTEGRATING ORAL NIGHTLY PRN
Status: DISCONTINUED | OUTPATIENT
Start: 2022-08-11 | End: 2022-08-12 | Stop reason: HOSPADM

## 2022-08-11 RX ORDER — ONDANSETRON 2 MG/ML
4 INJECTION INTRAMUSCULAR; INTRAVENOUS EVERY 6 HOURS PRN
Status: DISCONTINUED | OUTPATIENT
Start: 2022-08-11 | End: 2022-08-12 | Stop reason: HOSPADM

## 2022-08-11 RX ORDER — CALCIUM CARBONATE 200(500)MG
500 TABLET,CHEWABLE ORAL 3 TIMES DAILY PRN
Status: DISCONTINUED | OUTPATIENT
Start: 2022-08-11 | End: 2022-08-12 | Stop reason: HOSPADM

## 2022-08-11 RX ORDER — NITROGLYCERIN 0.4 MG/1
0.4 TABLET SUBLINGUAL EVERY 5 MIN PRN
Status: DISCONTINUED | OUTPATIENT
Start: 2022-08-11 | End: 2022-08-12 | Stop reason: HOSPADM

## 2022-08-11 RX ORDER — ACETAMINOPHEN 650 MG/1
650 SUPPOSITORY RECTAL EVERY 6 HOURS PRN
Status: DISCONTINUED | OUTPATIENT
Start: 2022-08-11 | End: 2022-08-12 | Stop reason: HOSPADM

## 2022-08-11 RX ORDER — 0.9 % SODIUM CHLORIDE 0.9 %
1000 INTRAVENOUS SOLUTION INTRAVENOUS ONCE
Status: COMPLETED | OUTPATIENT
Start: 2022-08-11 | End: 2022-08-11

## 2022-08-11 RX ORDER — WARFARIN SODIUM 5 MG/1
10 TABLET ORAL
Status: COMPLETED | OUTPATIENT
Start: 2022-08-11 | End: 2022-08-11

## 2022-08-11 RX ORDER — ONDANSETRON 4 MG/1
4 TABLET, ORALLY DISINTEGRATING ORAL EVERY 8 HOURS PRN
Status: DISCONTINUED | OUTPATIENT
Start: 2022-08-11 | End: 2022-08-12 | Stop reason: HOSPADM

## 2022-08-11 RX ORDER — SODIUM CHLORIDE 9 MG/ML
INJECTION, SOLUTION INTRAVENOUS PRN
Status: DISCONTINUED | OUTPATIENT
Start: 2022-08-11 | End: 2022-08-12 | Stop reason: HOSPADM

## 2022-08-11 RX ORDER — ACETAMINOPHEN 325 MG/1
650 TABLET ORAL EVERY 6 HOURS PRN
Status: DISCONTINUED | OUTPATIENT
Start: 2022-08-11 | End: 2022-08-12 | Stop reason: HOSPADM

## 2022-08-11 RX ORDER — POTASSIUM CHLORIDE 20 MEQ/1
40 TABLET, EXTENDED RELEASE ORAL PRN
Status: DISCONTINUED | OUTPATIENT
Start: 2022-08-11 | End: 2022-08-12 | Stop reason: HOSPADM

## 2022-08-11 RX ORDER — SODIUM CHLORIDE 0.9 % (FLUSH) 0.9 %
5-40 SYRINGE (ML) INJECTION EVERY 12 HOURS SCHEDULED
Status: DISCONTINUED | OUTPATIENT
Start: 2022-08-11 | End: 2022-08-12 | Stop reason: HOSPADM

## 2022-08-11 RX ORDER — POLYETHYLENE GLYCOL 3350 17 G/17G
17 POWDER, FOR SOLUTION ORAL DAILY PRN
Status: DISCONTINUED | OUTPATIENT
Start: 2022-08-11 | End: 2022-08-12 | Stop reason: HOSPADM

## 2022-08-11 RX ORDER — POTASSIUM CHLORIDE 7.45 MG/ML
10 INJECTION INTRAVENOUS PRN
Status: DISCONTINUED | OUTPATIENT
Start: 2022-08-11 | End: 2022-08-12 | Stop reason: HOSPADM

## 2022-08-11 RX ORDER — NALOXONE HYDROCHLORIDE 0.4 MG/ML
0.4 INJECTION, SOLUTION INTRAMUSCULAR; INTRAVENOUS; SUBCUTANEOUS PRN
Status: DISCONTINUED | OUTPATIENT
Start: 2022-08-11 | End: 2022-08-12 | Stop reason: HOSPADM

## 2022-08-11 RX ORDER — MORPHINE SULFATE 2 MG/ML
2 INJECTION, SOLUTION INTRAMUSCULAR; INTRAVENOUS
Status: DISCONTINUED | OUTPATIENT
Start: 2022-08-11 | End: 2022-08-12 | Stop reason: HOSPADM

## 2022-08-11 RX ADMIN — SODIUM CHLORIDE, PRESERVATIVE FREE 10 ML: 5 INJECTION INTRAVENOUS at 10:48

## 2022-08-11 RX ADMIN — GABAPENTIN 300 MG: 300 CAPSULE ORAL at 17:23

## 2022-08-11 RX ADMIN — ACETAMINOPHEN 650 MG: 325 TABLET, FILM COATED ORAL at 20:46

## 2022-08-11 RX ADMIN — EZETIMIBE 10 MG: 10 TABLET ORAL at 10:47

## 2022-08-11 RX ADMIN — AMLODIPINE BESYLATE 5 MG: 5 TABLET ORAL at 10:47

## 2022-08-11 RX ADMIN — SODIUM CHLORIDE, PRESERVATIVE FREE 10 ML: 5 INJECTION INTRAVENOUS at 20:47

## 2022-08-11 RX ADMIN — SUCRALFATE 1 G: 1 TABLET ORAL at 10:47

## 2022-08-11 RX ADMIN — WARFARIN SODIUM 10 MG: 5 TABLET ORAL at 18:44

## 2022-08-11 RX ADMIN — ASPIRIN 81 MG 81 MG: 81 TABLET ORAL at 10:47

## 2022-08-11 RX ADMIN — FLUTICASONE PROPIONATE 1 SPRAY: 50 SPRAY, METERED NASAL at 10:47

## 2022-08-11 RX ADMIN — VERAPAMIL HYDROCHLORIDE 40 MG: 80 TABLET ORAL at 22:47

## 2022-08-11 RX ADMIN — VERAPAMIL HYDROCHLORIDE 40 MG: 80 TABLET ORAL at 10:47

## 2022-08-11 RX ADMIN — ATORVASTATIN CALCIUM 80 MG: 40 TABLET, FILM COATED ORAL at 00:01

## 2022-08-11 RX ADMIN — CETIRIZINE HYDROCHLORIDE 10 MG: 10 TABLET, FILM COATED ORAL at 10:47

## 2022-08-11 RX ADMIN — MICONAZOLE NITRATE: 2 POWDER TOPICAL at 10:47

## 2022-08-11 RX ADMIN — NITROGLYCERIN 0.4 MG: 0.4 TABLET, ORALLY DISINTEGRATING SUBLINGUAL at 05:39

## 2022-08-11 RX ADMIN — PERFLUTREN 1.5 ML: 6.52 INJECTION, SUSPENSION INTRAVENOUS at 08:08

## 2022-08-11 RX ADMIN — BETHANECHOL CHLORIDE 25 MG: 25 TABLET ORAL at 10:46

## 2022-08-11 RX ADMIN — ATORVASTATIN CALCIUM 80 MG: 40 TABLET, FILM COATED ORAL at 20:46

## 2022-08-11 RX ADMIN — SUCRALFATE 1 G: 1 TABLET ORAL at 20:47

## 2022-08-11 RX ADMIN — GABAPENTIN 300 MG: 300 CAPSULE ORAL at 00:06

## 2022-08-11 RX ADMIN — FUROSEMIDE 80 MG: 40 TABLET ORAL at 10:47

## 2022-08-11 RX ADMIN — SODIUM CHLORIDE 1000 ML: 9 INJECTION, SOLUTION INTRAVENOUS at 04:47

## 2022-08-11 RX ADMIN — SPIRONOLACTONE 12.5 MG: 25 TABLET ORAL at 10:47

## 2022-08-11 RX ADMIN — MICONAZOLE NITRATE: 2 POWDER TOPICAL at 15:03

## 2022-08-11 RX ADMIN — SUCRALFATE 1 G: 1 TABLET ORAL at 18:43

## 2022-08-11 RX ADMIN — TOPIRAMATE 50 MG: 50 TABLET, FILM COATED ORAL at 10:46

## 2022-08-11 RX ADMIN — NITROGLYCERIN 0.4 MG: 0.4 TABLET, ORALLY DISINTEGRATING SUBLINGUAL at 04:31

## 2022-08-11 RX ADMIN — VERAPAMIL HYDROCHLORIDE 40 MG: 80 TABLET ORAL at 00:01

## 2022-08-11 RX ADMIN — PANTOPRAZOLE SODIUM 40 MG: 40 TABLET, DELAYED RELEASE ORAL at 10:46

## 2022-08-11 RX ADMIN — MORPHINE SULFATE 2 MG: 2 INJECTION, SOLUTION INTRAMUSCULAR; INTRAVENOUS at 07:13

## 2022-08-11 ASSESSMENT — PAIN DESCRIPTION - DESCRIPTORS
DESCRIPTORS: DULL
DESCRIPTORS: ACHING

## 2022-08-11 ASSESSMENT — ENCOUNTER SYMPTOMS
SINUS PAIN: 0
BLOOD IN STOOL: 0
EYE PAIN: 0
CONSTIPATION: 0
WHEEZING: 0
ABDOMINAL DISTENTION: 0
VOMITING: 0
COUGH: 0
NAUSEA: 0
TROUBLE SWALLOWING: 0
SHORTNESS OF BREATH: 1
SORE THROAT: 0
DIARRHEA: 0
ABDOMINAL PAIN: 0

## 2022-08-11 ASSESSMENT — PAIN DESCRIPTION - ORIENTATION: ORIENTATION: MID

## 2022-08-11 ASSESSMENT — PAIN DESCRIPTION - LOCATION
LOCATION: CHEST
LOCATION: HEAD
LOCATION: CHEST

## 2022-08-11 ASSESSMENT — PAIN - FUNCTIONAL ASSESSMENT
PAIN_FUNCTIONAL_ASSESSMENT: PREVENTS OR INTERFERES SOME ACTIVE ACTIVITIES AND ADLS
PAIN_FUNCTIONAL_ASSESSMENT: ACTIVITIES ARE NOT PREVENTED

## 2022-08-11 ASSESSMENT — PAIN SCALES - GENERAL
PAINLEVEL_OUTOF10: 3
PAINLEVEL_OUTOF10: 6
PAINLEVEL_OUTOF10: 5
PAINLEVEL_OUTOF10: 8
PAINLEVEL_OUTOF10: 4

## 2022-08-11 ASSESSMENT — PAIN DESCRIPTION - ONSET: ONSET: OTHER (COMMENT)

## 2022-08-11 ASSESSMENT — PAIN DESCRIPTION - PAIN TYPE: TYPE: ACUTE PAIN

## 2022-08-11 ASSESSMENT — PAIN DESCRIPTION - FREQUENCY: FREQUENCY: INTERMITTENT

## 2022-08-11 NOTE — PROGRESS NOTES
[facility]      Progress Note    Patient:  Patrick Barba  YOB: 1975  Date of Service: 8/11/2022  MRN: [id number]   Acct: [de-identified]   Primary Care Physician: Katya Moon DO  Advance Directive: Full Code  Admit Date: 8/10/2022       Hospital Day: 0    Portions of this note have been copied forward, however, updated to reflect the most current clinical status of this patient. CHIEF COMPLAINT chest pain    SUBJECTIVE: Ms. Zeke Mendez was sitting in bed this morning. Reported right-sided chest pain along with shortness of breath and lightheadedness. Denied nausea or vomiting. CUMULATIVE HOSPITAL COURSE:   The patient is a 26-year-old female with past medical history of morbid obesity, CAD, MI/PCI at age 32, CABG 2016, CVA, on warfarin, LV thrombus, type 2 diabetes, GERD, CKD 3, hyperlipidemia, and hypertension who presented to [address] [place] [address] ED with complaints of chest pain. Reported she acutely began to experience midsternal chest pressure day prior to admission while doing household chores. Initially she reported having intermittent chest pressure however continued to worsen with having sharp constant pain. Reported pain worsened on inspiration and shortness of breath with exertion. Denied radiation of pain. Denied recent illnesses, fever, chills, nausea, vomiting. Reported being followed by [facility] cardiology with most recent stress test suggesting mild ischemia in the AL, EF 40 to 50% on most recent TTE. Work-up in ED revealed CO2 21 BUN 23 creatinine 1.6, GFR 42, unremarkable CBC, INR 3.29, and CTA negative for PE. Patient was admitted to hospital medicine for chest pain with cardiology consultation. Echo indicated technically difficult study due to morbid obesity with poor images, normal LV size with difficult assessment of systolic function, estimated EF appears low normal at 50%, mild concentric LVH with mild grade 1 diastolic dysfunction.   Cardiology recommended movements intact. Conjunctiva/sclera: Conjunctivae normal.   Cardiovascular:      Rate and Rhythm: Normal rate and regular rhythm. Pulses: Normal pulses. Heart sounds: Normal heart sounds. No murmur heard. No friction rub. No gallop. Pulmonary:      Effort: Pulmonary effort is normal. No respiratory distress. Breath sounds: Normal breath sounds. No wheezing, rhonchi or rales. Abdominal:      General: Abdomen is flat. Bowel sounds are normal. There is no distension. Palpations: Abdomen is soft. Tenderness: There is no abdominal tenderness. Musculoskeletal:         General: No swelling. Normal range of motion. Cervical back: Normal range of motion and neck supple. Right lower leg: No edema. Left lower leg: No edema. Skin:     General: Skin is warm and dry. Coloration: Skin is not jaundiced. Findings: No erythema, lesion or rash. Neurological:      General: No focal deficit present. Mental Status: She is alert and oriented to person, place, and time. Mental status is at baseline. Cranial Nerves: No cranial nerve deficit. Sensory: No sensory deficit. Motor: No weakness. Psychiatric:         Mood and Affect: Mood normal.         Behavior: Behavior normal.         Thought Content:  Thought content normal.         Judgment: Judgment normal.          Medications:      sodium chloride      dextrose        sodium chloride flush  5-40 mL IntraVENous 2 times per day    warfarin placeholder: dosing by pharmacy   Other RX Placeholder    aspirin  81 mg Oral Daily    atorvastatin  80 mg Oral Nightly    amLODIPine  5 mg Oral Daily    ezetimibe  10 mg Oral Daily    fluticasone  1 spray Each Nostril Daily    furosemide  80 mg Oral Daily    cetirizine  10 mg Oral Daily    insulin glargine  80 Units SubCUTAneous Nightly    insulin lispro  0-16 Units SubCUTAneous TID     insulin lispro  0-4 Units SubCUTAneous Nightly    bethanechol  25 mg Oral Daily    [Held by provider] lisinopril  2.5 mg Oral Daily    pantoprazole  40 mg Oral Daily    miconazole   Topical TID    metoprolol succinate  25 mg Oral Daily    spironolactone  12.5 mg Oral Daily    [START ON 8/16/2022] Semaglutide(0.25 or 0.5MG/DOS)  0.25 mg SubCUTAneous Weekly    sucralfate  1 g Oral 4x Daily PC & HS    verapamil  40 mg Oral Q12H    topiramate  50 mg Oral Daily    [START ON 8/17/2022] vitamin D  50,000 Units Oral Weekly     sodium chloride flush, sodium chloride, ondansetron **OR** ondansetron, acetaminophen **OR** acetaminophen, potassium chloride **OR** potassium alternative oral replacement **OR** potassium chloride, magnesium sulfate, nitroGLYCERIN, polyethylene glycol, melatonin, calcium carbonate, morphine, naloxone, gabapentin, glucose, dextrose bolus **OR** dextrose bolus, glucagon (rDNA), dextrose, promethazine-dextromethorphan, prochlorperazine  Diet NPO Exceptions are: Sips of Water with Meds     Lab and other Data:     Recent Labs     08/10/22  1910   WBC 6.7   HGB 14.6        Recent Labs     08/10/22  1910 08/10/22  2318 08/11/22  0601     --   --    K 3.9  --   --      --   --    CO2 21*  --   --    BUN 23*  --   --    CREATININE 1.6*  --   --    GLUCOSE 90 89 114     Recent Labs     08/10/22  1910   AST 16   ALT 19   BILITOT <0.2   ALKPHOS 86     Troponin T:   Recent Labs     08/10/22  1910 08/10/22  2316 08/11/22  0602   TROPONINI <0.01 <0.01 <0.01       INR:   Recent Labs     08/10/22  1910 08/11/22  0602   INR 3.29* 2.93*       A1C:   Recent Labs     08/10/22  1910   LABA1C 7.5*       RAD:     Echo Complete  Result Date: 8/11/2022    Summary  Technically difficult study due to morbid obesity with poor images  Normal left ventricular size with difficult assessment of systolic  function even with Definity contrast -estimated ejection fraction appears  low normal at 50%  Mild concentric left ventricular hypertrophy with mild [grade 1] diastolic  dysfunction Normal left atrial size  Poor visualization of the aortic valve without evidence of stenosis or  insufficiency  Normally mobile mitral valve without demonstrable regurgitation  No pulmonic valvular insufficiency or stenosis demonstrated  Poor visualization of the right-sided chambers with preserved RV systolic  function  No tricuspid regurgitation with which to estimate RVSP  Nonvisualization of the IVC  Aortic root and ascending segment measure within normal limits  No significant pericardial effusion   Signature   ----------------------------------------------------------------  Electronically signed by Easton Carson MD(Interpreting physician)  on 08/11/2022 09:47 AM      CTA PULMONARY W CONTRAST  Result Date: 8/10/2022    1. No evidence of PE. Micro:    [id number], Rapid [[id number]] Collected: 08/10/22 1930   Order Status: Completed Specimen: Nasopharyngeal Swab Updated: 08/10/22 1952    SARS-CoV-2, NAAT Not Detected       Assessment/Plan   Principal Problem:    Chest pain due to myocardial ischemia, unspecified ischemic chest pain type  Active Problems:    Acute kidney injury superimposed on CKD ([facility] [address]    Type 2 diabetes mellitus with diabetic polyneuropathy, with long-term current use of insulin ([facility])    Mixed hyperlipidemia    NAYAN (obstructive sleep apnea)    Morbid obesity due to excess calories ([facility] [address]  Resolved Problems:    * No resolved hospital problems.  *      Principal Problem:    Chest pain due to myocardial ischemia, unspecified ischemic chest pain type-   - Cardiology following    - Recommended Lexiscan   - Aspirin and Lipitor  - SL Nitro PRN  - ECHO indicated technically difficult study due to morbid obesity with poor images, normal LV size with difficult assessment of systolic function, estimated EF appears low normal at 50%, mild concentric LVH with mild grade 1 diastolic dysfunction   - Trend troponin- negative   - FLP cholesterol 140, HDL 44, , triglyceride 185/ HgbA1c 7.5  - Serial and PRN EKGs  - Monitor on telemetry  - NPO       Active Problems:    Acute kidney injury superimposed on CKD ([facility] [address]-               - Monitor I's and O's closely              - Monitor labs closely              - Avoid hypotension              - Avoid nephrotoxic agents         Type 2 diabetes mellitus with diabetic polyneuropathy, with long-term current use of insulin ([facility] [address]-    - A1c 7.5   - Lantus    - SSI   - Accu-Checks    - Hypoglycemia treatment protocol in place         Mixed hyperlipidemia- on statin       NAYAN (obstructive sleep apnea)-    - noted       Morbid obesity due to excess calories ([facility] [address]   - noted               DVT Prophylaxis: SCDs      GI prophylaxis:  Protonix     Discharge planning: TBD        Further Orders per Clinical course/attending. Electronically signed by YARIEL Arellano CNP on 8/11/2022 at 12:32 PM       EMR Dragon/Transcription disclaimer:   Much of this encounter note is an electronic transcription/translation of spoken language to printed text.  The electronic translation of spoken language may permit erroneous, or at times, nonsensical words or phrases to be inadvertently transcribed; although attempts have made to review the note for such errors, some may still exist.

## 2022-08-11 NOTE — ED NOTES
Report called to [facility] on 7th floor.       Brooksie Landau, RN  08/11/22 0396

## 2022-08-11 NOTE — ED PROVIDER NOTES
[facility] CARE  eMERGENCY dEPARTMENT eNCOUnter      Pt Name: Andree Bui  MRN: [id number]  [place] 1975  Date of evaluation: 8/10/2022  Provider: Dakota Peter MD    [address] [place] [address]       Chief Complaint   Patient presents with    Chest Pain     Mid sternal chest pain described as pressure pain increases with a deep breath and when she moves her arm. . Pt states she had soreness in her chest last night but, pain has increased today. HISTORY OF PRESENT ILLNESS   (Location/Symptom, Timing/Onset,Context/Setting, Quality, Duration, Modifying Factors, Severity)  Note limiting factors. Andree Bui is a 52 y.o. female who presents to the emergency department due to chest pain. Patient has multiple medical problems including hypertension diabetes coronary disease and previous stroke. Has also had prior CABG. Tells me that she has had pain in the center and right side of her chest today. Pain constant but worse when she moves her right arm or takes in a deep breath. No hemoptysis. No unilateral leg swelling or pain. Does have some dyspnea associated with her symptoms. No cough. No fever. No abdominal pain nausea vomiting diarrhea. Tells me she deals with some chronic issues with abdominal discomfort but this is no different from baseline. No urinary complaints. No numbness. No weakness. HPI    NursingNotes were reviewed. REVIEW OF SYSTEMS    (2-9 systems for level 4, 10 or more for level 5)     Review of Systems   Constitutional:  Negative for fever. Eyes:  Negative for pain. Respiratory:  Positive for shortness of breath. Cardiovascular:  Positive for chest pain. Negative for palpitations and leg swelling. Gastrointestinal:  Negative for abdominal pain, diarrhea and vomiting. Genitourinary:  Negative for dysuria. Skin:  Negative for rash. Neurological:  Negative for weakness and headaches. All other systems reviewed and are negative.     A complete review of systems was performed and is negative except as noted above in the HPI.        PAST MEDICAL HISTORY     Past Medical History:   Diagnosis Date    CAD (coronary artery disease)     [facility] occlusion with cerebral infarction ([facility] [address]     Diabetes mellitus ([facility] [address]     GERD (gastroesophageal reflux disease)     Hyperlipidemia     Hypertension          SURGICAL HISTORY       Past Surgical History:   Procedure Laterality Date    CARDIAC SURGERY      COLONOSCOPY N/A 09/02/2021    Dr Amber Turcios, Sub prep [place], 4 year recall    CORONARY ARTERY BYPASS GRAFT      2016    TONSILLECTOMY      UPPER GASTROINTESTINAL ENDOSCOPY      sujatha Mcclure/snow per patient    UPPER GASTROINTESTINAL ENDOSCOPY N/A 09/02/2021    Dr Amber Turcios, BDM, (-)[place]         CURRENT MEDICATIONS       Current Discharge Medication List        CONTINUE these medications which have NOT CHANGED    Details   pantoprazole (PROTONIX) 40 MG tablet TAKE 1 TABLET BY MOUTH DAILY  Qty: 90 tablet, Refills: 1      fluticasone (FLONASE) 50 MCG/ACT nasal spray 1 spray by Each Nostril route daily  Qty: 32 g, Refills: 1    Associated Diagnoses: Upper respiratory tract infection, unspecified type      cetirizine (ZYRTEC ALLERGY) 10 MG tablet Take 1 tablet by mouth daily  Qty: 30 tablet, Refills: 0    Associated Diagnoses: Upper respiratory tract infection, unspecified type      Semaglutide,0.25 or 0.5MG/DOS, (OZEMPIC, 0.25 OR 0.5 MG/DOSE,) 2 MG/1.5ML SOPN Inject 0.25 mg into the skin once a week      amLODIPine (NORVASC) 5 MG tablet Take 5 mg by mouth daily       vitamin D (ERGOCALCIFEROL) 1.25 MG (67485 UT) CAPS capsule Take 1 capsule by mouth once a week      lisinopril (PRINIVIL;ZESTRIL) 2.5 MG tablet Take 1 tablet by mouth daily       promethazine-dextromethorphan (PROMETHAZINE-DM) 6.25-15 MG/5ML syrup Take 5 mLs by mouth nightly as needed for Cough  Qty: 120 mL, Refills: 0    Associated Diagnoses: Acute bronchitis, unspecified organism      warfarin (COUMADIN) 10 MG tablet Take 10 mg by mouth 10mg 6 days and 15mg one day weekly      sucralfate (CARAFATE) 1 GM/10ML suspension Take 10 mLs by mouth 4 times daily  Qty: 1200 mL, Refills: 3      furosemide (LASIX) 40 MG tablet Take 2 tablets by mouth daily  Qty: 60 tablet, Refills: 0      gabapentin (NEURONTIN) 300 MG capsule Take 1 capsule by mouth 2 times daily as needed (Neuropathy/nerve pains) for up to 3 days.  Intended supply: 30 days  Qty: 6 capsule, Refills: 0    Associated Diagnoses: Neuropathy      ondansetron (ZOFRAN ODT) 4 MG disintegrating tablet Take 1 tablet by mouth every 8 hours as needed for Nausea or Vomiting  Qty: 15 tablet, Refills: 0      nystatin (MYCOSTATIN) 106615 UNIT/GM powder Apply 3 times daily to lower groin, abdominal area and under bilateral breasts  Qty: 1 Bottle, Refills: 0    Associated Diagnoses: Candidiasis of skin      NONFORMULARY daily apple cider gummy       ezetimibe (ZETIA) 10 MG tablet Take 10 mg by mouth daily       insulin glargine (LANTUS;BASAGLAR) 100 UNIT/ML injection pen Inject 80 Units into the skin nightly       bethanechol (URECHOLINE) 25 MG tablet Take 1 tablet by mouth daily      metFORMIN (GLUCOPHAGE) 1000 MG tablet Take 1 tablet by mouth 2 times daily (with meals) Hold till monday      insulin aspart (NOVOLOG) 100 UNIT/ML injection vial Inject 10 Units into the skin 4 times daily (before meals and nightly) Up to 10 units ss coverage as directed      spironolactone (ALDACTONE) 25 MG tablet Take 12.5 mg by mouth daily      topiramate (TOPAMAX) 50 MG tablet Take 1 tablet by mouth daily       aspirin 81 MG tablet Take 81 mg by mouth daily      metoprolol succinate ER (TOPROL-XL) 25 MG XL tablet Take 25 mg by mouth daily       prochlorperazine (COMPAZINE) 10 MG tablet Take 10 mg by mouth 2 times daily as needed (headache)       verapamil (CALAN) 40 MG tablet Take 40 mg by mouth every 12 hours      atorvastatin (LIPITOR) 40 MG tablet Take 80 mg by mouth nightly              ALLERGIES Bactrim [sulfamethoxazole-trimethoprim]    FAMILY HISTORY       Family History   Problem Relation Age of Onset    Osteoarthritis Mother     Diabetes Mother     Hypertension Mother     Hypertension Father     Heart Attack Brother     Heart Failure Maternal Grandmother     Diabetes Maternal Grandfather     Heart Disease Maternal Grandfather     Heart Disease Paternal Grandmother     No Known Problems Paternal Grandfather     Colon Cancer Neg Hx     Colon Polyps Neg Hx     Esophageal Cancer Neg Hx     Liver Cancer Neg Hx     Liver Disease Neg Hx     Rectal Cancer Neg Hx     Stomach Cancer Neg Hx           SOCIAL HISTORY       Social History     Socioeconomic History    Marital status: Single     Spouse name: None    Number of children: None    Years of education: None    Highest education level: None   Tobacco Use    Smoking status: Never    Smokeless tobacco: Never   Vaping Use    Vaping Use: Never used   Substance and Sexual Activity    Alcohol use: Yes     Alcohol/week: 1.0 standard drink     Types: 1 Glasses of wine per week     Comment: occ    Drug use: No       SCREENINGS    [place] Coma Scale  Eye Opening: Spontaneous  Best Verbal Response: Oriented  Best Motor Response: Obeys commands  Remigio Coma Scale Score: 15        PHYSICAL EXAM    (up to 7 for level 4, 8 or more for level 5)     ED Triage Vitals [08/10/22 1859]   BP Temp Temp src Heart Rate Resp SpO2 Height Weight   (!) 158/95 -- -- (!) 104 18 96 % -- (!) 357 lb (161.9 kg)       Physical Exam  Vitals reviewed. Constitutional:       General: She is not in acute distress. Appearance: She is well-developed. She is obese. HENT:      Head: Normocephalic and atraumatic. Mouth/Throat:      Mouth: Mucous membranes are moist.   Eyes:      General: No scleral icterus. Pupils: Pupils are equal, round, and reactive to light. Neck:      Vascular: No JVD. Cardiovascular:      Rate and Rhythm: Normal rate and regular rhythm.       Pulses: Normal pulses. Heart sounds: Normal heart sounds. Pulmonary:      Effort: Pulmonary effort is normal. No respiratory distress. Breath sounds: Normal breath sounds. Abdominal:      General: There is no distension. Palpations: Abdomen is soft. Tenderness: There is no abdominal tenderness. There is no guarding or rebound. Musculoskeletal:         General: No tenderness. Cervical back: Normal range of motion and neck supple. Right lower leg: No edema. Left lower leg: No edema. Skin:     General: Skin is warm and dry. Capillary Refill: Capillary refill takes less than 2 seconds. Neurological:      General: No focal deficit present. Mental Status: She is alert and oriented to person, place, and time. GCS: GCS eye subscore is 4. GCS verbal subscore is 5. GCS motor subscore is 6. Sensory: Sensation is intact. Motor: Motor function is intact. Psychiatric:         Mood and Affect: Mood normal.         Behavior: Behavior normal.       DIAGNOSTIC RESULTS     EKG: All EKG's are interpreted by the Emergency Department Physician who either signs or Co-signs this chart in the absence of a cardiologist.    Sinus tachycardia. Normal QT. Borderline ST changes.   Borderline T wave changes    RADIOLOGY:   Non-plain film images such as CT, Ultrasound and MRI are read by the radiologist. [facility] images are visualized and preliminarily interpreted by the emergency physician with the below findings:        Interpretation per the Radiologist below, if available at the time of this note:    CTA PULMONARY W CONTRAST   Final Result            ED BEDSIDE ULTRASOUND:   Performed by ED Physician - none    LABS:  Labs Reviewed   CBC - Abnormal; Notable for the following components:       Result Value    RDW 15.0 (*)     All other components within normal limits   COMPREHENSIVE METABOLIC PANEL W/ REFLEX TO MG FOR LOW K - Abnormal; Notable for the following components:    CO2 21 (*)     BUN 23 (*)     Creatinine 1.6 (*)     GFR Non- 35 (*)     GFR  42 (*)     All other components within normal limits   PROTIME-INR - Abnormal; Notable for the following components:    Protime 34.8 (*)     INR 3.29 (*)     All other components within normal limits   HEMOGLOBIN A1C - Abnormal; Notable for the following components:    Hemoglobin A1C 7.5 (*)     All other components within normal limits   LIPID PANEL - Abnormal; Notable for the following components:    Cholesterol, Total 140 (*)     Triglycerides 185 (*)     HDL 44 (*)     All other components within normal limits   POCT GLUCOSE - Abnormal; Notable for the following components:    POC Glucose 114 (*)     All other components within normal limits   POCT GLUCOSE - Normal   POCT GLUCOSE - Normal   COVID-19, RAPID   TROPONIN   HCG, SERUM, QUALITATIVE   TSH WITH REFLEX TO FT4   MAGNESIUM   PHOSPHORUS   BRAIN NATRIURETIC PEPTIDE   TROPONIN   VITAMIN D 25 HYDROXY   TROPONIN   URINALYSIS WITH REFLEX TO CULTURE   SODIUM, URINE, RANDOM   PROTEIN, URINE, RANDOM   EOSINOPHIL SMEAR, URINE   OSMOLALITY, URINE   PROTIME-INR   TROPONIN   POCT GLUCOSE   POCT GLUCOSE   POCT GLUCOSE       All other labs were within normal range or not returned as of this dictation. EMERGENCY DEPARTMENT COURSE and DIFFERENTIALDIAGNOSIS/MDM:   Vitals:    Vitals:    08/11/22 0930 08/11/22 0937 08/11/22 1004 08/11/22 1008   BP:  (!) 125/93  (!) 122/93   Pulse: 93 92 90 93   Resp: 20 16     Temp:    97.7 °F (36.5 °C)   TempSrc:    Oral   SpO2:  98%  96%   Weight:    (!) 356 lb 4 oz (161.6 kg)   Height:    5' 4\" (1.626 m)       MDM    Patient resting comfortably in no distress. Nontoxic on exam at this time. Creatinine is elevated from previous labs. Patient denies any decrease in urine output. Tells me she has been drinking adequately. No abdominal or flank pain. No urinary complaints.   Patient's case discussed with Dr Jose Alejandro Storey, hospitalist, who is agreeable plan of care and admission. Patient updated about plan. CONSULTS:  IP CONSULT TO PHARMACY  IP CONSULT TO CARDIOLOGY    PROCEDURES:  Unless otherwise notedbelow, none     Procedures    FINAL IMPRESSION     1. Chest pain, unspecified type    2.  RADHA (acute kidney injury) [facility])          DISPOSITION/PLAN   DISPOSITION Admitted 08/10/2022 09:55:56 PM      PATIENT REFERRED TO:  @FUP@    DISCHARGE MEDICATIONS:  Current Discharge Medication List             (Please note that portions of this note were completed with a voice recognition program.  Efforts were made to edit the dictations butoccasionally words are mis-transcribed.)    Aristeo Prasad MD (electronically signed)  AttendingEmergency Physician          Aristeo Prasad MD  08/11/22 0482

## 2022-08-11 NOTE — ED NOTES
Dr Asha Christianson aware of bp after nitro. Ordered liter bolus and repeat nitro (if having pain 3/10 and if pressing increases). If pt still having cp of rate 3/10 or higher to call him.       Josefina Johnston RN  08/11/22 4129

## 2022-08-11 NOTE — ED NOTES
Report received from [facility]. VSS, pt sinus tach/ NSR on cardiac monitor.  Call light within reach     Ann Marie Savage RN  08/10/22 0242

## 2022-08-11 NOTE — ED NOTES
Report given to [facility], [address] [place] [address].       Keith Arboleda RN  08/11/22 6990

## 2022-08-11 NOTE — H&P
[address] - History & Physical      PCP: Yari Perkins DO    Date of Admission: 8/10/2022    Date of Service: 8/10/2022    Chief Complaint:  Chest pain     History Of Present Illness: The patient is a 52 y.o. female who presented to [facility] ER with PMH morbid obesity, CAD, MI/PCI at age 32, CABG 2016, CVA, warfarin, LV thrombus, type II DM, GERD, CKD IIIa, hyperlipidemia, HTN, complaining of chest pain. Patient states that she acutely began to experience midsternal chest pressure today while doing household chores. Initially she states that all intermittent chest pressure however pain began to become more severe describing as sharp constant pain thus decided to come in to [facility] ER. She states pain worsens on inspiration and has been getting short of breath with exertion. Denies radiation to left arm or jaw. Denies recent illness, fever, chills, nausea, vomiting, abdominal pain. Denies hemoptysis or lower extremity swelling. Currently patient endorses dull right-sided chest pain worsened on inspiration. Patient follows [address] [place] [address] cardiology and has had follow-up in July with most recent stress test suggesting mild ischemia in the AL wall, EF 40-50% on most recent TTE. Work-up in ER CTA pulmonary chest negative for aortic aneurysm or dissection, no infiltrate, pleural effusion, or pulmonary emboli, creatinine 1.6 BUN 23, troponin negative, INR 3.29, and COVID-negative. Received morphine total 6 mg IV and Zofran 4 mg IV while in ER. Patient is to be admitted to the hospitalist service with consultation to cardiology due to chest pain.   Past Medical History:        Diagnosis Date    CAD (coronary artery disease)     [facility] occlusion with cerebral infarction ([address]     Diabetes mellitus ([address]     GERD (gastroesophageal reflux disease)     Hyperlipidemia     Hypertension        Past Surgical History:        Procedure Laterality Date    CARDIAC SURGERY      COLONOSCOPY N/A 09/02/2021    Dr Elena Grace, 4 year recall    CORONARY ARTERY BYPASS GRAFT      2016    TONSILLECTOMY      UPPER GASTROINTESTINAL ENDOSCOPY      sujatha Patel/snow per patient    UPPER GASTROINTESTINAL ENDOSCOPY N/A 09/02/2021    Dr Shira Moss, [facility], (-[place]       Home Medications:  Prior to Admission medications    Medication Sig Start Date End Date Taking? Authorizing Provider   pantoprazole (PROTONIX) 40 MG tablet TAKE 1 TABLET BY MOUTH DAILY 6/26/22   Denver Balo, APRN - NP   fluticasone Robbin Pancake) 50 MCG/ACT nasal spray 1 spray by Each Nostril route daily 5/11/22   YARIEL Guajardo CNP   cetirizine (ZYRTEC ALLERGY) 10 MG tablet Take 1 tablet by mouth daily 5/11/22   YARIEL Guajardo CNP   Semaglutide,0.25 or 0.5MG/DOS, (OZEMPIC, 0.25 OR 0.5 MG/DOSE,) 2 MG/1.5ML SOPN Inject 0.25 mg into the skin once a week    Historical Provider, MD   amLODIPine (NORVASC) 5 MG tablet Take 5 mg by mouth daily     Historical Provider, MD   vitamin D (ERGOCALCIFEROL) 1.25 MG (29528 UT) CAPS capsule Take 1 capsule by mouth once a week 9/26/21   Historical Provider, MD   lisinopril (PRINIVIL;ZESTRIL) 2.5 MG tablet Take 1 tablet by mouth daily  10/9/21   Historical Provider, MD   promethazine-dextromethorphan (PROMETHAZINE-DM) 6.25-15 MG/5ML syrup Take 5 mLs by mouth nightly as needed for Cough 11/29/21   YARIEL Aj   warfarin (COUMADIN) 10 MG tablet Take 10 mg by mouth 10mg 6 days and 15mg one day weekly    Historical Provider, MD   sucralfate (CARAFATE) 1 GM/10ML suspension Take 10 mLs by mouth 4 times daily 10/5/21   Denver Balo, APRN - NP   furosemide (LASIX) 40 MG tablet Take 2 tablets by mouth daily  Patient taking differently: Take 120 mg by mouth in the morning. 9/24/21 8/10/22  Nicolás Abarca MD   gabapentin (NEURONTIN) 300 MG capsule Take 1 capsule by mouth 2 times daily as needed (Neuropathy/nerve pains) for up to 3 days.  Intended supply: 30 days 9/24/21 8/10/22  Tali Lorenzo MD ondansetron (ZOFRAN ODT) 4 MG disintegrating tablet Take 1 tablet by mouth every 8 hours as needed for Nausea or Vomiting 9/18/21   Hoda Hill MD   nystatin (MYCOSTATIN) 577588 UNIT/GM powder Apply 3 times daily to lower groin, abdominal area and under bilateral breasts 5/17/21   Joy Garcia, APRN - CNP   NONFORMULARY daily apple cider gummy     Historical Provider, MD   ezetimibe (ZETIA) 10 MG tablet Take 10 mg by mouth daily  2/15/19   Historical Provider, MD   insulin glargine (LANTUS;BASAGLAR) 100 UNIT/ML injection pen Inject 80 Units into the skin nightly  10/24/17   Historical Provider, MD   bethanechol (URECHOLINE) 25 MG tablet Take 1 tablet by mouth daily 9/14/17   Historical Provider, MD   metFORMIN (GLUCOPHAGE) 1000 MG tablet Take 1 tablet by mouth 2 times daily (with meals) Hold till monday 2/8/16   Historical Provider, MD   insulin aspart (NOVOLOG) 100 UNIT/ML injection vial Inject 10 Units into the skin 4 times daily (before meals and nightly) Up to 10 units ss coverage as directed 10/26/17   Historical Provider, MD   spironolactone (ALDACTONE) 25 MG tablet Take 12.5 mg by mouth daily 12/8/17   Historical Provider, MD   topiramate (TOPAMAX) 50 MG tablet Take 1 tablet by mouth daily  8/28/14   Historical Provider, MD   aspirin 81 MG tablet Take 81 mg by mouth daily    Historical Provider, MD   metoprolol succinate ER (TOPROL-XL) 25 MG XL tablet Take 25 mg by mouth daily     Historical Provider, MD   prochlorperazine (COMPAZINE) 10 MG tablet Take 10 mg by mouth 2 times daily as needed (headache)     Historical Provider, MD   verapamil (CALAN) 40 MG tablet Take 40 mg by mouth every 12 hours    Historical Provider, MD   atorvastatin (LIPITOR) 40 MG tablet Take 80 mg by mouth nightly     Historical Provider, MD       Allergies:    Bactrim [sulfamethoxazole-trimethoprim]    Social History:    The patient currently lives home  Tobacco:   reports that she has never smoked.  She has never used smokeless tobacco.  Alcohol:   reports current alcohol use of about 1.0 standard drink per week. Illicit Drugs: denies    Family History:      Problem Relation Age of Onset    Osteoarthritis Mother     Diabetes Mother     Hypertension Mother     Hypertension Father     Heart Attack Brother     Heart Failure Maternal Grandmother     Diabetes Maternal Grandfather     Heart Disease Maternal Grandfather     Heart Disease Paternal Grandmother     No Known Problems Paternal Grandfather     Colon Cancer Neg Hx     Colon Polyps Neg Hx     Esophageal Cancer Neg Hx     Liver Cancer Neg Hx     Liver Disease Neg Hx     Rectal Cancer Neg Hx     Stomach Cancer Neg Hx        Review of Systems:   Review of Systems   Constitutional:  Positive for fatigue. HENT: Negative. Respiratory:  Positive for chest tightness and shortness of breath. Cardiovascular:  Positive for chest pain. Gastrointestinal: Negative. Genitourinary: Negative. Musculoskeletal: Negative. Skin: Negative. Neurological: Negative. 14 point review of systems is negative except as specifically addressed above. Physical Examination:  /85   Pulse 97   Temp 98.2 °F (36.8 °C) (Oral)   Resp 15   Wt (!) 357 lb (161.9 kg)   LMP 07/04/2022   SpO2 94%   BMI 61.28 kg/m²   Physical Exam  Constitutional:       General: She is not in acute distress. Appearance: Normal appearance. HENT:      Mouth/Throat:      Mouth: Mucous membranes are moist.      Pharynx: Oropharynx is clear. Eyes:      Extraocular Movements: Extraocular movements intact. Conjunctiva/sclera: Conjunctivae normal.      Pupils: Pupils are equal, round, and reactive to light. Cardiovascular:      Rate and Rhythm: Normal rate and regular rhythm. Pulses: Normal pulses. Heart sounds: Normal heart sounds. No murmur heard. Pulmonary:      Effort: Pulmonary effort is normal. No respiratory distress. Breath sounds: Normal breath sounds.    Chest:      Chest wall: No tenderness. Abdominal:      General: Bowel sounds are normal. There is no distension. Palpations: Abdomen is soft. Tenderness: There is no abdominal tenderness. There is no guarding or rebound. Musculoskeletal:         General: No swelling. Normal range of motion. Cervical back: Normal range of motion and neck supple. No rigidity or tenderness. Right lower leg: No edema. Left lower leg: No edema. Skin:     General: Skin is warm and dry. Capillary Refill: Capillary refill takes less than 2 seconds. Neurological:      General: No focal deficit present. Mental Status: She is alert and oriented to person, place, and time. Cranial Nerves: No cranial nerve deficit. Motor: Weakness (generalized) present.    Psychiatric:         Mood and Affect: Mood normal.         Behavior: Behavior normal.        Diagnostic Data:  CBC:  Recent Labs     08/10/22  1910   WBC 6.7   HGB 14.6   HCT 44.3        BMP:  Recent Labs     08/10/22  1910      K 3.9      CO2 21*   BUN 23*   CREATININE 1.6*   CALCIUM 9.1     Recent Labs     08/10/22  1910   AST 16   ALT 19   BILITOT <0.2   ALKPHOS 86     Coag Panel:   Recent Labs     08/10/22  1910   INR 3.29*   PROTIME 34.8*     Cardiac Enzymes:   Recent Labs     08/10/22  1910   TROPONINI <0.01     ABGs:  Lab Results   Component Value Date/Time    PHART 7.430 02/23/2018 08:16 PM    PO2ART 85.0 02/23/2018 08:16 PM    OGA2CMX 41.0 02/23/2018 08:16 PM     Urinalysis:  Lab Results   Component Value Date/Time    NITRU Negative 09/20/2021 09:19 PM    WBCUA 3-5 01/08/2018 07:00 PM    WBCUA 26 01/08/2018 07:00 PM    WBCUA 3-5 01/08/2018 07:00 PM    BACTERIA 1+ 01/08/2018 07:00 PM    BACTERIA trace 01/08/2018 07:00 PM    RBCUA 0-1 08/07/2016 02:05 AM    BLOODU Negative 09/20/2021 09:19 PM    SPECGRAV 1.025 09/20/2021 09:19 PM    GLUCOSEU =>1000 09/20/2021 09:19 PM     A1C: No results for input(s): LABA1C in the last 72 hours.  ABG:No results for input(s): PHART, KCE3RVU, PO2ART, GPY1RKC, BEART, HGBAE, Z5ZGPSPE, CARBOXHGBART in the last 72 hours. CTA PULMONARY W CONTRAST    Result Date: 8/10/2022  CT ANGIOGRAM OF THE CHEST WITH IV CONTRAST CLINICAL HISTORY: Rule out PE TECHNIQUE: Serial axial images obtained. Sagittal reconstructed images obtained. Coronal reconstructed images obtained. Exam is performed with mL of Isovue-300 intravenous contrast. Per PQRS, CT exam is performed using one or more of the following dose reduction techniques: Automated exposure control, adjustment of the mA and/or KV according to patient size, or use of iterative reconstruction techniques. COMPARISON: None FINDINGS: HEART: Generalized cardiomegaly. No pericardial effusion is seen. VASCULATURE: Post-contrast appearance of the pulmonary arteries shows no evidence for filling defect. Negative for aortic aneurysm or aortic dissection. There is normal caliber of the pulmonary artery trunk in relation to the aorta. THORAX: No acute consolidating infiltrate, pneumothorax or pleural effusion is seen. Negative for pneumothorax. CHEST WALLS: Appearance is unremarkable. ESOPHAGUS: Appearance is unremarkable. STOMACH: Appearance is unremarkable. UPPER ABDOMEN: Mild fatty infiltration of the liver BONES: Appearance is unremarkable. 1.No evidence of PE.       Assessment/Plan:  Principal Problem:    Chest pain due to myocardial ischemia, unspecified ischemic chest pain type  - Cardiology consultation and recommendations appreciated  - Aspirin and Lipitor  - SL Nitro PRN  - ECHO   - Trend troponin  - FLP in a.m./ HgbA1c  - Serial and PRN EKGs  - Monitor on telemetry  - NPO after midnight  Active Problems:    Acute kidney injury superimposed on CKD ([address]   -Urine studies               - Monitor I's and O's closely              - Monitor labs closely              - Avoid hypotension              - Avoid nephrotoxic agents    Type 2 diabetes mellitus with diabetic

## 2022-08-11 NOTE — ED NOTES
Pt O2 sats fluctuating between 70's and 90's while resting with eyes closed. When pt awake pt sating in the 90s. Pt has sleep apnea. Pt placed on 2L NC for desating while resting with eyes closed.       Samina Montero RN  08/11/22 1531

## 2022-08-11 NOTE — PROGRESS NOTES
Clinical Pharmacy Note    Beti Irvin is a 52 y.o. female for whom pharmacy has been asked to manage warfarin therapy. Reason for Admission: CHest pain    Consulting Physician: Dr Kae Sandifer  Warfarin dose prior to admission: Patient states she takes 10mg every day   Warfarin indication: Hx of thrombus  Target INR range: 2-3   Outpatient warfarin provider: [address] [place] [address]    Past Medical History:   Diagnosis Date    CAD (coronary artery disease)     [facility] occlusion with cerebral infarction ([address]     Di[facility]     GERD (gastroesophageal reflux disease)     Hyperlipidemia     Hypertension                 Recent Labs     08/11/22  0602   INR 2.93*     Recent Labs     08/10/22  1910   HGB 14.6   HCT 44.3          Current warfarin drug-drug interactions:   NO significant         Date INR Warfarin Dose   08/10/12 3.29    08/11/12 2.93 10 mg                                Daily PT/INR until stable within therapeutic range. Thank you for the consult.  Give Warfarin 10mg tonight    Electronically signed by Lennox Rockwell, [facility] on 8/11/2022 at 3:29 PM

## 2022-08-11 NOTE — CONSULTS
[facility]) Cardiology   Consult Note       Requesting MD:  Dinora Christine MD   Admit Status:         Patient:    Sania Adam  [id number]  1975         Chief Complaint: Chest pain for over 24 hours  HPI: Patient is a 52 y.o. female experienced substernal chest tightness yesterday doing light housework. It persisted all day and overnight. She was admitted for observation. The pain will be aggravated by deep breathing and change of position. Walking in the room would not aggravate the pain. Her cardiac history dates back to at least 10 years ago, at that time patient had stenting after myocardial infarct. 5 years ago patient underwent CABG of unknown number of bypasses. Since then she been doing well. In the good day, patient could walk 15 minutes at her leisure pace. She stops because of shortness of breath no chest pain. After admission troponin was normal x3. EKG show sinus rhythm with old inferior wall or anterior wall myocardial infarct. She is known to have hypertension, diabetes, hyperlipidemia, sleep apnea and obesity. She is a non-smoker and nondrinker.   Patient had right hemispheric stroke in the past which is mild degree of residual weakness on the left side    Review of Systems:  HENNT: normal  PULMONARY: Shortness of breath on mild exertion  CVS:see history of present illness  ABD: denies any abdominal pain  PERIPHERL: normal  MSK: no swelling of the lower extremities  CNS: History of right hemispheric stroke  Renal: Denies any history of dysuria or frequency    Cardiac Specific Data:  Specialty Problems          Cardiology Problems    * (Principal) Chest pain due to myocardial ischemia, unspecified ischemic chest pain type        Atherosclerosis of native coronary artery of native heart with unstable angina pectoris (HCC)        [facility] occlusion with cerebral infarction [facility])        Hypertension        Mixed hyperlipidemia        Chest pain           Past Medical History:  Past Medical History:   Diagnosis Date    CAD (coronary artery disease)     [facility] occlusion with cerebral infarction ([facility] [address]     Diabetes mellitus ([facility] [address]     GERD (gastroesophageal reflux disease)     Hyperlipidemia     Hypertension         Past Surgical History:  Past Surgical History:   Procedure Laterality Date    CARDIAC SURGERY      COLONOSCOPY N/A 09/02/2021    Dr Marco Arceo, Sub prep Fair, 4 year recall    CORONARY ARTERY BYPASS GRAFT      2016    TONSILLECTOMY      UPPER GASTROINTESTINAL ENDOSCOPY      Gena Sit, w/dil per patient    UPPER GASTROINTESTINAL ENDOSCOPY N/A 09/02/2021    Dr Marco Arceo, BDM, (-)Sprue       Past Family History:  Family History   Problem Relation Age of Onset    Osteoarthritis Mother     Diabetes Mother     Hypertension Mother     Hypertension Father     Heart Attack Brother     Heart Failure Maternal Grandmother     Diabetes Maternal Grandfather     Heart Disease Maternal Grandfather     Heart Disease Paternal Grandmother     No Known Problems Paternal Grandfather     Colon Cancer Neg Hx     Colon Polyps Neg Hx     Esophageal Cancer Neg Hx     Liver Cancer Neg Hx     Liver Disease Neg Hx     Rectal Cancer Neg Hx     Stomach Cancer Neg Hx        Past Social History:  Social History     Socioeconomic History    Marital status: Single     Spouse name: Not on file    Number of children: Not on file    Years of education: Not on file    Highest education level: Not on file   Occupational History    Not on file   Tobacco Use    Smoking status: Never    Smokeless tobacco: Never   Vaping Use    Vaping Use: Never used   Substance and Sexual Activity    Alcohol use:  Yes     Alcohol/week: 1.0 standard drink     Types: 1 Glasses of wine per week     Comment: occ    Drug use: No    Sexual activity: Not on file   Other Topics Concern    Not on file   Social History Narrative    Not on file     Social Determinants of Health     Financial Resource Strain: Not on file   Food Insecurity: Not injection 5-40 mL  5-40 mL IntraVENous 2 times per day Jhoana Murphy MD   10 mL at 08/11/22 1048    sodium chloride flush 0.9 % injection 5-40 mL  5-40 mL IntraVENous PRN Jhoana Murphy MD        0.9 % sodium chloride infusion   IntraVENous PRN Jhoana Murphy MD        ondansetron (ZOFRAN-ODT) disintegrating tablet 4 mg  4 mg Oral Q8H PRN Jhoana Murphy MD        Or    ondansetron [facility]) injection 4 mg  4 mg IntraVENous Q6H PRN Jhoana Murphy MD        acetaminophen (TYLENOL) tablet 650 mg  650 mg Oral Q6H PRN Jhoana Murphy MD        Or    acetaminophen (TYLENOL) suppository 650 mg  650 mg Rectal Q6H PRN Jhoana Murphy MD        potassium chloride (KLOR-CON M) extended release tablet 40 mEq  40 mEq Oral PRN Jhoana Murphy MD        Or    potassium bicarb-citric acid (EFFER-K) effervescent tablet 40 mEq  40 mEq Oral PRN Jhoana Murphy MD        Or    potassium chloride 10 mEq/100 mL IVPB (Peripheral Line)  10 mEq IntraVENous PRN Jhoana Murphy MD        magnesium sulfate 2000 mg in 50 mL IVPB premix  2,000 mg IntraVENous PRN Jhoana Murphy MD        nitroGLYCERIN (NITROSTAT) SL tablet 0.4 mg  0.4 mg SubLINGual Q5 Min PRN Jhoana Murphy MD   0.4 mg at 08/11/22 0539    polyethylene glycol (GLYCOLAX) packet 17 g  17 g Oral Daily PRN Jhoana Murphy MD        melatonin disintegrating tablet 5 mg  5 mg Oral Nightly PRN Jhoana Murphy MD        calcium carbonate (TUMS) chewable tablet 500 mg  500 mg Oral TID PRN Jhoana Murphy MD        warfarin placeholder: dosing by pharmacy   Other Gayle Sanchez MD        morphine (PF) injection 2 mg  2 mg IntraVENous Q1H PRN Jhoana Murphy MD   2 mg at 08/11/22 0713    naloxone [facility]) injection 0.4 mg  0.4 mg IntraVENous PRN Jhoana Murphy MD        aspirin chewable tablet 81 mg  81 mg Oral Daily Jhoana Murphy MD   81 mg at 08/11/22 1047    atorvastatin (LIPITOR) tablet 80 mg  80 mg Oral Nightly Jhoana Murphy MD   80 mg at 08/11/22 0001    amLODIPine (NORVASC) tablet 5 mg  5 mg Oral Daily Rajeev Deal MD   5 mg at 08/11/22 1047    ezetimibe (ZETIA) tablet 10 mg  10 mg Oral Daily Rajeev Deal MD   10 mg at 08/11/22 1047    fluticasone (FLONASE) 50 MCG/ACT nasal spray 1 spray  1 spray Each Nostril Daily Rajeev Deal MD   1 spray at 08/11/22 1047    furosemide (LASIX) tablet 80 mg  80 mg Oral Daily Rajeev Deal MD   80 mg at 08/11/22 1047    cetirizine (ZYRTEC) tablet 10 mg  10 mg Oral Daily Rajeev Deal MD   10 mg at 08/11/22 1047    gabapentin (NEURONTIN) capsule 300 mg  300 mg Oral BID PRN Rajeev Deal MD   300 mg at 08/11/22 0006    insulin glargine (LANTUS) injection vial 80 Units  80 Units SubCUTAneous Nightly Rajeev Deal MD        insulin lispro (HUMALOG) injection vial 0-16 Units  0-16 Units SubCUTAneous TID WC Rajeev Deal MD        insulin lispro (HUMALOG) injection vial 0-4 Units  0-4 Units SubCUTAneous Nightly Rajeev MD Say        glucose chewable tablet 16 g  4 tablet Oral PRN Rajeev Deal MD        dextrose bolus 10% 125 mL  125 mL IntraVENous PRN Rajeev Deal MD        Or    dextrose bolus 10% 250 mL  250 mL IntraVENous PRN Rajeev Deal MD        glucagon (rDNA) injection 1 mg  1 mg SubCUTAneous PRN Rajeev Deal MD        dextrose 10 % infusion   IntraVENous Continuous PRN Rajeev Deal MD        bethanechol (URECHOLINE) tablet 25 mg  25 mg Oral Daily Rajeev Deal MD   25 mg at 08/11/22 1046    [Held by provider] lisinopril (PRINIVIL;ZESTRIL) tablet 2.5 mg  2.5 mg Oral Daily Rajeev Deal MD        promethazine-dextromethorphan (PROMETHAZINE-DM) 6.25-15 MG/5ML syrup 5 mL (Patient Supplied)  5 mL Oral Nightly PRN Rajeev Deal MD        prochlorperazine (COMPAZINE) tablet 10 mg  10 mg Oral BID PRN Rajeev Deal MD        pantoprazole (PROTONIX) tablet 40 mg  40 mg Oral Daily Rajeev Deal MD   40 mg at 08/11/22 1046    miconazole (MICOTIN) 2 % powder   Topical TID Rajeev Deal MD   Given at 08/11/22 1047    metoprolol succinate (TOPROL XL) extended release tablet 25 mg  25 mg Oral Daily Gia Aldridge MD        spironolactone (ALDACTONE) tablet 12.5 mg  12.5 mg Oral Daily Gia Aldridge MD   12.5 mg at 08/11/22 1047    [START ON 8/16/2022] Semaglutide(0.25 or 0.5MG/DOS) SOPN 0.25 mg (Patient Supplied)  0.25 mg SubCUTAneous Weekly Gia Aldridge MD        sucralfate (CARAFATE) tablet 1 g  1 g Oral 4x Daily PC & HS Gia Aldridge MD   1 g at 08/11/22 1047    verapamil (CALAN) tablet 40 mg  40 mg Oral Q12H Gia Aldridge MD   40 mg at 08/11/22 1047    topiramate (TOPAMAX) tablet 50 mg  50 mg Oral Daily Gia Aldridge MD   50 mg at 08/11/22 1046    [START ON 8/17/2022] vitamin D (ERGOCALCIFEROL) capsule 50,000 Units  50,000 Units Oral Weekly Gia Aldridge MD            Current Infused Meds:   sodium chloride      dextrose         Physical Exam:  Vitals:    08/11/22 1420   BP: 102/68   Pulse: 85   Resp: 20   Temp: 96.9 °F (36.1 °C)   SpO2: 100%       Intake/Output Summary (Last 24 hours) at 8/11/2022 1440  Last data filed at 8/11/2022 1048  Gross per 24 hour   Intake 10 ml   Output --   Net 10 ml     Estimated body mass index is 61.15 kg/m² as calculated from the following:    Height as of this encounter: 5' 4\" (1.626 m). Weight as of this encounter: 356 lb 4 oz (161.6 kg). PHYSICAL EXAM:  HENNT: normal  PULMONARY: normal to inspection, palpation, percussion and ascultation  CVS:Normal neck vein, heart sounds distant, S1 and S2 normal, no murmur  ABD: liver and spleen not palpable, nontender, bowel sound normal, obese abdomen  PERIPHERL: all pulses are normal with no bruit  MSK: no swelling of the lower extremities  CNS: Normal CN 2,3,4,6,5,7,9,10,11,and 12.   Oriented to time, place and person    Labs:  Recent Labs     08/10/22  1910   WBC 6.7   HGB 14.6          Recent Labs     08/10/22  1910      K 3.9      CO2 21*   BUN 23*   CREATININE 1.6*   CALCIUM 9.1   PHOS 2.9       CK, CKMB, Troponin:   Recent Labs 08/10/22  2316 08/11/22  0602 08/11/22  1339   TROPONINI <0.01 <0.01 <0.01       Last 3 BNP:  Recent Labs     08/10/22  1910   PROBNP 45             CTA PULMONARY W CONTRAST    Result Date: 8/10/2022  1. No evidence of PE. Assessment and Plan:  Over 24 hours of substernal chest tightness, pleuritic in nature and positional, troponin x3 normal, echo showed ejection fraction of 50%. EKG showed no acute changes  Hypertension, diabetes, hyperlipidemia, obesity, sleep apnea  History of myocardial infarct 10 years ago treated with stenting and followed 5 years ago by CABG  Stage III renal insufficiency  History of right cerebral hemisphere stroke    Plan: We will proceed with nuclear stress test    I have spent 60 minutes reviewing the chart, taking history, examining the patient, discussion with the patient and the family concerning the finding, diagnoses and treatment plan. This dictation was performed using [address] [place]. Mistake and misspelling may have been created without  realizing them. Please notify me for clarification.   Thank you    Miguel Silvestre MD   8/11/2022, 2:40 PM

## 2022-08-11 NOTE — CARE COORDINATION
08/11/22 9231   Service Assessment   Patient Orientation Alert and Oriented   Cognition Alert   History Provided By Patient   Primary Caregiver Self   Accompanied By/Relationship self   Support Systems Family Members;Parent;[employer]   Patient's Healthcare Decision Maker is: Legal Next of Sanjuana Sebas   PCP Verified by CM Yes   Last Visit to PCP Within last 6 months  (next visit is in September)   Prior Functional Level Independent in ADLs/IADLs   Current Functional Level Independent in ADLs/IADLs   Can patient return to prior living arrangement Yes   Ability to make needs known: Good   Family able to assist with home care needs: Yes   Would you like for me to discuss the discharge plan with any other family members/significant others, and if so, who? Yes   Financial Resources [employer] [place]; Medicaid  (welcare)   [employer]   (denied needs)   CM/SW Referral DME  (shower chair)   Social/Functional History   Lives With Family;Parent   Type of [address] [place] [address] One level   Home Access Stairs to enter with rails; Ramped entrance  (stairs in the front and an accessible ramp in the back)   Bathroom Shower/Tub Tub/Shower unit   Bathroom Toilet Standard   Bathroom Equipment   (had a shower chair that is now broken)   [address]   (denied needs)   [employer] Help From Family;Friend(s)   ADL Assistance Independent   Homemaking Assistance Independent   Homemaking Responsibilities Yes   Ambulation Assistance Independent   Transfer Assistance Independent   Active  Yes   Mode of Transportation Car   Occupation On disability   Discharge [address],  Family Members   Current Services Prior To Admission [address]   (would like a shower chair)   [address] Medications No  (insurance covers most and what it doesn't cover is okay)   Type of Home Care Services None   Patient expects to be discharged to: House   One/Two Story Residence One story   Services At/After Discharge   Transition of Care Consult (CM Consult) DME/Supply Assistance  (shower chair)   Alexander Blelo Discharge DME

## 2022-08-11 NOTE — PROGRESS NOTES
Bryan Craig arrived to room # [phone]. Presented with: chest pain  Mental Status: Patient is oriented, alert, coherent, logical, thought processes intact, and able to concentrate and follow conversation. Vitals:    08/11/22 1008   BP: (!) 122/93   Pulse: 93   Resp:    Temp: 97.7 °F (36.5 °C)   SpO2: 96%     Patient safety contract and falls prevention contract reviewed with patient Yes. Oriented Patient to room. Call light within reach. Yes.   Needs, issues or concerns expressed at this time: no.      Electronically signed by Uche Young RN on 8/11/2022 at 12:31 PM

## 2022-08-11 NOTE — ED NOTES
Cp after nitro is 6/10. Dr Dyson Fus aware. Pain still increases with movement.       Abbe Parker RN  08/11/22 6457

## 2022-08-11 NOTE — ED NOTES
Pt is gowned. Patient placed on cardiac monitor, continuous pulse oximeter, and NIBP monitor. Monitor alarms on. Dr. Bin Turner at bedside.       Radha Pratt RN  08/10/22 8684

## 2022-08-11 NOTE — CARE COORDINATION
Patient Contact Information:    [facility]  [phone] (home)   Telephone Information:   Mobile [phone]     Above information verified? [x]   Yes  []   No      Emergency Contacts:    Extended Emergency Contact Information  Primary Emergency Contact: Lauren Cannon 7 [address] Phone: [phone]  Mobile Phone: [phone]  Relation: Parent  Secondary Emergency Contact: MITCH DAVIES  Mobile Phone: [phone]  Relation: Other Relative  Preferred language: English   needed? No      Have you been vaccinated for COVID-19 (SARS-CoV-2)? [x]   Yes  []   No                   If so, when? Which :         []   Pfizer-BioNTech  [x]   Moderna  []   Romero Sensor  []   Other:         Pharmacy:    [facility] [address] [place] [address], [address] [place] [address] [phone] Assunta Mack [phone]  [address] [address]  [address] [place] [address]  Phone: [phone] Fax: [phone]    [facility] [address] [place] [address] [phone] - F [phone]  [address] [place] [address]   [address] [place] [address]  Phone: [phone] Fax: [phone]          Patient Deficits:    []   Yes   [x]   No    If yes:    []   Confusion/Memory  []   Visual  []   Motor/Sensory         []   Right arm         []   Right leg         []   Left arm         []   Left leg  []   Language/Speech         []   Aphasia         []   Dysarthria         []   Swallow         Remigio Coma Scale  Eye Opening: Spontaneous  Best Verbal Response: Oriented  Best Motor Response: Obeys commands  Remigio Coma Scale Score: 15    Patient Deficit Notes:

## 2022-08-11 NOTE — ED NOTES
Pt's CP 5/10. Stated nitro helped a little. C/o right sided cp and worse with movement.       Andreea Edmonds RN  08/11/22 3846

## 2022-08-11 NOTE — PROGRESS NOTES
4 Eyes Skin Assessment    Rudy Wang is being assessed upon: Admission    I agree that I, Elio Soler RN, along with Trent Patel RN (either 2 RN's or 1 LPN and 1 RN) have performed a thorough Head to Toe Skin Assessment on the patient. ALL assessment sites listed below have been assessed. Areas assessed by both nurses:     [x]   Head, Face, and Ears   [x]   Shoulders, Back, and Chest  [x]   Arms, Elbows, and Hands   [x]   Coccyx, Sacrum, and Ischium  [x]   Legs, Feet, and Heels    Does the Patient have Skin Breakdown?  No    Vikas Prevention initiated: No  Wound Care Orders initiated: No    WOC nurse consulted for Pressure Injury (Stage 3,4, Unstageable, DTI, NWPT, and Complex wounds) and New or Established Ostomies: No        Primary Nurse eSignature: Elio Soler RN on 8/11/2022 at 12:32 PM      Co-Signer eSignature: Electronically signed by Trent Patel RN on 8/11/2022 at 3:12 PM

## 2022-08-12 ENCOUNTER — APPOINTMENT (OUTPATIENT)
Dept: NUCLEAR MEDICINE | Age: 47
End: 2022-08-12
Payer: MEDICARE

## 2022-08-12 VITALS
BODY MASS INDEX: 50.02 KG/M2 | WEIGHT: 293 LBS | TEMPERATURE: 96.5 F | HEART RATE: 92 BPM | RESPIRATION RATE: 18 BRPM | OXYGEN SATURATION: 100 % | SYSTOLIC BLOOD PRESSURE: 141 MMHG | HEIGHT: 64 IN | DIASTOLIC BLOOD PRESSURE: 90 MMHG

## 2022-08-12 LAB
ANION GAP SERPL CALCULATED.3IONS-SCNC: 9 MMOL/L (ref 7–19)
BUN BLDV-MCNC: 20 MG/DL (ref 6–20)
CALCIUM SERPL-MCNC: 9 MG/DL (ref 8.6–10)
CHLORIDE BLD-SCNC: 104 MMOL/L (ref 98–111)
CO2: 23 MMOL/L (ref 22–29)
CREAT SERPL-MCNC: 1.3 MG/DL (ref 0.5–0.9)
GFR AFRICAN AMERICAN: 53
GFR NON-AFRICAN AMERICAN: 44
GLUCOSE BLD-MCNC: 105 MG/DL (ref 74–109)
GLUCOSE BLD-MCNC: 108 MG/DL (ref 70–99)
GLUCOSE BLD-MCNC: 111 MG/DL (ref 70–99)
HCT VFR BLD CALC: 41.3 % (ref 37–47)
HEMOGLOBIN: 13.6 G/DL (ref 12–16)
INR BLD: 2.33 (ref 0.88–1.18)
LV EF: 40 %
LVEF MODALITY: NORMAL
MCH RBC QN AUTO: 29.6 PG (ref 27–31)
MCHC RBC AUTO-ENTMCNC: 32.9 G/DL (ref 33–37)
MCV RBC AUTO: 90 FL (ref 81–99)
PDW BLD-RTO: 14.7 % (ref 11.5–14.5)
PERFORMED ON: ABNORMAL
PERFORMED ON: ABNORMAL
PLATELET # BLD: 308 K/UL (ref 130–400)
PMV BLD AUTO: 11.1 FL (ref 9.4–12.3)
POTASSIUM REFLEX MAGNESIUM: 4.5 MMOL/L (ref 3.5–5)
PROTHROMBIN TIME: 26.2 SEC (ref 12–14.6)
RBC # BLD: 4.59 M/UL (ref 4.2–5.4)
SODIUM BLD-SCNC: 136 MMOL/L (ref 136–145)
WBC # BLD: 5.1 K/UL (ref 4.8–10.8)

## 2022-08-12 PROCEDURE — 80048 BASIC METABOLIC PNL TOTAL CA: CPT

## 2022-08-12 PROCEDURE — 93005 ELECTROCARDIOGRAM TRACING: CPT | Performed by: HOSPITALIST

## 2022-08-12 PROCEDURE — 82947 ASSAY GLUCOSE BLOOD QUANT: CPT

## 2022-08-12 PROCEDURE — 78452 HT MUSCLE IMAGE SPECT MULT: CPT | Performed by: INTERNAL MEDICINE

## 2022-08-12 PROCEDURE — G0378 HOSPITAL OBSERVATION PER HR: HCPCS

## 2022-08-12 PROCEDURE — 6370000000 HC RX 637 (ALT 250 FOR IP): Performed by: INTERNAL MEDICINE

## 2022-08-12 PROCEDURE — A9502 TC99M TETROFOSMIN: HCPCS | Performed by: INTERNAL MEDICINE

## 2022-08-12 PROCEDURE — 93017 CV STRESS TEST TRACING ONLY: CPT

## 2022-08-12 PROCEDURE — 3430000000 HC RX DIAGNOSTIC RADIOPHARMACEUTICAL: Performed by: INTERNAL MEDICINE

## 2022-08-12 PROCEDURE — 93016 CV STRESS TEST SUPVJ ONLY: CPT | Performed by: INTERNAL MEDICINE

## 2022-08-12 PROCEDURE — 93018 CV STRESS TEST I&R ONLY: CPT | Performed by: INTERNAL MEDICINE

## 2022-08-12 PROCEDURE — 2580000003 HC RX 258: Performed by: HOSPITALIST

## 2022-08-12 PROCEDURE — 99213 OFFICE O/P EST LOW 20 MIN: CPT | Performed by: INTERNAL MEDICINE

## 2022-08-12 PROCEDURE — 36415 COLL VENOUS BLD VENIPUNCTURE: CPT

## 2022-08-12 PROCEDURE — 85027 COMPLETE CBC AUTOMATED: CPT

## 2022-08-12 PROCEDURE — 6360000002 HC RX W HCPCS: Performed by: INTERNAL MEDICINE

## 2022-08-12 PROCEDURE — 6370000000 HC RX 637 (ALT 250 FOR IP): Performed by: HOSPITALIST

## 2022-08-12 PROCEDURE — 85610 PROTHROMBIN TIME: CPT

## 2022-08-12 RX ORDER — WARFARIN SODIUM 5 MG/1
10 TABLET ORAL
Status: DISCONTINUED | OUTPATIENT
Start: 2022-08-12 | End: 2022-08-12 | Stop reason: HOSPADM

## 2022-08-12 RX ORDER — RANOLAZINE 500 MG/1
500 TABLET, EXTENDED RELEASE ORAL 2 TIMES DAILY
Qty: 60 TABLET | Refills: 0 | Status: SHIPPED | OUTPATIENT
Start: 2022-08-12

## 2022-08-12 RX ORDER — RANOLAZINE 500 MG/1
500 TABLET, EXTENDED RELEASE ORAL 2 TIMES DAILY
Status: DISCONTINUED | OUTPATIENT
Start: 2022-08-12 | End: 2022-08-12 | Stop reason: HOSPADM

## 2022-08-12 RX ORDER — ISOSORBIDE MONONITRATE 30 MG/1
30 TABLET, EXTENDED RELEASE ORAL DAILY
Status: DISCONTINUED | OUTPATIENT
Start: 2022-08-12 | End: 2022-08-12 | Stop reason: HOSPADM

## 2022-08-12 RX ORDER — ISOSORBIDE MONONITRATE 30 MG/1
30 TABLET, EXTENDED RELEASE ORAL DAILY
Qty: 30 TABLET | Refills: 0 | Status: SHIPPED | OUTPATIENT
Start: 2022-08-13

## 2022-08-12 RX ADMIN — RANOLAZINE 500 MG: 500 TABLET, EXTENDED RELEASE ORAL at 12:58

## 2022-08-12 RX ADMIN — TETROFOSMIN 24 MILLICURIE: 1.38 INJECTION, POWDER, LYOPHILIZED, FOR SOLUTION INTRAVENOUS at 08:47

## 2022-08-12 RX ADMIN — TOPIRAMATE 50 MG: 50 TABLET, FILM COATED ORAL at 10:27

## 2022-08-12 RX ADMIN — EZETIMIBE 10 MG: 10 TABLET ORAL at 10:27

## 2022-08-12 RX ADMIN — FUROSEMIDE 80 MG: 40 TABLET ORAL at 10:27

## 2022-08-12 RX ADMIN — ISOSORBIDE MONONITRATE 30 MG: 30 TABLET, EXTENDED RELEASE ORAL at 12:58

## 2022-08-12 RX ADMIN — TETROFOSMIN 8 MILLICURIE: 1.38 INJECTION, POWDER, LYOPHILIZED, FOR SOLUTION INTRAVENOUS at 06:50

## 2022-08-12 RX ADMIN — SUCRALFATE 1 G: 1 TABLET ORAL at 10:26

## 2022-08-12 RX ADMIN — PANTOPRAZOLE SODIUM 40 MG: 40 TABLET, DELAYED RELEASE ORAL at 10:27

## 2022-08-12 RX ADMIN — AMLODIPINE BESYLATE 5 MG: 5 TABLET ORAL at 10:27

## 2022-08-12 RX ADMIN — SODIUM CHLORIDE, PRESERVATIVE FREE 10 ML: 5 INJECTION INTRAVENOUS at 10:36

## 2022-08-12 RX ADMIN — SPIRONOLACTONE 12.5 MG: 25 TABLET ORAL at 10:27

## 2022-08-12 RX ADMIN — METOPROLOL SUCCINATE 25 MG: 25 TABLET, EXTENDED RELEASE ORAL at 10:27

## 2022-08-12 RX ADMIN — VERAPAMIL HYDROCHLORIDE 40 MG: 80 TABLET ORAL at 10:27

## 2022-08-12 RX ADMIN — ASPIRIN 81 MG 81 MG: 81 TABLET ORAL at 10:28

## 2022-08-12 RX ADMIN — CETIRIZINE HYDROCHLORIDE 10 MG: 10 TABLET, FILM COATED ORAL at 10:27

## 2022-08-12 RX ADMIN — FLUTICASONE PROPIONATE 1 SPRAY: 50 SPRAY, METERED NASAL at 10:26

## 2022-08-12 RX ADMIN — REGADENOSON 0.4 MG: 0.08 INJECTION, SOLUTION INTRAVENOUS at 09:09

## 2022-08-12 RX ADMIN — SUCRALFATE 1 G: 1 TABLET ORAL at 12:58

## 2022-08-12 RX ADMIN — BETHANECHOL CHLORIDE 25 MG: 25 TABLET ORAL at 10:27

## 2022-08-12 ASSESSMENT — PAIN SCALES - GENERAL: PAINLEVEL_OUTOF10: 0

## 2022-08-12 NOTE — PROGRESS NOTES
Patient educated on the discharge instructions including medications and follow-up appointments. This nurse stressed the importance of calling Monday to schedule a follow-up appointment with cardiology in 1 week. The patient verbalized understanding of the teaching. Patient was discharged home.

## 2022-08-12 NOTE — PROGRESS NOTES
Patient can be discharged from a cardiac standpoint per Dr. Vikki Coffman. Aida was mildly abnormal. Pt will need to follow-up with cardiology in one week, possible outpatient heart cath.

## 2022-08-12 NOTE — DISCHARGE INSTRUCTIONS
Chest Pain: Care Instructions  Your Care Instructions     There are many things that can cause chest pain. Some are not serious and will get better on their own in a few days. But some kinds of chest pain need more testing and treatment. Your doctor may have recommended a follow-up visit in the next 8 to 12 hours. If you are not getting better, you may need more testsor treatment. Even though your doctor has released you, you still need to watch for any problems. The doctor carefully checked you, but sometimes problems can develop later. If you have new symptoms or if your symptoms do not get better, getmedical care right away. If you have worse or different chest pain or pressure that lasts more than 5 minutes or you passed out (lost consciousness), call 911 or seek other emergency help right away. A medical visit is only one step in your treatment. Even if you feel better, you still need to do what your doctor recommends, such as going to all suggested follow-up appointments and taking medicines exactly as directed. Thiswill help you recover and help prevent future problems. How can you care for yourself at home? Rest until you feel better. Take your medicine exactly as prescribed. Call your doctor if you think you are having a problem with your medicine. Do not drive after taking a prescription pain medicine. When should you call for help? Call 911 if: You passed out (lost consciousness). You have severe difficulty breathing. You have symptoms of a heart attack. These may include:  Chest pain or pressure, or a strange feeling in your chest.  Sweating. Shortness of breath. Nausea or vomiting. Pain, pressure, or a strange feeling in your back, neck, jaw, or upper belly or in one or both shoulders or arms. Lightheadedness or sudden weakness. A fast or irregular heartbeat. After you call 911, the  may tell you to chew 1 adult-strength or 2 to 4 low-dose aspirin.  Wait for an ambulance. Do not try to drive yourself. Call your doctor today if:     You have any trouble breathing. Your chest pain gets worse. You are dizzy or lightheaded, or you feel like you may faint. You are not getting better as expected. You are having new or different chest pain. Where can you learn more? Go to [email or web address].[email or web address] org and sign in to your [employer] account. Enter A120 in the BUX box to learn more about \"Chest Pain: Care Instructions. \"     If you do not have an account, please click on the \"Sign Up Now\" link. Current as of: March 9, 2022               Content Version: 13.3  © 2006-2022 Healthwise, Incorporated. Care instructions adapted under license by [facility] ([facility]). If you have questions about a medical condition or this instruction, always ask your healthcare professional. [address] any warranty or liability for your use of this information.

## 2022-08-12 NOTE — PROGRESS NOTES
Clinical Pharmacy Note    Warfarin consult follow-up    Recent Labs     08/12/22  0507   INR 2.33*     Recent Labs     08/10/22  1910 08/12/22  0434   HGB 14.6 13.6   HCT 44.3 41.3    308       Significant Drug-Drug Interactions:  New warfarin drug-drug interactions: none  Discontinued drug-drug interactions: none    Date INR Warfarin Dose   08/10/12 3.29     08/11/12 2.93 10 mg    08/12/22 2.33 10 mg                                     Notes: Will give warfarin 10 mg once this evening. Daily PT/INR until stable within therapeutic range.      Electronically signed by King Nolasco [facility] on 8/12/2022 at 8:00 AM

## 2022-08-12 NOTE — PROGRESS NOTES
Cardiology Progress Note   Brenden Watkins MD      Patient:    Ian Dial  [id number]  1975    Patient Active Problem List    Diagnosis Date Noted    Chest pain due to myocardial ischemia, unspecified ischemic chest pain type 08/10/2022     Priority: Medium    Acute kidney injury superimposed on CKD ([address] 08/10/2022     Priority: Medium    Lip abrasion 04/05/2022     Priority: Low    Skin ulcer of face with fat layer exposed ([address] 04/05/2022     Priority: Low    Gallstones 09/24/2021     Priority: Low    Abnormal biliary HIDA scan 09/24/2021     Priority: Low    Chronic anticoagulation 09/24/2021     Priority: Low    History of gallstones 09/21/2021     Priority: Low    Chest pain 02/24/2018     Priority: Low    Gastroesophageal reflux disease without esophagitis      Priority: Low    Atherosclerosis of native coronary artery of native heart with unstable angina pectoris ([address] 08/04/2016     Priority: Low    Hypertension 08/04/2016     Priority: Low    Type 2 diabetes mellitus with diabetic polyneuropathy, with long-term current use of insulin ([address] 08/04/2016     Priority: Low    Mixed hyperlipidemia 08/04/2016     Priority: Low    NAYAN (obstructive sleep apnea) 08/04/2016     Priority: Low    Morbid obesity due to excess calories ([address] 08/04/2016     Priority: Low    Acute blood loss anemia 08/04/2016     Priority: Low    [facility] occlusion with cerebral infarction ([address] 08/04/2016     Priority: Low       Admit Date:  8/10/2022    Admission Problem List: Present on Admission:   Chest pain due to myocardial ischemia, unspecified ischemic chest pain type   Mixed hyperlipidemia   NAYAN (obstructive sleep apnea)   Type 2 diabetes mellitus with diabetic polyneuropathy, with long-term current use of insulin ([address]   Morbid obesity due to excess calories ([address]   Acute kidney injury superimposed on CKD [facility])       Cardiac Specific Data:  Specialty Problems          Cardiology Problems    * (Principal) Chest pain due to myocardial ischemia, unspecified ischemic chest pain type        Atherosclerosis of native coronary artery of native heart with unstable angina pectoris ([address]        [facility] occlusion with cerebral infarction [facility])        Hypertension        Mixed hyperlipidemia        Chest pain           Subjective:  Patient admitted for atypical chest pain over 24 hours. At the moment she is pain-free. Troponin was normal x3. EKG showed no evidence of acute ischemic changes.   Echocardiogram showed ejection fraction of 50%    Objective:   /84   Pulse 94   Temp 96.8 °F (36 °C) (Temporal)   Resp 18   Ht 5' 4\" (1.626 m)   Wt (!) 356 lb 4 oz (161.6 kg)   LMP 07/04/2022   SpO2 96%   BMI 61.15 kg/m²       Intake/Output Summary (Last 24 hours) at 8/12/2022 5207  Last data filed at 8/11/2022 1817  Gross per 24 hour   Intake 370 ml   Output 650 ml   Net -280 ml       Current Facility-Administered Medications   Medication Dose Route Frequency Provider Last Rate Last Admin    sodium chloride flush 0.9 % injection 5-40 mL  5-40 mL IntraVENous 2 times per day Jack Schreiber MD   10 mL at 08/11/22 2047    sodium chloride flush 0.9 % injection 5-40 mL  5-40 mL IntraVENous PRN Jack Schreiber MD        0.9 % sodium chloride infusion   IntraVENous PRN Jack Schreiber MD        ondansetron (ZOFRAN-ODT) disintegrating tablet 4 mg  4 mg Oral Q8H PRN Jack Schreiber MD        Or    ondansetron [facility]) injection 4 mg  4 mg IntraVENous Q6H PRN Jack Schreiber MD        acetaminophen (TYLENOL) tablet 650 mg  650 mg Oral Q6H PRN Jack Schreiber MD   650 mg at 08/11/22 2046    Or    acetaminophen (TYLENOL) suppository 650 mg  650 mg Rectal Q6H PRN Jack Schreiber MD        potassium chloride (KLOR-CON M) extended release tablet 40 mEq  40 mEq Oral PRN Jack Schreiber MD        Or    potassium bicarb-citric acid (EFFER-K) effervescent tablet 40 mEq  40 mEq Oral PRN Jack Schreiber MD        Or    potassium chloride 10 mEq/100 mL IVPB (Peripheral Line)  10 mEq IntraVENous PRN Sherry Bowers MD        magnesium sulfate 2000 mg in 50 mL IVPB premix  2,000 mg IntraVENous PRN Sherry Bowers MD        nitroGLYCERIN (NITROSTAT) SL tablet 0.4 mg  0.4 mg SubLINGual Q5 Min PRN Sherry Bowers MD   0.4 mg at 08/11/22 0539    polyethylene glycol (GLYCOLAX) packet 17 g  17 g Oral Daily PRN Sherry Bowers MD        melatonin disintegrating tablet 5 mg  5 mg Oral Nightly PRN Sherry Bowers MD        calcium carbonate (TUMS) chewable tablet 500 mg  500 mg Oral TID PRN Sherry Bowers MD        warfarin placeholder: dosing by pharmacy   Other Opal Longoria MD        morphine (PF) injection 2 mg  2 mg IntraVENous Q1H PRN Sherry Bowers MD   2 mg at 08/11/22 0713    naloxone [facility]) injection 0.4 mg  0.4 mg IntraVENous PRN Sherry Bowers MD        aspirin chewable tablet 81 mg  81 mg Oral Daily Sherry Bowers MD   81 mg at 08/11/22 1047    atorvastatin (LIPITOR) tablet 80 mg  80 mg Oral Nightly Sherry Bowers MD   80 mg at 08/11/22 2046    amLODIPine (NORVASC) tablet 5 mg  5 mg Oral Daily Sherry Bowers MD   5 mg at 08/11/22 1047    ezetimibe (ZETIA) tablet 10 mg  10 mg Oral Daily Sherry Bowers MD   10 mg at 08/11/22 1047    fluticasone (FLONASE) 50 MCG/ACT nasal spray 1 spray  1 spray Each Nostril Daily Sherry Bowers MD   1 spray at 08/11/22 1047    furosemide (LASIX) tablet 80 mg  80 mg Oral Daily Sherry Bowers MD   80 mg at 08/11/22 1047    cetirizine (ZYRTEC) tablet 10 mg  10 mg Oral Daily Sherry Bowers MD   10 mg at 08/11/22 1047    gabapentin (NEURONTIN) capsule 300 mg  300 mg Oral BID PRN Sherry Bowers MD   300 mg at 08/11/22 1723    [Held by provider] insulin glargine (LANTUS) injection vial 80 Units  80 Units SubCUTAneous Nightly Sherry Bowers MD        insulin lispro (HUMALOG) injection vial 0-16 Units  0-16 Units SubCUTAneous TID WC Sherry Bowers MD        insulin lispro (HUMALOG) injection vial 0-4 Units  0-4 Units SubCUTAneous Nightly Rajeev Deal MD        glucose chewable tablet 16 g  4 tablet Oral PRN Rajeev Deal MD        dextrose bolus 10% 125 mL  125 mL IntraVENous PRN Rajeev Deal MD        Or    dextrose bolus 10% 250 mL  250 mL IntraVENous PRN Rajeev Deal MD        glucagon (rDNA) injection 1 mg  1 mg SubCUTAneous PRN Rajeev Deal MD        dextrose 10 % infusion   IntraVENous Continuous PRN Rajeev Deal MD        bethanechol (URECHOLINE) tablet 25 mg  25 mg Oral Daily Rajeev Deal MD   25 mg at 08/11/22 1046    [Held by provider] lisinopril (PRINIVIL;ZESTRIL) tablet 2.5 mg  2.5 mg Oral Daily Rajeev Deal MD        promethazine-dextromethorphan (PROMETHAZINE-DM) 6.25-15 MG/5ML syrup 5 mL (Patient Supplied)  5 mL Oral Nightly PRN Rajeev Deal MD        prochlorperazine (COMPAZINE) tablet 10 mg  10 mg Oral BID PRN Rajeev Deal MD        pantoprazole (PROTONIX) tablet 40 mg  40 mg Oral Daily Rajeev Deal MD   40 mg at 08/11/22 1046    miconazole (MICOTIN) 2 % powder   Topical TID Rajeev Deal MD   Given at 08/11/22 1503    metoprolol succinate (TOPROL XL) extended release tablet 25 mg  25 mg Oral Daily Rajeev Deal MD        spironolactone (ALDACTONE) tablet 12.5 mg  12.5 mg Oral Daily Rajeev Deal MD   12.5 mg at 08/11/22 1047    [START ON 8/16/2022] Semaglutide(0.25 or 0.5MG/DOS) SOPN 0.25 mg (Patient Supplied)  0.25 mg SubCUTAneous Weekly Rajeev Deal MD        sucralfate (CARAFATE) tablet 1 g  1 g Oral 4x Daily PC & HS Rajeev Deal MD   1 g at 08/11/22 2047    verapamil (CALAN) tablet 40 mg  40 mg Oral Q12H Rajeev Deal MD   40 mg at 08/11/22 2247    topiramate (TOPAMAX) tablet 50 mg  50 mg Oral Daily Rajeev Deal MD   50 mg at 08/11/22 1046    [START ON 8/17/2022] vitamin D (ERGOCALCIFEROL) capsule 50,000 Units  50,000 Units Oral Weekly Rajeev Deal MD             sodium chloride flush  5-40 mL IntraVENous 2 times per day    warfarin placeholder: dosing by pharmacy   Other Ivone Bauman aspirin  81 mg Oral Daily    atorvastatin  80 mg Oral Nightly    amLODIPine  5 mg Oral Daily    ezetimibe  10 mg Oral Daily    fluticasone  1 spray Each Nostril Daily    furosemide  80 mg Oral Daily    cetirizine  10 mg Oral Daily    [Held by provider] insulin glargine  80 Units SubCUTAneous Nightly    insulin lispro  0-16 Units SubCUTAneous TID WC    insulin lispro  0-4 Units SubCUTAneous Nightly    bethanechol  25 mg Oral Daily    [Held by provider] lisinopril  2.5 mg Oral Daily    pantoprazole  40 mg Oral Daily    miconazole   Topical TID    metoprolol succinate  25 mg Oral Daily    spironolactone  12.5 mg Oral Daily    [START ON 8/16/2022] Semaglutide(0.25 or 0.5MG/DOS)  0.25 mg SubCUTAneous Weekly    sucralfate  1 g Oral 4x Daily PC & HS    verapamil  40 mg Oral Q12H    topiramate  50 mg Oral Daily    [START ON 8/17/2022] vitamin D  50,000 Units Oral Weekly         Physical Exam:  General: NAD, alert  HEENT: normal  CHEST: clear to ascultation  CVS: S1 and S2 normal, no murmur, no JVD  ABD; nontender, bowel sounds normal, liver and spleen not palpable  MSK: normal  CNS: oriented X3, normal affect, normal CN  PVD:  all peripheral pulses normal, no swelling of the ankles      RECENT LABS:    Lab Data:  CBC:   Recent Labs     08/10/22  1910 08/12/22  0434   WBC 6.7 5.1   HGB 14.6 13.6   HCT 44.3 41.3   MCV 91.3 90.0    308     BMP:   Recent Labs     08/10/22  1910 08/12/22  0434    136   K 3.9 4.5    104   CO2 21* 23   PHOS 2.9  --    BUN 23* 20   CREATININE 1.6* 1.3*     LIVER PROFILE:   Recent Labs     08/10/22  1910   AST 16   ALT 19   BILITOT <0.2   ALKPHOS 86     PT/INR:   Recent Labs     08/10/22  1910 08/11/22  0602 08/12/22  0507   PROTIME 34.8* 31.6* 26.2*   INR 3.29* 2.93* 2.33*     APTT: No results for input(s): APTT in the last 72 hours.     CK, CKMB, Troponin:   Recent Labs     08/10/22  2316 08/11/22  0602 08/11/22  1339   TROPONINI <0.01 <0.01 <0.01       Last 3 BNP:  Recent Labs     08/10/22  1910   PROBNP 38       IMAGING:  CTA PULMONARY W CONTRAST    Result Date: 8/10/2022  1. No evidence of PE. Assessment and Plan:    Over 24 hours of substernal chest tightness, pleuritic in nature and positional, troponin x3 normal, echo showed ejection fraction of 50%. EKG showed no acute changes. Today he is chest pain-free  Hypertension, diabetes, hyperlipidemia, obesity, sleep apnea  History of myocardial infarct 10 years ago treated with stenting and followed 5 years ago by CABG  Stage III renal insufficiency  History of right cerebral hemisphere stroke    Plan: Nuclear stress test today if it this is normal patient can be discharged from cardiac standpoint    I have spent 30 minutes reviewing the chart, taking history, examining the patient, discussion with the patient and the family concerning the finding, diagnoses and treatment plan. This dictation was performed using [address] [employer]. Mistake and misspelling may have been created without  realizing them. Please notify me for clarification.   Thank you    Tim Barrios MD  8/12/2022 6:39 AM

## 2022-08-12 NOTE — PLAN OF CARE
Problem: Discharge Planning  Goal: Discharge to home or other facility with appropriate resources  Outcome: Progressing  Flowsheets (Taken 8/11/2022 1004 by Catracho Gupta RN)  Discharge to home or other facility with appropriate resources: Identify barriers to discharge with patient and caregiver     Problem: Pain  Goal: Verbalizes/displays adequate comfort level or baseline comfort level  Outcome: Progressing     Problem: Safety - Adult  Goal: Free from fall injury  Outcome: Progressing     Problem: ABCDS Injury Assessment  Goal: Absence of physical injury  Outcome: Progressing     Problem: Skin/Tissue Integrity  Goal: Absence of new skin breakdown  Description: 1. Monitor for areas of redness and/or skin breakdown  2. Assess vascular access sites hourly  3. Every 4-6 hours minimum:  Change oxygen saturation probe site  4. Every 4-6 hours:  If on nasal continuous positive airway pressure, respiratory therapy assess nares and determine need for appliance change or resting period.   Outcome: Progressing   Electronically signed by Faisal Romo RN on 8/11/2022 at 11:11 PM

## 2022-08-12 NOTE — DISCHARGE SUMMARY
[id number] [facility]    Discharge Summary      Miriam Hunt  :  1975  MRN:  [id number]    Admit date:  8/10/2022  Discharge date:  2022    Discharging Physician:  Dr. Sary Baron Directive: Full Code    Consults: cardiology    Primary Care Physician:  Tracey Syed DO    Discharge Diagnoses:  Principal Problem:    Chest pain due to myocardial ischemia, unspecified ischemic chest pain type  Active Problems:    Acute kidney injury superimposed on CKD ([address]    Type 2 diabetes mellitus with diabetic polyneuropathy, with long-term current use of insulin ([address]    Mixed hyperlipidemia    NAYAN (obstructive sleep apnea)    Morbid obesity due to excess calories ([address]  Resolved Problems:    * No resolved hospital problems. *      Portions of this note have been copied forward, however, changed to reflect the most current clinical status of this patient. Hospital Course: The patient is a 49-year-old female with past medical history of morbid obesity, CAD, MI/PCI at age 32, CABG 2016, CVA, on warfarin, LV thrombus, type 2 diabetes, GERD, CKD 3, hyperlipidemia, and hypertension who presented to [facility] ED with complaints of chest pain. Reported she acutely began to experience midsternal chest pressure day prior to admission while doing household chores. Initially she reported having intermittent chest pressure however continued to worsen with having sharp constant pain. Reported pain worsened on inspiration and shortness of breath with exertion. Denied radiation of pain. Denied recent illnesses, fever, chills, nausea, vomiting. Reported being followed by [facility] cardiology with most recent stress test suggesting mild ischemia in the AL, EF 40 to 50% on most recent TTE. Work-up in ED revealed CO2 21 BUN 23 creatinine 1.6, GFR 42, unremarkable CBC, INR 3.29, and CTA negative for PE. Patient was admitted to hospital medicine for chest pain with cardiology consultation.   Echo indicated technically difficult study due to morbid obesity with poor images, normal LV size with difficult assessment of systolic function, estimated EF appears low normal at 50%, mild concentric LVH with mild grade 1 diastolic dysfunction. Cardiology recommended Adalgisa Drew Lasix and indicated myocardial perfusion imaging showed old lateral infarct, mild ischemia, EF 40%. Cardiology recommended close follow-up with cardiology outpatient within a week for possible heart cath due to mildly abnormal Lexiscan results. Patient is being discharged on Imdur and Ranexa per cardiology recommendations. She has been advised to follow-up with PCP and cardiology within a week. Patient is currently in stable condition to be discharged home. Significant Diagnostic Studies:     Echo Complete  Result Date: 8/11/2022    Summary  Technically difficult study due to morbid obesity with poor images  Normal left ventricular size with difficult assessment of systolic  function even with Definity contrast -estimated ejection fraction appears  low normal at 50%  Mild concentric left ventricular hypertrophy with mild [grade 1] diastolic  dysfunction  Normal left atrial size  Poor visualization of the aortic valve without evidence of stenosis or  insufficiency  Normally mobile mitral valve without demonstrable regurgitation  No pulmonic valvular insufficiency or stenosis demonstrated  Poor visualization of the right-sided chambers with preserved RV systolic  function  No tricuspid regurgitation with which to estimate RVSP  Nonvisualization of the IVC  Aortic root and ascending segment measure within normal limits  No significant pericardial effusion   Signature   ----------------------------------------------------------------  Electronically signed by Martita Arriola MD(Interpreting physician)  on 08/11/2022 09:47 AM       CTA PULMONARY W CONTRAST  Result Date: 8/10/2022    1. No evidence of PE.       Pertinent Labs:   CBC:   Recent Labs     08/10/22  1910 08/12/22  0434   WBC 6.7 5.1   HGB 14.6 13.6    308     BMP:    Recent Labs     08/10/22  1910 08/10/22  2318 08/11/22  0601 08/12/22  0434     --   --  136   K 3.9  --   --  4.5     --   --  104   CO2 21*  --   --  23   BUN 23*  --   --  20   CREATININE 1.6*  --   --  1.3*   GLUCOSE 90 89 114 105     INR:   Recent Labs     08/10/22  1910 08/11/22  0602 08/12/22  0507   INR 3.29* 2.93* 2.33*       Physical Exam:   Vital Signs: BP (!) 141/90   Pulse 92   Temp (!) 96.5 °F (35.8 °C) (Temporal)   Resp 18   Ht 5' 4\" (1.626 m)   Wt (!) 356 lb 4 oz (161.6 kg)   LMP 07/04/2022   SpO2 100%   BMI 61.15 kg/m²   General appearance:. Alert and Cooperative, obese  HEENT: Normocephalic. Chest: Lung sounds clear bilaterally without wheezes or rhonchi. Cardiac: RRR, S1, S2 normal. No murmurs, gallops, or rubs auscultated. Abdomen: soft, non-tender; non-distended normal bowel sounds no masses, no organomegaly. Extremities: No clubbing or cyanosis. No peripheral edema. Peripheral pulses palpable. Neurologic: Grossly intact. Discharge Medications:          Medication List        START taking these medications      isosorbide mononitrate 30 MG extended release tablet  Commonly known as: IMDUR  Take 1 tablet by mouth in the morning. Start taking on: August 13, 2022     ranolazine 500 MG extended release tablet  Commonly known as: RANEXA  Take 1 tablet by mouth in the morning and 1 tablet before bedtime.             CHANGE how you take these medications      furosemide 40 MG tablet  Commonly known as: LASIX  Take 2 tablets by mouth daily  What changed: how much to take            CONTINUE taking these medications      amLODIPine 5 MG tablet  Commonly known as: NORVASC     aspirin 81 MG tablet     atorvastatin 40 MG tablet  Commonly known as: LIPITOR     bethanechol 25 MG tablet  Commonly known as: URECHOLINE     cetirizine 10 MG tablet  Commonly known as: ZyrTEC Allergy  Take 1 tablet by mouth daily     ezetimibe 10 MG tablet  Commonly known as: ZETIA     fluticasone 50 MCG/ACT nasal spray  Commonly known as: FLONASE  1 spray by Each Nostril route daily     gabapentin 300 MG capsule  Commonly known as: NEURONTIN  Take 1 capsule by mouth 2 times daily as needed (Neuropathy/nerve pains) for up to 3 days.  Intended supply: 30 days     insulin aspart 100 UNIT/ML injection vial  Commonly known as: NOVOLOG     insulin glargine 100 UNIT/ML injection pen  Commonly known as: LANTUS;BASAGLAR     lisinopril 2.5 MG tablet  Commonly known as: PRINIVIL;ZESTRIL     metFORMIN 1000 MG tablet  Commonly known as: GLUCOPHAGE     metoprolol succinate 25 MG extended release tablet  Commonly known as: TOPROL XL     NONFORMULARY     nystatin 098162 UNIT/GM powder  Commonly known as: MYCOSTATIN  Apply 3 times daily to lower groin, abdominal area and under bilateral breasts     ondansetron 4 MG disintegrating tablet  Commonly known as: Zofran ODT  Take 1 tablet by mouth every 8 hours as needed for Nausea or Vomiting     Ozempic (0.25 or 0.5 MG/DOSE) 2 MG/1.5ML Sopn  Generic drug: Semaglutide(0.25 or 0.5MG/DOS)     pantoprazole 40 MG tablet  Commonly known as: PROTONIX  TAKE 1 TABLET BY MOUTH DAILY     prochlorperazine 10 MG tablet  Commonly known as: COMPAZINE     promethazine-dextromethorphan 6.25-15 MG/5ML syrup  Commonly known as: PROMETHAZINE-DM  Take 5 mLs by mouth nightly as needed for Cough     spironolactone 25 MG tablet  Commonly known as: ALDACTONE     sucralfate 1 GM/10ML suspension  Commonly known as: Carafate  Take 10 mLs by mouth 4 times daily     topiramate 50 MG tablet  Commonly known as: TOPAMAX     verapamil 40 MG tablet  Commonly known as: CALAN     vitamin D 1.25 MG (04816 UT) Caps capsule  Commonly known as: ERGOCALCIFEROL     warfarin 10 MG tablet  Commonly known as: COUMADIN               Where to Get Your Medications        These medications were sent to [id number], [address]  [address] [facility], [place] [address]      Phone: [phone]   isosorbide mononitrate 30 MG extended release tablet  ranolazine 500 MG extended release tablet            Discharge Instructions: Follow up with Luis Ochoa DO in 2 days. Follow-up with cardiology within a week as recommended. Take medications as directed. Resume activity as tolerated. Diet: ADULT DIET; Regular; 4 carb choices (60 gm/meal); Low Fat/Low Chol/High Fiber/GENET     Disposition: Patient is medically stable and will be discharged home. Time spent on discharge 36 minutes spent in assessing patient, reviewing medications, discussion with nursing, confirming safe discharge plan and preparation of discharge summary. Signed:  Electronically signed by YARIEL Govea CNP on 8/12/22 at 2:43 PM CDT         EMR Dragon/Transcription disclaimer:   Much of this encounter note is an electronic transcription/translation of spoken language to printed text.  The electronic translation of spoken language may permit erroneous, or at times, nonsensical words or phrases to be inadvertently transcribed; although attempts have made to review the note for such errors, some may still exist.

## 2022-08-13 LAB
EKG P AXIS: 44 DEGREES
EKG P-R INTERVAL: 150 MS
EKG Q-T INTERVAL: 384 MS
EKG QRS DURATION: 80 MS
EKG QTC CALCULATION (BAZETT): 436 MS
EKG T AXIS: 66 DEGREES

## 2022-08-13 PROCEDURE — 93010 ELECTROCARDIOGRAM REPORT: CPT | Performed by: INTERNAL MEDICINE

## 2022-08-15 ENCOUNTER — CARE COORDINATION (OUTPATIENT)
Dept: CASE MANAGEMENT | Age: 47
End: 2022-08-15

## 2022-08-15 NOTE — CARE COORDINATION
Trista [facility] Transitions Initial Follow Up Call    Call within 2 business days of discharge: Yes    Patient: Gerry Willson Patient : 1975   MRN: [id number]  Reason for Admission:   Discharge Date: 22 RARS: Readmission Risk Score: 18      Last Discharge [facility]       Date Complaint Diagnosis Description Type Department Provider    8/10/22 Chest Pain Chest pain, unspecified type . .. ED to Hosp-Admission (Discharged) (ADMITTED) L BHARATH Mayorga MD; Reid Garcia. Spoke with: JAUN Fragoso 99: Radha 555    Non-face-to-face services provided:  Obtained and reviewed discharge summary and/or continuity of care documents Reviewed encounter information for continuity of care prior to follow up phone call - chart notes, consults, progress notes, test results, med list, appointments, AVS, other information. Advance Care Planning   Healthcare Decision Maker:    Primary Decision Maker: Yared Fox [facility] - [phone]    Transitions of Care Initial Call    Was this an external facility discharge? No Discharge Facility:     Challenges to be reviewed by the provider   Additional needs identified to be addressed with provider: No  none             Method of communication with provider : none    Advance Care Planning:   Does patient have an Advance Directive: not on file; education provided. Care Transition Nurse contacted the patient by telephone to perform post hospital discharge assessment. Verified name and  with patient as identifiers. Provided introduction to self, and explanation of the CTN role. CTN reviewed discharge instructions, medical action plan and red flags with patient who verbalized understanding. Patient given an opportunity to ask questions and does not have any further questions or concerns at this time. Were discharge instructions available to patient? Yes.  Reviewed appropriate site of care based on symptoms and resources available to patient including: PCP  Specialist. The patient agrees to contact the PCP office for questions related to their healthcare. Medication reconciliation was performed with patient, who verbalizes understanding of administration of home medications. Advised obtaining a 90-day supply of all daily and as-needed medications. Was patient discharged with a pulse oximeter? no    CTN provided contact information. No further follow-up call indicated based on severity of symptoms and risk factors. Care Transitions 24 Hour Call    Do you have a copy of your discharge instructions?: Yes  Do you have all of your prescriptions and are they filled?: Yes  Have you been contacted by a [address]?: No  Have you scheduled your follow up appointment?: Yes  How are you going to get to your appointment?: Car - family or friend to transport  [id number] you have support at home?: Parent(s)  Do you feel like you have everything you need to keep you well at home?: Yes  Are you an active caregiver in your home?: No  Care Transitions Interventions         Follow Up : Spoke with patient today for CT call after discharge from [facility]. She says she is tired today, but up trying to do some things around the house. She has her medications, did review. She has HFU with PCP tomorrow. She sees Cardio on 9/9 and GI on 8/30. She says she is not very hungry today, but going to eat. Says her blood sugar is 116. She wears a Dexcom for blood sugar monitoring She says she is up washing clothes and doing some things around the house. She says no chest pain like while inpatient, but some tightness every now and again. CTN did encourage her to rest. She does follow with [address] [place] [address] Cardiology as well. Take her vitals and try to eat. She has had the Covid vaccine, has PHCDM listed and does not have LW. She is following with tomorrow with non-[facility] provider.  No further outreach scheduled at this time.   Future Appointments   Date Time Provider Shu Flowers   8/30/2022 10:40 AM YARIEL English - [place] [facility] [place]   9/9/2022  8:30 AM YARIEL Garcias - NP [facility] [place]       Suresh Blas RN

## 2022-08-20 ENCOUNTER — HOSPITAL ENCOUNTER (INPATIENT)
Age: 47
LOS: 2 days | Discharge: HOME | DRG: 287 | End: 2022-08-23
Attending: EMERGENCY MEDICINE | Admitting: HOSPITALIST
Payer: MEDICARE

## 2022-08-20 ENCOUNTER — APPOINTMENT (OUTPATIENT)
Dept: GENERAL RADIOLOGY | Age: 47
DRG: 287 | End: 2022-08-20
Payer: MEDICARE

## 2022-08-20 DIAGNOSIS — R07.9 CHEST PAIN, UNSPECIFIED TYPE: Primary | ICD-10-CM

## 2022-08-20 PROBLEM — R07.2 SUBSTERNAL CHEST PAIN RELIEVED BY NITROGLYCERIN: Status: ACTIVE | Noted: 2022-08-20

## 2022-08-20 LAB
ALBUMIN SERPL-MCNC: 4.2 G/DL (ref 3.5–5.2)
ALP BLD-CCNC: 96 U/L (ref 35–104)
ALT SERPL-CCNC: 19 U/L (ref 5–33)
ANION GAP SERPL CALCULATED.3IONS-SCNC: 12 MMOL/L (ref 7–19)
APTT: 50.3 SEC (ref 26–36.2)
AST SERPL-CCNC: 17 U/L (ref 5–32)
BASOPHILS ABSOLUTE: 0 K/UL (ref 0–0.2)
BASOPHILS RELATIVE PERCENT: 0.4 % (ref 0–1)
BILIRUB SERPL-MCNC: 0.4 MG/DL (ref 0.2–1.2)
BUN BLDV-MCNC: 23 MG/DL (ref 6–20)
CALCIUM SERPL-MCNC: 9.1 MG/DL (ref 8.6–10)
CHLORIDE BLD-SCNC: 100 MMOL/L (ref 98–111)
CHOLESTEROL, TOTAL: 136 MG/DL (ref 160–199)
CO2: 22 MMOL/L (ref 22–29)
CREAT SERPL-MCNC: 1.8 MG/DL (ref 0.5–0.9)
EOSINOPHILS ABSOLUTE: 0.3 K/UL (ref 0–0.6)
EOSINOPHILS RELATIVE PERCENT: 3.8 % (ref 0–5)
GFR AFRICAN AMERICAN: 36
GFR NON-AFRICAN AMERICAN: 30
GLUCOSE BLD-MCNC: 124 MG/DL (ref 70–99)
GLUCOSE BLD-MCNC: 73 MG/DL (ref 70–99)
GLUCOSE BLD-MCNC: 88 MG/DL (ref 74–109)
GLUCOSE BLD-MCNC: 89 MG/DL (ref 70–99)
GLUCOSE BLD-MCNC: 91 MG/DL (ref 70–99)
HBA1C MFR BLD: 7 % (ref 4–6)
HCT VFR BLD CALC: 45.1 % (ref 37–47)
HDLC SERPL-MCNC: 47 MG/DL (ref 65–121)
HEMOGLOBIN: 14.8 G/DL (ref 12–16)
IMMATURE GRANULOCYTES #: 0 K/UL
INR BLD: 3.24 (ref 0.88–1.18)
LDL CHOLESTEROL CALCULATED: 65 MG/DL
LYMPHOCYTES ABSOLUTE: 2.6 K/UL (ref 1.1–4.5)
LYMPHOCYTES RELATIVE PERCENT: 37.7 % (ref 20–40)
MCH RBC QN AUTO: 30 PG (ref 27–31)
MCHC RBC AUTO-ENTMCNC: 32.8 G/DL (ref 33–37)
MCV RBC AUTO: 91.3 FL (ref 81–99)
MONOCYTES ABSOLUTE: 0.5 K/UL (ref 0–0.9)
MONOCYTES RELATIVE PERCENT: 7.9 % (ref 0–10)
NEUTROPHILS ABSOLUTE: 3.4 K/UL (ref 1.5–7.5)
NEUTROPHILS RELATIVE PERCENT: 49.9 % (ref 50–65)
PDW BLD-RTO: 14.7 % (ref 11.5–14.5)
PERFORMED ON: ABNORMAL
PERFORMED ON: NORMAL
PLATELET # BLD: 334 K/UL (ref 130–400)
PMV BLD AUTO: 11.2 FL (ref 9.4–12.3)
POTASSIUM SERPL-SCNC: 3.8 MMOL/L (ref 3.5–5)
PRO-BNP: 61 PG/ML (ref 0–450)
PROTHROMBIN TIME: 34.3 SEC (ref 12–14.6)
RBC # BLD: 4.94 M/UL (ref 4.2–5.4)
SARS-COV-2, NAAT: NOT DETECTED
SODIUM BLD-SCNC: 134 MMOL/L (ref 136–145)
T4 FREE: 1.22 NG/DL (ref 0.93–1.7)
TOTAL PROTEIN: 7.4 G/DL (ref 6.6–8.7)
TRIGL SERPL-MCNC: 122 MG/DL (ref 0–149)
TROPONIN: <0.01 NG/ML (ref 0–0.03)
TSH REFLEX FT4: 5.89 UIU/ML (ref 0.35–5.5)
WBC # BLD: 6.9 K/UL (ref 4.8–10.8)

## 2022-08-20 PROCEDURE — 6370000000 HC RX 637 (ALT 250 FOR IP): Performed by: INTERNAL MEDICINE

## 2022-08-20 PROCEDURE — 96366 THER/PROPH/DIAG IV INF ADDON: CPT

## 2022-08-20 PROCEDURE — 99220 PR INITIAL OBSERVATION CARE/DAY 70 MINUTES: CPT | Performed by: INTERNAL MEDICINE

## 2022-08-20 PROCEDURE — 83880 ASSAY OF NATRIURETIC PEPTIDE: CPT

## 2022-08-20 PROCEDURE — 71045 X-RAY EXAM CHEST 1 VIEW: CPT | Performed by: RADIOLOGY

## 2022-08-20 PROCEDURE — 84443 ASSAY THYROID STIM HORMONE: CPT

## 2022-08-20 PROCEDURE — 96375 TX/PRO/DX INJ NEW DRUG ADDON: CPT

## 2022-08-20 PROCEDURE — G0378 HOSPITAL OBSERVATION PER HR: HCPCS

## 2022-08-20 PROCEDURE — 85610 PROTHROMBIN TIME: CPT

## 2022-08-20 PROCEDURE — 80061 LIPID PANEL: CPT

## 2022-08-20 PROCEDURE — 82947 ASSAY GLUCOSE BLOOD QUANT: CPT

## 2022-08-20 PROCEDURE — 83036 HEMOGLOBIN GLYCOSYLATED A1C: CPT

## 2022-08-20 PROCEDURE — 36415 COLL VENOUS BLD VENIPUNCTURE: CPT

## 2022-08-20 PROCEDURE — 2580000003 HC RX 258: Performed by: HOSPITALIST

## 2022-08-20 PROCEDURE — 80053 COMPREHEN METABOLIC PANEL: CPT

## 2022-08-20 PROCEDURE — 85025 COMPLETE CBC W/AUTO DIFF WBC: CPT

## 2022-08-20 PROCEDURE — 93005 ELECTROCARDIOGRAM TRACING: CPT | Performed by: EMERGENCY MEDICINE

## 2022-08-20 PROCEDURE — 84439 ASSAY OF FREE THYROXINE: CPT

## 2022-08-20 PROCEDURE — 96365 THER/PROPH/DIAG IV INF INIT: CPT

## 2022-08-20 PROCEDURE — 2500000003 HC RX 250 WO HCPCS: Performed by: HOSPITALIST

## 2022-08-20 PROCEDURE — 71045 X-RAY EXAM CHEST 1 VIEW: CPT

## 2022-08-20 PROCEDURE — 85730 THROMBOPLASTIN TIME PARTIAL: CPT

## 2022-08-20 PROCEDURE — 87635 SARS-COV-2 COVID-19 AMP PRB: CPT

## 2022-08-20 PROCEDURE — 2500000003 HC RX 250 WO HCPCS: Performed by: EMERGENCY MEDICINE

## 2022-08-20 PROCEDURE — 96374 THER/PROPH/DIAG INJ IV PUSH: CPT

## 2022-08-20 PROCEDURE — 99285 EMERGENCY DEPT VISIT HI MDM: CPT

## 2022-08-20 PROCEDURE — 6360000002 HC RX W HCPCS: Performed by: EMERGENCY MEDICINE

## 2022-08-20 PROCEDURE — 84484 ASSAY OF TROPONIN QUANT: CPT

## 2022-08-20 PROCEDURE — 6370000000 HC RX 637 (ALT 250 FOR IP): Performed by: HOSPITALIST

## 2022-08-20 RX ORDER — INSULIN GLARGINE 100 [IU]/ML
80 INJECTION, SOLUTION SUBCUTANEOUS NIGHTLY
Status: DISCONTINUED | OUTPATIENT
Start: 2022-08-20 | End: 2022-08-23 | Stop reason: HOSPADM

## 2022-08-20 RX ORDER — PANTOPRAZOLE SODIUM 40 MG/1
40 TABLET, DELAYED RELEASE ORAL DAILY
Status: DISCONTINUED | OUTPATIENT
Start: 2022-08-20 | End: 2022-08-23 | Stop reason: HOSPADM

## 2022-08-20 RX ORDER — ACETAMINOPHEN 325 MG/1
650 TABLET ORAL EVERY 6 HOURS PRN
Status: DISCONTINUED | OUTPATIENT
Start: 2022-08-20 | End: 2022-08-23 | Stop reason: HOSPADM

## 2022-08-20 RX ORDER — METOPROLOL SUCCINATE 25 MG/1
25 TABLET, EXTENDED RELEASE ORAL DAILY
Status: DISCONTINUED | OUTPATIENT
Start: 2022-08-20 | End: 2022-08-23 | Stop reason: HOSPADM

## 2022-08-20 RX ORDER — AMLODIPINE BESYLATE 5 MG/1
5 TABLET ORAL DAILY
Status: DISCONTINUED | OUTPATIENT
Start: 2022-08-20 | End: 2022-08-23 | Stop reason: HOSPADM

## 2022-08-20 RX ORDER — NITROGLYCERIN 20 MG/100ML
5 INJECTION INTRAVENOUS CONTINUOUS
Status: DISCONTINUED | OUTPATIENT
Start: 2022-08-20 | End: 2022-08-23

## 2022-08-20 RX ORDER — POTASSIUM CHLORIDE 20 MEQ/1
40 TABLET, EXTENDED RELEASE ORAL PRN
Status: DISCONTINUED | OUTPATIENT
Start: 2022-08-20 | End: 2022-08-23 | Stop reason: HOSPADM

## 2022-08-20 RX ORDER — EZETIMIBE 10 MG/1
10 TABLET ORAL DAILY
Status: DISCONTINUED | OUTPATIENT
Start: 2022-08-20 | End: 2022-08-23 | Stop reason: HOSPADM

## 2022-08-20 RX ORDER — ONDANSETRON 2 MG/ML
4 INJECTION INTRAMUSCULAR; INTRAVENOUS EVERY 6 HOURS PRN
Status: DISCONTINUED | OUTPATIENT
Start: 2022-08-20 | End: 2022-08-23 | Stop reason: HOSPADM

## 2022-08-20 RX ORDER — GABAPENTIN 300 MG/1
300 CAPSULE ORAL 2 TIMES DAILY PRN
Status: DISCONTINUED | OUTPATIENT
Start: 2022-08-20 | End: 2022-08-23 | Stop reason: HOSPADM

## 2022-08-20 RX ORDER — TOPIRAMATE 50 MG/1
50 TABLET, FILM COATED ORAL DAILY
Status: DISCONTINUED | OUTPATIENT
Start: 2022-08-20 | End: 2022-08-23 | Stop reason: HOSPADM

## 2022-08-20 RX ORDER — ERGOCALCIFEROL 1.25 MG/1
50000 CAPSULE ORAL WEEKLY
Status: DISCONTINUED | OUTPATIENT
Start: 2022-08-27 | End: 2022-08-23 | Stop reason: HOSPADM

## 2022-08-20 RX ORDER — ATORVASTATIN CALCIUM 40 MG/1
80 TABLET, FILM COATED ORAL NIGHTLY
Status: DISCONTINUED | OUTPATIENT
Start: 2022-08-20 | End: 2022-08-23 | Stop reason: HOSPADM

## 2022-08-20 RX ORDER — SPIRONOLACTONE 25 MG/1
12.5 TABLET ORAL DAILY
Status: DISCONTINUED | OUTPATIENT
Start: 2022-08-20 | End: 2022-08-23 | Stop reason: HOSPADM

## 2022-08-20 RX ORDER — BETHANECHOL CHLORIDE 25 MG/1
25 TABLET ORAL DAILY
Status: DISCONTINUED | OUTPATIENT
Start: 2022-08-20 | End: 2022-08-23 | Stop reason: HOSPADM

## 2022-08-20 RX ORDER — LISINOPRIL 2.5 MG/1
2.5 TABLET ORAL DAILY
Status: DISCONTINUED | OUTPATIENT
Start: 2022-08-20 | End: 2022-08-23 | Stop reason: HOSPADM

## 2022-08-20 RX ORDER — CETIRIZINE HYDROCHLORIDE 10 MG/1
10 TABLET ORAL DAILY
Status: DISCONTINUED | OUTPATIENT
Start: 2022-08-20 | End: 2022-08-23 | Stop reason: HOSPADM

## 2022-08-20 RX ORDER — SUCRALFATE 1 G/1
1 TABLET ORAL 4 TIMES DAILY
Refills: 3 | Status: DISCONTINUED | OUTPATIENT
Start: 2022-08-20 | End: 2022-08-23 | Stop reason: HOSPADM

## 2022-08-20 RX ORDER — SODIUM CHLORIDE 0.9 % (FLUSH) 0.9 %
5-40 SYRINGE (ML) INJECTION PRN
Status: DISCONTINUED | OUTPATIENT
Start: 2022-08-20 | End: 2022-08-22 | Stop reason: SDUPTHER

## 2022-08-20 RX ORDER — ASPIRIN 81 MG/1
81 TABLET ORAL DAILY
Status: DISCONTINUED | OUTPATIENT
Start: 2022-08-20 | End: 2022-08-23 | Stop reason: HOSPADM

## 2022-08-20 RX ORDER — DEXTROMETHORPHAN HYDROBROMIDE AND PROMETHAZINE HYDROCHLORIDE 15; 6.25 MG/5ML; MG/5ML
5 SYRUP ORAL NIGHTLY PRN
Status: DISCONTINUED | OUTPATIENT
Start: 2022-08-20 | End: 2022-08-23 | Stop reason: HOSPADM

## 2022-08-20 RX ORDER — RANOLAZINE 500 MG/1
500 TABLET, EXTENDED RELEASE ORAL 2 TIMES DAILY
Status: DISCONTINUED | OUTPATIENT
Start: 2022-08-20 | End: 2022-08-23

## 2022-08-20 RX ORDER — INSULIN LISPRO 100 [IU]/ML
0-16 INJECTION, SOLUTION INTRAVENOUS; SUBCUTANEOUS
Status: DISCONTINUED | OUTPATIENT
Start: 2022-08-20 | End: 2022-08-23 | Stop reason: HOSPADM

## 2022-08-20 RX ORDER — INSULIN LISPRO 100 [IU]/ML
0-4 INJECTION, SOLUTION INTRAVENOUS; SUBCUTANEOUS NIGHTLY
Status: DISCONTINUED | OUTPATIENT
Start: 2022-08-20 | End: 2022-08-23 | Stop reason: HOSPADM

## 2022-08-20 RX ORDER — ISOSORBIDE MONONITRATE 30 MG/1
30 TABLET, EXTENDED RELEASE ORAL DAILY
Status: DISCONTINUED | OUTPATIENT
Start: 2022-08-20 | End: 2022-08-23 | Stop reason: HOSPADM

## 2022-08-20 RX ORDER — DEXTROSE MONOHYDRATE 100 MG/ML
INJECTION, SOLUTION INTRAVENOUS CONTINUOUS PRN
Status: DISCONTINUED | OUTPATIENT
Start: 2022-08-20 | End: 2022-08-23 | Stop reason: HOSPADM

## 2022-08-20 RX ORDER — ACETAMINOPHEN 650 MG/1
650 SUPPOSITORY RECTAL EVERY 6 HOURS PRN
Status: DISCONTINUED | OUTPATIENT
Start: 2022-08-20 | End: 2022-08-23 | Stop reason: HOSPADM

## 2022-08-20 RX ORDER — LORAZEPAM 1 MG/1
1 TABLET ORAL EVERY 6 HOURS PRN
Status: DISCONTINUED | OUTPATIENT
Start: 2022-08-20 | End: 2022-08-23 | Stop reason: HOSPADM

## 2022-08-20 RX ORDER — SODIUM CHLORIDE 0.9 % (FLUSH) 0.9 %
5-40 SYRINGE (ML) INJECTION EVERY 12 HOURS SCHEDULED
Status: DISCONTINUED | OUTPATIENT
Start: 2022-08-20 | End: 2022-08-22 | Stop reason: SDUPTHER

## 2022-08-20 RX ORDER — POTASSIUM CHLORIDE 7.45 MG/ML
10 INJECTION INTRAVENOUS PRN
Status: DISCONTINUED | OUTPATIENT
Start: 2022-08-20 | End: 2022-08-23 | Stop reason: HOSPADM

## 2022-08-20 RX ORDER — FUROSEMIDE 40 MG/1
80 TABLET ORAL DAILY
Status: DISCONTINUED | OUTPATIENT
Start: 2022-08-20 | End: 2022-08-23 | Stop reason: HOSPADM

## 2022-08-20 RX ORDER — MORPHINE SULFATE 2 MG/ML
2 INJECTION, SOLUTION INTRAMUSCULAR; INTRAVENOUS ONCE
Status: COMPLETED | OUTPATIENT
Start: 2022-08-20 | End: 2022-08-20

## 2022-08-20 RX ORDER — FLUTICASONE PROPIONATE 50 MCG
1 SPRAY, SUSPENSION (ML) NASAL DAILY
Status: DISCONTINUED | OUTPATIENT
Start: 2022-08-20 | End: 2022-08-23 | Stop reason: HOSPADM

## 2022-08-20 RX ORDER — ONDANSETRON 4 MG/1
4 TABLET, ORALLY DISINTEGRATING ORAL EVERY 8 HOURS PRN
Status: DISCONTINUED | OUTPATIENT
Start: 2022-08-20 | End: 2022-08-23 | Stop reason: HOSPADM

## 2022-08-20 RX ORDER — MAGNESIUM SULFATE IN WATER 40 MG/ML
2000 INJECTION, SOLUTION INTRAVENOUS PRN
Status: DISCONTINUED | OUTPATIENT
Start: 2022-08-20 | End: 2022-08-23 | Stop reason: HOSPADM

## 2022-08-20 RX ORDER — SODIUM CHLORIDE 9 MG/ML
INJECTION, SOLUTION INTRAVENOUS PRN
Status: DISCONTINUED | OUTPATIENT
Start: 2022-08-20 | End: 2022-08-22 | Stop reason: SDUPTHER

## 2022-08-20 RX ORDER — VERAPAMIL HYDROCHLORIDE 80 MG/1
40 TABLET ORAL EVERY 12 HOURS
Status: DISCONTINUED | OUTPATIENT
Start: 2022-08-20 | End: 2022-08-23 | Stop reason: HOSPADM

## 2022-08-20 RX ADMIN — TOPIRAMATE 50 MG: 50 TABLET, FILM COATED ORAL at 09:03

## 2022-08-20 RX ADMIN — LORAZEPAM 1 MG: 1 TABLET ORAL at 11:56

## 2022-08-20 RX ADMIN — FUROSEMIDE 80 MG: 40 TABLET ORAL at 08:57

## 2022-08-20 RX ADMIN — SODIUM CHLORIDE, PRESERVATIVE FREE 10 ML: 5 INJECTION INTRAVENOUS at 09:01

## 2022-08-20 RX ADMIN — CETIRIZINE HYDROCHLORIDE 10 MG: 10 TABLET, FILM COATED ORAL at 08:56

## 2022-08-20 RX ADMIN — FLUTICASONE PROPIONATE 1 SPRAY: 50 SPRAY, METERED NASAL at 08:57

## 2022-08-20 RX ADMIN — MORPHINE SULFATE 2 MG: 2 INJECTION, SOLUTION INTRAMUSCULAR; INTRAVENOUS at 02:08

## 2022-08-20 RX ADMIN — EZETIMIBE 10 MG: 10 TABLET ORAL at 08:56

## 2022-08-20 RX ADMIN — SUCRALFATE 1 G: 1 TABLET ORAL at 11:58

## 2022-08-20 RX ADMIN — AMLODIPINE BESYLATE 5 MG: 5 TABLET ORAL at 08:52

## 2022-08-20 RX ADMIN — LIDOCAINE HYDROCHLORIDE: 20 SOLUTION ORAL; TOPICAL at 09:04

## 2022-08-20 RX ADMIN — LORAZEPAM 1 MG: 1 TABLET ORAL at 22:20

## 2022-08-20 RX ADMIN — BETHANECHOL CHLORIDE 25 MG: 25 TABLET ORAL at 08:55

## 2022-08-20 RX ADMIN — RANOLAZINE 500 MG: 500 TABLET, FILM COATED, EXTENDED RELEASE ORAL at 22:09

## 2022-08-20 RX ADMIN — SUCRALFATE 1 G: 1 TABLET ORAL at 22:09

## 2022-08-20 RX ADMIN — ISOSORBIDE MONONITRATE 30 MG: 30 TABLET, EXTENDED RELEASE ORAL at 08:58

## 2022-08-20 RX ADMIN — MICONAZOLE NITRATE: 2 POWDER TOPICAL at 22:11

## 2022-08-20 RX ADMIN — GABAPENTIN 300 MG: 300 CAPSULE ORAL at 08:58

## 2022-08-20 RX ADMIN — RANOLAZINE 500 MG: 500 TABLET, FILM COATED, EXTENDED RELEASE ORAL at 09:01

## 2022-08-20 RX ADMIN — SPIRONOLACTONE 12.5 MG: 25 TABLET ORAL at 09:02

## 2022-08-20 RX ADMIN — VERAPAMIL HYDROCHLORIDE 40 MG: 80 TABLET ORAL at 17:18

## 2022-08-20 RX ADMIN — SUCRALFATE 1 G: 1 TABLET ORAL at 09:03

## 2022-08-20 RX ADMIN — MICONAZOLE NITRATE: 2 POWDER TOPICAL at 09:00

## 2022-08-20 RX ADMIN — LISINOPRIL 2.5 MG: 2.5 TABLET ORAL at 08:58

## 2022-08-20 RX ADMIN — ATORVASTATIN CALCIUM 80 MG: 40 TABLET, FILM COATED ORAL at 22:10

## 2022-08-20 RX ADMIN — PANTOPRAZOLE SODIUM 40 MG: 40 TABLET, DELAYED RELEASE ORAL at 09:01

## 2022-08-20 RX ADMIN — ASPIRIN 81 MG: 81 TABLET, COATED ORAL at 08:54

## 2022-08-20 RX ADMIN — NITROGLYCERIN 5 MCG/MIN: 20 INJECTION INTRAVENOUS at 02:09

## 2022-08-20 RX ADMIN — GABAPENTIN 300 MG: 300 CAPSULE ORAL at 22:20

## 2022-08-20 RX ADMIN — SUCRALFATE 1 G: 1 TABLET ORAL at 17:18

## 2022-08-20 RX ADMIN — ACETAMINOPHEN 650 MG: 325 TABLET, FILM COATED ORAL at 22:08

## 2022-08-20 RX ADMIN — METOPROLOL SUCCINATE 25 MG: 25 TABLET, EXTENDED RELEASE ORAL at 08:59

## 2022-08-20 SDOH — SOCIAL STABILITY: SOCIAL NETWORK: HOW OFTEN DO YOU ATTENT MEETINGS OF THE CLUB OR ORGANIZATION YOU BELONG TO?: NEVER

## 2022-08-20 SDOH — SOCIAL STABILITY: SOCIAL NETWORK: ARE YOU MARRIED, WIDOWED, DIVORCED, SEPARATED, NEVER MARRIED, OR LIVING WITH A PARTNER?: NEVER MARRIED

## 2022-08-20 SDOH — ECONOMIC STABILITY: HOUSING INSECURITY
IN THE LAST 12 MONTHS, WAS THERE A TIME WHEN YOU DID NOT HAVE A STEADY PLACE TO SLEEP OR SLEPT IN A SHELTER (INCLUDING NOW)?: NO

## 2022-08-20 SDOH — ECONOMIC STABILITY: INCOME INSECURITY: IN THE LAST 12 MONTHS, WAS THERE A TIME WHEN YOU WERE NOT ABLE TO PAY THE MORTGAGE OR RENT ON TIME?: NO

## 2022-08-20 SDOH — ECONOMIC STABILITY: TRANSPORTATION INSECURITY
IN THE PAST 12 MONTHS, HAS THE LACK OF TRANSPORTATION KEPT YOU FROM MEDICAL APPOINTMENTS OR FROM GETTING MEDICATIONS?: NO

## 2022-08-20 SDOH — SOCIAL STABILITY: SOCIAL NETWORK: HOW OFTEN DO YOU GET TOGETHER WITH FRIENDS OR RELATIVES?: MORE THAN THREE TIMES A WEEK

## 2022-08-20 SDOH — SOCIAL STABILITY: SOCIAL NETWORK: HOW OFTEN DO YOU ATTEND CHURCH OR RELIGIOUS SERVICES?: MORE THAN 4 TIMES PER YEAR

## 2022-08-20 SDOH — ECONOMIC STABILITY: INCOME INSECURITY: HOW HARD IS IT FOR YOU TO PAY FOR THE VERY BASICS LIKE FOOD, HOUSING, MEDICAL CARE, AND HEATING?: SOMEWHAT HARD

## 2022-08-20 SDOH — ECONOMIC STABILITY: FOOD INSECURITY: WITHIN THE PAST 12 MONTHS, THE FOOD YOU BOUGHT JUST DIDN'T LAST AND YOU DIDN'T HAVE MONEY TO GET MORE.: SOMETIMES TRUE

## 2022-08-20 SDOH — SOCIAL STABILITY: SOCIAL NETWORK
IN A TYPICAL WEEK, HOW MANY TIMES DO YOU TALK ON THE PHONE WITH FAMILY, FRIENDS, OR NEIGHBORS?: MORE THAN THREE TIMES A WEEK

## 2022-08-20 SDOH — ECONOMIC STABILITY: FOOD INSECURITY: WITHIN THE PAST 12 MONTHS, YOU WORRIED THAT YOUR FOOD WOULD RUN OUT BEFORE YOU GOT MONEY TO BUY MORE.: SOMETIMES TRUE

## 2022-08-20 SDOH — SOCIAL STABILITY: SOCIAL NETWORK
DO YOU BELONG TO ANY CLUBS OR ORGANIZATIONS SUCH AS CHURCH GROUPS UNIONS, FRATERNAL OR ATHLETIC GROUPS, OR SCHOOL GROUPS?: NO

## 2022-08-20 SDOH — HEALTH STABILITY: PHYSICAL HEALTH: ON AVERAGE, HOW MANY DAYS PER WEEK DO YOU ENGAGE IN MODERATE TO STRENUOUS EXERCISE (LIKE A BRISK WALK)?: 2 DAYS

## 2022-08-20 SDOH — HEALTH STABILITY: PHYSICAL HEALTH: ON AVERAGE, HOW MANY MINUTES DO YOU ENGAGE IN EXERCISE AT THIS LEVEL?: 30 MIN

## 2022-08-20 SDOH — ECONOMIC STABILITY: TRANSPORTATION INSECURITY
IN THE PAST 12 MONTHS, HAS LACK OF TRANSPORTATION KEPT YOU FROM MEETINGS, WORK, OR FROM GETTING THINGS NEEDED FOR DAILY LIVING?: NO

## 2022-08-20 SDOH — HEALTH STABILITY: MENTAL HEALTH
STRESS IS WHEN SOMEONE FEELS TENSE, NERVOUS, ANXIOUS, OR CAN'T SLEEP AT NIGHT BECAUSE THEIR MIND IS TROUBLED. HOW STRESSED ARE YOU?: TO SOME EXTENT

## 2022-08-20 SDOH — ECONOMIC STABILITY: HOUSING INSECURITY: IN THE LAST 12 MONTHS, HOW MANY PLACES HAVE YOU LIVED?: 1

## 2022-08-20 ASSESSMENT — PAIN - FUNCTIONAL ASSESSMENT
PAIN_FUNCTIONAL_ASSESSMENT: 0-10
PAIN_FUNCTIONAL_ASSESSMENT: ACTIVITIES ARE NOT PREVENTED

## 2022-08-20 ASSESSMENT — PAIN DESCRIPTION - DESCRIPTORS
DESCRIPTORS: PRESSURE
DESCRIPTORS: BURNING;PRESSURE
DESCRIPTORS: PRESSURE
DESCRIPTORS: SHARP
DESCRIPTORS: PRESSURE
DESCRIPTORS: PRESSURE
DESCRIPTORS: ACHING

## 2022-08-20 ASSESSMENT — PAIN SCALES - GENERAL
PAINLEVEL_OUTOF10: 6
PAINLEVEL_OUTOF10: 9
PAINLEVEL_OUTOF10: 4
PAINLEVEL_OUTOF10: 0
PAINLEVEL_OUTOF10: 10
PAINLEVEL_OUTOF10: 4
PAINLEVEL_OUTOF10: 0
PAINLEVEL_OUTOF10: 5
PAINLEVEL_OUTOF10: 8

## 2022-08-20 ASSESSMENT — PAIN DESCRIPTION - LOCATION
LOCATION: CHEST
LOCATION: HEAD
LOCATION: CHEST
LOCATION: CHEST;BREAST
LOCATION: CHEST
LOCATION: CHEST

## 2022-08-20 ASSESSMENT — PAIN DESCRIPTION - ORIENTATION
ORIENTATION: RIGHT
ORIENTATION: MID

## 2022-08-20 ASSESSMENT — ENCOUNTER SYMPTOMS
GASTROINTESTINAL NEGATIVE: 1
SHORTNESS OF BREATH: 0
DIARRHEA: 0
EYES NEGATIVE: 1
VOMITING: 0
SHORTNESS OF BREATH: 1
NAUSEA: 0
COUGH: 0
RESPIRATORY NEGATIVE: 1
ABDOMINAL PAIN: 0

## 2022-08-20 NOTE — ED NOTES
Pt is gowned. Patient placed on cardiac monitor, continuous pulse oximeter, and NIBP monitor.  Monitor alarms on.       Lilliam Adams RN  08/20/22 1385

## 2022-08-20 NOTE — CONSULTS
[facility]  Cardiology Consult      Requesting MD:  Kelli White MD   Admit Status:         History obtained from:   [] Patient  [] Other (specify):     Patient:  Re Srivastava  [id number]     Chief Complaint:   Chief Complaint   Patient presents with    Chest Pain     Pt arrived to the ed with c/o chest pain that started yesterday. HPI: Ms. Kevin Caceres is a 52 y.o. female with a history of coronary artery disease previous CABG approximately 2015 hospitalized last week seen by Dr. Fredy Santos had a stress test which showed EF 40% old lateral infarct mild ischemia. Is back and now this morning with recurrent chest pressure off and on yesterday on warfarin INR 3.24 creatinine 1.8. All troponins are negative. BNP 61.  Cardiology consulted. Does not describe typical exertional angina. Chest pain seem to be relieved with nitroglycerin. Review of Systems:  Review of Systems   Constitutional: Negative. Negative for chills, fever and unexpected weight change. HENT: Negative. Eyes: Negative. Respiratory: Negative. Negative for shortness of breath. Cardiovascular: Negative. Negative for chest pain. Gastrointestinal: Negative. Negative for diarrhea, nausea and vomiting. Endocrine: Negative. Genitourinary: Negative. Musculoskeletal: Negative. Skin: Negative. Neurological: Negative. All other systems reviewed and are negative.     Cardiac Specific Data:  Specialty Problems          Cardiology Problems    Chest pain due to myocardial ischemia, unspecified ischemic chest pain type        * (Principal) Substernal chest pain relieved by nitroglycerin        Atherosclerosis of native coronary artery of native heart with unstable angina pectoris (HCC)        [facility] occlusion with cerebral infarction [facility])        Hypertension        Mixed hyperlipidemia        Chest pain           Past Medical History:  Past Medical History:   Diagnosis Date    CAD (coronary artery disease)     [facility] occlusion with cerebral infarction (HCC)     CHF (congestive heart failure) (HCC)     Chronic kidney disease     Diabetes mellitus (HCC)     Diabetic neuropathy (HCC)     GERD (gastroesophageal reflux disease)     Hyperlipidemia     Hypertension     Neuromuscular disorder ([address]     Seizures ([facility] [address]     as an child        Past Surgical History:  Past Surgical History:   Procedure Laterality Date    CARDIAC SURGERY      COLONOSCOPY N/A 09/02/2021    Dr Hannah Avalos, Sub prep Fair, 4 year recall    CORONARY ARTERY BYPASS GRAFT      2016    ENDOSCOPY, COLON, DIAGNOSTIC      EYE SURGERY      TONSILLECTOMY      at age 8    UPPER GASTROINTESTINAL ENDOSCOPY      Chantel Rucker w/snow per patient    UPPER GASTROINTESTINAL ENDOSCOPY N/A 09/02/2021    Dr Hannah Avalos, BDM, (-)[place]       Past Family History:  Family History   Problem Relation Age of Onset    Osteoarthritis Mother     Diabetes Mother     Hypertension Mother     Heart Failure Mother     Hypertension Father     Heart Attack Brother     Heart Failure Brother     Heart Failure Maternal Grandmother     Diabetes Maternal Grandfather     Heart Disease Maternal Grandfather     Heart Disease Paternal Grandmother     No Known Problems Paternal Grandfather     Colon Cancer Neg Hx     Colon Polyps Neg Hx     Esophageal Cancer Neg Hx     Liver Cancer Neg Hx     Liver Disease Neg Hx     Rectal Cancer Neg Hx     Stomach Cancer Neg Hx        Past Social History:  Social History     Socioeconomic History    Marital status: Single     Spouse name: Not on file    Number of children: 0    Years of education: Not on file    Highest education level: Not on file   Occupational History    Occupation: customer service     Comment: disability   Tobacco Use    Smoking status: Never    Smokeless tobacco: Never   Vaping Use    Vaping Use: Never used   Substance and Sexual Activity    Alcohol use:  Yes     Alcohol/week: 2.0 standard drinks     Types: 2 Glasses of wine per week     Comment: weekly    Drug use: Never    Sexual activity: Not Currently     Comment: has no kids   Other Topics Concern    Not on file   Social History Narrative    CODE STATUS: Full Code    HEALTH CARE PROXY: her Father, Mr. Pastor Kennedy, [phone]    AMBULATES: independently    DOMICILED: has 3 steps to enter the home, her Father and [place] and Cousin's daughter stay with them, has a pet dog     Social Determinants of Health     Financial Resource Strain: Medium Risk    Difficulty of Paying Living Expenses: Somewhat hard   Food Insecurity: Food Insecurity Present    Worried About [address] in the Last Year: Sometimes true    Ran Out of Food in the Last Year: Sometimes true   Transportation Needs: No Transportation Needs    Lack of Transportation (Medical): No    Lack of Transportation (Non-Medical): No   Physical Activity: Insufficiently Active    Days of Exercise per Week: 2 days    Minutes of Exercise per Session: 30 min   Stress: Stress Concern Present    Feeling of Stress : To some extent   Social Connections: Moderately Isolated    Frequency of Communication with Friends and Family: More than three times a week    Frequency of Social Gatherings with Friends and Family: More than three times a week    Attends Orthodox Services: More than 4 times per year    Active Member of [address] [place] [address] [employer] or Organizations: No    Attends Club or Organization Meetings: Never    Marital Status: Never    Intimate Partner Violence: Not on file   Housing Stability: [address] to Pay for Housing in the Last Year: No    Number of [place] in the Last Year: 1    Unstable Housing in the Last Year: No       Allergies: Allergies   Allergen Reactions    Bactrim [Sulfamethoxazole-Trimethoprim] Other (See Comments)     Yeast inf       Home Meds:  Prior to Admission medications    Medication Sig Start Date End Date Taking?  Authorizing Provider   isosorbide mononitrate (IMDUR) 30 MG extended release tablet Take 1 tablet by mouth in the morning. 8/13/22   YARIEL Dominguez CNP   ranolazine (RANEXA) 500 MG extended release tablet Take 1 tablet by mouth in the morning and 1 tablet before bedtime. 8/12/22   YARIEL Dominguez CNP   pantoprazole (PROTONIX) 40 MG tablet TAKE 1 TABLET BY MOUTH DAILY 6/26/22   YARIEL English NP   fluticasone [facility]) 50 MCG/ACT nasal spray 1 spray by Each Nostril route daily 5/11/22   YARIEL Tan CNP   cetirizine (ZYRTEC ALLERGY) 10 MG tablet Take 1 tablet by mouth daily 5/11/22   YARIEL Tan CNP   Semaglutide,0.25 or 0.5MG/DOS, (OZEMPIC, 0.25 OR 0.5 MG/DOSE,) 2 MG/1.5ML SOPN Inject 0.25 mg into the skin once a week    Historical Provider, MD   amLODIPine (NORVASC) 5 MG tablet Take 5 mg by mouth daily     Historical Provider, MD   vitamin D (ERGOCALCIFEROL) 1.25 MG (54370 UT) CAPS capsule Take 1 capsule by mouth once a week 9/26/21   Historical Provider, MD   lisinopril (PRINIVIL;ZESTRIL) 2.5 MG tablet Take 1 tablet by mouth daily  10/9/21   Historical Provider, MD   promethazine-dextromethorphan (PROMETHAZINE-DM) 6.25-15 MG/5ML syrup Take 5 mLs by mouth nightly as needed for Cough 11/29/21   YARIEL Estrada   warfarin (COUMADIN) 10 MG tablet Take 10 mg by mouth 10mg every day    Historical Provider, MD   sucralfate (CARAFATE) 1 GM/10ML suspension Take 10 mLs by mouth 4 times daily 10/5/21   YARIEL English NP   furosemide (LASIX) 40 MG tablet Take 2 tablets by mouth daily  Patient taking differently: Take 120 mg by mouth in the morning. 9/24/21 8/10/22  Nicolás Abarca MD   gabapentin (NEURONTIN) 300 MG capsule Take 1 capsule by mouth 2 times daily as needed (Neuropathy/nerve pains) for up to 3 days.  Intended supply: 30 days 9/24/21 8/10/22  Nicolás Abarca MD   ondansetron (ZOFRAN ODT) 4 MG disintegrating tablet Take 1 tablet by mouth every 8 hours as needed for Nausea or Vomiting 9/18/21   Sydnee Montgomery MD   nystatin (MYCOSTATIN) 876929 UNIT/GM powder Apply 3 times daily to lower groin, abdominal area and under bilateral breasts 5/17/21   Epimenio Ripa, APRN - CNP   NONFORMULARY daily apple cider gummy     Historical Provider, MD   ezetimibe (ZETIA) 10 MG tablet Take 10 mg by mouth daily  2/15/19   Historical Provider, MD   insulin glargine (LANTUS;BASAGLAR) 100 UNIT/ML injection pen Inject 80 Units into the skin nightly  10/24/17   Historical Provider, MD   bethanechol (URECHOLINE) 25 MG tablet Take 1 tablet by mouth daily 9/14/17   Historical Provider, MD   metFORMIN (GLUCOPHAGE) 1000 MG tablet Take 1 tablet by mouth 2 times daily (with meals) Hold till monday 2/8/16   Historical Provider, MD   insulin aspart (NOVOLOG) 100 UNIT/ML injection vial Inject 10 Units into the skin 4 times daily (before meals and nightly) Up to 10 units ss coverage as directed 10/26/17   Historical Provider, MD   spironolactone (ALDACTONE) 25 MG tablet Take 12.5 mg by mouth daily 12/8/17   Historical Provider, MD   topiramate (TOPAMAX) 50 MG tablet Take 1 tablet by mouth daily  8/28/14   Historical Provider, MD   aspirin 81 MG tablet Take 81 mg by mouth daily    Historical Provider, MD   metoprolol succinate ER (TOPROL-XL) 25 MG XL tablet Take 25 mg by mouth daily     Historical Provider, MD   prochlorperazine (COMPAZINE) 10 MG tablet Take 10 mg by mouth 2 times daily as needed (headache)     Historical Provider, MD   verapamil (CALAN) 40 MG tablet Take 40 mg by mouth every 12 hours    Historical Provider, MD   atorvastatin (LIPITOR) 40 MG tablet Take 80 mg by mouth nightly     Historical Provider, MD       Current Meds:   sodium chloride flush  5-40 mL IntraVENous 2 times per day    amLODIPine  5 mg Oral Daily    aspirin  81 mg Oral Daily    atorvastatin  80 mg Oral Nightly    bethanechol  25 mg Oral Daily    cetirizine  10 mg Oral Daily    ezetimibe  10 mg Oral Daily    fluticasone  1 spray Each Nostril Daily    furosemide  80 mg Oral Daily    insulin glargine 80 Units SubCUTAneous Nightly    isosorbide mononitrate  30 mg Oral Daily    lisinopril  2.5 mg Oral Daily    metoprolol succinate  25 mg Oral Daily    miconazole   Topical BID    pantoprazole  40 mg Oral Daily    ranolazine  500 mg Oral BID    spironolactone  12.5 mg Oral Daily    sucralfate  1 g Oral 4x daily    verapamil  40 mg Oral Q12H    [START ON 8/27/2022] vitamin D  50,000 Units Oral Weekly    topiramate  50 mg Oral Daily    insulin lispro  0-16 Units SubCUTAneous TID     insulin lispro  0-4 Units SubCUTAneous Nightly       Current Infused Meds:   nitroGLYCERIN 5 mcg/min (08/20/22 0830)    sodium chloride 15 mL/hr at 08/20/22 0739    dextrose         Physical Exam:  Vitals:    08/20/22 0739   BP: (!) 127/93   Pulse: 89   Resp: 16   Temp: 98.2 °F (36.8 °C)   SpO2: 97%       Intake/Output Summary (Last 24 hours) at 8/20/2022 1120  Last data filed at 8/20/2022 1000  Gross per 24 hour   Intake 400.02 ml   Output --   Net 400.02 ml     Estimated body mass index is 60.16 kg/m² as calculated from the following:    Height as of this encounter: 5' 4\" (1.626 m). Weight as of this encounter: 350 lb 8 oz (159 kg). Physical Exam  Vitals reviewed. Constitutional:       General: She is not in acute distress. Appearance: Normal appearance. She is well-developed. She is obese. She is not ill-appearing, toxic-appearing or diaphoretic. Comments: Very anxious appearing   HENT:      Head: Normocephalic and atraumatic. Nose: Nose normal.      Mouth/Throat:      Mouth: Mucous membranes are moist.      Pharynx: Oropharynx is clear. Eyes:      General: No scleral icterus. Extraocular Movements: Extraocular movements intact. Pupils: Pupils are equal, round, and reactive to light. Neck:      Vascular: No carotid bruit or JVD. Cardiovascular:      Rate and Rhythm: Normal rate and regular rhythm. Heart sounds: Normal heart sounds. No murmur heard. No friction rub. No gallop. Pulmonary:      Effort: Pulmonary effort is normal. No respiratory distress. Breath sounds: Normal breath sounds. No stridor. No wheezing, rhonchi or rales. Chest:      Chest wall: No tenderness. Abdominal:      General: Abdomen is flat. Bowel sounds are normal. There is no distension. Palpations: Abdomen is soft. There is no mass. Tenderness: There is no abdominal tenderness. There is no right CVA tenderness, left CVA tenderness, guarding or rebound. Hernia: No hernia is present. Musculoskeletal:         General: No swelling, tenderness, deformity or signs of injury. Cervical back: Normal range of motion and neck supple. No rigidity or tenderness. Right lower leg: No edema. Left lower leg: No edema. Lymphadenopathy:      Cervical: No cervical adenopathy. Skin:     General: Skin is warm and dry. Neurological:      General: No focal deficit present. Mental Status: She is alert and oriented to person, place, and time. Mental status is at baseline. Cranial Nerves: No cranial nerve deficit. Sensory: No sensory deficit. Motor: No weakness. Coordination: Coordination normal.   Psychiatric:         Mood and Affect: Mood normal.         Behavior: Behavior normal.         Thought Content: Thought content normal.         Judgment: Judgment normal.       Labs:  Recent Labs     08/20/22  0129   WBC 6.9   HGB 14.8          Recent Labs     08/20/22  0129   *   K 3.8      CO2 22   BUN 23*   CREATININE 1.8*   LABGLOM 30*   CALCIUM 9.1       CK, CKMB, Troponin: @LABRCNT (CKTOTAL:3, CKMB:3, TROPONINI:3)@    Last 3 BNP:  No results for input(s): BNP in the last 72 hours.         IMAGING:  Echo Complete    Result Date: 8/11/2022  Transthoracic Echocardiography Report (TTE)  Demographics   Patient Name   Alma Beckham    Date of Study           08/11/2022   MRN            [id number]         Gender                  Female   Date of Birth  1975 Room Number             [id number]   Age            52 year(s)   Height:        64 inches      Referring Physician     Enmanuel Edmonds MD   Weight:        357.01 pounds  Sonographer             Guerrero Draper   BSA:           2.5 m^2        Interpreting Physician  Shavonne Saenz MD   BMI:           61.28 kg/m^2  Procedure Type of Study   TTE procedure:ECHO NO CONTRAST WITH DOP/COLR, ECHO 2D W/DOPPLER/CONTRAST  LIMITD. Study Location: Echo Lab Technical Quality: Poor visualization due to body habitus. Patient Status: Outpatient Contrast Medium: Definity. BP: 92/58 mmHg Indications:Chest pain. Conclusions   Summary  Technically difficult study due to morbid obesity with poor images  Normal left ventricular size with difficult assessment of systolic  function even with Definity contrast -estimated ejection fraction appears  low normal at 50%  Mild concentric left ventricular hypertrophy with mild [grade 1] diastolic  dysfunction  Normal left atrial size  Poor visualization of the aortic valve without evidence of stenosis or  insufficiency  Normally mobile mitral valve without demonstrable regurgitation  No pulmonic valvular insufficiency or stenosis demonstrated  Poor visualization of the right-sided chambers with preserved RV systolic  function  No tricuspid regurgitation with which to estimate RVSP  Nonvisualization of the IVC  Aortic root and ascending segment measure within normal limits  No significant pericardial effusion   Signature   ----------------------------------------------------------------  Electronically signed by Shavonne Saenz MD(Interpreting physician)  on 08/11/2022 09:47 AM  ----------------------------------------------------------------  2D Measurements and Calculations(cm)   LVIDd: 4.18 cm                      LVIDs: 3.01 cm  IVSd: 1.1 cm  LVPWd: 1 cm                         AO Root Dimension: 2.5 cm  Rt. Vent.  Dimension: 1.96 cm        LA Dimension: 3.1 cm  % Ejection Fraction: 55 %           LV Systolic dimension: 3.01cm                                      LV PW diastolic: 1cm  LV dimension: 4.18 cm               LVOT diameter: 2 cm                                      RV Diastolic dimension: 8.82OV   Ascending Aorta: 2.9 cm  Doppler Measurements and Calculations                                           MV Peak E-Wave: 65.6 cm/s                                          MV Peak A-Wave: 80.6 cm/s                                          MV E/A Ratio: 0.81 %                                          MV Mean velocity: NaNcm/s                                          MV Peak Gradient: 1.72 mmHg   MV E' septal velocity: 13.4cm/s  MV E' lateral velocity:5.22 cm/s      XR CHEST PORTABLE    Result Date: 8/20/2022  NO PRIOR REPORT AVAILABLE Exam: X-RAY OF [facility] Clinical data:Chest pain. Technique:Single view of the chest. Prior studies: No prior studies submitted. Findings:Sternotomy sutures in situ. The lungs are grossly clear; noevidence of acute infiltrate or pleural effusion. Cardiac silhouette is within normal limits. No acute osseous abnormality is detected. no acute cardiopulmonary disease No acute cardiopulmonary process. Recommendation: Follow up as clinically indicated. Electronically Signed by Apoorva Love at 20-Aug-2022 03:39:29 AM             CTA PULMONARY W CONTRAST    Result Date: 8/10/2022  CT ANGIOGRAM OF THE CHEST WITH IV CONTRAST CLINICAL HISTORY: Rule out PE TECHNIQUE: Serial axial images obtained. Sagittal reconstructed images obtained. Coronal reconstructed images obtained. Exam is performed with mL of Isovue-300 intravenous contrast. Per PQRS, CT exam is performed using one or more of the following dose reduction techniques: Automated exposure control, adjustment of the mA and/or KV according to patient size, or use of iterative reconstruction techniques. COMPARISON: None FINDINGS: HEART: Generalized cardiomegaly. No pericardial effusion is seen.  VASCULATURE: Post-contrast appearance of the pulmonary arteries shows no evidence for filling defect. Negative for aortic aneurysm or aortic dissection. There is normal caliber of the pulmonary artery trunk in relation to the aorta. THORAX: No acute consolidating infiltrate, pneumothorax or pleural effusion is seen. Negative for pneumothorax. CHEST WALLS: Appearance is unremarkable. ESOPHAGUS: Appearance is unremarkable. STOMACH: Appearance is unremarkable. UPPER ABDOMEN: Mild fatty infiltration of the liver BONES: Appearance is unremarkable. 1.No evidence of PE. NM MYOCARDIAL SPECT REST EXERCISE OR RX    Result Date: 8/12/2022  Lexiscan Nuclear Stress Test Report Procedure date: 8/12/2022 Indications: chest pain Procedure: Stress was performed with injection of 0.4 mg Lexiscan. Vital signs and EKG were monitored. Technetium-99 Myoview was injected in divided doses, approximately 8.5 mCi and 25.0 mCi respectively for rest and stress imaging. The patient was discharged in stable condition. Results: Patient had symptoms of dyspnea during infusion that resolved in recovery. Baseline EKG showed normal sinus rhythm with nonspecific ST/T changes. During stress there were no significant EKG changes or rhythm changes. Baseline and peak blood pressures were 140/94, and 140/94 respectively. Baseline and peak heart rates were 91 and  104 respectively. EF=40% Myocardial perfusion images showed old lateral infarct. mild ischaemia.  Ef 40% Signed by Dr Hollie Harkins      Assessment:  Complains of chest pressure off and on last 24 hours now improved  Morbid obesity  Diabetes mellitus type 2  Chronic kidney disease creatinine 1.8  Chronic anticoagulation with warfarin  Coronary artery disease  Hyperlipidemia  Obstructive sleep apnea  History of anemia  History of cerebral artery occlusion with cerebral infarction  Gastroesophageal reflux disease  History of gallstones  History of pancreatitis  History of diabetic neuropathy  History of seizures  Lexiscan 8/12/2022 EF 40% old lateral infarct mild ischemia suggested  CTA pulmonary 1022 fatty liver infiltration no evidence of pulmonary embolism  CT abdomen pelvis 9/20/2021 mild fat stranding associated with pancreatic head and neck may be seen in acute pancreatitis no other abnormalities  CT of the head 4/26/2018 several areas of encephalomalacia bilaterally as detailed unchanged from previous study new low-density area left temporal lobe may represent subacute or chronic nonhemorrhagic infarction  Echocardiogram 1122 technically difficult study normal left ventricular function EF 50% mild concentric LVH grade 1 diastolic dysfunction      Recommendations:  Continue home medications hold warfarin  Tentative cardiac catheterization Monday per Dr. El Bai after INR substantially decreased today 3.24  Further comments to follow

## 2022-08-20 NOTE — PROGRESS NOTES
Kaden Wright arrived to room # [id number]. Presented with: Substernal chest pressure  Mental Status: Patient is oriented, alert, thought processes intact, and able to concentrate and follow conversation. Vitals:    08/20/22 0739   BP: (!) 127/93   Pulse: 89   Resp: 16   Temp: 98.2 °F (36.8 °C)   SpO2: 97%     Patient safety contract and falls prevention contract reviewed with patient Yes. Oriented Patient to room. Call light within reach. Yes.   Needs, issues or concerns expressed at this time: no.      Electronically signed by Lakisha Peterson RN on 8/20/2022 at 7:47 AM

## 2022-08-20 NOTE — H&P
[facility] History and Physical    Patient Information:  Patient: Bakari Pollock  MRN: [id number]   Acct: [de-identified]  YOB: 1975  Admit Date: 8/20/2022      Date of Service: 8/20/2022  Primary Care Physician: Alison Mccauley DO  Advance Directive: Full Code  Health Care Proxy: her Father, Mr. Wally Muir, [phone]        SUBJECTIVE:    Chief Complaint   Patient presents with    Chest Pain     Pt arrived to the ed with c/o chest pain that started yesterday. EP Sign Out:  Chest pain  Recent negative heart cath  NG GGT relieving her pan fully  For heart cath once INR normalizes, to be done by Dr Akanksha Mera    HPI:  Mrs. Bakari Pollock is a pleasant 52year old  lady known to me from prior admission,. She presents a few days after discharge from an ACS rule out. She has had recurrent NG responsive CP. Her cardiologist wants her to be admitted for monitoring while her INR normalized following holding warfarin, and will take her for heart cath as soon as able. She is being treated with a NG GGT at this time. Review of Systems:   Review of Systems   Constitutional:  Positive for fatigue. Negative for chills, diaphoresis and fever. Respiratory:  Positive for shortness of breath (intermittent). Cardiovascular:  Positive for chest pain. Negative for palpitations and leg swelling. Endorses chest pressure   Gastrointestinal:  Negative for nausea. Endocrine: Positive for cold intolerance. Neurological:  Positive for light-headedness and numbness (feet have been numb for many months). Negative for syncope and weakness. Psychiatric/Behavioral:  Negative for confusion.       Past Medical History:   Diagnosis Date    CAD (coronary artery disease)     [facility] occlusion with cerebral infarction ([facility] [address]     Diabetes mellitus ([facility] [address]     GERD (gastroesophageal reflux disease)     Hyperlipidemia     Hypertension      Past Psychiatric History:  Denies any    Past Surgical History:   Procedure Laterality Date    COLONOSCOPY N/A 09/02/2021    Dr Mikey Roe, 4 year recall    CORONARY ARTERY BYPASS GRAFT      2016    TONSILLECTOMY      at age 8    [facility], w/dil per patient    UPPER GASTROINTESTINAL ENDOSCOPY N/A 09/02/2021    Dr Edie Suarez, [facility], (-)[place]     Social History       Tobacco History       Smoking Status  Never      Smokeless Tobacco Use  Never              Alcohol History       Alcohol Use Status  Yes Drinks/Week  1 Glasses of wine per week Amount  1.0 standard drink of alcohol/wk Comment  occ              Drug Use       Drug Use Status  Never              Sexual Activity       Sexually Active  Not Asked Comment  has no kids             CODE STATUS: Full Code  HEALTH CARE PROXY: her Father, Mr. Loren Garrett, [phone]  AMBULATES: independently  DOMICILED: has 3 steps to enter the home, her Father and Cousing and Cousin's daughter stay with them, has a pet dog     Family History   Problem Relation Age of Onset    Osteoarthritis Mother     Diabetes Mother     Hypertension Mother     Heart Failure Mother     Hypertension Father     Heart Attack Brother     Heart Failure Brother     Heart Failure Maternal Grandmother     Diabetes Maternal Grandfather     Heart Disease Maternal Grandfather     Heart Disease Paternal Grandmother     No Known Problems Paternal Grandfather     Colon Cancer Neg Hx     Colon Polyps Neg Hx     Esophageal Cancer Neg Hx     Liver Cancer Neg Hx     Liver Disease Neg Hx     Rectal Cancer Neg Hx     Stomach Cancer Neg Hx      Allergies   Allergen Reactions    Bactrim [Sulfamethoxazole-Trimethoprim] Other (See Comments)     Yeast inf     Home Medications:  Prior to Admission medications    Medication Sig Start Date End Date Taking? Authorizing Provider   isosorbide mononitrate (IMDUR) 30 MG extended release tablet Take 1 tablet by mouth in the morning.  8/13/22   Alona ARGUELLO YARIEL Montelongo CNP   ranolazine (RANEXA) 500 MG extended release tablet Take 1 tablet by mouth in the morning and 1 tablet before bedtime. 8/12/22   YARIEL Lai CNP   pantoprazole (PROTONIX) 40 MG tablet TAKE 1 TABLET BY MOUTH DAILY 6/26/22   YARIEL Jonas NP   fluticasone [facility]) 50 MCG/ACT nasal spray 1 spray by Each Nostril route daily 5/11/22   YARIEL Dunbar CNP   cetirizine (ZYRTEC ALLERGY) 10 MG tablet Take 1 tablet by mouth daily 5/11/22   YARIEL Dunbar CNP   Semaglutide,0.25 or 0.5MG/DOS, (OZEMPIC, 0.25 OR 0.5 MG/DOSE,) 2 MG/1.5ML SOPN Inject 0.25 mg into the skin once a week    Historical Provider, MD   amLODIPine (NORVASC) 5 MG tablet Take 5 mg by mouth daily     Historical Provider, MD   vitamin D (ERGOCALCIFEROL) 1.25 MG (76086 UT) CAPS capsule Take 1 capsule by mouth once a week 9/26/21   Historical Provider, MD   lisinopril (PRINIVIL;ZESTRIL) 2.5 MG tablet Take 1 tablet by mouth daily  10/9/21   Historical Provider, MD   promethazine-dextromethorphan (PROMETHAZINE-DM) 6.25-15 MG/5ML syrup Take 5 mLs by mouth nightly as needed for Cough 11/29/21   YARIEL Cortez   warfarin (COUMADIN) 10 MG tablet Take 10 mg by mouth 10mg every day    Historical Provider, MD   sucralfate (CARAFATE) 1 GM/10ML suspension Take 10 mLs by mouth 4 times daily 10/5/21   YARIEL Jonas NP   furosemide (LASIX) 40 MG tablet Take 2 tablets by mouth daily  Patient taking differently: Take 120 mg by mouth in the morning. 9/24/21 8/10/22  Nicolás Abarca MD   gabapentin (NEURONTIN) 300 MG capsule Take 1 capsule by mouth 2 times daily as needed (Neuropathy/nerve pains) for up to 3 days.  Intended supply: 30 days 9/24/21 8/10/22  Nicolás Abarca MD   ondansetron (ZOFRAN ODT) 4 MG disintegrating tablet Take 1 tablet by mouth every 8 hours as needed for Nausea or Vomiting 9/18/21   Huyen Sanchez MD   nystatin (MYCOSTATIN) 839706 UNIT/GM powder Apply 3 times daily to lower groin, abdominal area and under bilateral breasts 5/17/21   Lissette Ramos, APRN - CNP   NONFORMULARY daily apple cider gummy     Historical Provider, MD   ezetimibe (ZETIA) 10 MG tablet Take 10 mg by mouth daily  2/15/19   Historical Provider, MD   insulin glargine (LANTUS;BASAGLAR) 100 UNIT/ML injection pen Inject 80 Units into the skin nightly  10/24/17   Historical Provider, MD   bethanechol (URECHOLINE) 25 MG tablet Take 1 tablet by mouth daily 9/14/17   Historical Provider, MD   metFORMIN (GLUCOPHAGE) 1000 MG tablet Take 1 tablet by mouth 2 times daily (with meals) Hold till monday 2/8/16   Historical Provider, MD   insulin aspart (NOVOLOG) 100 UNIT/ML injection vial Inject 10 Units into the skin 4 times daily (before meals and nightly) Up to 10 units ss coverage as directed 10/26/17   Historical Provider, MD   spironolactone (ALDACTONE) 25 MG tablet Take 12.5 mg by mouth daily 12/8/17   Historical Provider, MD   topiramate (TOPAMAX) 50 MG tablet Take 1 tablet by mouth daily  8/28/14   Historical Provider, MD   aspirin 81 MG tablet Take 81 mg by mouth daily    Historical Provider, MD   metoprolol succinate ER (TOPROL-XL) 25 MG XL tablet Take 25 mg by mouth daily     Historical Provider, MD   prochlorperazine (COMPAZINE) 10 MG tablet Take 10 mg by mouth 2 times daily as needed (headache)     Historical Provider, MD   verapamil (CALAN) 40 MG tablet Take 40 mg by mouth every 12 hours    Historical Provider, MD   atorvastatin (LIPITOR) 40 MG tablet Take 80 mg by mouth nightly     Historical Provider, MD         OBJECTIVE:    Vitals:    08/20/22 0630   BP: 107/68   Pulse: 89   Resp: 17   Temp:    SpO2: 94%   Breathing room air    /68   Pulse 89   Temp 98 °F (36.7 °C) (Oral)   Resp 17   Ht 5' 4\" (1.626 m)   Wt (!) 347 lb (157.4 kg)   SpO2 94%   BMI 59.56 kg/m²     No intake or output data in the 24 hours ending 08/20/22 0700    Physical Exam  Vitals reviewed.    Constitutional:       General: She is not in acute hours.  TSH: Invalid input(s): LABTSH  ABG: No results for input(s): PHART, ULY5URL, PO2ART, NBT4THU, BEART, HGBAE, R9EHHSPA, CARBOXHGBART in the last 72 hours. Urinalysis:   Lab Results   Component Value Date/Time    NITRU Negative 09/20/2021 09:19 PM    WBCUA 3-5 01/08/2018 07:00 PM    WBCUA 26 01/08/2018 07:00 PM    WBCUA 3-5 01/08/2018 07:00 PM    BACTERIA 1+ 01/08/2018 07:00 PM    BACTERIA trace 01/08/2018 07:00 PM    RBCUA 0-1 08/07/2016 02:05 AM    BLOODU Negative 09/20/2021 09:19 PM    SPECGRAV 1.025 09/20/2021 09:19 PM    GLUCOSEU =>1000 09/20/2021 09:19 PM       EKG:   No st changes reported    IMAGING:  XR CHEST PORTABLE  Result Date: 8/20/2022  no acute cardiopulmonary disease No acute cardiopulmonary process. Recommendation: Follow up as clinically indicated.  Electronically Signed by Nichole Primrose at 20-Aug-2022 03:39:29 AM                 ASESSMENTS & PLANS:    Chest Pain:  Recent non-invasive workup  Her cardiologist was contacted, will wait for INR to come down and then take for heart cath  HOLD warfarin , begin SCDs when indicated  Dr Corine Pickard of Cardiology on case  Tele for 48 hours  Trend Tn Q3h x 3 more to make 12h trend  HbA1c and TSH with Reflex and Lipid Panel - as add ons    Chronic Medical Problems:  Continue home regimen as indicated    amLODIPine  5 mg Oral Daily    aspirin  81 mg Oral Daily    atorvastatin  80 mg Oral Nightly    bethanechol  25 mg Oral Daily    cetirizine  10 mg Oral Daily    ezetimibe  10 mg Oral Daily    fluticasone  1 spray Each Nostril Daily    furosemide  80 mg Oral Daily    insulin glargine  80 Units SubCUTAneous Nightly    isosorbide mononitrate  30 mg Oral Daily    lisinopril  2.5 mg Oral Daily    metoprolol succinate  25 mg Oral Daily    miconazole   Topical BID    pantoprazole  1 tablet Oral Daily    ranolazine  500 mg Oral BID    spironolactone  12.5 mg Oral Daily    sucralfate  1 g Oral 4x daily    verapamil  40 mg Oral Q12H    [START ON 8/27/2022] vitamin D  50,000 Units Oral Weekly    topiramate  50 mg Oral Daily    insulin lispro  0-16 Units SubCUTAneous TID WC    insulin lispro  0-4 Units SubCUTAneous Nightly     Supoportive and Prophylactic Txx:  DVT Prophylaxis: on warfarin  GI (PUD) Ppx: not indicated  PT consult for evalutation and Txx as indicated: contraindicated  ADULT DIET; Regular; 4 carb choices (60 gm/meal); Low Fat/Low Chol/High Fiber/2 gm Na; Low Sodium (2 gm); Low Potassium (Less than 3000 mg/day); Low Phosphorus (Less than 1000 mg); No Caffeine  sodium chloride flush, sodium chloride, ondansetron **OR** ondansetron, acetaminophen **OR** acetaminophen, potassium chloride **OR** potassium alternative oral replacement **OR** potassium chloride, magnesium sulfate, gabapentin, promethazine-dextromethorphan, glucose, dextrose bolus **OR** dextrose bolus, glucagon (rDNA), dextrose      Care time of >34 minutes  Pt seen/examined and admitted to OBServation status. Inpatient status is used for patients with an expected LOS extending past two midnights due to medical therapy and or critical care needs, otherwise patients are placed to OBServation status. Signed:  Electronically signed by Blake Garcia MD on 8/20/22 at 7:14 AM CDT.

## 2022-08-20 NOTE — PROGRESS NOTES
Report received from Prabhu FERREIRA RN  Electronically signed by Ganesh Carpenter RN on 8/20/2022 at 7:36 AM

## 2022-08-20 NOTE — PROGRESS NOTES
4 Eyes Skin Assessment    Jaimie Garcia is being assessed upon: Admission    I agree that I, Taran Wilson RN, along with Elizabeth Meyer RN (either 2 RN's or 1 LPN and 1 RN) have performed a thorough Head to Toe Skin Assessment on the patient. ALL assessment sites listed below have been assessed. Areas assessed by both nurses:     [x]   Head, Face, and Ears   [x]   Shoulders, Back, and Chest  [x]   Arms, Elbows, and Hands   [x]   Coccyx, Sacrum, and Ischium  [x]   Legs, Feet, and Heels    Does the Patient have Skin Breakdown?  No    Vikas Prevention initiated: Yes  Wound Care Orders initiated: NA    [facility] nurse consulted for Pressure Injury (Stage 3,4, Unstageable, DTI, NWPT, and Complex wounds) and New or Established Ostomies: NA        Primary Nurse eSignature: Taran Wilson RN on 8/20/2022 at 9:11 AM      Co-Signer eSignature: Electronically signed by Brisa Magana RN on 8/20/22 at 10:18 AM CDT

## 2022-08-20 NOTE — ED NOTES
[place] redrawn and sent to lab after initial labs hemolyzed.       Helen Sweet RN  08/20/22 1164 well-nourished/no distress

## 2022-08-20 NOTE — ED PROVIDER NOTES
[facility] DE[facility]  eMERGENCY dEPARTMENT eNCOUnter      Pt Name: Gibran Alicea  MRN: [id number]  [place] 1975  Date of evaluation: 8/20/2022  Provider: Hoda Hill MD    CHIEF COMPLAINT       Chief Complaint   Patient presents with    Chest Pain     Pt arrived to the ed with c/o chest pain that started yesterday. HISTORY OF PRESENT ILLNESS   (Location/Symptom, Timing/Onset,Context/Setting, Quality, Duration, Modifying Factors, Severity)  Note limiting factors. Gibran Alicea is a 52 y.o. female who presents to the emergency department for evaluation regarding feelings of chest pain that began today. She describes pressure and tightness over her anterior chest that is worse with any kind of exertion. States that she does have a prior history of coronary artery disease with previous CABG. She has a complicated previous history and is underwent previous stenting at her initial MI when she was in her 25s. Patient also has a prior history of an LV thrombus and is currently maintained on Coumadin therapy. No prior history of pulmonary embolism or DVT. She is not had recent illness, fever or chills. She denies any cough or nasal congestion symptoms. She is not had any palpitations or syncopal events. States that she did take sublingual nitro at home with subsequent improvement in her chest pressure. In review of her most recent hospitalization it appears she did undergo a Lexiscan which she tells me was somewhat abnormal.  In review of the discharge summary it appears they were considering her for an outpatient cardiac catheterization for further evaluation of the symptoms. HPI    NursingNotes were reviewed. REVIEW OF SYSTEMS    (2-9 systems for level 4, 10 or more for level 5)     Review of Systems   Constitutional:  Negative for chills and fever. Respiratory:  Positive for shortness of breath. Negative for cough. Cardiovascular:  Positive for chest pain.  Negative for palpitations and leg swelling. Gastrointestinal:  Negative for abdominal pain, diarrhea, nausea and vomiting. Neurological:  Negative for dizziness and syncope. All other systems reviewed and are negative. PAST MEDICALHISTORY     Past Medical History:   Diagnosis Date    CAD (coronary artery disease)     [facility] occlusion with cerebral infarction ([facility] [address]     Diabetes mellitus ([facility] [address]     GERD (gastroesophageal reflux disease)     Hyperlipidemia     Hypertension          SURGICAL HISTORY       Past Surgical History:   Procedure Laterality Date    CARDIAC SURGERY      COLONOSCOPY N/A 09/02/2021    Dr New Handy, Sub prep [place], 4 year recall    CORONARY ARTERY BYPASS GRAFT      2016    TONSILLECTOMY      UPPER GASTROINTESTINAL ENDOSCOPY      Gay Sofia, w/snow per patient    UPPER GASTROINTESTINAL ENDOSCOPY N/A 09/02/2021    Dr New Handy, [facility], (-)[place]         CURRENT MEDICATIONS     Previous Medications    AMLODIPINE (NORVASC) 5 MG TABLET    Take 5 mg by mouth daily     ASPIRIN 81 MG TABLET    Take 81 mg by mouth daily    ATORVASTATIN (LIPITOR) 40 MG TABLET    Take 80 mg by mouth nightly     BETHANECHOL (URECHOLINE) 25 MG TABLET    Take 1 tablet by mouth daily    CETIRIZINE (ZYRTEC ALLERGY) 10 MG TABLET    Take 1 tablet by mouth daily    EZETIMIBE (ZETIA) 10 MG TABLET    Take 10 mg by mouth daily     FLUTICASONE (FLONASE) 50 MCG/ACT NASAL SPRAY    1 spray by Each Nostril route daily    FUROSEMIDE (LASIX) 40 MG TABLET    Take 2 tablets by mouth daily    GABAPENTIN (NEURONTIN) 300 MG CAPSULE    Take 1 capsule by mouth 2 times daily as needed (Neuropathy/nerve pains) for up to 3 days.  Intended supply: 30 days    INSULIN ASPART (NOVOLOG) 100 UNIT/ML INJECTION VIAL    Inject 10 Units into the skin 4 times daily (before meals and nightly) Up to 10 units ss coverage as directed    INSULIN GLARGINE (LANTUS;BASAGLAR) 100 UNIT/ML INJECTION PEN    Inject 80 Units into the skin nightly     ISOSORBIDE MONONITRATE (IMDUR) 30 MG EXTENDED RELEASE TABLET    Take 1 tablet by mouth in the morning. LISINOPRIL (PRINIVIL;ZESTRIL) 2.5 MG TABLET    Take 1 tablet by mouth daily     METFORMIN (GLUCOPHAGE) 1000 MG TABLET    Take 1 tablet by mouth 2 times daily (with meals) Hold till monday    METOPROLOL SUCCINATE ER (TOPROL-XL) 25 MG XL TABLET    Take 25 mg by mouth daily     NONFORMULARY    daily apple cider gummy     NYSTATIN (MYCOSTATIN) 008594 UNIT/GM POWDER    Apply 3 times daily to lower groin, abdominal area and under bilateral breasts    ONDANSETRON (ZOFRAN ODT) 4 MG DISINTEGRATING TABLET    Take 1 tablet by mouth every 8 hours as needed for Nausea or Vomiting    PANTOPRAZOLE (PROTONIX) 40 MG TABLET    TAKE 1 TABLET BY MOUTH DAILY    PROCHLORPERAZINE (COMPAZINE) 10 MG TABLET    Take 10 mg by mouth 2 times daily as needed (headache)     PROMETHAZINE-DEXTROMETHORPHAN (PROMETHAZINE-DM) 6.25-15 MG/5ML SYRUP    Take 5 mLs by mouth nightly as needed for Cough    RANOLAZINE (RANEXA) 500 MG EXTENDED RELEASE TABLET    Take 1 tablet by mouth in the morning and 1 tablet before bedtime.     SEMAGLUTIDE,0.25 OR 0.5MG/DOS, (OZEMPIC, 0.25 OR 0.5 MG/DOSE,) 2 MG/1.5ML SOPN    Inject 0.25 mg into the skin once a week    SPIRONOLACTONE (ALDACTONE) 25 MG TABLET    Take 12.5 mg by mouth daily    SUCRALFATE (CARAFATE) 1 GM/10ML SUSPENSION    Take 10 mLs by mouth 4 times daily    TOPIRAMATE (TOPAMAX) 50 MG TABLET    Take 1 tablet by mouth daily     VERAPAMIL (CALAN) 40 MG TABLET    Take 40 mg by mouth every 12 hours    VITAMIN D (ERGOCALCIFEROL) 1.25 MG (77764 UT) CAPS CAPSULE    Take 1 capsule by mouth once a week    WARFARIN (COUMADIN) 10 MG TABLET    Take 10 mg by mouth 10mg every day       ALLERGIES     Bactrim [sulfamethoxazole-trimethoprim]    FAMILY HISTORY       Family History   Problem Relation Age of Onset    Osteoarthritis Mother     Diabetes Mother     Hypertension Mother     Hypertension Father     Heart Attack Brother     Heart Failure Maternal Grandmother     Diabetes Maternal Grandfather     Heart Disease Maternal Grandfather     Heart Disease Paternal Grandmother     No Known Problems Paternal Grandfather     Colon Cancer Neg Hx     Colon Polyps Neg Hx     Esophageal Cancer Neg Hx     Liver Cancer Neg Hx     Liver Disease Neg Hx     Rectal Cancer Neg Hx     Stomach Cancer Neg Hx           SOCIAL HISTORY       Social History     Socioeconomic History    Marital status: Single     Spouse name: None    Number of children: None    Years of education: None    Highest education level: None   Tobacco Use    Smoking status: Never    Smokeless tobacco: Never   Vaping Use    Vaping Use: Never used   Substance and Sexual Activity    Alcohol use: Yes     Alcohol/week: 1.0 standard drink     Types: 1 Glasses of wine per week     Comment: occ    Drug use: No       SCREENINGS    [place] Coma Scale  Eye Opening: Spontaneous  Best Verbal Response: Oriented  Best Motor Response: Obeys commands  [place] Coma Scale Score: 15        PHYSICAL EXAM    (up to 7 for level 4, 8 or more for level 5)     ED Triage Vitals [08/20/22 0052]   BP Temp Temp Source Heart Rate Resp SpO2 Height Weight   120/85 98 °F (36.7 °C) Oral 95 15 97 % 5' 4\" (1.626 m) (!) 347 lb (157.4 kg)       Physical Exam  Vitals and nursing note reviewed. Constitutional:       Appearance: She is obese. HENT:      Head: Atraumatic. Mouth/Throat:      Mouth: Mucous membranes are moist. Mucous membranes are not dry. Eyes:      General: No scleral icterus. Pupils: Pupils are equal, round, and reactive to light. Neck:      Trachea: No tracheal deviation. Cardiovascular:      Rate and Rhythm: Normal rate and regular rhythm. Pulses: Normal pulses. Heart sounds: Normal heart sounds. No murmur heard. Pulmonary:      Effort: Pulmonary effort is normal. No respiratory distress. Breath sounds: Normal breath sounds. No stridor. Abdominal:      General: There is no distension. Palpations: Abdomen is soft. Tenderness: There is no abdominal tenderness. There is no guarding. Musculoskeletal:      Right lower leg: No edema. Left lower leg: No edema. Skin:     Capillary Refill: Capillary refill takes less than 2 seconds. Coloration: Skin is not pale. Findings: No rash. Neurological:      General: No focal deficit present. Mental Status: She is alert and oriented to person, place, and time. Psychiatric:         Mood and Affect: Mood normal.         Behavior: Behavior is cooperative. DIAGNOSTIC RESULTS     EKG: All EKG's areinterpreted by the Emergency Department Physician who either signs or Co-signs this chart in the absence of a cardiologist.    [phone]: Normal sinus rhythm at a rate of 97, no evidence of acute ST or T wave changes identified. QTc: 460 MS. RADIOLOGY:  Non-plain film images such as CT, Ultrasound and MRI are read by the radiologist. Plain radiographic images are visualized and preliminarily interpreted bythe emergency physician with the below findings:        XR CHEST PORTABLE   Final Result   no acute cardiopulmonary disease   No acute cardiopulmonary process. Recommendation: Follow up as clinically indicated.     Electronically Signed by Milly Mena at 20-Aug-2022 03:39:29 AM                       LABS:  Labs Reviewed   CBC WITH AUTO DIFFERENTIAL - Abnormal; Notable for the following components:       Result Value    MCHC 32.8 (*)     RDW 14.7 (*)     Neutrophils % 49.9 (*)     All other components within normal limits   COMPREHENSIVE METABOLIC PANEL - Abnormal; Notable for the following components:    Sodium 134 (*)     BUN 23 (*)     Creatinine 1.8 (*)     GFR Non- 30 (*)     GFR  36 (*)     All other components within normal limits   APTT - Abnormal; Notable for the following components:    aPTT 50.3 (*)     All other components within normal limits   PROTIME-INR - Abnormal; Notable for the following components:    Protime 34.3 (*)     INR 3.24 (*)     All other components within normal limits   COVID-19, RAPID   TROPONIN   BRAIN NATRIURETIC PEPTIDE   TROPONIN       All other labs were within normal range or not returned as of this dictation. EMERGENCY DEPARTMENT COURSE and DIFFERENTIAL DIAGNOSIS/MDM:   Vitals:    Vitals:    08/20/22 0445 08/20/22 0515 08/20/22 0545 08/20/22 0600   BP: (!) 124/91 102/66 106/79 103/68   Pulse: 93 85 89 93   Resp: 16 15 16 17   Temp:       TempSrc:       SpO2: 97% 96% 93% 100%   Weight:       Height:           MDM    Patient's EKG and cardiac biomarker are unremarkable for evidence of cardiac ischemia. Her vital signs have remained stable. She did have some relief of chest pain after nitro and morphine here in the ED. CONSULTS:    Case was discussed with Dr. Vy Licona regarding cardiology consultation. We reviewed patient's previous history, most recent Lexiscan report. Agreeable to see patient in consultation and was agreeable for admission. Felt that would consider patient for cardiac catheterization after her INR trended down. Case was discussed with her Dr. Laine Pop regarding admission to the hospitalist service. PROCEDURES:  Unless otherwise noted below, none     Procedures    FINAL IMPRESSION      1.  Chest pain, unspecified type          DISPOSITION/PLAN   DISPOSITION Decision To Admit 08/20/2022 06:16:59 AM        (Please note that portions of this note were completed with a voice recognition program.  Efforts were made to edit thedictations but occasionally words are mis-transcribed.)    Zac Sierra MD (electronically signed)  Attending Emergency Physician          Zac Sierra MD  09/01/22 0598

## 2022-08-20 NOTE — PROGRESS NOTES
Portable telemetry  applied to patient chest. Cardiac rhythm: SR @ 93. Called and verified telemetry placement with KRISHNA Manning MT.   Electronically signed by Esme Kunz RN on 8/20/2022 at 8:30 AM

## 2022-08-20 NOTE — PROGRESS NOTES
Patient able to take medications without difficulty.   Electronically signed by Rosina Ugalde RN on 8/20/2022 at 9:10 AM

## 2022-08-21 PROBLEM — R07.9 CHEST PAIN IN ADULT: Status: ACTIVE | Noted: 2022-08-21

## 2022-08-21 LAB
ANION GAP SERPL CALCULATED.3IONS-SCNC: 13 MMOL/L (ref 7–19)
BUN BLDV-MCNC: 26 MG/DL (ref 6–20)
CALCIUM SERPL-MCNC: 9 MG/DL (ref 8.6–10)
CHLORIDE BLD-SCNC: 101 MMOL/L (ref 98–111)
CO2: 23 MMOL/L (ref 22–29)
CREAT SERPL-MCNC: 1.7 MG/DL (ref 0.5–0.9)
GFR AFRICAN AMERICAN: 39
GFR NON-AFRICAN AMERICAN: 32
GLUCOSE BLD-MCNC: 174 MG/DL (ref 70–99)
GLUCOSE BLD-MCNC: 204 MG/DL (ref 70–99)
GLUCOSE BLD-MCNC: 79 MG/DL (ref 74–109)
GLUCOSE BLD-MCNC: 82 MG/DL (ref 70–99)
GLUCOSE BLD-MCNC: 98 MG/DL (ref 70–99)
HCT VFR BLD CALC: 40.4 % (ref 37–47)
HEMOGLOBIN: 13.6 G/DL (ref 12–16)
INR BLD: 4.3 (ref 0.88–1.18)
MCH RBC QN AUTO: 30.6 PG (ref 27–31)
MCHC RBC AUTO-ENTMCNC: 33.7 G/DL (ref 33–37)
MCV RBC AUTO: 90.8 FL (ref 81–99)
PDW BLD-RTO: 14.6 % (ref 11.5–14.5)
PERFORMED ON: ABNORMAL
PERFORMED ON: ABNORMAL
PERFORMED ON: NORMAL
PERFORMED ON: NORMAL
PLATELET # BLD: 309 K/UL (ref 130–400)
PMV BLD AUTO: 11.6 FL (ref 9.4–12.3)
POTASSIUM REFLEX MAGNESIUM: 4 MMOL/L (ref 3.5–5)
PROTHROMBIN TIME: 43.3 SEC (ref 12–14.6)
RBC # BLD: 4.45 M/UL (ref 4.2–5.4)
SODIUM BLD-SCNC: 137 MMOL/L (ref 136–145)
WBC # BLD: 6 K/UL (ref 4.8–10.8)

## 2022-08-21 PROCEDURE — 99232 SBSQ HOSP IP/OBS MODERATE 35: CPT | Performed by: INTERNAL MEDICINE

## 2022-08-21 PROCEDURE — 36415 COLL VENOUS BLD VENIPUNCTURE: CPT

## 2022-08-21 PROCEDURE — 2140000000 HC CCU INTERMEDIATE R&B

## 2022-08-21 PROCEDURE — 93005 ELECTROCARDIOGRAM TRACING: CPT | Performed by: HOSPITALIST

## 2022-08-21 PROCEDURE — 6360000002 HC RX W HCPCS: Performed by: HOSPITALIST

## 2022-08-21 PROCEDURE — 80048 BASIC METABOLIC PNL TOTAL CA: CPT

## 2022-08-21 PROCEDURE — 93005 ELECTROCARDIOGRAM TRACING: CPT | Performed by: INTERNAL MEDICINE

## 2022-08-21 PROCEDURE — 6370000000 HC RX 637 (ALT 250 FOR IP): Performed by: HOSPITALIST

## 2022-08-21 PROCEDURE — 85027 COMPLETE CBC AUTOMATED: CPT

## 2022-08-21 PROCEDURE — 82947 ASSAY GLUCOSE BLOOD QUANT: CPT

## 2022-08-21 PROCEDURE — 85610 PROTHROMBIN TIME: CPT

## 2022-08-21 PROCEDURE — 2580000003 HC RX 258: Performed by: HOSPITALIST

## 2022-08-21 PROCEDURE — 2500000003 HC RX 250 WO HCPCS: Performed by: EMERGENCY MEDICINE

## 2022-08-21 RX ORDER — PHYTONADIONE 10 MG/ML
10 INJECTION, EMULSION INTRAMUSCULAR; INTRAVENOUS; SUBCUTANEOUS ONCE
Status: DISCONTINUED | OUTPATIENT
Start: 2022-08-21 | End: 2022-08-21

## 2022-08-21 RX ADMIN — SUCRALFATE 1 G: 1 TABLET ORAL at 19:26

## 2022-08-21 RX ADMIN — ACETAMINOPHEN 650 MG: 325 TABLET, FILM COATED ORAL at 19:26

## 2022-08-21 RX ADMIN — CETIRIZINE HYDROCHLORIDE 10 MG: 10 TABLET, FILM COATED ORAL at 08:05

## 2022-08-21 RX ADMIN — PHYTONADIONE 10 MG: 10 INJECTION, EMULSION INTRAMUSCULAR; INTRAVENOUS; SUBCUTANEOUS at 11:44

## 2022-08-21 RX ADMIN — FUROSEMIDE 80 MG: 40 TABLET ORAL at 08:05

## 2022-08-21 RX ADMIN — ATORVASTATIN CALCIUM 80 MG: 40 TABLET, FILM COATED ORAL at 19:26

## 2022-08-21 RX ADMIN — FLUTICASONE PROPIONATE 1 SPRAY: 50 SPRAY, METERED NASAL at 08:08

## 2022-08-21 RX ADMIN — GABAPENTIN 300 MG: 300 CAPSULE ORAL at 13:37

## 2022-08-21 RX ADMIN — SUCRALFATE 1 G: 1 TABLET ORAL at 08:05

## 2022-08-21 RX ADMIN — EZETIMIBE 10 MG: 10 TABLET ORAL at 08:04

## 2022-08-21 RX ADMIN — INSULIN LISPRO 4 UNITS: 100 INJECTION, SOLUTION INTRAVENOUS; SUBCUTANEOUS at 17:28

## 2022-08-21 RX ADMIN — INSULIN GLARGINE 50 UNITS: 100 INJECTION, SOLUTION SUBCUTANEOUS at 21:30

## 2022-08-21 RX ADMIN — SPIRONOLACTONE 12.5 MG: 25 TABLET ORAL at 08:05

## 2022-08-21 RX ADMIN — ONDANSETRON 4 MG: 2 INJECTION INTRAMUSCULAR; INTRAVENOUS at 12:01

## 2022-08-21 RX ADMIN — ASPIRIN 81 MG: 81 TABLET, COATED ORAL at 08:04

## 2022-08-21 RX ADMIN — LISINOPRIL 2.5 MG: 2.5 TABLET ORAL at 08:05

## 2022-08-21 RX ADMIN — BETHANECHOL CHLORIDE 25 MG: 25 TABLET ORAL at 08:05

## 2022-08-21 RX ADMIN — SUCRALFATE 1 G: 1 TABLET ORAL at 17:24

## 2022-08-21 RX ADMIN — PANTOPRAZOLE SODIUM 40 MG: 40 TABLET, DELAYED RELEASE ORAL at 08:05

## 2022-08-21 RX ADMIN — METOPROLOL SUCCINATE 25 MG: 25 TABLET, EXTENDED RELEASE ORAL at 08:05

## 2022-08-21 RX ADMIN — SUCRALFATE 1 G: 1 TABLET ORAL at 13:03

## 2022-08-21 RX ADMIN — VERAPAMIL HYDROCHLORIDE 40 MG: 80 TABLET ORAL at 17:24

## 2022-08-21 RX ADMIN — RANOLAZINE 500 MG: 500 TABLET, FILM COATED, EXTENDED RELEASE ORAL at 08:05

## 2022-08-21 RX ADMIN — SODIUM CHLORIDE, PRESERVATIVE FREE 10 ML: 5 INJECTION INTRAVENOUS at 19:26

## 2022-08-21 RX ADMIN — TOPIRAMATE 50 MG: 50 TABLET, FILM COATED ORAL at 08:05

## 2022-08-21 RX ADMIN — AMLODIPINE BESYLATE 5 MG: 5 TABLET ORAL at 08:05

## 2022-08-21 RX ADMIN — ISOSORBIDE MONONITRATE 30 MG: 30 TABLET, EXTENDED RELEASE ORAL at 08:05

## 2022-08-21 RX ADMIN — VERAPAMIL HYDROCHLORIDE 40 MG: 80 TABLET ORAL at 06:31

## 2022-08-21 RX ADMIN — RANOLAZINE 500 MG: 500 TABLET, FILM COATED, EXTENDED RELEASE ORAL at 19:26

## 2022-08-21 RX ADMIN — SODIUM CHLORIDE, PRESERVATIVE FREE 10 ML: 5 INJECTION INTRAVENOUS at 08:10

## 2022-08-21 ASSESSMENT — PAIN SCALES - GENERAL: PAINLEVEL_OUTOF10: 9

## 2022-08-21 ASSESSMENT — PAIN - FUNCTIONAL ASSESSMENT: PAIN_FUNCTIONAL_ASSESSMENT: ACTIVITIES ARE NOT PREVENTED

## 2022-08-21 NOTE — PROGRESS NOTES
Cardiology Daily Note Caroline Costa MD      Patient:  Ian Dial  [id number]    Patient Active Problem List    Diagnosis Date Noted    Chest pain in adult 08/21/2022    Substernal chest pain relieved by nitroglycerin 08/20/2022    Chest pain due to myocardial ischemia, unspecified ischemic chest pain type 08/10/2022    Acute kidney injury superimposed on CKD ([address] 08/10/2022    Lip abrasion 04/05/2022    Skin ulcer of face with fat layer exposed ([address] 04/05/2022    Gallstones 09/24/2021    Abnormal biliary HIDA scan 09/24/2021    Chronic anticoagulation 09/24/2021    History of gallstones 09/21/2021    Chest pain 02/24/2018    Gastroesophageal reflux disease without esophagitis     Atherosclerosis of native coronary artery of native heart with unstable angina pectoris ([address] 08/04/2016    Hypertension 08/04/2016    Type 2 diabetes mellitus with diabetic polyneuropathy, with long-term current use of insulin ([address] 08/04/2016    Mixed hyperlipidemia 08/04/2016    NAYAN (obstructive sleep apnea) 08/04/2016    Morbid obesity due to excess calories ([address] 08/04/2016    Acute blood loss anemia 08/04/2016    [facility] occlusion with cerebral infarction ([address] 08/04/2016       Admit Date:  8/20/2022    Admission Problem List: Present on Admission:   Substernal chest pain relieved by nitroglycerin   Chest pain in adult      Cardiac Specific Data:  Specialty Problems          Cardiology Problems    Chest pain due to myocardial ischemia, unspecified ischemic chest pain type        * (Principal) Substernal chest pain relieved by nitroglycerin        Chest pain in adult        Atherosclerosis of native coronary artery of native heart with unstable angina pectoris ([address]        [facility] occlusion with cerebral infarction [facility])        Hypertension        Mixed hyperlipidemia        Chest pain           Subjective:  Ms. Shahnaz Shipley seen today resting comfortably. INR 4.30 blood pressure 112/75 heart 87.   Has some soreness across YARIEL   warfarin (COUMADIN) 10 MG tablet Take 10 mg by mouth 10mg every day    Historical Provider, MD   sucralfate (CARAFATE) 1 GM/10ML suspension Take 10 mLs by mouth 4 times daily 10/5/21   YARIEL Kelly NP   furosemide (LASIX) 40 MG tablet Take 2 tablets by mouth daily  Patient taking differently: Take 120 mg by mouth in the morning. 9/24/21 8/10/22  Nicolás Abarca MD   gabapentin (NEURONTIN) 300 MG capsule Take 1 capsule by mouth 2 times daily as needed (Neuropathy/nerve pains) for up to 3 days.  Intended supply: 30 days 9/24/21 8/10/22  Nicolás Abarca MD   ondansetron (ZOFRAN ODT) 4 MG disintegrating tablet Take 1 tablet by mouth every 8 hours as needed for Nausea or Vomiting 9/18/21   Jessi Luke MD   nystatin (MYCOSTATIN) 807313 UNIT/GM powder Apply 3 times daily to lower groin, abdominal area and under bilateral breasts 5/17/21   YARIEL Mcfadden - CNP   NONFORMULARY daily apple cider gummy     Historical Provider, MD   ezetimibe (ZETIA) 10 MG tablet Take 10 mg by mouth daily  2/15/19   Historical Provider, MD   insulin glargine (LANTUS;BASAGLAR) 100 UNIT/ML injection pen Inject 80 Units into the skin nightly  10/24/17   Historical Provider, MD   bethanechol (URECHOLINE) 25 MG tablet Take 1 tablet by mouth daily 9/14/17   Historical Provider, MD   metFORMIN (GLUCOPHAGE) 1000 MG tablet Take 1 tablet by mouth 2 times daily (with meals) Hold till monday 2/8/16   Historical Provider, MD   insulin aspart (NOVOLOG) 100 UNIT/ML injection vial Inject 10 Units into the skin 4 times daily (before meals and nightly) Up to 10 units ss coverage as directed 10/26/17   Historical Provider, MD   spironolactone (ALDACTONE) 25 MG tablet Take 12.5 mg by mouth daily 12/8/17   Historical Provider, MD   topiramate (TOPAMAX) 50 MG tablet Take 1 tablet by mouth daily  8/28/14   Historical Provider, MD   aspirin 81 MG tablet Take 81 mg by mouth daily    Historical Provider, MD   metoprolol succinate ER (TOPROL-XL) 25 MG XL tablet Take 25 mg by mouth daily     Historical Provider, MD   prochlorperazine (COMPAZINE) 10 MG tablet Take 10 mg by mouth 2 times daily as needed (headache)     Historical Provider, MD   verapamil (CALAN) 40 MG tablet Take 40 mg by mouth every 12 hours    Historical Provider, MD   atorvastatin (LIPITOR) 40 MG tablet Take 80 mg by mouth nightly     Historical Provider, MD        phytonadione (VITAMIN K)  IVPB  10 mg IntraVENous Once    sodium chloride flush  5-40 mL IntraVENous 2 times per day    amLODIPine  5 mg Oral Daily    aspirin  81 mg Oral Daily    atorvastatin  80 mg Oral Nightly    bethanechol  25 mg Oral Daily    cetirizine  10 mg Oral Daily    ezetimibe  10 mg Oral Daily    fluticasone  1 spray Each Nostril Daily    furosemide  80 mg Oral Daily    insulin glargine  80 Units SubCUTAneous Nightly    isosorbide mononitrate  30 mg Oral Daily    lisinopril  2.5 mg Oral Daily    metoprolol succinate  25 mg Oral Daily    miconazole   Topical BID    pantoprazole  40 mg Oral Daily    ranolazine  500 mg Oral BID    spironolactone  12.5 mg Oral Daily    sucralfate  1 g Oral 4x daily    verapamil  40 mg Oral Q12H    [START ON 8/27/2022] vitamin D  50,000 Units Oral Weekly    topiramate  50 mg Oral Daily    insulin lispro  0-16 Units SubCUTAneous TID WC    insulin lispro  0-4 Units SubCUTAneous Nightly       TELEMETRY: Sinus     Physical Exam:      Physical Exam      General:  Awake, alert, NAD  Skin:  Warm and dry  Neck:  no jvd , no carotid bruits  Chest:  Clear to auscultation, no wheezing or rales  Cardiovascular:  RRR K1Q1 no murmurs, clicks, gallups, or rubs  Abdomen:  Soft nontender, nondistended, bowel sounds present  Extremities:  Edema: none       Lab Data:  CBC:   Recent Labs     08/20/22  0129 08/21/22  0129   WBC 6.9 6.0   HGB 14.8 13.6   HCT 45.1 40.4   MCV 91.3 90.8    309     BMP:   Recent Labs     08/20/22  0129 08/21/22  0129   * 137   K 3.8 4.0    101   CO2 22 23   BUN 23* 26*   CREATININE 1.8* 1.7*     LIVER PROFILE:   Recent Labs     08/20/22 0129   AST 17   ALT 19   BILITOT 0.4   ALKPHOS 96     PT/INR:   Recent Labs     08/20/22 0129 08/21/22 0129   PROTIME 34.3* 43.3*   INR 3.24* 4.30*     APTT:   Recent Labs     08/20/22 0129   APTT 50.3*     BNP:  No results for input(s): BNP in the last 72 hours. CK, CKMB, Troponin: @LABRCNT (CKTOTAL:3, CKMB:3, TROPONINI:3)@    IMAGING:  Echo Complete    Result Date: 8/11/2022  Transthoracic Echocardiography Report (TTE)  Demographics   Patient Name   Rodolfo Rodríguez    Date of Study           08/11/2022   MRN            [id number]         Gender                  Female   Date of Birth  1975     Room Number             [facility]   Age            52 year(s)   Height:        64 inches      Referring Physician     Roby Whitmore MD   Weight:        357.01 pounds  Sonographer             Radha Slight   BSA:           2.5 m^2        Interpreting Physician  Armando Dumont MD   BMI:           61.28 kg/m^2  Procedure Type of Study   TTE procedure:ECHO NO CONTRAST WITH DOP/COLR, ECHO 2D W/DOPPLER/CONTRAST  LIMITD. Study Location: Echo Lab Technical Quality: Poor visualization due to body habitus. Patient Status: Outpatient Contrast Medium: Definity. BP: 92/58 mmHg Indications:Chest pain.   Conclusions   Summary  Technically difficult study due to morbid obesity with poor images  Normal left ventricular size with difficult assessment of systolic  function even with Definity contrast -estimated ejection fraction appears  low normal at 50%  Mild concentric left ventricular hypertrophy with mild [grade 1] diastolic  dysfunction  Normal left atrial size  Poor visualization of the aortic valve without evidence of stenosis or  insufficiency  Normally mobile mitral valve without demonstrable regurgitation  No pulmonic valvular insufficiency or stenosis demonstrated  Poor visualization of the right-sided chambers with preserved RV systolic  function  No tricuspid regurgitation with which to estimate RVSP  Nonvisualization of the IVC  Aortic root and ascending segment measure within normal limits  No significant pericardial effusion   Signature   ----------------------------------------------------------------  Electronically signed by Reinier Degroot MD(Interpreting physician)  on 08/11/2022 09:47 AM  ----------------------------------------------------------------  2D Measurements and Calculations(cm)   LVIDd: 4.18 cm                      LVIDs: 3.01 cm  IVSd: 1.1 cm  LVPWd: 1 cm                         AO Root Dimension: 2.5 cm  Rt. Vent. Dimension: 1.96 cm        LA Dimension: 3.1 cm  % Ejection Fraction: 55 %           LV Systolic dimension: 5.76NE                                      LV PW diastolic: 1cm  LV dimension: 4.18 cm               LVOT diameter: 2 cm                                      RV Diastolic dimension: 9.82TL   Ascending Aorta: 2.9 cm  Doppler Measurements and Calculations                                           MV Peak E-Wave: 65.6 cm/s                                          MV Peak A-Wave: 80.6 cm/s                                          MV E/A Ratio: 0.81 %                                          MV Mean velocity: NaNcm/s                                          MV Peak Gradient: 1.72 mmHg   MV E' septal velocity: 13.4cm/s  MV E' lateral velocity:5.22 cm/s      XR CHEST PORTABLE    Result Date: 8/20/2022  NO PRIOR REPORT AVAILABLE Exam: X-RAY OF [facility] Clinical data:Chest pain. Technique:Single view of the chest. Prior studies: No prior studies submitted. Findings:Sternotomy sutures in situ. The lungs are grossly clear; noevidence of acute infiltrate or pleural effusion. Cardiac silhouette is within normal limits. No acute osseous abnormality is detected. no acute cardiopulmonary disease No acute cardiopulmonary process. Recommendation: Follow up as clinically indicated.  Electronically Signed by Renee Goetz at 20-Aug-2022 03:39:29 AM CTA PULMONARY W CONTRAST    Result Date: 8/10/2022  CT ANGIOGRAM OF THE CHEST WITH IV CONTRAST CLINICAL HISTORY: Rule out PE TECHNIQUE: Serial axial images obtained. Sagittal reconstructed images obtained. Coronal reconstructed images obtained. Exam is performed with mL of Isovue-300 intravenous contrast. Per PQRS, CT exam is performed using one or more of the following dose reduction techniques: Automated exposure control, adjustment of the mA and/or KV according to patient size, or use of iterative reconstruction techniques. COMPARISON: None FINDINGS: HEART: Generalized cardiomegaly. No pericardial effusion is seen. VASCULATURE: Post-contrast appearance of the pulmonary arteries shows no evidence for filling defect. Negative for aortic aneurysm or aortic dissection. There is normal caliber of the pulmonary artery trunk in relation to the aorta. THORAX: No acute consolidating infiltrate, pneumothorax or pleural effusion is seen. Negative for pneumothorax. CHEST WALLS: Appearance is unremarkable. ESOPHAGUS: Appearance is unremarkable. STOMACH: Appearance is unremarkable. UPPER ABDOMEN: Mild fatty infiltration of the liver BONES: Appearance is unremarkable. 1.No evidence of PE. NM MYOCARDIAL SPECT REST EXERCISE OR RX    Result Date: 8/12/2022  Lexiscan Nuclear Stress Test Report Procedure date: 8/12/2022 Indications: chest pain Procedure: Stress was performed with injection of 0.4 mg Lexiscan. Vital signs and EKG were monitored. Technetium-99 Myoview was injected in divided doses, approximately 8.5 mCi and 25.0 mCi respectively for rest and stress imaging. The patient was discharged in stable condition. Results: Patient had symptoms of dyspnea during infusion that resolved in recovery. Baseline EKG showed normal sinus rhythm with nonspecific ST/T changes. During stress there were no significant EKG changes or rhythm changes. Baseline and peak blood pressures were 140/94, and 140/94 respectively.   Baseline and peak heart rates were 91 and  104 respectively. EF=40% Myocardial perfusion images showed old lateral infarct. mild ischaemia.  Ef 40% Signed by Dr Fajardo Human        Assessment:  Complains of chest pressure off and on last 24 hours now improved  Morbid obesity  Diabetes mellitus type 2  Chronic kidney disease creatinine 1.8  Chronic anticoagulation with warfarin  Coronary artery disease  Hyperlipidemia  Obstructive sleep apnea  History of anemia  History of cerebral artery occlusion with cerebral infarction  Gastroesophageal reflux disease  History of gallstones  History of pancreatitis  History of diabetic neuropathy  History of seizures  Lexiscan 8/12/2022 EF 40% old lateral infarct mild ischemia suggested  CTA pulmonary 1022 fatty liver infiltration no evidence of pulmonary embolism  CT abdomen pelvis 9/20/2021 mild fat stranding associated with pancreatic head and neck may be seen in acute pancreatitis no other abnormalities  CT of the head 4/26/2018 several areas of encephalomalacia bilaterally as detailed unchanged from previous study new low-density area left temporal lobe may represent subacute or chronic nonhemorrhagic infarction  Echocardiogram 1122 technically difficult study normal left ventricular function EF 50% mild concentric LVH grade 1 diastolic dysfunction    Plan:  Hold warfarin Dr. Maris Mackey to assess tomorrow    Quentin Celestin MD, MD 8/21/2022 12:19 PM

## 2022-08-21 NOTE — PROGRESS NOTES
positive bowel sounds in all quadrants, no distention, nontender to palpation  Extremities: no tenderness, no edema, moves all extremities  Psych: Affect normal and good eye contact, behavioral normal.        Labs/Imaging/Diagnostics    Labs:  CBC:  Recent Labs     08/20/22 0129 08/21/22 0129   WBC 6.9 6.0   RBC 4.94 4.45   HGB 14.8 13.6   HCT 45.1 40.4   MCV 91.3 90.8   RDW 14.7* 14.6*    309     CHEMISTRIES:  Recent Labs     08/20/22 0129 08/21/22 0129   * 137   K 3.8 4.0    101   CO2 22 23   BUN 23* 26*   CREATININE 1.8* 1.7*   GLUCOSE 88 79     PT/INR:  Recent Labs     08/20/22 0129 08/21/22 0129   PROTIME 34.3* 43.3*   INR 3.24* 4.30*     APTT:  Recent Labs     08/20/22 0129   APTT 50.3*     LIVER PROFILE:  Recent Labs     08/20/22 0129   AST 17   ALT 19   BILITOT 0.4   ALKPHOS 96       Imaging Last 24 Hours:  XR CHEST PORTABLE    Result Date: 8/20/2022  NO PRIOR REPORT AVAILABLE Exam: X-RAY OF THECHEST Clinical data:Chest pain. Technique:Single view of the chest. Prior studies: No prior studies submitted. Findings:Sternotomy sutures in situ. The lungs are grossly clear; noevidence of acute infiltrate or pleural effusion. Cardiac silhouette is within normal limits. No acute osseous abnormality is detected. no acute cardiopulmonary disease No acute cardiopulmonary process. Recommendation: Follow up as clinically indicated. Electronically Signed by Abi Severino at 20-Aug-2022 03:39:29 AM             Assessment//Plan           Hospital Problems             Last Modified POA    * (Principal) Substernal chest pain relieved by nitroglycerin 8/20/2022 Yes    Chest pain in adult 8/21/2022 Yes     Assessment & Plan    Recurrent chest pain  Troponin negative on admission, EKG not listed abnormality. Recently evaluated in house and stress test was done 8/12/2022 which showed EF of 40%, old lateral infarct with mild ischemia.   Echocardiogram with grade 1 diastolic dysfunction  Currently on nitro drip as well as heparin drip  Seen in house by cardiology plans for reevaluation on Monday for possible cath. Continue aspirin, statin, Imdur, lisinopril and metoprolol. Type 2 diabetes  Insulin regimen as currently ordered  Hypoglycemia protocol    GERD: Continue pantoprazole    Hypertension: Continue lisinopril, Imdur and Norvasc. Hyperlipidemia: Statin      Code: Full  Diet:/Diabetic  Disposition: Pending completion of above work-up.     Electronically signed by Marquez Pulido MD on 8/21/22 at 12:56 PM CDT

## 2022-08-22 ENCOUNTER — APPOINTMENT (OUTPATIENT)
Dept: CARDIAC CATH/INVASIVE PROCEDURES | Age: 47
DRG: 287 | End: 2022-08-22
Payer: MEDICARE

## 2022-08-22 LAB
ANION GAP SERPL CALCULATED.3IONS-SCNC: 11 MMOL/L (ref 7–19)
ANION GAP SERPL CALCULATED.3IONS-SCNC: 9 MMOL/L (ref 7–19)
BUN BLDV-MCNC: 23 MG/DL (ref 6–20)
BUN BLDV-MCNC: 26 MG/DL (ref 6–20)
CALCIUM SERPL-MCNC: 8.9 MG/DL (ref 8.6–10)
CALCIUM SERPL-MCNC: 9 MG/DL (ref 8.6–10)
CHLORIDE BLD-SCNC: 101 MMOL/L (ref 98–111)
CHLORIDE BLD-SCNC: 99 MMOL/L (ref 98–111)
CO2: 24 MMOL/L (ref 22–29)
CO2: 27 MMOL/L (ref 22–29)
CREAT SERPL-MCNC: 1.6 MG/DL (ref 0.5–0.9)
CREAT SERPL-MCNC: 1.8 MG/DL (ref 0.5–0.9)
EKG P AXIS: 31 DEGREES
EKG P-R INTERVAL: 162 MS
EKG Q-T INTERVAL: 362 MS
EKG QRS DURATION: 82 MS
EKG QTC CALCULATION (BAZETT): 426 MS
EKG T AXIS: 79 DEGREES
GFR AFRICAN AMERICAN: 36
GFR AFRICAN AMERICAN: 42
GFR NON-AFRICAN AMERICAN: 30
GFR NON-AFRICAN AMERICAN: 35
GLUCOSE BLD-MCNC: 102 MG/DL (ref 74–109)
GLUCOSE BLD-MCNC: 103 MG/DL (ref 70–99)
GLUCOSE BLD-MCNC: 116 MG/DL (ref 74–109)
GLUCOSE BLD-MCNC: 138 MG/DL (ref 70–99)
GLUCOSE BLD-MCNC: 99 MG/DL (ref 70–99)
HCT VFR BLD CALC: 41.4 % (ref 37–47)
HEMOGLOBIN: 13.5 G/DL (ref 12–16)
INR BLD: 1.67 (ref 0.88–1.18)
MCH RBC QN AUTO: 29.7 PG (ref 27–31)
MCHC RBC AUTO-ENTMCNC: 32.6 G/DL (ref 33–37)
MCV RBC AUTO: 91 FL (ref 81–99)
PDW BLD-RTO: 14.6 % (ref 11.5–14.5)
PERFORMED ON: ABNORMAL
PERFORMED ON: ABNORMAL
PERFORMED ON: NORMAL
PLATELET # BLD: 293 K/UL (ref 130–400)
PMV BLD AUTO: 10.8 FL (ref 9.4–12.3)
POTASSIUM REFLEX MAGNESIUM: 3.8 MMOL/L (ref 3.5–5)
POTASSIUM SERPL-SCNC: 4.2 MMOL/L (ref 3.5–5)
PROTHROMBIN TIME: 19.9 SEC (ref 12–14.6)
RBC # BLD: 4.55 M/UL (ref 4.2–5.4)
SODIUM BLD-SCNC: 134 MMOL/L (ref 136–145)
SODIUM BLD-SCNC: 137 MMOL/L (ref 136–145)
WBC # BLD: 6.2 K/UL (ref 4.8–10.8)

## 2022-08-22 PROCEDURE — 93459 L HRT ART/GRFT ANGIO: CPT | Performed by: INTERNAL MEDICINE

## 2022-08-22 PROCEDURE — 82947 ASSAY GLUCOSE BLOOD QUANT: CPT

## 2022-08-22 PROCEDURE — B2111ZZ FLUOROSCOPY OF MULTIPLE CORONARY ARTERIES USING LOW OSMOLAR CONTRAST: ICD-10-PCS | Performed by: INTERNAL MEDICINE

## 2022-08-22 PROCEDURE — 36415 COLL VENOUS BLD VENIPUNCTURE: CPT

## 2022-08-22 PROCEDURE — 93010 ELECTROCARDIOGRAM REPORT: CPT | Performed by: INTERNAL MEDICINE

## 2022-08-22 PROCEDURE — 99153 MOD SED SAME PHYS/QHP EA: CPT

## 2022-08-22 PROCEDURE — 6360000004 HC RX CONTRAST MEDICATION: Performed by: HOSPITALIST

## 2022-08-22 PROCEDURE — 80048 BASIC METABOLIC PNL TOTAL CA: CPT

## 2022-08-22 PROCEDURE — 99152 MOD SED SAME PHYS/QHP 5/>YRS: CPT | Performed by: INTERNAL MEDICINE

## 2022-08-22 PROCEDURE — 2140000000 HC CCU INTERMEDIATE R&B

## 2022-08-22 PROCEDURE — 2500000003 HC RX 250 WO HCPCS

## 2022-08-22 PROCEDURE — 99221 1ST HOSP IP/OBS SF/LOW 40: CPT | Performed by: INTERNAL MEDICINE

## 2022-08-22 PROCEDURE — 2580000003 HC RX 258: Performed by: INTERNAL MEDICINE

## 2022-08-22 PROCEDURE — 85610 PROTHROMBIN TIME: CPT

## 2022-08-22 PROCEDURE — C1894 INTRO/SHEATH, NON-LASER: HCPCS

## 2022-08-22 PROCEDURE — B2151ZZ FLUOROSCOPY OF LEFT HEART USING LOW OSMOLAR CONTRAST: ICD-10-PCS | Performed by: INTERNAL MEDICINE

## 2022-08-22 PROCEDURE — 99152 MOD SED SAME PHYS/QHP 5/>YRS: CPT

## 2022-08-22 PROCEDURE — C1887 CATHETER, GUIDING: HCPCS

## 2022-08-22 PROCEDURE — 2709999900 HC NON-CHARGEABLE SUPPLY

## 2022-08-22 PROCEDURE — 93455 CORONARY ART/GRFT ANGIO S&I: CPT

## 2022-08-22 PROCEDURE — 6370000000 HC RX 637 (ALT 250 FOR IP): Performed by: HOSPITALIST

## 2022-08-22 PROCEDURE — C1769 GUIDE WIRE: HCPCS

## 2022-08-22 PROCEDURE — 85027 COMPLETE CBC AUTOMATED: CPT

## 2022-08-22 PROCEDURE — 4A023N7 MEASUREMENT OF CARDIAC SAMPLING AND PRESSURE, LEFT HEART, PERCUTANEOUS APPROACH: ICD-10-PCS | Performed by: INTERNAL MEDICINE

## 2022-08-22 PROCEDURE — 6360000002 HC RX W HCPCS

## 2022-08-22 RX ORDER — SODIUM CHLORIDE 9 MG/ML
INJECTION, SOLUTION INTRAVENOUS PRN
Status: DISCONTINUED | OUTPATIENT
Start: 2022-08-22 | End: 2022-08-22 | Stop reason: SDUPTHER

## 2022-08-22 RX ORDER — ACETAMINOPHEN 325 MG/1
650 TABLET ORAL EVERY 4 HOURS PRN
Status: DISCONTINUED | OUTPATIENT
Start: 2022-08-22 | End: 2022-08-22 | Stop reason: SDUPTHER

## 2022-08-22 RX ORDER — SODIUM CHLORIDE 0.9 % (FLUSH) 0.9 %
5-40 SYRINGE (ML) INJECTION EVERY 12 HOURS SCHEDULED
Status: DISCONTINUED | OUTPATIENT
Start: 2022-08-22 | End: 2022-08-23 | Stop reason: HOSPADM

## 2022-08-22 RX ORDER — SODIUM CHLORIDE 9 MG/ML
INJECTION, SOLUTION INTRAVENOUS CONTINUOUS
Status: DISCONTINUED | OUTPATIENT
Start: 2022-08-22 | End: 2022-08-23 | Stop reason: HOSPADM

## 2022-08-22 RX ORDER — SODIUM CHLORIDE 0.9 % (FLUSH) 0.9 %
5-40 SYRINGE (ML) INJECTION PRN
Status: DISCONTINUED | OUTPATIENT
Start: 2022-08-22 | End: 2022-08-23 | Stop reason: HOSPADM

## 2022-08-22 RX ORDER — IODIXANOL 320 MG/ML
100 INJECTION, SOLUTION INTRAVASCULAR
Status: COMPLETED | OUTPATIENT
Start: 2022-08-22 | End: 2022-08-22

## 2022-08-22 RX ORDER — SODIUM CHLORIDE 9 MG/ML
INJECTION, SOLUTION INTRAVENOUS PRN
Status: DISCONTINUED | OUTPATIENT
Start: 2022-08-22 | End: 2022-08-23 | Stop reason: HOSPADM

## 2022-08-22 RX ORDER — SODIUM CHLORIDE 0.9 % (FLUSH) 0.9 %
5-40 SYRINGE (ML) INJECTION PRN
Status: DISCONTINUED | OUTPATIENT
Start: 2022-08-22 | End: 2022-08-22 | Stop reason: SDUPTHER

## 2022-08-22 RX ORDER — SODIUM CHLORIDE 0.9 % (FLUSH) 0.9 %
5-40 SYRINGE (ML) INJECTION EVERY 12 HOURS SCHEDULED
Status: DISCONTINUED | OUTPATIENT
Start: 2022-08-22 | End: 2022-08-22 | Stop reason: SDUPTHER

## 2022-08-22 RX ORDER — SODIUM CHLORIDE 9 MG/ML
INJECTION, SOLUTION INTRAVENOUS CONTINUOUS
Status: ACTIVE | OUTPATIENT
Start: 2022-08-22 | End: 2022-08-23

## 2022-08-22 RX ADMIN — GABAPENTIN 300 MG: 300 CAPSULE ORAL at 17:49

## 2022-08-22 RX ADMIN — SUCRALFATE 1 G: 1 TABLET ORAL at 09:52

## 2022-08-22 RX ADMIN — PANTOPRAZOLE SODIUM 40 MG: 40 TABLET, DELAYED RELEASE ORAL at 09:52

## 2022-08-22 RX ADMIN — VERAPAMIL HYDROCHLORIDE 40 MG: 80 TABLET ORAL at 17:47

## 2022-08-22 RX ADMIN — CETIRIZINE HYDROCHLORIDE 10 MG: 10 TABLET, FILM COATED ORAL at 09:52

## 2022-08-22 RX ADMIN — MICONAZOLE NITRATE: 2 POWDER TOPICAL at 21:36

## 2022-08-22 RX ADMIN — SUCRALFATE 1 G: 1 TABLET ORAL at 21:20

## 2022-08-22 RX ADMIN — EZETIMIBE 10 MG: 10 TABLET ORAL at 09:53

## 2022-08-22 RX ADMIN — SODIUM CHLORIDE, PRESERVATIVE FREE 10 ML: 5 INJECTION INTRAVENOUS at 09:53

## 2022-08-22 RX ADMIN — SODIUM CHLORIDE: 9 INJECTION, SOLUTION INTRAVENOUS at 09:59

## 2022-08-22 RX ADMIN — SODIUM CHLORIDE, PRESERVATIVE FREE 10 ML: 5 INJECTION INTRAVENOUS at 21:27

## 2022-08-22 RX ADMIN — ISOSORBIDE MONONITRATE 30 MG: 30 TABLET, EXTENDED RELEASE ORAL at 09:52

## 2022-08-22 RX ADMIN — AMLODIPINE BESYLATE 5 MG: 5 TABLET ORAL at 09:52

## 2022-08-22 RX ADMIN — ASPIRIN 81 MG: 81 TABLET, COATED ORAL at 09:53

## 2022-08-22 RX ADMIN — FUROSEMIDE 80 MG: 40 TABLET ORAL at 09:52

## 2022-08-22 RX ADMIN — BETHANECHOL CHLORIDE 25 MG: 25 TABLET ORAL at 09:53

## 2022-08-22 RX ADMIN — TOPIRAMATE 50 MG: 50 TABLET, FILM COATED ORAL at 09:53

## 2022-08-22 RX ADMIN — SPIRONOLACTONE 12.5 MG: 25 TABLET ORAL at 09:53

## 2022-08-22 RX ADMIN — IODIXANOL 124 ML: 320 INJECTION, SOLUTION INTRAVASCULAR at 17:14

## 2022-08-22 RX ADMIN — ATORVASTATIN CALCIUM 80 MG: 40 TABLET, FILM COATED ORAL at 21:20

## 2022-08-22 RX ADMIN — SUCRALFATE 1 G: 1 TABLET ORAL at 17:47

## 2022-08-22 RX ADMIN — METOPROLOL SUCCINATE 25 MG: 25 TABLET, EXTENDED RELEASE ORAL at 09:52

## 2022-08-22 RX ADMIN — RANOLAZINE 500 MG: 500 TABLET, FILM COATED, EXTENDED RELEASE ORAL at 09:52

## 2022-08-22 RX ADMIN — RANOLAZINE 500 MG: 500 TABLET, FILM COATED, EXTENDED RELEASE ORAL at 21:20

## 2022-08-22 RX ADMIN — INSULIN GLARGINE 50 UNITS: 100 INJECTION, SOLUTION SUBCUTANEOUS at 21:32

## 2022-08-22 RX ADMIN — ACETAMINOPHEN 650 MG: 325 TABLET, FILM COATED ORAL at 21:21

## 2022-08-22 RX ADMIN — LISINOPRIL 2.5 MG: 2.5 TABLET ORAL at 09:52

## 2022-08-22 RX ADMIN — SODIUM CHLORIDE: 9 INJECTION, SOLUTION INTRAVENOUS at 17:47

## 2022-08-22 ASSESSMENT — PAIN DESCRIPTION - LOCATION: LOCATION: HEAD

## 2022-08-22 ASSESSMENT — PAIN SCALES - GENERAL
PAINLEVEL_OUTOF10: 0
PAINLEVEL_OUTOF10: 0
PAINLEVEL_OUTOF10: 6

## 2022-08-22 ASSESSMENT — PAIN SCALES - WONG BAKER: WONGBAKER_NUMERICALRESPONSE: 0

## 2022-08-22 ASSESSMENT — PAIN - FUNCTIONAL ASSESSMENT: PAIN_FUNCTIONAL_ASSESSMENT: ACTIVITIES ARE NOT PREVENTED

## 2022-08-22 ASSESSMENT — PAIN DESCRIPTION - DESCRIPTORS: DESCRIPTORS: THROBBING

## 2022-08-22 ASSESSMENT — PAIN DESCRIPTION - ORIENTATION: ORIENTATION: INNER

## 2022-08-22 NOTE — PROGRESS NOTES
Physician Progress Note      Antione Bradshaw  WOLF #:                  [id number]  :                       1975  ADMIT DATE:       2022 2:32 AM  DISCH DATE:  RESPONDING  PROVIDER #:        LISA BRODERICK        QUERY TEXT:    Stage of Chronic Kidney Disease: Please provide further specificity, if known. Clinical indicators include: chronic kidney disease, bun, creatinine, bnp,   probnp  Options provided:  -- Chronic kidney disease stage 1  -- Chronic kidney disease stage 2  -- Chronic kidney disease stage 3  -- Chronic kidney disease stage 3a  -- Chronic kidney disease stage 3b  -- Chronic kidney disease stage 4  -- Chronic kidney disease stage 5  -- Chronic kidney disease stage 5, requiring dialysis  -- End stage renal disease  -- Other - I will add my own diagnosis  -- Disagree - Not applicable / Not valid  -- Disagree - Clinically Unable to determine / Unknown        PROVIDER RESPONSE TEXT:    The patient has chronic kidney disease stage 3.       Electronically signed by:  Sri Brown 2022 2:17 PM

## 2022-08-22 NOTE — PROCEDURES
Cardiac Catherization        Patient Name: Gibran Alicea   MRN: [id number]   Age: 52 y.o. : 1975   Admission Date: 2022   Room/Bed: Abimael Finney MD        DATE OF PROCEDURE: 2022   INDICATIONS:   This is a 52 y.o. female with recurrence of atypical chest pain tender to the touch. Nuclear stress test showed fixed apical defect. Patient had myocardial infarct at age 34. She underwent CABG with LIMA to the left anterior descending and first diagonal branch 2016. PROCEDURE:   1. Coronary Angiogram   2. Left heart catheterization   3. Graft study      ANESTHESIA   Moderate sedation. The patient was continously monitored by the trained experienced nurse under my supervision with respect to level of  consciousness, and vital signs/physiologic status throughout the case. For further details regarding specific medications and doses please refer to  cath lab procedural notes    DESCRIPTION OF PROCEDURE:   Gibran Alicea was brought to the cath lab in a fasting state after informed consent was obtained. A pre procedural time out occurred to identify correct patient and procedure. Patient was prepped and draped in the usual sterile fashion. Left radial approach was used. Bleeding was stopped with TR band  Routine use of diagnostic catheters were used for angiogram and different projections. FINDINGS:   HEMODYNAMICS:   LVEDP: 5 mmHg   BP: 120/70  HR: 80/min  There was no gradient seen across the aortic valve. Left ventriculogram was performed: Not performed because of renal insufficiency    Comments: Normal left ventricular end-diastolic pressure    CORONARY ANGIOGRAPHY:   Dominance: Right  Left Main: No selective engagement.   However injections of the LIMA retrogradely filled the left main which looks normal.  The origins of the left coronary cusp was rotated and cannot be reached selectively from the left radial approach. LAD: Left anterior descending was normal in caliber and narrowed 50% proximally before the anastomosis with a LIMA. The mid and distal left anterior descending were normal to small in caliber and moderately irregular. The LIMA graft was normal and tortuous. It had a sequential work-up to the diagonal branch which was normal  LCx: Left circumflex was faintly visualized through the retrograde filling from injections of the LIMA to the left anterior descending. Detail cannot be visualized  RCA: Right coronary ostium was very anterior. Right coronary artery was normal in caliber and free of significant disease          ESTIMATED BLOOD LOSS: Minimal  COMPLICATIONS: None         CONCLUSIONS:   Extremely difficult coronary artery angiogram from the left radial approach. The ascending aortic root was rotated anteriorly. Left main from retrograde filling during LIMA injection was normal  Left anterior descending filled via a normal tortuous LIMA graft. There was 50% stenoses of the proximal left anterior descending. Mid and distal left anterior descending after the LIMA distal anastomosis was normal to small in caliber and moderately irregular  Diagonal branch filled via the sequential hookup from the LIMA and was normal  Left circumflex cannot be seen too clearly  Right coronary artery was normal    RECOMMENDATIONS:   The chest pain is atypical and can be reproduced by touching the chest wall. Nuclear stress test showed fixed apical defect. The left circumflex area looks normal on nuclear stress test.  EKGs and enzymes were normal.  I do not think the chest pain is from cardiac ischemia. It is most likely musculoskeletal in origin. This can be treated medically. This dictation was performed using [address]. Mistake and misspelling may have been created without  realizing them. Please notify me for clarification.   Thank you  Lilliana Sawant MD  8/22/2022, 5:15 PM

## 2022-08-22 NOTE — PROGRESS NOTES
Patient complaining of chest pain at a 10 pressure across her chest and sharp pain in between her shoulder blades. 132/84, 93, 20, 97% on RA turned up nitro 10mcg/min. EKG done no changes. After several minutes patient states she is fine. Will monitor her closely.

## 2022-08-22 NOTE — CONSULTS
[facility] ([facility]) Cardiology   Consult Note       Requesting MD:  Richard Daniel MD   Admit Status:         Patient:    Adán Oro  [id number]  1975         Chief Complaint: Recurrence of on and off substernal chest pain  HPI: Patient is a 52 y.o. female while doing some laundry developed substernal ache with radiation to the back. It lasted for 4 hours. She was seen in the emergency room and was given nitroglycerin sublingually. It subsided somewhat but it Coming Back. She Has Been Put on IV Nitro with Relief Overnight. 2 Troponins Were Negative. EKG Did Not Show Any Major Changes. Patient Was Admitted August 11 for Similar Problem. Nuclear Stress Test Showed Reversible Defect in Lateral Wall. He Was Sent Home and Schedule As Outpatient for Heart Cath. She Is Known to Have Hypertension, Diabetes and Hyperlipidemia, Sleep Apnea and Morbid Obesity. 2016 she had CABG namelt LIMA to LAD and D1. History of MI at age 32 in the LAD trritory She Is a Non-Smoker Nondrinker. Patient Had Right Hemispheric Stroke in the past mild residual weakness of the left side  Hx of apical thrombus with CVA. She has been on coumadin. The latest echo in June showed EF 45-50%.  No apical thrombus mentioned    Review of Systems:  HENNT: normal  PULMONARY: normal   CVS:see history of present illness  ABD: denies any abdominal pain  PERIPHERL: normal  MSK: no swelling of the lower extremities  CNS: History of right hemispheric stroke  Renal: Denies any history of dysuria or frequency    Cardiac Specific Data:  Specialty Problems          Cardiology Problems    Chest pain due to myocardial ischemia, unspecified ischemic chest pain type        * (Principal) Substernal chest pain relieved by nitroglycerin        Chest pain in adult        Atherosclerosis of native coronary artery of native heart with unstable angina pectoris (HCC)        [facility] occlusion with cerebral infarction ([address]        Hypertension        Mixed hyperlipidemia        Chest pain           Past Medical History:  Past Medical History:   Diagnosis Date    CAD (coronary artery disease)     [facility] occlusion with cerebral infarction (HCC)     CHF (congestive heart failure) (HCC)     Chronic kidney disease     Diabetes mellitus ([facility]     Diabetic neuropathy (HCC)     GERD (gastroesophageal reflux disease)     Hyperlipidemia     Hypertension     Neuromuscular disorder ([facility]     Seizures ([facility]     as an child        Past Surgical History:  Past Surgical History:   Procedure Laterality Date    CARDIAC SURGERY      COLONOSCOPY N/A 09/02/2021    Dr Shira Moss, Sub prep Fair, 4 year recall    CORONARY ARTERY BYPASS GRAFT      2016    ENDOSCOPY, COLON, DIAGNOSTIC      EYE SURGERY      TONSILLECTOMY      at age 8    UPPER GASTROINTESTINAL ENDOSCOPY      [place], w/dil per patient    UPPER GASTROINTESTINAL ENDOSCOPY N/A 09/02/2021    Dr Shira Moss, BDM, (-)Spr[place]       Past Family History:  Family History   Problem Relation Age of Onset    Osteoarthritis Mother     Diabetes Mother     Hypertension Mother     Heart Failure Mother     Hypertension Father     Heart Attack Brother     Heart Failure Brother     Heart Failure Maternal Grandmother     Diabetes Maternal Grandfather     Heart Disease Maternal Grandfather     Heart Disease Paternal Grandmother     No Known Problems Paternal Grandfather     Colon Cancer Neg Hx     Colon Polyps Neg Hx     Esophageal Cancer Neg Hx     Liver Cancer Neg Hx     Liver Disease Neg Hx     Rectal Cancer Neg Hx     Stomach Cancer Neg Hx        Past Social History:  Social History     Socioeconomic History    Marital status: Single     Spouse name: Not on file    Number of children: 0    Years of education: Not on file    Highest education level: Not on file   Occupational History    Occupation: customer service     Comment: disability   Tobacco Use    Smoking status: Never    Smokeless tobacco: Never   Vaping Use    Vaping Use: Never used   Substance and Sexual Activity    Alcohol use: Yes     Alcohol/week: 2.0 standard drinks     Types: 2 Glasses of wine per week     Comment: weekly    Drug use: Never    Sexual activity: Not Currently     Comment: has no kids   Other Topics Concern    Not on file   Social History Narrative    CODE STATUS: Full Code    HEALTH CARE PROXY: her Father, Mr. Lauren Joaquin, [phone]    AMBULATES: independently    DOMICILED: has 3 steps to enter the home, her Father and Cousing and Cousin's daughter stay with them, has a pet dog     Social Determinants of Health     Financial Resource Strain: Medium Risk    Difficulty of Paying Living Expenses: Somewhat hard   Food Insecurity: Food Insecurity Present    Worried About [facility] in the Last Year: Sometimes true    Ran Out of Food in the Last Year: Sometimes true   Transportation Needs: No Transportation Needs    Lack of Transportation (Medical): No    Lack of Transportation (Non-Medical): No   Physical Activity: Insufficiently Active    Days of Exercise per Week: 2 days    Minutes of Exercise per Session: 30 min   Stress: Stress Concern Present    Feeling of Stress : To some extent   Social Connections: Moderately Isolated    Frequency of Communication with Friends and Family: More than three times a week    Frequency of Social Gatherings with Friends and Family: More than three times a week    Attends Buddhism Services: More than 4 times per year    Active Member of [address] [place] [address] or Organizations: No    Attends Club or Organization Meetings: Never    Marital Status: Never    Intimate Partner Violence: Not on file   Housing Stability: [address] to Pay for Housing in the Last Year: No    Number of [place] in the Last Year: 1    Unstable Housing in the Last Year: No       Allergies:   Allergies   Allergen Reactions    Bactrim [Sulfamethoxazole-Trimethoprim] Other (See Comments)     Yeast inf       Prior to Admission medications Medication Sig Start Date End Date Taking? Authorizing Provider   isosorbide mononitrate (IMDUR) 30 MG extended release tablet Take 1 tablet by mouth in the morning. 8/13/22   YARIEL Carter CNP   ranolazine (RANEXA) 500 MG extended release tablet Take 1 tablet by mouth in the morning and 1 tablet before bedtime. 8/12/22   YARIEL Carter CNP   pantoprazole (PROTONIX) 40 MG tablet TAKE 1 TABLET BY MOUTH DAILY 6/26/22   YARIEL Reynoso NP   fluticasone [facility]) 50 MCG/ACT nasal spray 1 spray by Each Nostril route daily 5/11/22   Felisa Cooks, APRN - CNP   cetirizine (ZYRTEC ALLERGY) 10 MG tablet Take 1 tablet by mouth daily 5/11/22   Felisa Cooks, APRN - CNP   Semaglutide,0.25 or 0.5MG/DOS, (OZEMPIC, 0.25 OR 0.5 MG/DOSE,) 2 MG/1.5ML SOPN Inject 0.25 mg into the skin once a week    Historical Provider, MD   amLODIPine (NORVASC) 5 MG tablet Take 5 mg by mouth daily     Historical Provider, MD   vitamin D (ERGOCALCIFEROL) 1.25 MG (53436 UT) CAPS capsule Take 1 capsule by mouth once a week 9/26/21   Historical Provider, MD   lisinopril (PRINIVIL;ZESTRIL) 2.5 MG tablet Take 1 tablet by mouth daily  10/9/21   Historical Provider, MD   promethazine-dextromethorphan (PROMETHAZINE-DM) 6.25-15 MG/5ML syrup Take 5 mLs by mouth nightly as needed for Cough 11/29/21   YARIEL Lackey   warfarin (COUMADIN) 10 MG tablet Take 10 mg by mouth 10mg every day    Historical Provider, MD   sucralfate (CARAFATE) 1 GM/10ML suspension Take 10 mLs by mouth 4 times daily 10/5/21   YARIEL Reynoso NP   furosemide (LASIX) 40 MG tablet Take 2 tablets by mouth daily  Patient taking differently: Take 120 mg by mouth in the morning. 9/24/21 8/10/22  Nicolás Abarca MD   gabapentin (NEURONTIN) 300 MG capsule Take 1 capsule by mouth 2 times daily as needed (Neuropathy/nerve pains) for up to 3 days.  Intended supply: 30 days 9/24/21 8/10/22  Nicolás Abarca MD   ondansetron (ZOFRAN ODT) 4 MG disintegrating tablet Take 1 tablet by mouth every 8 hours as needed for Nausea or Vomiting 9/18/21   Lj Alfred MD   nystatin (MYCOSTATIN) 181637 UNIT/GM powder Apply 3 times daily to lower groin, abdominal area and under bilateral breasts 5/17/21   YARIEL Downs - CNP   NONFORMULARY daily apple cider gummy     Historical Provider, MD   ezetimibe (ZETIA) 10 MG tablet Take 10 mg by mouth daily  2/15/19   Historical Provider, MD   insulin glargine (LANTUS;BASAGLAR) 100 UNIT/ML injection pen Inject 80 Units into the skin nightly  10/24/17   Historical Provider, MD   bethanechol (URECHOLINE) 25 MG tablet Take 1 tablet by mouth daily 9/14/17   Historical Provider, MD   metFORMIN (GLUCOPHAGE) 1000 MG tablet Take 1 tablet by mouth 2 times daily (with meals) Hold till monday 2/8/16   Historical Provider, MD   insulin aspart (NOVOLOG) 100 UNIT/ML injection vial Inject 10 Units into the skin 4 times daily (before meals and nightly) Up to 10 units ss coverage as directed 10/26/17   Historical Provider, MD   spironolactone (ALDACTONE) 25 MG tablet Take 12.5 mg by mouth daily 12/8/17   Historical Provider, MD   topiramate (TOPAMAX) 50 MG tablet Take 1 tablet by mouth daily  8/28/14   Historical Provider, MD   aspirin 81 MG tablet Take 81 mg by mouth daily    Historical Provider, MD   metoprolol succinate ER (TOPROL-XL) 25 MG XL tablet Take 25 mg by mouth daily     Historical Provider, MD   prochlorperazine (COMPAZINE) 10 MG tablet Take 10 mg by mouth 2 times daily as needed (headache)     Historical Provider, MD   verapamil (CALAN) 40 MG tablet Take 40 mg by mouth every 12 hours    Historical Provider, MD   atorvastatin (LIPITOR) 40 MG tablet Take 80 mg by mouth nightly     Historical Provider, MD        Current Facility-Administered Medications   Medication Dose Route Frequency Provider Last Rate Last Admin    0.9 % sodium chloride infusion   IntraVENous Continuous Lethea Morning, MD        nitroGLYCERIN 50 mg in dextrose 5% 250 mL infusion  5 mcg/min IntraVENous Continuous Zac Sierra MD 1.5 mL/hr at 08/21/22 0335 5 mcg/min at 08/21/22 0335    sodium chloride flush 0.9 % injection 5-40 mL  5-40 mL IntraVENous 2 times per day Marco Anna MD   10 mL at 08/21/22 1926    sodium chloride flush 0.9 % injection 5-40 mL  5-40 mL IntraVENous PRN Marco Anna MD        0.9 % sodium chloride infusion   IntraVENous PRN Marco Anna MD 15 mL/hr at 08/20/22 0739 Rate Verify at 08/20/22 0739    ondansetron (ZOFRAN-ODT) disintegrating tablet 4 mg  4 mg Oral Q8H PRN Marco Anna MD        Or    ondansetron [facility]) injection 4 mg  4 mg IntraVENous Q6H PRN Marco Anna MD   4 mg at 08/21/22 1201    acetaminophen (TYLENOL) tablet 650 mg  650 mg Oral Q6H PRN Marco Anna MD   650 mg at 08/21/22 1926    Or    acetaminophen (TYLENOL) suppository 650 mg  650 mg Rectal Q6H PRN Marco Anna MD        potassium chloride (KLOR-CON M) extended release tablet 40 mEq  40 mEq Oral PRN Marco Anna MD        Or    potassium bicarb-citric acid (EFFER-K) effervescent tablet 40 mEq  40 mEq Oral PRN Marco Anna MD        Or    potassium chloride 10 mEq/100 mL IVPB (Peripheral Line)  10 mEq IntraVENous PRN Marco Anna MD        magnesium sulfate 2000 mg in 50 mL IVPB premix  2,000 mg IntraVENous PRN Marco Anna MD        amLODIPine (NORVASC) tablet 5 mg  5 mg Oral Daily Marco Anna MD   5 mg at 08/21/22 0805    aspirin EC tablet 81 mg  81 mg Oral Daily Marco Anna MD   81 mg at 08/21/22 0804    atorvastatin (LIPITOR) tablet 80 mg  80 mg Oral Nightly Marco Anna MD   80 mg at 08/21/22 1926    bethanechol (URECHOLINE) tablet 25 mg  25 mg Oral Daily Marco Anna MD   25 mg at 08/21/22 0805    cetirizine (ZYRTEC) tablet 10 mg  10 mg Oral Daily Marco Anna MD   10 mg at 08/21/22 0805    ezetimibe (ZETIA) tablet 10 mg  10 mg Oral Daily Marco Anna MD   10 mg at 08/21/22 0804    fluticasone (FLONASE) 50 MCG/ACT nasal spray 1 spray  1 spray Each Nostril Daily Andree Sibley MD   1 spray at 08/21/22 7708    furosemide (LASIX) tablet 80 mg  80 mg Oral Daily Andree Sibley MD   80 mg at 08/21/22 0805    gabapentin (NEURONTIN) capsule 300 mg  300 mg Oral BID PRN Andree Sibley MD   300 mg at 08/21/22 1337    insulin glargine (LANTUS) injection vial 80 Units  80 Units SubCUTAneous Nightly Andree Sibley MD   50 Units at 08/21/22 2130    isosorbide mononitrate (IMDUR) extended release tablet 30 mg  30 mg Oral Daily Andree Sibley MD   30 mg at 08/21/22 0805    lisinopril (PRINIVIL;ZESTRIL) tablet 2.5 mg  2.5 mg Oral Daily Andree Sibley MD   2.5 mg at 08/21/22 0805    metoprolol succinate (TOPROL XL) extended release tablet 25 mg  25 mg Oral Daily Andree Sibley MD   25 mg at 08/21/22 0805    miconazole (MICOTIN) 2 % powder   Topical BID Andree Sibley MD   Given at 08/20/22 2211    pantoprazole (PROTONIX) tablet 40 mg  40 mg Oral Daily Andree Sibley MD   40 mg at 08/21/22 0805    promethazine-dextromethorphan (PROMETHAZINE-DM) 6.25-15 MG/5ML syrup 5 mL (Patient Supplied)  5 mL Oral Nightly PRN Andree Sibley MD        ranolazine [facility]) extended release tablet 500 mg  500 mg Oral BID Andree Sibley MD   500 mg at 08/21/22 1926    spironolactone (ALDACTONE) tablet 12.5 mg  12.5 mg Oral Daily Andree Sibley MD   12.5 mg at 08/21/22 0805    sucralfate (CARAFATE) tablet 1 g  1 g Oral 4x daily Andree Sibley MD   1 g at 08/21/22 1926    verapamil (CALAN) tablet 40 mg  40 mg Oral Q12H Andree Sibley MD   40 mg at 08/21/22 1724    [START ON 8/27/2022] vitamin D (ERGOCALCIFEROL) capsule 50,000 Units  50,000 Units Oral Weekly Andree Sibley MD        topiramate (TOPAMAX) tablet 50 mg  50 mg Oral Daily Andree Sibley MD   50 mg at 08/21/22 0805    insulin lispro (HUMALOG) injection vial 0-16 Units  0-16 Units SubCUTAneous TID WC Andree Sibley MD   4 Units at 08/21/22 1728    insulin lispro (HUMALOG) injection vial 0-4 Units  0-4 Units SubCUTAneous Nightly Andree Sibley MD

## 2022-08-22 NOTE — PROGRESS NOTES
Physician Progress Note      Jd Whitmore  CSN #:                  [id number]  :                       1975  ADMIT DATE:       2022 2:32 AM  DISCH DATE:  RESPONDING  PROVIDER #:        Aleksandra Joy MD          QUERY TEXT:    Pt admitted with chest pain and has CHF documented. If possible, please   document in progress notes and discharge summary further specificity regarding   the type and acuity of CHF:    The medical record reflects the following:  Risk Factors: DM CAD HTN  Clinical Indicators: ECHO 22, \"Normal left ventricular size with   difficult assessment of systolic function even with Definity contrast   -estimated ejection fraction appears low normal at 50% Mild concentric left   ventricular hypertrophy with mild [grade 1] diastolic dysfunction. \" Pro-BNP   61. CXR No acute cardiopulmonary process. Treatment: CXR, Cardiac Profile, Cardiology consult. Options provided:  -- Chronic Systolic CHF/HFrEF  -- Chronic Diastolic CHF/HFpEF  -- Chronic Systolic and Diastolic CHF  -- Other - I will add my own diagnosis  -- Disagree - Not applicable / Not valid  -- Disagree - Clinically unable to determine / Unknown  -- Refer to Clinical Documentation Reviewer    PROVIDER RESPONSE TEXT:    Patient does not carry a documented hx of heart failure.  thanks    Query created by: Tonio Sol on 2022 9:20 AM      Electronically signed by:  Aleksandra Joy MD 2022 6:54 PM

## 2022-08-22 NOTE — CARE COORDINATION
08/22/22 1305   Service Assessment   Patient Orientation Alert and Oriented   Cognition Alert   Primary Caregiver Self   Accompanied By/Relationship family member in room with patient   Support Systems Parent; Family Members;Friends/Neighbors   Patient's Healthcare Decision Maker is: Legal Next of Migue Lara   PCP Verified by CM Yes   Last Visit to PCP Within last 3 months  (went to appointment last week with PCP)   Prior Functional Level Independent in ADLs/IADLs   Current Functional Level Independent in ADLs/IADLs   Can patient return to prior living arrangement Yes   Family able to assist with home care needs: Yes   Financial Resources Medicare;Medicaid; Food [place]   Community Resources   (denies needs at this time)   CM/SW Referral DME   Social/Functional History   Lives With Family;Parent   Type of [facility] One level   Home Access Stairs to enter with rails; Ramped entrance  (stairs in the front and an accessible ramp in the back)   Bathroom Shower/Tub Tub/Shower unit   Bathroom Toilet Standard   Bathroom Equipment   (had a shower chair that is now broken)   [address]   (denied needs)   [address] Help From Family;Friend(s)   ADL Assistance Independent   Homemaking Assistance Independent   Homemaking Responsibilities Yes   Ambulation Assistance Independent   Transfer Assistance Independent   Active  Yes   Mode of Transportation Car   Occupation On disability   Discharge Planning   [facility] None   Potential Assistance Needed N/A   DME Ordered? No   Potential Assistance Purchasing Medications No  (patient has Medicare Advantage program and Medicaid)   Type of Home Care Services None   Patient expects to be discharged to: House   One/Two Story Residence One story   History of falls?  0   Services At/After Discharge   Services At/After Discharge None       Patient Contact Information:    [address] [place] [address] Floridalma Morgan [address]  [phone] (home)   Telephone Information:   Mobile [phone]     Above information verified? [x]   Yes  []   No      Emergency Contacts:    Extended Emergency Contact Information  Primary Emergency Contact: Lauren Diaz 7 [address] Phone: [phone]  Mobile Phone: [phone]  Relation: Parent  Secondary Emergency Contact: MITCH DAVIES  Mobile Phone: [phone]  Relation: Other Relative  Preferred language: English   needed? No      Have you been vaccinated for COVID-19 (SARS-CoV-2)? [x]   Yes  []   No                   If so, when? Which :         []   Pfizer-BioNTech  [x]   Moderna  []   [employer]  []   Other:         Pharmacy:    [facility] [address] [facility], [address] [phone] [facility] [phone]  [address] [facility]  [address] [address]  Phone: [phone] Fax: [phone]    [facility] [address] [facility], [address] [facility] [address] [phone] - F [phone]  [address] [place] [address] [address] [address]  Phone: [phone] Fax: [phone]          B[place] Coma Scale  Eye Opening: Spontaneous  Best Verbal Response: Oriented  Best Motor Response: Obeys commands  Remigio Coma Scale Score: 13    Spoke with patient at bedside and she states that after she was discharged from last hospital stay that she went home and started doing things without taking any time to rest and started having chest pain again and the pain was some different this time so needed to get checked out. Denies any discharge needs does not want HH as she is not home bound and is independent with ADL's. Denies any DME needs as well. Will continue to follow for needs.  signed by Carmelo Leal RN on 8/22/2022 at 1:17 PM

## 2022-08-22 NOTE — PROGRESS NOTES
Progress Note  Date:2022       Room:0721/721-02  Patient Jennifer Rossi     YOB: 1975     Age:47 y.o. Subjective    Subjective: 49-year-old lady with a history of coronary artery disease, type 2 diabetes, GERD, hypertension, hyperlipidemia, who presented to the hospital  with concerns of chest pain. Patient was recently seen by in the hospital and stress test was done 2022 which showed EF of 40%, old lateral infarct with mild ischemia. Echocardiogram with grade 1 diastolic dysfunction, due to persistent chest pain patient was initiated on heparin drip as well as nitro drip this admission. Patient reevaluated by cardiology this morning, plan for cardiac cath today. Seen this morning house no family member present, was sleepy but easily arousable, denied any acute overnight event, had slight episodes of increased chest pain yesterday requiring increased levels of nitro drip. INR much improved status post vitamin K. Review of Systems: 12 point system review negative except as stated above. Objective         Vitals Last 24 Hours:  TEMPERATURE:  Temp  Av.4 °F (36.3 °C)  Min: 96.8 °F (36 °C)  Max: 97.9 °F (36.6 °C)  RESPIRATIONS RANGE: Resp  Av.7  Min: 16  Max: 18  PULSE OXIMETRY RANGE: SpO2  Av.8 %  Min: 96 %  Max: 98 %  PULSE RANGE: Pulse  Av.2  Min: 87  Max: 97  BLOOD PRESSURE RANGE: Systolic (18WIR), ZJZ:002 , Min:98 , ABS:098   ; Diastolic (44LQQ), TOY:88, Min:76, Max:95    I/O (24Hr): Intake/Output Summary (Last 24 hours) at 2022 1359  Last data filed at 2022 1938  Gross per 24 hour   Intake 360 ml   Output 200 ml   Net 160 ml         Physical Examination:  General: Well-developed, no acute distress lying comfortably in bed. HEENT: Atraumatic normocephalic, range of motion normal, no JVD, no tracheal deviation noted. Cardiac: Normal S1-S2 no murmurs rub or gallop.   Respiratory: clear To auscultation bilaterally, no rhonchi or rales, no wheezing  Abdomen: Soft, positive bowel sounds in all quadrants, no distention, nontender to palpation  Extremities: no tenderness, no edema, moves all extremities  Psych: Affect normal and good eye contact, behavioral normal.        Labs/Imaging/Diagnostics    Labs:  CBC:  Recent Labs     08/20/22 0129 08/21/22 0129 08/22/22 0146   WBC 6.9 6.0 6.2   RBC 4.94 4.45 4.55   HGB 14.8 13.6 13.5   HCT 45.1 40.4 41.4   MCV 91.3 90.8 91.0   RDW 14.7* 14.6* 14.6*    309 293       CHEMISTRIES:  Recent Labs     08/21/22 0129 08/22/22 0146 08/22/22  1033    134* 137   K 4.0 3.8 4.2    99 101   CO2 23 24 27   BUN 26* 26* 23*   CREATININE 1.7* 1.8* 1.6*   GLUCOSE 79 116* 102       PT/INR:  Recent Labs     08/20/22 0129 08/21/22 0129 08/22/22 0146   PROTIME 34.3* 43.3* 19.9*   INR 3.24* 4.30* 1.67*       APTT:  Recent Labs     08/20/22 0129   APTT 50.3*       LIVER PROFILE:  Recent Labs     08/20/22 0129   AST 17   ALT 19   BILITOT 0.4   ALKPHOS 96         Imaging Last 24 Hours:  XR CHEST PORTABLE    Result Date: 8/20/2022  NO PRIOR REPORT AVAILABLE [facility] Clinical data:Chest pain. Technique:Single view of the chest. Prior studies: No prior studies submitted. Findings:Sternotomy sutures in situ. The lungs are grossly clear; noevidence of acute infiltrate or pleural effusion. Cardiac silhouette is within normal limits. No acute osseous abnormality is detected. no acute cardiopulmonary disease No acute cardiopulmonary process. Recommendation: Follow up as clinically indicated. Electronically Signed by Brody Brown at 20-Aug-2022 03:39:29 AM             Assessment//Plan           Hospital Problems             Last Modified POA    * (Principal) Substernal chest pain relieved by nitroglycerin 8/20/2022 Yes    Chest pain in adult 8/21/2022 Yes       Assessment & Plan    Recurrent chest pain  Troponin negative on admission, EKG not listed abnormality.   Recently evaluated in

## 2022-08-23 VITALS
BODY MASS INDEX: 50.02 KG/M2 | TEMPERATURE: 97.4 F | HEIGHT: 64 IN | WEIGHT: 293 LBS | SYSTOLIC BLOOD PRESSURE: 132 MMHG | OXYGEN SATURATION: 97 % | DIASTOLIC BLOOD PRESSURE: 98 MMHG | RESPIRATION RATE: 16 BRPM | HEART RATE: 86 BPM

## 2022-08-23 LAB
ANION GAP SERPL CALCULATED.3IONS-SCNC: 11 MMOL/L (ref 7–19)
BUN BLDV-MCNC: 20 MG/DL (ref 6–20)
CALCIUM SERPL-MCNC: 8.6 MG/DL (ref 8.6–10)
CHLORIDE BLD-SCNC: 104 MMOL/L (ref 98–111)
CO2: 23 MMOL/L (ref 22–29)
CREAT SERPL-MCNC: 1.6 MG/DL (ref 0.5–0.9)
GFR AFRICAN AMERICAN: 42
GFR NON-AFRICAN AMERICAN: 35
GLUCOSE BLD-MCNC: 155 MG/DL (ref 70–99)
GLUCOSE BLD-MCNC: 93 MG/DL (ref 74–109)
HCT VFR BLD CALC: 39.2 % (ref 37–47)
HEMOGLOBIN: 13 G/DL (ref 12–16)
INR BLD: 1.26 (ref 0.88–1.18)
MCH RBC QN AUTO: 30.2 PG (ref 27–31)
MCHC RBC AUTO-ENTMCNC: 33.2 G/DL (ref 33–37)
MCV RBC AUTO: 91 FL (ref 81–99)
PDW BLD-RTO: 14.3 % (ref 11.5–14.5)
PERFORMED ON: ABNORMAL
PLATELET # BLD: 276 K/UL (ref 130–400)
PMV BLD AUTO: 11.3 FL (ref 9.4–12.3)
POTASSIUM REFLEX MAGNESIUM: 4 MMOL/L (ref 3.5–5)
POTASSIUM SERPL-SCNC: 4 MMOL/L (ref 3.5–5)
PROTHROMBIN TIME: 15.8 SEC (ref 12–14.6)
RBC # BLD: 4.31 M/UL (ref 4.2–5.4)
SODIUM BLD-SCNC: 138 MMOL/L (ref 136–145)
WBC # BLD: 6 K/UL (ref 4.8–10.8)

## 2022-08-23 PROCEDURE — 85027 COMPLETE CBC AUTOMATED: CPT

## 2022-08-23 PROCEDURE — 99232 SBSQ HOSP IP/OBS MODERATE 35: CPT | Performed by: INTERNAL MEDICINE

## 2022-08-23 PROCEDURE — 80048 BASIC METABOLIC PNL TOTAL CA: CPT

## 2022-08-23 PROCEDURE — 6370000000 HC RX 637 (ALT 250 FOR IP): Performed by: HOSPITALIST

## 2022-08-23 PROCEDURE — 85610 PROTHROMBIN TIME: CPT

## 2022-08-23 PROCEDURE — 82947 ASSAY GLUCOSE BLOOD QUANT: CPT

## 2022-08-23 PROCEDURE — 2580000003 HC RX 258: Performed by: INTERNAL MEDICINE

## 2022-08-23 PROCEDURE — 36415 COLL VENOUS BLD VENIPUNCTURE: CPT

## 2022-08-23 RX ADMIN — SPIRONOLACTONE 12.5 MG: 25 TABLET ORAL at 08:48

## 2022-08-23 RX ADMIN — SUCRALFATE 1 G: 1 TABLET ORAL at 08:49

## 2022-08-23 RX ADMIN — SODIUM CHLORIDE, PRESERVATIVE FREE 10 ML: 5 INJECTION INTRAVENOUS at 08:54

## 2022-08-23 RX ADMIN — ASPIRIN 81 MG: 81 TABLET, COATED ORAL at 08:49

## 2022-08-23 RX ADMIN — AMLODIPINE BESYLATE 5 MG: 5 TABLET ORAL at 08:49

## 2022-08-23 RX ADMIN — CETIRIZINE HYDROCHLORIDE 10 MG: 10 TABLET, FILM COATED ORAL at 08:49

## 2022-08-23 RX ADMIN — ISOSORBIDE MONONITRATE 30 MG: 30 TABLET, EXTENDED RELEASE ORAL at 08:49

## 2022-08-23 RX ADMIN — RANOLAZINE 500 MG: 500 TABLET, FILM COATED, EXTENDED RELEASE ORAL at 08:48

## 2022-08-23 RX ADMIN — MICONAZOLE NITRATE: 2 POWDER TOPICAL at 08:54

## 2022-08-23 RX ADMIN — VERAPAMIL HYDROCHLORIDE 40 MG: 80 TABLET ORAL at 05:48

## 2022-08-23 RX ADMIN — BETHANECHOL CHLORIDE 25 MG: 25 TABLET ORAL at 08:48

## 2022-08-23 RX ADMIN — PANTOPRAZOLE SODIUM 40 MG: 40 TABLET, DELAYED RELEASE ORAL at 08:49

## 2022-08-23 RX ADMIN — FUROSEMIDE 80 MG: 40 TABLET ORAL at 08:48

## 2022-08-23 RX ADMIN — EZETIMIBE 10 MG: 10 TABLET ORAL at 08:48

## 2022-08-23 RX ADMIN — TOPIRAMATE 50 MG: 50 TABLET, FILM COATED ORAL at 08:48

## 2022-08-23 RX ADMIN — METOPROLOL SUCCINATE 25 MG: 25 TABLET, EXTENDED RELEASE ORAL at 08:48

## 2022-08-23 RX ADMIN — LISINOPRIL 2.5 MG: 2.5 TABLET ORAL at 08:49

## 2022-08-23 NOTE — DISCHARGE SUMMARY
Discharge Summary      Date:8/23/2022        Patient Dawna Dunbar     YOB: 1975     Age:47 y.o. Admit Date:8/20/2022   Admission Condition:fair   Discharged Condition:stable  Discharge Date: 08/23/22       Discharge Diagnoses   Principal Problem:    Substernal chest pain relieved by nitroglycerin  Active Problems:    Chest pain in adult  Resolved Problems:    * No resolved hospital problems. [facility] Stay   Narrative of Hospital Course:     20-year-old lady with a history of coronary artery disease, type 2 diabetes, GERD, hypertension, hyperlipidemia, who presented to the hospital 8/20 with concerns of chest pain. Patient was recently seen by in the hospital and stress test was done 8/12/2022 which showed EF of 40%, old lateral infarct with mild ischemia. Echocardiogram with grade 1 diastolic dysfunction, due to persistent chest pain patient was initiated on heparin drip as well as nitro drip this admission. Seen by cardiology, status post cardiac cath with no evidence of significant stenosis and no stent placed. Patient's chest pain is atypical and reproducible on palpation. EKG with no acute ST-T wave abnormality, troponin negative, patient to continue current medications. Needs close monitoring of INR levels outpt. Discharged home in stable condition to follow-up outpatient with PCP and cardiologist for continuous management of chronic medical problems. Physical Examination:  General: Well-developed, no acute distress lying comfortably in bed. HEENT: Atraumatic normocephalic, range of motion normal, no JVD, no tracheal deviation noted. Cardiac: Normal S1-S2 no murmurs rub or gallop.   Respiratory: clear To auscultation bilaterally, no rhonchi or rales, no wheezing  Abdomen: Soft, positive bowel sounds in all quadrants, no distention, nontender to palpation  Extremities: no tenderness, no edema, moves all extremities  Psych: Affect normal and good eye contact, behavioral normal.        Consultants:   IP CONSULT TO CARDIOLOGY    Time Spent on Discharge:  35 minutes were spent in patient examination, evaluation, counseling as well as medication reconciliation, prescriptions for required medications, discharge plan and follow up. Surgeries/Procedures Performed:  NONE       Significant Diagnostic Studies:   Recent Labs:  CBC:   Lab Results   Component Value Date/Time    WBC 6.0 08/23/2022 02:37 AM    RBC 4.31 08/23/2022 02:37 AM    HGB 13.0 08/23/2022 02:37 AM    HCT 39.2 08/23/2022 02:37 AM    MCV 91.0 08/23/2022 02:37 AM    MCH 30.2 08/23/2022 02:37 AM    MCHC 33.2 08/23/2022 02:37 AM    RDW 14.3 08/23/2022 02:37 AM     08/23/2022 02:37 AM     BMP:    Lab Results   Component Value Date/Time    GLUCOSE 93 08/23/2022 02:37 AM     08/23/2022 02:37 AM    K 4.0 08/23/2022 02:37 AM    K 4.0 08/23/2022 02:37 AM     08/23/2022 02:37 AM    CO2 23 08/23/2022 02:37 AM    ANIONGAP 11 08/23/2022 02:37 AM    BUN 20 08/23/2022 02:37 AM    CREATININE 1.6 08/23/2022 02:37 AM    CALCIUM 8.6 08/23/2022 02:37 AM    LABGLOM 35 08/23/2022 02:37 AM    GFRAA 42 08/23/2022 02:37 AM       Radiology Last 7 Days:  XR CHEST PORTABLE    Result Date: 8/20/2022  no acute cardiopulmonary disease No acute cardiopulmonary process. Recommendation: Follow up as clinically indicated. Electronically Signed by Juan Last at 20-Aug-2022 03:39:29 AM               Discharge Plan   Disposition: Home    Provider Follow-Up:   YARIEL Fink NP  [address] [place] [address]  [phone]    Follow up on 9/9/2022  8:30 am    DO Todd Bullock [facility]  [phone]    Follow up on 8/24/2022  2:40pm Hospital follow-up.   If you are not able to keep this appointment please contact the office to reschedule         Patient Instructions   Diet: cardiac diet and diabetic diet    Activity: activity as tolerated      Discharge Medications         Medication List CHANGE how you take these medications      furosemide 40 MG tablet  Commonly known as: LASIX  Take 2 tablets by mouth daily  What changed: how much to take            CONTINUE taking these medications      amLODIPine 5 MG tablet  Commonly known as: NORVASC     aspirin 81 MG tablet     atorvastatin 40 MG tablet  Commonly known as: LIPITOR     bethanechol 25 MG tablet  Commonly known as: URECHOLINE     cetirizine 10 MG tablet  Commonly known as: ZyrTEC Allergy  Take 1 tablet by mouth daily     ezetimibe 10 MG tablet  Commonly known as: ZETIA     fluticasone 50 MCG/ACT nasal spray  Commonly known as: FLONASE  1 spray by Each Nostril route daily     gabapentin 300 MG capsule  Commonly known as: NEURONTIN  Take 1 capsule by mouth 2 times daily as needed (Neuropathy/nerve pains) for up to 3 days. Intended supply: 30 days     insulin aspart 100 UNIT/ML injection vial  Commonly known as: NOVOLOG     insulin glargine 100 UNIT/ML injection pen  Commonly known as: LANTUS;BASAGLAR     isosorbide mononitrate 30 MG extended release tablet  Commonly known as: IMDUR  Take 1 tablet by mouth in the morning.      lisinopril 2.5 MG tablet  Commonly known as: PRINIVIL;ZESTRIL     metFORMIN 1000 MG tablet  Commonly known as: GLUCOPHAGE     metoprolol succinate 25 MG extended release tablet  Commonly known as: TOPROL XL     NONFORMULARY     nystatin 962757 UNIT/GM powder  Commonly known as: MYCOSTATIN  Apply 3 times daily to lower groin, abdominal area and under bilateral breasts     ondansetron 4 MG disintegrating tablet  Commonly known as: Zofran ODT  Take 1 tablet by mouth every 8 hours as needed for Nausea or Vomiting     Ozempic (0.25 or 0.5 MG/DOSE) 2 MG/1.5ML Sopn  Generic drug: Semaglutide(0.25 or 0.5MG/DOS)     pantoprazole 40 MG tablet  Commonly known as: PROTONIX  TAKE 1 TABLET BY MOUTH DAILY     prochlorperazine 10 MG tablet  Commonly known as: COMPAZINE     promethazine-dextromethorphan 6.25-15 MG/5ML syrup  Commonly known as: PROMETHAZINE-DM  Take 5 mLs by mouth nightly as needed for Cough     ranolazine 500 MG extended release tablet  Commonly known as: RANEXA  Take 1 tablet by mouth in the morning and 1 tablet before bedtime.      spironolactone 25 MG tablet  Commonly known as: ALDACTONE     sucralfate 1 GM/10ML suspension  Commonly known as: Carafate  Take 10 mLs by mouth 4 times daily     topiramate 50 MG tablet  Commonly known as: TOPAMAX     verapamil 40 MG tablet  Commonly known as: CALAN     vitamin D 1.25 MG (20456 UT) Caps capsule  Commonly known as: ERGOCALCIFEROL     warfarin 10 MG tablet  Commonly known as: COUMADIN              Electronically signed by Araceli Wood MD on 8/23/22 at 1:51 PM CDT

## 2022-08-23 NOTE — PROGRESS NOTES
Cardiology Progress Note   Qing Blue MD      Patient:    Gilles Dickson  [id number]  1975    Patient Active Problem List    Diagnosis Date Noted    Chest pain in adult 08/21/2022     Priority: Medium    Substernal chest pain relieved by nitroglycerin 08/20/2022     Priority: Medium    Chest pain due to myocardial ischemia, unspecified ischemic chest pain type 08/10/2022     Priority: Medium    Acute kidney injury superimposed on CKD ([address] 08/10/2022     Priority: Medium    Lip abrasion 04/05/2022     Priority: Low    Skin ulcer of face with fat layer exposed ([address] 04/05/2022     Priority: Low    Gallstones 09/24/2021     Priority: Low    Abnormal biliary HIDA scan 09/24/2021     Priority: Low    Chronic anticoagulation 09/24/2021     Priority: Low    History of gallstones 09/21/2021     Priority: Low    Chest pain 02/24/2018     Priority: Low    Gastroesophageal reflux disease without esophagitis      Priority: Low    Atherosclerosis of native coronary artery of native heart with unstable angina pectoris ([facility] [address] 08/04/2016     Priority: Low    Hypertension 08/04/2016     Priority: Low    Type 2 diabetes mellitus with diabetic polyneuropathy, with long-term current use of insulin ([facility] [address] 08/04/2016     Priority: Low    Mixed hyperlipidemia 08/04/2016     Priority: Low    NAYAN (obstructive sleep apnea) 08/04/2016     Priority: Low    Morbid obesity due to excess calories ([facility] [address] 08/04/2016     Priority: Low    Acute blood loss anemia 08/04/2016     Priority: Low    [facility] occlusion with cerebral infarction ([facility] 08/04/2016     Priority: Low       Admit Date:  8/20/2022    Admission Problem List: Present on Admission:   Substernal chest pain relieved by nitroglycerin   Chest pain in adult       Cardiac Specific Data:  Specialty Problems          Cardiology Problems    Chest pain due to myocardial ischemia, unspecified ischemic chest pain type        * (Principal) Substernal chest pain relieved by nitroglycerin Chest pain in adult        Atherosclerosis of native coronary artery of native heart with unstable angina pectoris ([address]        [facility] occlusion with cerebral infarction [facility])        Hypertension        Mixed hyperlipidemia        Chest pain           Subjective:  Patient had difficult cardiac catheterization yesterday using left radial approach because of morbid obesity with BMI of 61. The LIMA graft was patent with hookup to the left anterior descending and first diagonal branch. Right coronary artery flow is normal.  Left circumflex was faintly visualized through retrograde filling from LIMA injection. It was faintly visualized. Left main probably was normal.  Left main was never selectively engaged due to rotations of the ascending aortic root. Echocardiogram showed ejection fraction of 50%. The chest pain is reproducible by touching the chest wall. Troponin normal x2. EKG did not show any acute changes.   Nuclear stress test showed fixed apical defect    Objective:   BP (!) 120/93   Pulse 87   Temp 97.9 °F (36.6 °C) (Temporal)   Resp 17   Ht 5' 4\" (1.626 m)   Wt (!) 356 lb (161.5 kg)   LMP  (LMP Unknown) Comment: LMP in June 2022  SpO2 99%   BMI 61.11 kg/m²       Intake/Output Summary (Last 24 hours) at 8/23/2022 0858  Last data filed at 8/22/2022 2141  Gross per 24 hour   Intake 480 ml   Output 400 ml   Net 80 ml       Current Facility-Administered Medications   Medication Dose Route Frequency Provider Last Rate Last Admin    0.9 % sodium chloride infusion   IntraVENous Continuous Rashaad Almonte  mL/hr at 08/22/22 0959 New Bag at 08/22/22 0959    sodium chloride flush 0.9 % injection 5-40 mL  5-40 mL IntraVENous 2 times per day Rashaad Almonte MD   10 mL at 08/23/22 0854    sodium chloride flush 0.9 % injection 5-40 mL  5-40 mL IntraVENous PRN Rashaad Almonte MD        0.9 % sodium chloride infusion   IntraVENous PRN Rashaad Almonte MD        ondansetron (ZOFRAN-ODT) disintegrating tablet 4 mg  4 mg Oral Q8H PRN Rajeev Deal MD        Or    ondansetron [facility]) injection 4 mg  4 mg IntraVENous Q6H PRN Rajeev Deal MD   4 mg at 08/21/22 1201    acetaminophen (TYLENOL) tablet 650 mg  650 mg Oral Q6H PRN Rajeev Deal MD   650 mg at 08/22/22 2121    Or    acetaminophen (TYLENOL) suppository 650 mg  650 mg Rectal Q6H PRN Rajeev Deal MD        potassium chloride (KLOR-CON M) extended release tablet 40 mEq  40 mEq Oral PRN Rajeev Deal MD        Or    potassium bicarb-citric acid (EFFER-K) effervescent tablet 40 mEq  40 mEq Oral PRN Rajeev Deal MD        Or    potassium chloride 10 mEq/100 mL IVPB (Peripheral Line)  10 mEq IntraVENous PRN Rajeev Deal MD        magnesium sulfate 2000 mg in 50 mL IVPB premix  2,000 mg IntraVENous PRN Rajeev Deal MD        amLODIPine (NORVASC) tablet 5 mg  5 mg Oral Daily Rajeev Deal MD   5 mg at 08/23/22 0849    aspirin EC tablet 81 mg  81 mg Oral Daily Rajeev Deal MD   81 mg at 08/23/22 0849    atorvastatin (LIPITOR) tablet 80 mg  80 mg Oral Nightly Rajeev Deal MD   80 mg at 08/22/22 2120    bethanechol (URECHOLINE) tablet 25 mg  25 mg Oral Daily Rajeev Deal MD   25 mg at 08/23/22 0848    cetirizine (ZYRTEC) tablet 10 mg  10 mg Oral Daily Rajeev Deal MD   10 mg at 08/23/22 0849    ezetimibe (ZETIA) tablet 10 mg  10 mg Oral Daily Rajeev Deal MD   10 mg at 08/23/22 0848    fluticasone (FLONASE) 50 MCG/ACT nasal spray 1 spray  1 spray Each Nostril Daily Rajeev Deal MD   1 spray at 08/21/22 0181    furosemide (LASIX) tablet 80 mg  80 mg Oral Daily Rajeev Deal MD   80 mg at 08/23/22 0848    gabapentin (NEURONTIN) capsule 300 mg  300 mg Oral BID PRN Rajeev Deal MD   300 mg at 08/22/22 1749    insulin glargine (LANTUS) injection vial 80 Units  80 Units SubCUTAneous Nightly Rajeev Deal MD   50 Units at 08/22/22 5901    isosorbide mononitrate (IMDUR) extended release tablet 30 mg  30 mg Oral Daily Rajeev Deal MD   30 mg at 08/23/22 5856 lisinopril (PRINIVIL;ZESTRIL) tablet 2.5 mg  2.5 mg Oral Daily Jacquelyn Devine MD   2.5 mg at 08/23/22 0849    metoprolol succinate (TOPROL XL) extended release tablet 25 mg  25 mg Oral Daily Jacquelyn Devine MD   25 mg at 08/23/22 0848    miconazole (MICOTIN) 2 % powder   Topical BID Jacquelyn Devine MD   Given at 08/23/22 0854    pantoprazole (PROTONIX) tablet 40 mg  40 mg Oral Daily Jacquelyn Devine MD   40 mg at 08/23/22 0849    promethazine-dextromethorphan (PROMETHAZINE-DM) 6.25-15 MG/5ML syrup 5 mL (Patient Supplied)  5 mL Oral Nightly PRN Jacquelyn Devine MD        spironolactone (ALDACTONE) tablet 12.5 mg  12.5 mg Oral Daily Jacquelyn Devine MD   12.5 mg at 08/23/22 0848    sucralfate (CARAFATE) tablet 1 g  1 g Oral 4x daily Jacquelyn Devine MD   1 g at 08/23/22 0849    verapamil (CALAN) tablet 40 mg  40 mg Oral Q12H Jacquelyn Devine MD   40 mg at 08/23/22 0548    [START ON 8/27/2022] vitamin D (ERGOCALCIFEROL) capsule 50,000 Units  50,000 Units Oral Weekly Jacquelyn Devine MD        topiramate (TOPAMAX) tablet 50 mg  50 mg Oral Daily Jacquelyn Devine MD   50 mg at 08/23/22 0848    insulin lispro (HUMALOG) injection vial 0-16 Units  0-16 Units SubCUTAneous TID WC Jacquelyn Devine MD   4 Units at 08/21/22 1728    insulin lispro (HUMALOG) injection vial 0-4 Units  0-4 Units SubCUTAneous Nightly Jacquelyn Devine MD        glucose chewable tablet 16 g  4 tablet Oral PRN Jacquelyn Devine MD        dextrose bolus 10% 125 mL  125 mL IntraVENous PRN Jacquelyn Devine MD        Or    dextrose bolus 10% 250 mL  250 mL IntraVENous PRN Jacquelyn Devine MD        glucagon (rDNA) injection 1 mg  1 mg SubCUTAneous PRN Jacquelyn Devine MD        dextrose 10 % infusion   IntraVENous Continuous PRN Jacquelyn Devine MD        LORazepam (ATIVAN) tablet 1 mg  1 mg Oral Q6H PRN Jennifer Coulter MD   1 mg at 08/20/22 2220         sodium chloride flush  5-40 mL IntraVENous 2 times per day    amLODIPine  5 mg Oral Daily    aspirin  81 mg Oral Daily atorvastatin  80 mg Oral Nightly    bethanechol  25 mg Oral Daily    cetirizine  10 mg Oral Daily    ezetimibe  10 mg Oral Daily    fluticasone  1 spray Each Nostril Daily    furosemide  80 mg Oral Daily    insulin glargine  80 Units SubCUTAneous Nightly    isosorbide mononitrate  30 mg Oral Daily    lisinopril  2.5 mg Oral Daily    metoprolol succinate  25 mg Oral Daily    miconazole   Topical BID    pantoprazole  40 mg Oral Daily    spironolactone  12.5 mg Oral Daily    sucralfate  1 g Oral 4x daily    verapamil  40 mg Oral Q12H    [START ON 8/27/2022] vitamin D  50,000 Units Oral Weekly    topiramate  50 mg Oral Daily    insulin lispro  0-16 Units SubCUTAneous TID WC    insulin lispro  0-4 Units SubCUTAneous Nightly         Physical Exam:  HENNT: normal  PULMONARY: normal to inspection, palpation, percussion and ascultation  CVS:Normal neck vein, S1 and S2 normal, no murmur  ABD: liver and spleen not palpable, nontender, bowel sound normal  PERIPHERL: all pulses are normal with no bruit  MSK: no swelling of the lower extremities chest pain can be reproduced by pressing substernal  CNS: Normal CN 2,3,4,6,5,7,9,10,11,and 12. Oriented to time, place and person      RECENT LABS:    Lab Data:  CBC:   Recent Labs     08/21/22  0129 08/22/22  0146 08/23/22  0237   WBC 6.0 6.2 6.0   HGB 13.6 13.5 13.0   HCT 40.4 41.4 39.2   MCV 90.8 91.0 91.0    293 276     BMP:   Recent Labs     08/22/22  0146 08/22/22  1033 08/23/22  0237   * 137 138   K 3.8 4.2 4.0  4.0   CL 99 101 104   CO2 24 27 23   BUN 26* 23* 20   CREATININE 1.8* 1.6* 1.6*     LIVER PROFILE: No results for input(s): AST, ALT, LIPASE, BILIDIR, BILITOT, ALKPHOS in the last 72 hours. Invalid input(s): AMYLASE,  ALB  PT/INR:   Recent Labs     08/21/22  0129 08/22/22  0146 08/23/22  0237   PROTIME 43.3* 19.9* 15.8*   INR 4.30* 1.67* 1.26*     APTT: No results for input(s): APTT in the last 72 hours.     CK, CKMB, Troponin:   Recent Labs 08/20/22  0943 08/20/22  1241   TROPONINI <0.01 <0.01       Last 3 BNP:  No results for input(s): PROBNP in the last 72 hours. IMAGING:  XR CHEST PORTABLE    Result Date: 8/20/2022  no acute cardiopulmonary disease No acute cardiopulmonary process. Recommendation: Follow up as clinically indicated. Electronically Signed by Nick Campoverde at 20-Aug-2022 03:39:29 AM                  Assessment and Plan:    Recurrence of chest pain which can be reproduced by pressing on the chest wall. Nuclear stress test was not impressive with fixed apical defect. cardiac catheterization was difficult because of morbid obesity. Using the left radial approach the LIMA graft was patent supplying the left anterior descending and first diagonal branch. The right coronary artery was normal.  The left circumflex filled faintly via retrograde filling from the LIMA graft. It was not well visualized. However due to rotations of the aorta. Left main was never selectively engaged. Patient tolerated procedure well. Hypertension, diabetes and hyperlipidemia with morbid obesity and sleep apnea  History of right hemispheric stroke with hx of apical thrombus in the past on coumadin. No apica; thrombus seen in June ECHO, EF 45-50%  S/p CABG 2016 with LIMA to LAD and D1. MI at age 34  History of CVA    Plan: I will highly recommend that she should go back to Coumadin lower dose at this point perhaps 5 mg daily because of the history of apical thrombus in the past.  Patient coming in with coagulopathy from taking too much Coumadin. She is monitoring her on INR through a stroke clinic in [place]. The chest pain is definitely not related to ischemia. The chest pain can be reproduced by touching the chest wall. I have recommended rapid weight loss. In the future if there is any question of ischemia, right radial approach can be used.   Femoral artery approach has high risk of bleeding post catheterization because of morbid obesity with BMI of 61. Ranitidine can be discontinued. From cardiac standpoint patient can be discharged    I have spent 30 minutes reviewing the chart, taking history, examining the patient, discussion with the patient and the family concerning the finding, diagnoses and treatment plan. This dictation was performed using [address]. Mistake and misspelling may have been created without  realizing them. Please notify me for clarification.   Thank you    Katherne Sicard, MD  8/23/2022 8:58 AM

## 2022-08-24 ENCOUNTER — CARE COORDINATION (OUTPATIENT)
Dept: CASE MANAGEMENT | Age: 47
End: 2022-08-24

## 2022-08-24 LAB
EKG P AXIS: 41 DEGREES
EKG P AXIS: 46 DEGREES
EKG P-R INTERVAL: 144 MS
EKG P-R INTERVAL: 160 MS
EKG Q-T INTERVAL: 362 MS
EKG Q-T INTERVAL: 380 MS
EKG QRS DURATION: 84 MS
EKG QRS DURATION: 96 MS
EKG QTC CALCULATION (BAZETT): 423 MS
EKG QTC CALCULATION (BAZETT): 437 MS
EKG T AXIS: 78 DEGREES
EKG T AXIS: 90 DEGREES

## 2022-08-24 PROCEDURE — 93010 ELECTROCARDIOGRAM REPORT: CPT | Performed by: INTERNAL MEDICINE

## 2022-08-24 NOTE — CARE COORDINATION
[facility] Transitions Initial Follow Up Call    Call within 2 business days of discharge: Yes    Patient: Jean Tim Patient : 1975   MRN: [id number]  Reason for Admission:   Discharge Date: 22 RARS: Readmission Risk Score: 14.1      Last Discharge [facility]       Date Complaint Diagnosis Description Type Department Provider    22 Chest Pain Chest pain, unspecified type ED to Hosp-Admission (Discharged) (ADMITTED) L BHARATH Johnson MD; Kaia Staples. .. Spoke with: [address]: [place] [address]    Non-face-to-face services provided:  Obtained and reviewed discharge summary and/or continuity of care documents Reviewed encounter information for continuity of care prior to follow up phone call - chart notes, consults, progress notes, test results, med list, appointments, AVS, other information. Advance Care Planning   Healthcare Decision Maker:    Primary Decision Maker: Jamie Hirsch - [phone]    Transitions of Care Initial Call    Was this an external facility discharge? No Discharge Facility:     Challenges to be reviewed by the provider   Additional needs identified to be addressed with provider: No  none             Method of communication with provider : none    Advance Care Planning:   Does patient have an Advance Directive: not on file; education provided. Care Transition Nurse contacted the patient by telephone to perform post hospital discharge assessment. Verified name and  with patient as identifiers. Provided introduction to self, and explanation of the CTN role. CTN reviewed discharge instructions, medical action plan and red flags with patient who verbalized understanding. Patient given an opportunity to ask questions and does not have any further questions or concerns at this time. Were discharge instructions available to patient? Yes.  Reviewed appropriate site of care based on symptoms and resources available to patient including: PCP  Specialist. The patient agrees to contact the PCP office for questions related to their healthcare. Medication reconciliation was performed with patient, who verbalizes understanding of administration of home medications. Advised obtaining a 90-day supply of all daily and as-needed medications. Was patient discharged with a pulse oximeter? no    CTN provided contact information. No further follow-up call indicated based on severity of symptoms and risk factors. Care Transitions 24 Hour Call    Do you have a copy of your discharge instructions?: Yes  Do you have all of your prescriptions and are they filled?: Yes  Have you been contacted by a [address]?: No  Have you scheduled your follow up appointment?: Yes  How are you going to get to your appointment?: Car - family or friend to transport  [address] you have support at home?: Parent(s)  Do you feel like you have everything you need to keep you well at home?: Yes  Are you an active caregiver in your home?: No  Care Transitions Interventions         Follow Up : Spoke with patient today for CT call after discharge from [facility]. She says she is better, CTN discussed with her to rest and not be up trying to work in the house like after last discharge. She says she understands and is resting. She did review her meds and taking as ordered. She says she is not having any chest pain. She is eating and drinking ok. She has a Dexcom says her blood sugar was 140 this morning. She had a heart cath, accessed left wrist, some soreness, but no hematoma. She is scheduled at [facility] with Dr. Margot Rahman for EGD on 9/2. CTN inquired why seeing him and seeing Latoya Bernard. She said she is candidate for gastric surgery so seeing him. She has HFU with PCP today and [facility] on 8/30 and Cardiology on 9/9. She is aware of these appointments.  CTN encouraged her to make all appointments for continuity of care. She has had the Covid vaccine, has PHCDM listed and no LW/AD. She is following with non-[facility] provider today, no further outreach warranted.    Future Appointments   Date Time Provider Shu Flowers   8/30/2022 10:40 AM YRAIEL Bearden NP-[place]   9/9/2022  8:30 AM Sofy Dudley APRN - NP [facility] P-[place]       Valarie Guevara, RN

## 2022-08-26 ENCOUNTER — OFFICE VISIT (OUTPATIENT)
Dept: BARIATRICS/WEIGHT MGMT | Facility: CLINIC | Age: 47
End: 2022-08-26

## 2022-08-26 ENCOUNTER — HOSPITAL ENCOUNTER (OUTPATIENT)
Dept: LAB | Age: 47
Discharge: HOME | End: 2022-08-26

## 2022-08-26 VITALS
SYSTOLIC BLOOD PRESSURE: 140 MMHG | BODY MASS INDEX: 48.82 KG/M2 | HEIGHT: 65 IN | HEART RATE: 98 BPM | WEIGHT: 293 LBS | TEMPERATURE: 97.7 F | DIASTOLIC BLOOD PRESSURE: 90 MMHG | OXYGEN SATURATION: 94 %

## 2022-08-26 DIAGNOSIS — I10 PRIMARY HYPERTENSION: ICD-10-CM

## 2022-08-26 DIAGNOSIS — Z79.4 TYPE 2 DIABETES MELLITUS WITH OTHER SPECIFIED COMPLICATION, WITH LONG-TERM CURRENT USE OF INSULIN: ICD-10-CM

## 2022-08-26 DIAGNOSIS — G47.33 OSA (OBSTRUCTIVE SLEEP APNEA): ICD-10-CM

## 2022-08-26 DIAGNOSIS — E11.69 TYPE 2 DIABETES MELLITUS WITH OTHER SPECIFIED COMPLICATION, WITH LONG-TERM CURRENT USE OF INSULIN: ICD-10-CM

## 2022-08-26 DIAGNOSIS — E66.01 CLASS 3 SEVERE OBESITY DUE TO EXCESS CALORIES WITH SERIOUS COMORBIDITY AND BODY MASS INDEX (BMI) OF 60.0 TO 69.9 IN ADULT: Primary | ICD-10-CM

## 2022-08-26 LAB
INR BLD: 1.33 (ref 0.88–1.18)
PROTHROMBIN TIME: 16.6 SEC (ref 12–14.6)

## 2022-08-26 PROCEDURE — 99213 OFFICE O/P EST LOW 20 MIN: CPT | Performed by: NURSE PRACTITIONER

## 2022-08-26 NOTE — PROGRESS NOTES
"Patient Care Team:  Fátima Connor DO as PCP - General (Family Medicine)    Reason for Visit:  Surgical Weight loss    Subjective   Cassidy FLORES is a 47 y.o. female.     Cassidy is here for follow-up and continued medical management of her morbid obesity.  She is currently on a prescription diet.  Cassidy previously was to apply dietary changes such as following the meal plan as directed.  She admits to eating 3-4 meals per day.  As a result she lost weight since her last visit.  She states she was in and out of the hospital a lot of the past few weeks.     Review Of Systems:  Review of Systems   Constitutional: Positive for appetite change and fatigue.   Respiratory: Positive for shortness of breath.    Cardiovascular: Negative.    Gastrointestinal: Negative.  Positive for GERD.   Endocrine: Negative.    Musculoskeletal: Negative.    Neurological: Positive for headache.   Psychiatric/Behavioral: Negative.        The following portions of the patient's history were reviewed and updated as appropriate: allergies, current medications, past family history, past medical history, past social history, past surgical history, and problem list.    Objective   /90 (BP Location: Right arm, Patient Position: Sitting, Cuff Size: Adult)   Pulse 98   Temp 97.7 °F (36.5 °C)   Ht 163.8 cm (64.5\")   Wt (!) 161 kg (354 lb)   SpO2 94%   BMI 59.83 kg/m²       08/26/22  1016   Weight: (!) 161 kg (354 lb)       Physical Exam  Vitals reviewed.   Constitutional:       Appearance: She is obese.   Cardiovascular:      Rate and Rhythm: Normal rate and regular rhythm.   Pulmonary:      Effort: Pulmonary effort is normal.   Abdominal:      General: Bowel sounds are normal.      Palpations: Abdomen is soft.   Musculoskeletal:      Comments: Pain in left arm from recent procedure    Skin:     General: Skin is warm and dry.   Neurological:      Mental Status: She is alert and oriented to person, place, and time.   Psychiatric:  "        Mood and Affect: Mood normal.         Behavior: Behavior normal.         Class 3 Severe Obesity (BMI >=40). Obesity-related health conditions include the following: obstructive sleep apnea, hypertension and diabetes mellitus. Obesity is improving with treatment. BMI is is above average; BMI management plan is completed. We discussed portion control and increasing exercise.     Assessment & Plan   Diagnoses and all orders for this visit:    1. Class 3 severe obesity due to excess calories with serious comorbidity and body mass index (BMI) of 60.0 to 69.9 in adult ([facility]) (Primary)  Assessment & Plan:  Patient's (Body mass index is 59.83 kg/m².) indicates that they are morbidly obese (BMI > 40 or > 35 with obesity - related health condition) with health conditions that include obstructive sleep apnea, hypertension and diabetes mellitus . Weight is improving with treatment. BMI is is above average; BMI management plan is completed. We discussed portion control and increasing exercise.       2. Primary hypertension  Assessment & Plan:  Hypertension is improving with treatment.  Continue current treatment regimen.  Dietary sodium restriction.  Weight loss.  Blood pressure will be reassessed at the next regular appointment.      3. MARTHA (obstructive sleep apnea)  Comments:  Wears CPAP     4. Type 2 diabetes mellitus with other specified complication, with long-term current use of insulin ([facility])  Comments:  States glucose levels are improving, advised to f/u with PCP for medication adjustments as needed.        Cassidy FLORES was seen today for follow-up, obesity, nutrition counseling and weight loss.  She has lost weight since her last visit.  Today we discussed healthy changes in lifestyle, diet, and exercise. Dietician consultation obtained.  Cassidy FLORES had received handouts to her explaining the recommendation on portion sizes/appetite control/reading nutrition labels.   Intensive behavioral therapy for obesity  was done today as well.     Goals for this month are:   1. Increase vegetable intake   2. I will review records from [facility], patient states they told her the chest discomfort may be from GERD.She has an EGD next Friday.   3. Fully eliminate sodas by next appointment, patient is currently drinking 1 -2 per week.     Follow up in one month for a weight recheck.

## 2022-08-30 ENCOUNTER — OFFICE VISIT (OUTPATIENT)
Dept: GASTROENTEROLOGY | Age: 47
End: 2022-08-30
Payer: MEDICARE

## 2022-08-30 VITALS
HEIGHT: 64 IN | BODY MASS INDEX: 50.02 KG/M2 | SYSTOLIC BLOOD PRESSURE: 132 MMHG | DIASTOLIC BLOOD PRESSURE: 74 MMHG | WEIGHT: 293 LBS

## 2022-08-30 DIAGNOSIS — K59.00 CONSTIPATION, UNSPECIFIED CONSTIPATION TYPE: ICD-10-CM

## 2022-08-30 DIAGNOSIS — K21.9 CHRONIC GERD: Primary | ICD-10-CM

## 2022-08-30 PROCEDURE — 99213 OFFICE O/P EST LOW 20 MIN: CPT | Performed by: NURSE PRACTITIONER

## 2022-08-30 RX ORDER — HYDROCODONE BITARTRATE AND ACETAMINOPHEN 5; 325 MG/1; MG/1
TABLET ORAL PRN
COMMUNITY
Start: 2022-08-24 | End: 2022-09-09 | Stop reason: ALTCHOICE

## 2022-08-30 RX ORDER — CARVEDILOL 3.12 MG/1
TABLET ORAL 2 TIMES DAILY
COMMUNITY

## 2022-08-30 RX ORDER — SUCRALFATE ORAL 1 G/10ML
1 SUSPENSION ORAL 4 TIMES DAILY
Qty: 1200 ML | Refills: 3 | Status: SHIPPED | OUTPATIENT
Start: 2022-08-30

## 2022-08-30 RX ORDER — NITROGLYCERIN 0.4 MG/1
TABLET SUBLINGUAL PRN
COMMUNITY

## 2022-08-30 ASSESSMENT — ENCOUNTER SYMPTOMS
ABDOMINAL DISTENTION: 0
COUGH: 0
DIARRHEA: 0
ANAL BLEEDING: 0
TROUBLE SWALLOWING: 0
NAUSEA: 0
CONSTIPATION: 1
CHOKING: 0
ABDOMINAL PAIN: 0
SHORTNESS OF BREATH: 0
BLOOD IN STOOL: 0
VOMITING: 0
RECTAL PAIN: 0

## 2022-08-30 NOTE — PROGRESS NOTES
Subjective:     Patient ID: Mart Ponce is a 52 y.o. female  PCP: Sanjeev Dial DO  Referring Provider: No ref. provider found    HPI  Patient presents to the office today with the following complaints: Follow-up      Pt seen today for follow up. Hx chronic GERD. Currently taking Pantoprazole 40 mg and Carafate suspension daily. She has upcoming EGD with Dr. Sarah Medina for possible bariatric surgery. She feels symptoms are currently controlled. Hx chronic constipation. Currently using Miralax daily. She states this works well to control her symptoms. Last EGD 9/2021 - [facility], (-)[facility]  Last Colonoscopy 9/2021 - fair prep, 4 year recall    Assessment:     1. Chronic GERD    2. Constipation, unspecified constipation type            Plan:   - Continue Miralax daily  - Continue Pantoprazole 40 mg daily   - Ok for Carafate daily prn, refills provided  - Follow up yearly or sooner if needed  - Call with any questions or concerns       Orders  No orders of the defined types were placed in this encounter.     Medications  Orders Placed This Encounter   Medications    sucralfate (CARAFATE) 1 GM/10ML suspension     Sig: Take 10 mLs by mouth 4 times daily     Dispense:  1200 mL     Refill:  3         Patient History:     Past Medical History:   Diagnosis Date    CAD (coronary artery disease)     [facility] occlusion with cerebral infarction (HCC)     CHF (congestive heart failure) (HCC)     Chronic kidney disease     Diabetes mellitus (HCC)     Diabetic neuropathy (HCC)     GERD (gastroesophageal reflux disease)     Hyperlipidemia     Hypertension     Neuromuscular disorder ([address]     Seizures ([address]     as an child       Past Surgical History:   Procedure Laterality Date    CARDIAC SURGERY      COLONOSCOPY N/A 09/02/2021    Dr Edie Suarez, Sub prep Fair, 4 year recall    CORONARY ARTERY BYPASS GRAFT      2016    ENDOSCOPY, COLON, DIAGNOSTIC      EYE SURGERY      TONSILLECTOMY      at age 8    UPPER GASTROINTESTINAL ENDOSCOPY      Kami w/snow per patient    UPPER GASTROINTESTINAL ENDOSCOPY N/A 09/02/2021    Dr New Handy, BD, (-)[facility]       Family History   Problem Relation Age of Onset    Osteoarthritis Mother     Diabetes Mother     Hypertension Mother     Heart Failure Mother     Hypertension Father     Heart Attack Brother     Heart Failure Brother     Heart Failure Maternal Grandmother     Diabetes Maternal Grandfather     Heart Disease Maternal Grandfather     Heart Disease Paternal Grandmother     No Known Problems Paternal Grandfather     Colon Cancer Neg Hx     Colon Polyps Neg Hx     Esophageal Cancer Neg Hx     Liver Cancer Neg Hx     Liver Disease Neg Hx     Rectal Cancer Neg Hx     Stomach Cancer Neg Hx        Social History     Socioeconomic History    Marital status: Single    Number of children: 0   Occupational History    Occupation: customer service     Comment: disability   Tobacco Use    Smoking status: Never    Smokeless tobacco: Never   Vaping Use    Vaping Use: Never used   Substance and Sexual Activity    Alcohol use: Yes     Alcohol/week: 2.0 standard drinks     Types: 2 Glasses of wine per week     Comment: weekly    Drug use: Never    Sexual activity: Not Currently     Comment: has no kids   Social History Narrative    CODE STATUS: Full Code    HEALTH CARE PROXY: her Father, Mr. Art Beard, [phone]    AMBULATES: independently    DOMICILED: has 3 steps to enter the home, her Father and C[place] and Cousin's daughter stay with them, has a pet dog     Social Determinants of Health     Financial Resource Strain: Medium Risk    Difficulty of Paying Living Expenses: Somewhat hard   Food Insecurity: Food Insecurity Present    Worried About [facility] in the Last Year: Sometimes true    Ran Out of Food in the Last Year: Sometimes true   Transportation Needs: No Transportation Needs    Lack of Transportation (Medical): No    Lack of Transportation (Non-Medical):  No Physical Activity: Insufficiently Active    Days of Exercise per Week: 2 days    Minutes of Exercise per Session: 30 min   Stress: Stress Concern Present    Feeling of Stress : To some extent   Social Connections: Moderately Isolated    Frequency of Communication with Friends and Family: More than three times a week    Frequency of Social Gatherings with Friends and Family: More than three times a week    Attends Hindu Services: More than 4 times per year    Active Member of [employer] or Organizations: No    Attends Club or Organization Meetings: Never    Marital Status: Never    Housing Stability: [address] to Pay for Housing in the Last Year: No    Number of [place] in the Last Year: 1    Unstable Housing in the Last Year: No       Current Outpatient Medications   Medication Sig Dispense Refill    carvedilol (COREG) 3.125 MG tablet in the morning and at bedtime      HYDROcodone-acetaminophen (NORCO) 5-325 MG per tablet as needed. nitroGLYCERIN (NITROSTAT) 0.4 MG SL tablet as needed      sucralfate (CARAFATE) 1 GM/10ML suspension Take 10 mLs by mouth 4 times daily 1200 mL 3    isosorbide mononitrate (IMDUR) 30 MG extended release tablet Take 1 tablet by mouth in the morning. 30 tablet 0    ranolazine (RANEXA) 500 MG extended release tablet Take 1 tablet by mouth in the morning and 1 tablet before bedtime.  60 tablet 0    pantoprazole (PROTONIX) 40 MG tablet TAKE 1 TABLET BY MOUTH DAILY 90 tablet 1    fluticasone (FLONASE) 50 MCG/ACT nasal spray 1 spray by Each Nostril route daily 32 g 1    cetirizine (ZYRTEC ALLERGY) 10 MG tablet Take 1 tablet by mouth daily 30 tablet 0    Semaglutide,0.25 or 0.5MG/DOS, (OZEMPIC, 0.25 OR 0.5 MG/DOSE,) 2 MG/1.5ML SOPN Inject 0.25 mg into the skin once a week      amLODIPine (NORVASC) 5 MG tablet Take 5 mg by mouth daily       vitamin D (ERGOCALCIFEROL) 1.25 MG (43142 UT) CAPS capsule Take 1 capsule by mouth once a week      lisinopril (PRINIVIL;ZESTRIL) 2.5 MG tablet Take 1 tablet by mouth daily       promethazine-dextromethorphan (PROMETHAZINE-DM) 6.25-15 MG/5ML syrup Take 5 mLs by mouth nightly as needed for Cough 120 mL 0    warfarin (COUMADIN) 10 MG tablet Take 10 mg by mouth 10mg every day      furosemide (LASIX) 40 MG tablet Take 2 tablets by mouth daily (Patient taking differently: Take 120 mg by mouth daily) 60 tablet 0    gabapentin (NEURONTIN) 300 MG capsule Take 1 capsule by mouth 2 times daily as needed (Neuropathy/nerve pains) for up to 3 days.  Intended supply: 30 days 6 capsule 0    ondansetron (ZOFRAN ODT) 4 MG disintegrating tablet Take 1 tablet by mouth every 8 hours as needed for Nausea or Vomiting 15 tablet 0    nystatin (MYCOSTATIN) 850317 UNIT/GM powder Apply 3 times daily to lower groin, abdominal area and under bilateral breasts 1 Bottle 0    ezetimibe (ZETIA) 10 MG tablet Take 10 mg by mouth daily       insulin glargine (LANTUS;BASAGLAR) 100 UNIT/ML injection pen Inject 80 Units into the skin nightly       bethanechol (URECHOLINE) 25 MG tablet Take 1 tablet by mouth daily      metFORMIN (GLUCOPHAGE) 1000 MG tablet Take 1 tablet by mouth 2 times daily (with meals) Hold till monday      insulin aspart (NOVOLOG) 100 UNIT/ML injection vial Inject 10 Units into the skin 4 times daily (before meals and nightly) Up to 10 units ss coverage as directed      spironolactone (ALDACTONE) 25 MG tablet Take 12.5 mg by mouth daily      topiramate (TOPAMAX) 50 MG tablet Take 1 tablet by mouth daily       aspirin 81 MG tablet Take 81 mg by mouth daily      metoprolol succinate ER (TOPROL-XL) 25 MG XL tablet Take 25 mg by mouth daily       prochlorperazine (COMPAZINE) 10 MG tablet Take 10 mg by mouth 2 times daily as needed (headache)       verapamil (CALAN) 40 MG tablet Take 40 mg by mouth every 12 hours      atorvastatin (LIPITOR) 40 MG tablet Take 80 mg by mouth nightly       NONFORMULARY daily apple cider gummy  (Patient not taking: Reported on 8/30/2022) No current facility-administered medications for this visit. Allergies   Allergen Reactions    Sulfamethoxazole-Trimethoprim Other (See Comments)     Yeast inf  Gives her a Yeast infection       Review of Systems   Constitutional:  Negative for activity change, appetite change, fatigue, fever and unexpected weight change. HENT:  Negative for trouble swallowing. Respiratory:  Negative for cough, choking and shortness of breath. Cardiovascular:  Negative for chest pain. Gastrointestinal:  Positive for constipation. Negative for abdominal distention, abdominal pain, anal bleeding, blood in stool, diarrhea, nausea, rectal pain and vomiting. Reflux   Allergic/Immunologic: Negative for food allergies. All other systems reviewed and are negative. Objective:     /74   Ht 5' 4\" (1.626 m)   Wt (!) 348 lb 9.6 oz (158.1 kg)   LMP  (LMP Unknown) Comment: LMP in June 2022  BMI 59.84 kg/m²     Physical Exam  Vitals reviewed. Constitutional:       General: She is not in acute distress. Appearance: She is well-developed. HENT:      Head: Normocephalic and atraumatic. Right Ear: External ear normal.      Left Ear: External ear normal.      Nose: Nose normal.      Comments: Mask on     Mouth/Throat:      Comments: Mask on  Eyes:      General: No scleral icterus. Right eye: No discharge. Left eye: No discharge. Conjunctiva/sclera: Conjunctivae normal.      Pupils: Pupils are equal, round, and reactive to light. Cardiovascular:      Rate and Rhythm: Normal rate and regular rhythm. Heart sounds: Normal heart sounds. No murmur heard. Pulmonary:      Effort: Pulmonary effort is normal. No respiratory distress. Breath sounds: Normal breath sounds. No wheezing or rales. Abdominal:      General: Bowel sounds are normal. There is no distension. Palpations: Abdomen is soft. There is no mass. Tenderness: There is no abdominal tenderness.  There is no guarding or rebound. Musculoskeletal:         General: Normal range of motion. Cervical back: Normal range of motion and neck supple. Skin:     General: Skin is warm and dry. Coloration: Skin is not pale. Neurological:      Mental Status: She is alert and oriented to person, place, and time.    Psychiatric:         Behavior: Behavior normal.

## 2022-08-31 ENCOUNTER — TELEPHONE (OUTPATIENT)
Dept: BARIATRICS/WEIGHT MGMT | Facility: CLINIC | Age: 47
End: 2022-08-31

## 2022-08-31 NOTE — TELEPHONE ENCOUNTER
BA-EGD       Patient was called and reminded of their procedure with Dr Hernandez on 09/02/2022        Instructions:     1) Eat normal the day before your procedure until 8 p.m. in the evening.    2) Beginning at 8pm clear liquids only-no Red or Pink in Color   3) Nothing to eat or drink after midnight.  4) Bring a list of medications.   5) Advised that they must bring a  as that IV sedation is being used.           The patient voiced an understanding and was agreeable.

## 2022-09-01 ENCOUNTER — HOSPITAL ENCOUNTER (OUTPATIENT)
Dept: LAB | Age: 47
Discharge: HOME | End: 2022-09-01
Payer: MEDICARE

## 2022-09-01 LAB
INR BLD: 3.72 (ref 0.88–1.18)
PROTHROMBIN TIME: 38.5 SEC (ref 12–14.6)

## 2022-09-01 PROCEDURE — 85610 PROTHROMBIN TIME: CPT

## 2022-09-02 ENCOUNTER — ANESTHESIA EVENT (OUTPATIENT)
Dept: GASTROENTEROLOGY | Facility: HOSPITAL | Age: 47
End: 2022-09-02

## 2022-09-02 ENCOUNTER — HOSPITAL ENCOUNTER (OUTPATIENT)
Facility: HOSPITAL | Age: 47
Setting detail: HOSPITAL OUTPATIENT SURGERY
Discharge: HOME | End: 2022-09-02
Attending: SURGERY | Admitting: SURGERY

## 2022-09-02 ENCOUNTER — ANESTHESIA (OUTPATIENT)
Dept: GASTROENTEROLOGY | Facility: HOSPITAL | Age: 47
End: 2022-09-02

## 2022-09-02 VITALS
RESPIRATION RATE: 21 BRPM | BODY MASS INDEX: 50.02 KG/M2 | SYSTOLIC BLOOD PRESSURE: 156 MMHG | TEMPERATURE: 96.2 F | HEIGHT: 64 IN | WEIGHT: 293 LBS | DIASTOLIC BLOOD PRESSURE: 99 MMHG | HEART RATE: 103 BPM | OXYGEN SATURATION: 98 %

## 2022-09-02 DIAGNOSIS — I10 PRIMARY HYPERTENSION: ICD-10-CM

## 2022-09-02 DIAGNOSIS — R63.5 ABNORMAL WEIGHT GAIN: ICD-10-CM

## 2022-09-02 DIAGNOSIS — Z87.19 H/O GASTROESOPHAGEAL REFLUX (GERD): ICD-10-CM

## 2022-09-02 DIAGNOSIS — G47.33 OSA (OBSTRUCTIVE SLEEP APNEA): ICD-10-CM

## 2022-09-02 DIAGNOSIS — E11.69 TYPE 2 DIABETES MELLITUS WITH OTHER SPECIFIED COMPLICATION, WITH LONG-TERM CURRENT USE OF INSULIN: ICD-10-CM

## 2022-09-02 DIAGNOSIS — E66.01 CLASS 3 SEVERE OBESITY DUE TO EXCESS CALORIES WITH SERIOUS COMORBIDITY AND BODY MASS INDEX (BMI) OF 60.0 TO 69.9 IN ADULT: ICD-10-CM

## 2022-09-02 DIAGNOSIS — Z79.4 TYPE 2 DIABETES MELLITUS WITH OTHER SPECIFIED COMPLICATION, WITH LONG-TERM CURRENT USE OF INSULIN: ICD-10-CM

## 2022-09-02 LAB
B-HCG UR QL: NEGATIVE
GLUCOSE BLDC GLUCOMTR-MCNC: 106 MG/DL (ref 70–130)
TSH SERPL DL<=0.05 MIU/L-ACNC: 2.39 UIU/ML (ref 0.27–4.2)

## 2022-09-02 PROCEDURE — 84443 ASSAY THYROID STIM HORMONE: CPT | Performed by: SURGERY

## 2022-09-02 PROCEDURE — 87081 CULTURE SCREEN ONLY: CPT | Performed by: SURGERY

## 2022-09-02 PROCEDURE — 81025 URINE PREGNANCY TEST: CPT | Performed by: NURSE ANESTHETIST, CERTIFIED REGISTERED

## 2022-09-02 PROCEDURE — 25010000002 PROPOFOL 10 MG/ML EMULSION: Performed by: NURSE ANESTHETIST, CERTIFIED REGISTERED

## 2022-09-02 PROCEDURE — 82962 GLUCOSE BLOOD TEST: CPT

## 2022-09-02 RX ORDER — LIDOCAINE HYDROCHLORIDE 20 MG/ML
INJECTION, SOLUTION EPIDURAL; INFILTRATION; INTRACAUDAL; PERINEURAL AS NEEDED
Status: DISCONTINUED | OUTPATIENT
Start: 2022-09-02 | End: 2022-09-02 | Stop reason: SURG

## 2022-09-02 RX ORDER — SODIUM CHLORIDE 0.9 % (FLUSH) 0.9 %
10 SYRINGE (ML) INJECTION EVERY 12 HOURS SCHEDULED
Status: CANCELLED | OUTPATIENT
Start: 2022-09-02

## 2022-09-02 RX ORDER — SODIUM CHLORIDE 0.9 % (FLUSH) 0.9 %
10 SYRINGE (ML) INJECTION AS NEEDED
Status: DISCONTINUED | OUTPATIENT
Start: 2022-09-02 | End: 2022-09-02 | Stop reason: HOSPADM

## 2022-09-02 RX ORDER — PROPOFOL 10 MG/ML
VIAL (ML) INTRAVENOUS AS NEEDED
Status: DISCONTINUED | OUTPATIENT
Start: 2022-09-02 | End: 2022-09-02 | Stop reason: SURG

## 2022-09-02 RX ORDER — SODIUM CHLORIDE 9 MG/ML
100 INJECTION, SOLUTION INTRAVENOUS CONTINUOUS
Status: CANCELLED | OUTPATIENT
Start: 2022-09-02

## 2022-09-02 RX ORDER — LIDOCAINE HYDROCHLORIDE 10 MG/ML
0.5 INJECTION, SOLUTION EPIDURAL; INFILTRATION; INTRACAUDAL; PERINEURAL ONCE AS NEEDED
Status: DISCONTINUED | OUTPATIENT
Start: 2022-09-02 | End: 2022-09-02 | Stop reason: HOSPADM

## 2022-09-02 RX ORDER — SODIUM CHLORIDE 9 MG/ML
500 INJECTION, SOLUTION INTRAVENOUS CONTINUOUS PRN
Status: DISCONTINUED | OUTPATIENT
Start: 2022-09-02 | End: 2022-09-02 | Stop reason: HOSPADM

## 2022-09-02 RX ORDER — SODIUM CHLORIDE 0.9 % (FLUSH) 0.9 %
10 SYRINGE (ML) INJECTION AS NEEDED
Status: CANCELLED | OUTPATIENT
Start: 2022-09-02

## 2022-09-02 RX ADMIN — SODIUM CHLORIDE 500 ML: 9 INJECTION, SOLUTION INTRAVENOUS at 08:57

## 2022-09-02 RX ADMIN — LIDOCAINE HYDROCHLORIDE 100 MG: 20 INJECTION, SOLUTION EPIDURAL; INFILTRATION; INTRACAUDAL; PERINEURAL at 09:10

## 2022-09-02 RX ADMIN — LIDOCAINE HYDROCHLORIDE 100 MG: 20 INJECTION, SOLUTION EPIDURAL; INFILTRATION; INTRACAUDAL; PERINEURAL at 09:11

## 2022-09-02 RX ADMIN — PROPOFOL 80 MG: 10 INJECTION, EMULSION INTRAVENOUS at 09:10

## 2022-09-02 NOTE — ANESTHESIA PREPROCEDURE EVALUATION
Anesthesia Evaluation     Patient summary reviewed   NPO Solid Status: > 6 hours             Airway   Mallampati: II  TM distance: >3 FB  Dental - normal exam     Pulmonary    (+) sleep apnea,   Cardiovascular   Exercise tolerance: good (4-7 METS)    PT is on anticoagulation therapy    (+) hypertension 2 medications or greater, CAD, CABG,     ROS comment: Mildly reduced LVEF estimated at 40-50%    Neuro/Psych  (+) CVA,    GI/Hepatic/Renal/Endo    (+) morbid obesity,  renal disease CRI, diabetes mellitus using insulin,     Musculoskeletal     Abdominal   (+) obese,    Substance History      OB/GYN          Other                        Anesthesia Plan    ASA 4     MAC     (Warfarin held--    Came to hospital last week with chest pains---stress test was done 8/12/2022 which showed EF of 40%, old lateral infarct with mild ischemia. Echocardiogram with grade 1 diastolic dysfunction, due to persistent chest pain patient was initiated on heparin drip as well as nitro drip this admission. Seen by cardiology, status post cardiac cath with no evidence of significant stenosis and no stent placed. Patient's chest pain is atypical and reproducible on palpation. EKG with no acute ST-T wave abnormality, troponin negative, patient to continue current medications. Needs close monitoring of INR levels outpt. Discharged home in stable condition to follow-up outpatient with PCP and cardiologist for continuous management of chronic medical problems.)  intravenous induction     Anesthetic plan, risks, benefits, and alternatives have been provided, discussed and informed consent has been obtained with: patient and spouse/significant other.        CODE STATUS:

## 2022-09-02 NOTE — ANESTHESIA POSTPROCEDURE EVALUATION
"Patient: Cassidy FLORES    Procedure Summary     Date: 09/02/22 Room / Location:  [facility] /  [facility]    Anesthesia Start: 0907 Anesthesia Stop: 0916    Procedure: ESOPHAGOGASTRODUODENOSCOPY WITH ANESTHESIA (N/A ) Diagnosis:       Class 3 severe obesity due to excess calories with serious comorbidity and body mass index (BMI) of 60.0 to 69.9 in adult ([facility])      Primary hypertension      MARTHA (obstructive sleep apnea)      Type 2 diabetes mellitus with other specified complication, with long-term current use of insulin ([facility])      H/O gastroesophageal reflux (GERD)      (Class 3 severe obesity due to excess calories with serious comorbidity and body mass index (BMI) of 60.0 to 69.9 in adult ([facility]) [E66.01, Z68.44])      (Primary hypertension [I10])      (MARTHA (obstructive sleep apnea) [G47.33])      (Type 2 diabetes mellitus with other specified complication, with long-term current use of insulin ([facility]) [E11.69, Z79.4])      (H/O gastroesophageal reflux (GERD) [Z87.19])    Surgeons: Robert Hernandez MD Provider: Puneet Rider CRNA    Anesthesia Type: MAC ASA Status: 4          Anesthesia Type: MAC    Vitals  Vitals Value Taken Time   /86 09/02/22 0916   Temp     Pulse 99 09/02/22 0916   Resp 15 09/02/22 0916   SpO2 97 % 09/02/22 0916           Post Anesthesia Care and Evaluation    Patient location during evaluation: PHASE II  Patient participation: complete - patient participated  Level of consciousness: awake  Pain score: 0  Pain management: adequate    Airway patency: patent  Anesthetic complications: No anesthetic complications  PONV Status: none  Cardiovascular status: acceptable  Respiratory status: acceptable  Hydration status: acceptable    Comments: Blood pressure 147/86, pulse 99, temperature 96.2 °F (35.7 °C), temperature source Temporal, resp. rate 15, height 162.6 cm (64\"), weight (!) 159 kg (351 lb), last menstrual period 06/01/2022, SpO2 97 %, not currently " breastfeeding.

## 2022-09-03 LAB — UREASE TISS QL: NEGATIVE

## 2022-09-07 ENCOUNTER — HOSPITAL ENCOUNTER (OUTPATIENT)
Dept: LAB | Age: 47
Discharge: HOME | End: 2022-09-07
Payer: MEDICARE

## 2022-09-07 LAB
INR BLD: 3.29 (ref 0.88–1.18)
PROTHROMBIN TIME: 34.8 SEC (ref 12–14.6)

## 2022-09-07 PROCEDURE — 36415 COLL VENOUS BLD VENIPUNCTURE: CPT

## 2022-09-07 PROCEDURE — 85610 PROTHROMBIN TIME: CPT

## 2022-09-09 ENCOUNTER — OFFICE VISIT (OUTPATIENT)
Dept: CARDIOLOGY CLINIC | Age: 47
End: 2022-09-09
Payer: MEDICARE

## 2022-09-09 VITALS
OXYGEN SATURATION: 100 % | DIASTOLIC BLOOD PRESSURE: 82 MMHG | HEART RATE: 107 BPM | WEIGHT: 293 LBS | BODY MASS INDEX: 50.02 KG/M2 | SYSTOLIC BLOOD PRESSURE: 138 MMHG | HEIGHT: 64 IN

## 2022-09-09 DIAGNOSIS — I25.10 CORONARY ARTERY DISEASE INVOLVING NATIVE CORONARY ARTERY OF NATIVE HEART WITHOUT ANGINA PECTORIS: Primary | ICD-10-CM

## 2022-09-09 DIAGNOSIS — I10 ESSENTIAL HYPERTENSION: ICD-10-CM

## 2022-09-09 DIAGNOSIS — E78.5 DYSLIPIDEMIA: ICD-10-CM

## 2022-09-09 PROCEDURE — 99214 OFFICE O/P EST MOD 30 MIN: CPT | Performed by: NURSE PRACTITIONER

## 2022-09-09 PROCEDURE — 93000 ELECTROCARDIOGRAM COMPLETE: CPT | Performed by: NURSE PRACTITIONER

## 2022-09-09 ASSESSMENT — ENCOUNTER SYMPTOMS
SHORTNESS OF BREATH: 1
SORE THROAT: 0
COUGH: 0
WHEEZING: 0
CHEST TIGHTNESS: 0

## 2022-09-09 NOTE — PROGRESS NOTES
[facility] Cardiology  [address] [facility]  [phone]      Chief Complaint / Reason for Being Seen: Follow-up. 1. Coronary artery disease involving native coronary artery of native heart without angina pectoris    2. Essential hypertension    3. Dyslipidemia          Patient with a history of coronary artery disease, type 2 diabetes, GERD, hypertension, and hyperlipidemia. Patient presented to the ER on 8/20/2022 with complaints of chest pain. She had recently been seen in the hospital and had a stress test on 8/12/2022 that showed an EF of 40 with an old lateral infarct with mild ischemia. 2D echo grade 1 diastolic dysfunction. She was started on a heparin drip as well as nitro. Cardiology was consulted and cardiac catheterization was performed. CONCLUSIONS:   Extremely difficult coronary artery angiogram from the left radial approach. The ascending aortic root was rotated anteriorly. Left main from retrograde filling during LIMA injection was normal. Left anterior descending filled via a normal tortuous LIMA graft. There was 50% stenoses of the proximal left anterior descending. Mid and distal left anterior descending after the LIMA distal anastomosis was normal to small in caliber and moderately irregular. Diagonal branch filled via the sequential hookup from the LIMA and was normal.Left circumflex cannot be seen too clearly. Right coronary artery was normal.      RECOMMENDATIONS:   The chest pain is atypical and can be reproduced by touching the chest wall. Nuclear stress test showed fixed apical defect. The left circumflex area looks normal on nuclear stress test.  EKGs and enzymes were normal.  I do not think the chest pain is from cardiac ischemia. It is most likely musculoskeletal in origin. This can be treated medically. Dr Madelaine Rueda. Subjective: Presents to clinic today for hospital follow-up.   She denies any further complaints of chest pain, pressure or tightness. She does have some shortness of breath which is her baseline no worsening. There is no orthopnea or PND. Patient denies any lightheadedness, dizziness or syncope. Patient sees cardiology in [place] every 3 months. Old records have been obtained from the referring providers. Those records have been reviewed and summarized. Ian Dial is a 52 y.o. female with the following history as recorded in [facility]:  Patient Active Problem List    Diagnosis Date Noted    Chest pain in adult 08/21/2022    Substernal chest pain relieved by nitroglycerin 08/20/2022    Chest pain due to myocardial ischemia, unspecified ischemic chest pain type 08/10/2022    Acute kidney injury superimposed on CKD ([address] 08/10/2022    Lip abrasion 04/05/2022    Skin ulcer of face with fat layer exposed ([address] 04/05/2022    Gallstones 09/24/2021    Abnormal biliary HIDA scan 09/24/2021    Chronic anticoagulation 09/24/2021    History of gallstones 09/21/2021    Chest pain 02/24/2018    Gastroesophageal reflux disease without esophagitis     Atherosclerosis of native coronary artery of native heart with unstable angina pectoris ([address] 08/04/2016    Hypertension 08/04/2016    Type 2 diabetes mellitus with diabetic polyneuropathy, with long-term current use of insulin ([address] 08/04/2016    Mixed hyperlipidemia 08/04/2016    NAYAN (obstructive sleep apnea) 08/04/2016    Morbid obesity due to excess calories ([address] 08/04/2016    Acute blood loss anemia 08/04/2016    [facility] occlusion with cerebral infarction ([address] 08/04/2016     Current Outpatient Medications   Medication Sig Dispense Refill    carvedilol (COREG) 3.125 MG tablet in the morning and at bedtime      nitroGLYCERIN (NITROSTAT) 0.4 MG SL tablet as needed      sucralfate (CARAFATE) 1 GM/10ML suspension Take 10 mLs by mouth 4 times daily 1200 mL 3    isosorbide mononitrate (IMDUR) 30 MG extended release tablet Take 1 tablet by mouth in the morning.  30 tablet 0 ranolazine (RANEXA) 500 MG extended release tablet Take 1 tablet by mouth in the morning and 1 tablet before bedtime. 60 tablet 0    pantoprazole (PROTONIX) 40 MG tablet TAKE 1 TABLET BY MOUTH DAILY 90 tablet 1    fluticasone (FLONASE) 50 MCG/ACT nasal spray 1 spray by Each Nostril route daily 32 g 1    cetirizine (ZYRTEC ALLERGY) 10 MG tablet Take 1 tablet by mouth daily 30 tablet 0    Semaglutide,0.25 or 0.5MG/DOS, (OZEMPIC, 0.25 OR 0.5 MG/DOSE,) 2 MG/1.5ML SOPN Inject 0.25 mg into the skin once a week      amLODIPine (NORVASC) 5 MG tablet Take 5 mg by mouth daily       vitamin D (ERGOCALCIFEROL) 1.25 MG (83328 UT) CAPS capsule Take 1 capsule by mouth once a week      lisinopril (PRINIVIL;ZESTRIL) 2.5 MG tablet Take 1 tablet by mouth daily       promethazine-dextromethorphan (PROMETHAZINE-DM) 6.25-15 MG/5ML syrup Take 5 mLs by mouth nightly as needed for Cough 120 mL 0    warfarin (COUMADIN) 10 MG tablet Take 10 mg by mouth 10mg every day      furosemide (LASIX) 40 MG tablet Take 2 tablets by mouth daily (Patient taking differently: Take 120 mg by mouth daily) 60 tablet 0    gabapentin (NEURONTIN) 300 MG capsule Take 1 capsule by mouth 2 times daily as needed (Neuropathy/nerve pains) for up to 3 days.  Intended supply: 30 days 6 capsule 0    ondansetron (ZOFRAN ODT) 4 MG disintegrating tablet Take 1 tablet by mouth every 8 hours as needed for Nausea or Vomiting 15 tablet 0    nystatin (MYCOSTATIN) 289973 UNIT/GM powder Apply 3 times daily to lower groin, abdominal area and under bilateral breasts 1 Bottle 0    ezetimibe (ZETIA) 10 MG tablet Take 10 mg by mouth daily       insulin glargine (LANTUS;BASAGLAR) 100 UNIT/ML injection pen Inject 80 Units into the skin nightly       bethanechol (URECHOLINE) 25 MG tablet Take 1 tablet by mouth daily      metFORMIN (GLUCOPHAGE) 1000 MG tablet Take 1 tablet by mouth 2 times daily (with meals) Hold till monday      insulin aspart (NOVOLOG) 100 UNIT/ML injection vial Inject 10 Units into the skin 4 times daily (before meals and nightly) Up to 10 units ss coverage as directed      spironolactone (ALDACTONE) 25 MG tablet Take 12.5 mg by mouth daily      topiramate (TOPAMAX) 50 MG tablet Take 1 tablet by mouth daily       aspirin 81 MG tablet Take 81 mg by mouth daily      metoprolol succinate ER (TOPROL-XL) 25 MG XL tablet Take 25 mg by mouth daily       prochlorperazine (COMPAZINE) 10 MG tablet Take 10 mg by mouth 2 times daily as needed (headache)       verapamil (CALAN) 40 MG tablet Take 40 mg by mouth every 12 hours      atorvastatin (LIPITOR) 40 MG tablet Take 80 mg by mouth nightly       NONFORMULARY daily apple cider gummy  (Patient not taking: Reported on 8/30/2022)       No current facility-administered medications for this visit.      Allergies: Sulfamethoxazole-trimethoprim  Past Medical History:   Diagnosis Date    CAD (coronary artery disease)     [facility] occlusion with cerebral infarction (HCC)     CHF (congestive heart failure) (HCC)     Chronic kidney disease     Diabetes mellitus (HCC)     Diabetic neuropathy (HCC)     GERD (gastroesophageal reflux disease)     Hyperlipidemia     Hypertension     [address]uromuscular disorder [address]     Seizures ([address]     as an child     Past Surgical History:   Procedure Laterality Date    CARDIAC SURGERY      COLONOSCOPY N/A 09/02/2021    Dr Ann Marie Kwan, Sub prep [place], 4 year recall    CORONARY ARTERY BYPASS GRAFT      2016    ENDOSCOPY, COLON, DIAGNOSTIC      EYE SURGERY      TONSILLECTOMY      at age 8    [facility], w/dil per patient    UPPER GASTROINTESTINAL ENDOSCOPY N/A 09/02/2021    Dr Ann Marie Kwan, [facility], (-[place]     Family History   Problem Relation Age of Onset    Osteoarthritis Mother     Diabetes Mother     Hypertension Mother     Heart Failure Mother     Hypertension Father     Heart Surgery Father     Heart Attack Brother     Heart Failure Maternal Grandmother     Diabetes Maternal Grandfather     Heart Disease Maternal Grandfather     Heart Disease Paternal Grandmother     No Known Problems Paternal Grandfather     Colon Cancer Neg Hx     Colon Polyps Neg Hx     Esophageal Cancer Neg Hx     Liver Cancer Neg Hx     Liver Disease Neg Hx     Rectal Cancer Neg Hx     Stomach Cancer Neg Hx      Social History     Tobacco Use    Smoking status: Never    Smokeless tobacco: Never   Substance Use Topics    Alcohol use: Yes     Alcohol/week: 2.0 standard drinks     Types: 2 Glasses of wine per week     Comment: weekly prn          Review of System:    Review of Systems   Constitutional:  Negative for activity change, chills, diaphoresis, fatigue and fever. HENT:  Negative for congestion and sore throat. Respiratory:  Positive for shortness of breath (Baseline no worsening). Negative for cough, chest tightness and wheezing. Cardiovascular:  Negative for chest pain, palpitations and leg swelling. Neurological:  Negative for dizziness, syncope, light-headedness and headaches. Psychiatric/Behavioral:  Negative for confusion. The patient is not nervous/anxious. Objective:    /82   Pulse (!) 107   Ht 5' 4\" (1.626 m)   Wt (!) 345 lb (156.5 kg)   LMP  (LMP Unknown) Comment: LMP in June 2022  SpO2 100%   BMI 59.22 kg/m²     GENERAL - well developed and well nourished    HEENT -  PERRLA, Hearing appears normal, conjunctive and lids are normal.  External inspection of ears and nose appear normal.  NECK - no thyromegaly, no JVD, trachea is in the midline  CARDIOVASCULAR - PMI is in the mid line clavicular position, Normal S1 and S2 with no systolic murmur. No S3 or S4    PULMONARY - no respiratory distress. No wheezes or rales. Lungs are clear to ausculation, normal respiratory effort.   ABDOMEN  - soft, non tender, no rebound  MUSCULOSKELETAL  - range of motion of the upper and lower extermites appears normal and equal and is without pain   EXTREMITIES - no significant edema NEUROLOGIC - gait and station are normal  SKIN - turgor is normal, skin warm and dry. PSYCHIATRIC - normal mood and affect, alert and orientated x 3,      ASSESSMENT:    ALL THE CARDIOLOGY PROBLEMS ARE LISTED ABOVE; HOWEVER, THE FOLLOWING SPECIFIC CARDIAC PROBLEMS / CONDITIONS WERE ADDRESSED AND TREATED DURING THE OFFICE VISIT TODAY:       Cardiac Specific Problem  Discussion and Plan         1. CAD Initial encounter   EKG in the office today showing sinus tachycardia with a heart rate of having 107 bpm.  Rechecked pulse rate to be 82 bpm.  Patient is on Imdur, Ranexa, aspirin, Lipitor, Toprol. Continue present medical management         2. Hypertension Initial encounter   Blood pressure in the office today 138/82 O2 sat 100%. Patient is on Norvasc 5 mg daily. Coreg 3.25 mg twice a day. Lisinopril 2.5 mg daily. Continue present medical management. 3. Dyslipidemia Initial encounter   Patient is on Lipitor for 80 mg nightly. Continue present medical management. PLAN:    Orders Placed This Encounter   Procedures    EKG 12 lead     Order Specific Question:   Reason for Exam?     Answer: Other     No orders of the defined types were placed in this encounter. Return if symptoms worsen or fail to improve. Discussed with patient. I greatly appreciate the opportunity to meet Sravanthi Kirkpatrick and your confidence in allowing me to participate in her cardiovascular care. YARIEL Astudillo NP 9/9/2022 8:38 AM CDT                    This dictation was generated by voice recognition computer software. Although all attempts are made to edit dictation for accuracy, there may be errors in the transcription that are not intended.

## 2022-09-14 ENCOUNTER — HOSPITAL ENCOUNTER (OUTPATIENT)
Dept: LAB | Age: 47
Discharge: HOME | End: 2022-09-14
Admitting: PSYCHIATRY & NEUROLOGY
Payer: MEDICARE

## 2022-09-14 LAB
INR BLD: 3.08 (ref 0.88–1.18)
PROTHROMBIN TIME: 32.9 SEC (ref 12–14.6)

## 2022-09-14 PROCEDURE — 36415 COLL VENOUS BLD VENIPUNCTURE: CPT

## 2022-09-14 PROCEDURE — 85610 PROTHROMBIN TIME: CPT

## 2022-09-20 ENCOUNTER — OFFICE VISIT (OUTPATIENT)
Dept: BARIATRICS/WEIGHT MGMT | Facility: CLINIC | Age: 47
End: 2022-09-20

## 2022-09-20 VITALS
TEMPERATURE: 97.5 F | BODY MASS INDEX: 48.82 KG/M2 | HEIGHT: 65 IN | DIASTOLIC BLOOD PRESSURE: 90 MMHG | WEIGHT: 293 LBS | OXYGEN SATURATION: 97 % | HEART RATE: 106 BPM | SYSTOLIC BLOOD PRESSURE: 138 MMHG

## 2022-09-20 DIAGNOSIS — K22.89 OTHER SPECIFIED DISEASE OF ESOPHAGUS: ICD-10-CM

## 2022-09-20 DIAGNOSIS — I10 PRIMARY HYPERTENSION: ICD-10-CM

## 2022-09-20 DIAGNOSIS — G47.33 OSA (OBSTRUCTIVE SLEEP APNEA): ICD-10-CM

## 2022-09-20 DIAGNOSIS — E66.01 CLASS 3 SEVERE OBESITY DUE TO EXCESS CALORIES WITH SERIOUS COMORBIDITY AND BODY MASS INDEX (BMI) OF 60.0 TO 69.9 IN ADULT: Primary | ICD-10-CM

## 2022-09-20 PROCEDURE — 99214 OFFICE O/P EST MOD 30 MIN: CPT | Performed by: SURGERY

## 2022-09-20 RX ORDER — SCOLOPAMINE TRANSDERMAL SYSTEM 1 MG/1
1 PATCH, EXTENDED RELEASE TRANSDERMAL CONTINUOUS
Status: CANCELLED | OUTPATIENT
Start: 2022-09-20 | End: 2022-09-23

## 2022-09-20 RX ORDER — ENOXAPARIN SODIUM 150 MG/ML
40 INJECTION SUBCUTANEOUS ONCE
Status: CANCELLED | OUTPATIENT
Start: 2022-09-20 | End: 2022-09-20

## 2022-09-20 RX ORDER — ONDANSETRON 2 MG/ML
4 INJECTION INTRAMUSCULAR; INTRAVENOUS EVERY 6 HOURS PRN
Status: CANCELLED | OUTPATIENT
Start: 2022-09-20

## 2022-09-20 RX ORDER — GABAPENTIN 250 MG/5ML
250 SOLUTION ORAL ONCE
Status: CANCELLED | OUTPATIENT
Start: 2022-09-20 | End: 2022-09-20

## 2022-09-20 NOTE — PROGRESS NOTES
Patient Care Team:  Fátima Finley DO as PCP - General (Family Medicine)    Reason for Visit:  Surgical Weight loss    Subjective      Cassidy FLORES is a pleasant 47 y.o. female and presents with morbid obesity with her Body mass index is 58.1 kg/m².    She is here for discussion of weight loss options.  She stated she has been with the disease of obesity for year(s).  She stated she suffers from hypertension, obstructive sleep apnea and morbid obesity due to her weight gain.  She stated that weight loss helps alleviate these symptoms.   She stated that she has tried multiple diet regimens including completing a medically supervised weight loss program to help with weight loss.  She stated that she has attempted these conservative methods for weight loss without maintaining long term success.  Today she would like to discuss surgical weight loss options such as the Laparoscopic Sleeve Gastrectomy or the Laparoscopic R - Y Gastric Bypass.      Review of Systems  General ROS: positive for  - fatigue  Psychological ROS: negative  Respiratory ROS: no cough, shortness of breath, or wheezing  Cardiovascular ROS: positive for - dyspnea on exertion  Gastrointestinal ROS: no abdominal pain, change in bowel habits, or black or bloody stools  positive for - heartburn    History  Past Medical History:   Diagnosis Date   • Coronary artery disease    • Diabetes mellitus (HCC)    • Elevated cholesterol    • Hypertension    • Sleep apnea    • Stroke (HCC)    • TIA (transient ischemic attack)      Past Surgical History:   Procedure Laterality Date   • CARDIAC SURGERY     • ENDOSCOPY N/A 9/2/2022    Procedure: ESOPHAGOGASTRODUODENOSCOPY WITH ANESTHESIA;  Surgeon: Robert Hernandez MD;  Location: [facility] ENDOSCOPY;  Service: General;  Laterality: N/A;  pre obesity  post normal  dr finley   • TONSILLECTOMY       Family History   Problem Relation Age of Onset   • Diabetes Mother    • Hypertension Mother    • Heart disease  Mother    • Obesity Mother    • Heart disease Father      Social History     Tobacco Use   • Smoking status: Never Smoker   • Smokeless tobacco: Never Used   Vaping Use   • Vaping Use: Never used   Substance Use Topics   • Alcohol use: Never   • Drug use: Never     E-cigarette/Vaping   • E-cigarette/Vaping Use Never User      E-cigarette/Vaping Substances     (Not in a hospital admission)    Allergies:  Sulfamethoxazole-trimethoprim      Current Outpatient Medications:   •  albuterol sulfate  (90 Base) MCG/ACT inhaler, Inhale 2 puffs Every 4 (Four) Hours As Needed for Wheezing., Disp: 3.1 g, Rfl: 0  •  amLODIPine (NORVASC) 5 MG tablet, Take 5 mg by mouth 1 (One) Time As Needed., Disp: , Rfl:   •  Aspirin Buf,CaCarb-MgCarb-MgO, 81 MG tablet, Take 81 mg by mouth Daily., Disp: , Rfl:   •  atorvastatin (LIPITOR) 80 MG tablet, Take 80 mg by mouth Daily., Disp: , Rfl:   •  bethanechol (URECHOLINE) 25 MG tablet, Take 25 mg by mouth Daily., Disp: , Rfl:   •  cloNIDine (CATAPRES) 0.1 MG tablet, Take 0.1 mg by mouth Daily., Disp: , Rfl:   •  Continuous Blood Gluc Sensor (Dexcom G6 Sensor), , Disp: , Rfl:   •  Continuous Blood Gluc Transmit (Dexcom G6 Transmitter) misc, USE AS DIRECTED. CHANGE EVERY 90 DAYS, Disp: , Rfl:   •  Dapagliflozin Propanediol 10 MG tablet, Take 10 mg by mouth Daily., Disp: , Rfl:   •  exenatide er (BYDUREON) 2 MG pen-injector injection, Inject 2 mg under the skin into the appropriate area as directed 1 (One) Time Per Week., Disp: , Rfl:   •  ezetimibe (ZETIA) 10 MG tablet, Take 10 mg by mouth Daily., Disp: , Rfl:   •  furosemide (LASIX) 40 MG tablet, Take 80 mg by mouth Daily., Disp: , Rfl:   •  gabapentin (NEURONTIN) 600 MG tablet, Take 600 mg by mouth 3 (Three) Times a Day., Disp: , Rfl:   •  insulin aspart (novoLOG) 100 UNIT/ML injection, Inject  under the skin into the appropriate area as directed 3 (Three) Times a Day Before Meals., Disp: , Rfl:   •  lisinopril (PRINIVIL,ZESTRIL) 2.5 MG  tablet, Take 40 mg by mouth Daily., Disp: , Rfl:   •  meloxicam (MOBIC) 15 MG tablet, Take 15 mg by mouth Daily., Disp: , Rfl:   •  metFORMIN ER (GLUCOPHAGE-XR) 500 MG 24 hr tablet, Take 500 mg by mouth Daily., Disp: , Rfl:   •  metoprolol succinate XL (TOPROL-XL) 25 MG 24 hr tablet, Take 25 mg by mouth Daily., Disp: , Rfl:   •  nystatin (MYCOSTATIN) 361631 UNIT/GM powder, Apply  topically to the appropriate area as directed 4 (Four) Times a Day., Disp: , Rfl:   •  pantoprazole (PROTONIX) 40 MG EC tablet, Take 40 mg by mouth Daily., Disp: , Rfl:   •  prochlorperazine (COMPAZINE) 10 MG tablet, Take 10 mg by mouth Daily., Disp: , Rfl:   •  Semaglutide,0.25 or 0.5MG/DOS, (Ozempic, 0.25 or 0.5 MG/DOSE,) 2 MG/1.5ML solution pen-injector, Ozempic 0.25 mg or 0.5 mg (2 mg/1.5 mL) subcutaneous pen injector, Disp: , Rfl:   •  spironolactone (ALDACTONE) 25 MG tablet, Take 25 mg by mouth Daily., Disp: , Rfl:   •  sucralfate (CARAFATE) 1 GM/10ML suspension, 10 mL Every 6 (Six) Hours., Disp: , Rfl:   •  topiramate (TOPAMAX) 100 MG tablet, Take 100 mg by mouth Daily., Disp: , Rfl:   •  traMADol (ULTRAM) 50 MG tablet, Take 50 mg by mouth Daily., Disp: , Rfl:   •  verapamil (CALAN) 40 MG tablet, Take 40 mg by mouth., Disp: , Rfl:   •  vitamin D (ERGOCALCIFEROL) 1.25 MG (62285 UT) capsule capsule, Take 50,000 Units by mouth 1 (One) Time Per Week., Disp: , Rfl:   •  warfarin (COUMADIN) 10 MG tablet, Take 15 mg by mouth Every Other Day., Disp: , Rfl:   •  Insulin Glargine, 1 Unit Dial, (Toujeo SoloStar) 300 UNIT/ML solution pen-injector injection, Toujeo SoloStar U-300 Insulin 300 unit/mL (1.5 mL) subcutaneous pen, Disp: , Rfl:     Objective     Vital Signs  Temp:  [97.5 °F (36.4 °C)] 97.5 °F (36.4 °C)  Heart Rate:  [106] 106  BP: (138)/(90) 138/90  Body mass index is 58.1 kg/m².      09/20/22  1526   Weight: (!) 156 kg (343 lb 12.8 oz)       General Appearance:  awake, alert, oriented, in no acute distress  Lungs:  Normal expansion.   Clear to auscultation.  No rales, rhonchi, or wheezing.  Heart:  Heart regular rate and rhythm  Abdomen:  Soft, non-tender, normal bowel sounds; no bruits, organomegaly or masses.  Abnormal shape: obese      Results Review:   I reviewed the patient's new clinical results.        Assessment & Plan   Encounter Diagnoses   Name Primary?   • Class 3 severe obesity due to excess calories with serious comorbidity and body mass index (BMI) of 60.0 to 69.9 in adult (HCC) Yes   • Primary hypertension    • MARTHA (obstructive sleep apnea)        I believe this patient will be a good candidate for weight loss surgery.    She has chosen laparoscopic sleeve gastrectomy. I agree with this decision.  I have discussed the Kelvin - Y Gastric Bypass, laparoscopic sleeve gastrectomy and the Laparoscopic Gastric Band procedures to provide the alternatives which includes non surgical weight loss options as well.  We discussed the benefits of the surgeries including the benefit of weight loss and the possible reversal of co-morbid conditions associated with morbid obesity.  She is aware that the Sleeve procedure is not reversible.  We discussed the complications and risks which include the risk of perforation, leakage,bleeding, intra-abdominal organ injury, specifically at high risks of the liver and spleen, stenosis or ulcerations, the risk of venous thrombosis formation in the lungs, mesentery veins or lower extremities  leading to possible organ injury and death.  I explained to the patient that there is a risk of infection.  I explained to her the possibility that this procedure may not be performed laparoscopically and may require being converted to an opened procedure or aborted due to abnormal anatomy.  Also postoperatively there is a risk of increased GERD symptoms after this procedure.  I have explained if she develops intestinal metaplasia of her esophagus consideration for conversion to another weight loss procedure may be  necessary.    I have explained to the patient that depression, addictive behaviors and even an increase in suicidal emotions can occur after surgery and even though they have undergone a mental health evaluation through psychology/psychiatry or by a registered therapist she may require additional evaluation and treatment in the future.  Nicotine cessation needs to continue even after their surgical procedure and nicotine products should be avoided due to the side effects of these products.  I explained to Cassidy FLORES not to plan for pregnancy within 12 to 18 months of her procedure.  I explained to her postoperatively she should avoid having unprotected sex that would increase her risk of pregnancy during the postoperative period of 1 to 1-1/2 years.      I have reviewed with the patient her 30-day mortality risk using the Bariatric Surgical Risk/Benefit Calculator to be 0.14 to 0.15%.    I explained to the patient that the success of the surgery is directly related to the motivation and dedication to behavioral changes that they have learned during their preoperative course.  There is data that supports approximately 10% of patients may have satisfactory weight loss or weight gain weight but they have lost after surgery.  It is more likely due to returning to the previous behaviors they incorporated during their disease of obesity.  I also encourage Cassidy FLORES to incorporate exercise again after surgery weight restrictions have been removed postoperatively.    Memo Report   As part of this patient's treatment plan I am prescribing controlled substances.  We review Memo in our educational course.  The patient has been made aware of appropriate use of such medications, including potential risk of somnolence, limited ability to drive and /or work safely, and potential for dependence or overdose. It has also been made clear that these medications are for use by this patient only, without concomitant use of  alcohol or other substances unless prescribed.    Cassidy FLORES will be required to complete the educational preoperative class detailing terms of the preoperative and postoperative recommendations, risks of surgery, the monitoring of the patient's CLEM reports if necessary. Cassidy FLORES is aware that inappropriate use of prescribed medications will result in cessation of prescribing such medications.    CLEM report has been reviewed.      History and physical exam exhibit continued safe and appropriate use of controlled substances.       Cassidy FLORES understands the surgical procedures and the different surgical options that are available.   Cassidy FLORES understands the lifestyle changes that are required after surgery and has agreed to follow the guidelines outlined in the weight management program. Cassidy FLORES also expressed understanding of the risks involved and had all of her questions answered and desires to proceed.     Upon completion of our discussion and addressing and answering her questions to her satisfaction, informed consent was obtained.   She will be scheduled accordingly for a laparoscopic Sleeve gastrectomy procedure.      I discussed the patient's findings and my recommendations with patient.     I have also recommended that she obtain preoperative educational course, laboratory work and completion of her diet regimen prior to surgery consideration.      Dr. Robert Hernandez MD FACS    09/20/22  15:48 CDT  Patient Care Team:  Fátima Connor DO as PCP - General (Family Medicine)

## 2022-09-27 NOTE — PROGRESS NOTES
"Metabolic and Bariatric Surgery Adult Nutrition Assessment    Patient Name: Cassidy FLORES   YOB: 1975   MRN: [id number]     Assessment Date:  09/20/2022     Reason for Visit: Follow-up Nutrition Assessment     Treatment Pathway: Preoperative Bariatric Surgery, Visit 10    Assessment    Anthropometrics   Wt Readings from Last 1 Encounters:   09/20/22 (!) 156 kg (343 lb 12.8 oz)     Ht Readings from Last 1 Encounters:   09/20/22 163.8 cm (64.5\")     BMI Readings from Last 1 Encounters:   09/20/22 58.10 kg/m²        Initial Weight/Date: 367 lbs (Dec 2021)  Weight Changes since last visit: -10.2 lbs  Net Weight Change: -23.2 lbs     Past Medical History:   Diagnosis Date   • Coronary artery disease    • Diabetes mellitus (HCC)    • Elevated cholesterol    • Hypertension    • Sleep apnea    • Stroke (HCC)    • TIA (transient ischemic attack)       Past Surgical History:   Procedure Laterality Date   • CARDIAC SURGERY     • ENDOSCOPY N/A 9/2/2022    Procedure: ESOPHAGOGASTRODUODENOSCOPY WITH ANESTHESIA;  Surgeon: Robert Hernandez MD;  Location: [facility] ENDOSCOPY;  Service: General;  Laterality: N/A;  pre obesity  post normal  dr finley   • TONSILLECTOMY        Current Outpatient Medications   Medication Sig Dispense Refill   • albuterol sulfate  (90 Base) MCG/ACT inhaler Inhale 2 puffs Every 4 (Four) Hours As Needed for Wheezing. 3.1 g 0   • amLODIPine (NORVASC) 5 MG tablet Take 5 mg by mouth 1 (One) Time As Needed.     • Aspirin Buf,CaCarb-MgCarb-MgO, 81 MG tablet Take 81 mg by mouth Daily.     • atorvastatin (LIPITOR) 80 MG tablet Take 80 mg by mouth Daily.     • bethanechol (URECHOLINE) 25 MG tablet Take 25 mg by mouth Daily.     • cloNIDine (CATAPRES) 0.1 MG tablet Take 0.1 mg by mouth Daily.     • Continuous Blood Gluc Sensor (Dexcom G6 Sensor)      • Continuous Blood Gluc Transmit (Dexcom G6 Transmitter) misc USE AS DIRECTED. CHANGE EVERY 90 DAYS     • Dapagliflozin Propanediol 10 MG tablet " Take 10 mg by mouth Daily.     • exenatide er (BYDUREON) 2 MG pen-injector injection Inject 2 mg under the skin into the appropriate area as directed 1 (One) Time Per Week.     • ezetimibe (ZETIA) 10 MG tablet Take 10 mg by mouth Daily.     • furosemide (LASIX) 40 MG tablet Take 80 mg by mouth Daily.     • gabapentin (NEURONTIN) 600 MG tablet Take 600 mg by mouth 3 (Three) Times a Day.     • insulin aspart (novoLOG) 100 UNIT/ML injection Inject  under the skin into the appropriate area as directed 3 (Three) Times a Day Before Meals.     • Insulin Glargine, 1 Unit Dial, (Toujeo SoloStar) 300 UNIT/ML solution pen-injector injection Toujeo SoloStar U-300 Insulin 300 unit/mL (1.5 mL) subcutaneous pen     • lisinopril (PRINIVIL,ZESTRIL) 2.5 MG tablet Take 40 mg by mouth Daily.     • meloxicam (MOBIC) 15 MG tablet Take 15 mg by mouth Daily.     • metFORMIN ER (GLUCOPHAGE-XR) 500 MG 24 hr tablet Take 500 mg by mouth Daily.     • metoprolol succinate XL (TOPROL-XL) 25 MG 24 hr tablet Take 25 mg by mouth Daily.     • nystatin (MYCOSTATIN) 233068 UNIT/GM powder Apply  topically to the appropriate area as directed 4 (Four) Times a Day.     • pantoprazole (PROTONIX) 40 MG EC tablet Take 40 mg by mouth Daily.     • prochlorperazine (COMPAZINE) 10 MG tablet Take 10 mg by mouth Daily.     • Semaglutide,0.25 or 0.5MG/DOS, (Ozempic, 0.25 or 0.5 MG/DOSE,) 2 MG/1.5ML solution pen-injector Ozempic 0.25 mg or 0.5 mg (2 mg/1.5 mL) subcutaneous pen injector     • spironolactone (ALDACTONE) 25 MG tablet Take 25 mg by mouth Daily.     • sucralfate (CARAFATE) 1 GM/10ML suspension 10 mL Every 6 (Six) Hours.     • topiramate (TOPAMAX) 100 MG tablet Take 100 mg by mouth Daily.     • traMADol (ULTRAM) 50 MG tablet Take 50 mg by mouth Daily.     • verapamil (CALAN) 40 MG tablet Take 40 mg by mouth.     • vitamin D (ERGOCALCIFEROL) 1.25 MG (04273 UT) capsule capsule Take 50,000 Units by mouth 1 (One) Time Per Week.     • warfarin (COUMADIN) 10 MG  tablet Take 15 mg by mouth Every Other Day.       No current facility-administered medications for this visit.      Allergies   Allergen Reactions   • Sulfamethoxazole-Trimethoprim Unknown - Low Severity     Gives her a Yeast infection        Nutrition Recall  Eating 3-4 meals daily. Just not hungry for meal 4 most days. Reviewed meal recall.   Snacking - limited to none.  Monitoring portions- not consistently    Nutrition Intervention  Nutrition education and nutrition coaching for behavior change provided.  Strategies used included Ongoing reinforcement  Review of medical weight loss prescription 4 meal/day plan and reviewed nutritional needs for Preoperative Bariatric Surgery, Visit 10.  Self-monitoring strategies such as keeping a food journal (on paper or electronically) and calculating fluid/protein intake were discussed.    Recommended Diet Changes  Continue Medical Weight Loss Prescription plan at this time; transition to Liver Shrinking Diet 14 days prior to surgery. Reviewed LSD guidelines and meal adjustments      Goals  1. Measure portions of all foods.  2. Refer to meal plan prior to each meal.  3. Initiate LSD 14 days prior to surgery    Monitoring/Evaluation Plan  Anticipate follow up per program protocol. Continue collaboration of care with physician and treatment team.     Electronically signed by  Velma Lee RDN, LD  09/20/2022 13:31 CDT.

## 2022-09-29 ENCOUNTER — HOSPITAL ENCOUNTER (OUTPATIENT)
Dept: LAB | Age: 47
Discharge: HOME | End: 2022-09-29
Payer: MEDICARE

## 2022-09-29 LAB
INR BLD: 3.41 (ref 0.88–1.18)
PROTHROMBIN TIME: 35.8 SEC (ref 12–14.6)

## 2022-09-29 PROCEDURE — 36415 COLL VENOUS BLD VENIPUNCTURE: CPT

## 2022-09-29 PROCEDURE — 85610 PROTHROMBIN TIME: CPT

## 2022-10-11 ENCOUNTER — HOSPITAL ENCOUNTER (OUTPATIENT)
Dept: LAB | Age: 47
Discharge: HOME | End: 2022-10-11
Payer: MEDICARE

## 2022-10-11 LAB
INR BLD: 4.29 (ref 0.88–1.18)
PROTHROMBIN TIME: 43.2 SEC (ref 12–14.6)

## 2022-10-11 PROCEDURE — 85610 PROTHROMBIN TIME: CPT

## 2022-10-11 PROCEDURE — 36415 COLL VENOUS BLD VENIPUNCTURE: CPT

## 2022-10-12 ENCOUNTER — TELEPHONE (OUTPATIENT)
Dept: BARIATRICS/WEIGHT MGMT | Facility: CLINIC | Age: 47
End: 2022-10-12

## 2022-10-12 NOTE — TELEPHONE ENCOUNTER
Shelly ALVARADO from the [facility] contacted with questions about patient's Coumadin.  Patient stated that she was to be off of Coumadin for 2 weeks and stopped her Coumadin on Monday which was 3 days ago.  Shelly states that her INR was elevated so she was advised to DC the Coumadin but was reaching out so we were all on the same page with patient's treatment plan.  I spoke with Dr. Hernandez and patient has not been advised to DC Coumadin for 2 weeks.  I have requested to speak with  which is her neurologist to discuss treatment plan due to past discussion.  Her neurologist would like for her to be bridged and not off of blood thinners for surgical procedure.  JENNY Wallace placed request for Dr. Martin to reach out to our clinic.  At this time patient will remain off of Coumadin due to elevated INR level and will continue with [facility] until she hears from myself or Dr. Martin.

## 2022-10-14 ENCOUNTER — PRE-ADMISSION TESTING (OUTPATIENT)
Dept: PREADMISSION TESTING | Facility: HOSPITAL | Age: 47
End: 2022-10-14

## 2022-10-14 ENCOUNTER — DOCUMENTATION (OUTPATIENT)
Dept: BARIATRICS/WEIGHT MGMT | Facility: CLINIC | Age: 47
End: 2022-10-14

## 2022-10-14 DIAGNOSIS — I10 PRIMARY HYPERTENSION: Primary | ICD-10-CM

## 2022-10-14 DIAGNOSIS — D68.59 HYPERCOAGULABLE STATE: ICD-10-CM

## 2022-10-14 DIAGNOSIS — E66.01 CLASS 3 SEVERE OBESITY DUE TO EXCESS CALORIES WITH SERIOUS COMORBIDITY AND BODY MASS INDEX (BMI) OF 60.0 TO 69.9 IN ADULT: Primary | ICD-10-CM

## 2022-10-14 NOTE — PROGRESS NOTES
Discussed with the patient today about her surgical course.  I had the opportunity to talk to her neurologist who clarified her hypercoagulable state.  The etiology is still undetermined.  And she has had significant episodes of having clots within her heart and brain with resultant strokes which was noted in the past.  My initial concern was perhaps trying to bridge the patient to offer her a surgical procedure however I explained to the provider that she might be better served at a [facility] facility with the capabilities and resources available due to her high risk of bleeding and also clotting.  He was in agreement with this decision and supportive.  I then talked with Ms. Tellez today and explained to her my concerns.  Due to her unique situation she would be better served at a facility that has the capabilities of bridging her as well as monitoring her postoperative care.  I explained to the patient that the sleeve gastrectomy could potentially increase the risk of mesenteric venous thrombosis and her unique situation.  This could cause injury to her small intestines.  She was in agreement with holding off surgery at this time and pursuing medical management however is still interested in surgery options which if available we will try to seek a facility that can manage her surgery and acute postoperative course safely.

## 2022-10-17 ENCOUNTER — HOSPITAL ENCOUNTER (OUTPATIENT)
Dept: LAB | Age: 47
Discharge: HOME | End: 2022-10-17
Payer: MEDICARE

## 2022-10-17 ENCOUNTER — TELEPHONE (OUTPATIENT)
Dept: CARDIOLOGY CLINIC | Age: 47
End: 2022-10-17

## 2022-10-17 LAB
INR BLD: 2.32 (ref 0.88–1.18)
PROTHROMBIN TIME: 26.1 SEC (ref 12–14.6)

## 2022-10-17 PROCEDURE — 85610 PROTHROMBIN TIME: CPT

## 2022-10-17 PROCEDURE — 36415 COLL VENOUS BLD VENIPUNCTURE: CPT

## 2022-10-17 NOTE — TELEPHONE ENCOUNTER
Date: 10-20-22    Cardiologist: Dr. Brendan Harper    Procedure: Gastric Sleeve      Surgeon: Dr. Roger Borrero    Last Office Visit: 9-9-22    Reason for office visit and medical concerns addressed at this office visit: CAD, hyperlipidemia, CHF, HTN, DM    Testing Performed and Date of Service:  Heart cath 8-22-22    RCRI = 3 pts, elevated, 11%   METs 4    Current Medications: Coreg, nitro, carafate, imdur, ranexa, protonix, zyrtec, norvasc, vit D, lisinopril, coumadin, lasix, neurontin, zetia, insulin, metformin, aldactone, topamax, ASA, toprol xl, compazine, calan, lipitor    Is the patient currently taking an anticoagulant? If so, what is the diagnosis the patient has been given to warrant the need for the anticoagulant?  ASA    Additional Notes: Cardiac Clearance Request

## 2022-10-24 ENCOUNTER — HOSPITAL ENCOUNTER (OUTPATIENT)
Dept: LAB | Age: 47
Discharge: HOME | End: 2022-10-24
Payer: MEDICARE

## 2022-10-24 LAB
INR BLD: 2.7 (ref 0.88–1.18)
PROTHROMBIN TIME: 29.5 SEC (ref 12–14.6)

## 2022-10-24 PROCEDURE — 36415 COLL VENOUS BLD VENIPUNCTURE: CPT

## 2022-10-24 PROCEDURE — 85610 PROTHROMBIN TIME: CPT

## 2022-10-31 ENCOUNTER — HOSPITAL ENCOUNTER (OUTPATIENT)
Dept: LAB | Age: 47
Discharge: HOME | End: 2022-10-31
Payer: MEDICARE

## 2022-10-31 LAB
INR BLD: 3.14 (ref 0.88–1.18)
PROTHROMBIN TIME: 33.5 SEC (ref 12–14.6)

## 2022-10-31 PROCEDURE — 85610 PROTHROMBIN TIME: CPT

## 2022-10-31 PROCEDURE — 36415 COLL VENOUS BLD VENIPUNCTURE: CPT

## 2022-11-07 ENCOUNTER — HOSPITAL ENCOUNTER (OUTPATIENT)
Dept: LAB | Age: 47
Discharge: HOME | End: 2022-11-07
Payer: MEDICARE

## 2022-11-07 LAB
INR BLD: 4.45 (ref 0.88–1.18)
PROTHROMBIN TIME: 44.5 SEC (ref 12–14.6)

## 2022-11-07 PROCEDURE — 36415 COLL VENOUS BLD VENIPUNCTURE: CPT

## 2022-11-07 PROCEDURE — 85610 PROTHROMBIN TIME: CPT

## 2022-11-14 ENCOUNTER — HOSPITAL ENCOUNTER (OUTPATIENT)
Dept: LAB | Age: 47
Discharge: HOME | End: 2022-11-14

## 2022-11-14 LAB
INR BLD: 2.61 (ref 0.88–1.18)
PROTHROMBIN TIME: 28.8 SEC (ref 12–14.6)

## 2022-11-21 ENCOUNTER — HOSPITAL ENCOUNTER (OUTPATIENT)
Dept: LAB | Age: 47
Discharge: HOME | End: 2022-11-21

## 2022-11-21 LAB
APTT: 40.2 SEC (ref 26–36.2)
INR BLD: 2.04 (ref 0.88–1.18)
PROTHROMBIN TIME: 23.5 SEC (ref 12–14.6)

## 2022-11-22 ENCOUNTER — OFFICE VISIT (OUTPATIENT)
Dept: BARIATRICS/WEIGHT MGMT | Facility: CLINIC | Age: 47
End: 2022-11-22

## 2022-11-22 ENCOUNTER — HOSPITAL ENCOUNTER (OUTPATIENT)
Dept: ULTRASOUND IMAGING | Age: 47
Discharge: HOME | End: 2022-11-22
Payer: MEDICARE

## 2022-11-22 VITALS
HEIGHT: 65 IN | SYSTOLIC BLOOD PRESSURE: 160 MMHG | DIASTOLIC BLOOD PRESSURE: 94 MMHG | HEART RATE: 102 BPM | BODY MASS INDEX: 48.82 KG/M2 | WEIGHT: 293 LBS | TEMPERATURE: 98.4 F | OXYGEN SATURATION: 95 %

## 2022-11-22 DIAGNOSIS — E66.01 CLASS 3 SEVERE OBESITY DUE TO EXCESS CALORIES WITH SERIOUS COMORBIDITY AND BODY MASS INDEX (BMI) OF 60.0 TO 69.9 IN ADULT: Primary | ICD-10-CM

## 2022-11-22 DIAGNOSIS — D68.59 HYPERCOAGULABLE STATE: ICD-10-CM

## 2022-11-22 DIAGNOSIS — Z87.19 H/O GASTROESOPHAGEAL REFLUX (GERD): ICD-10-CM

## 2022-11-22 DIAGNOSIS — G47.33 OSA (OBSTRUCTIVE SLEEP APNEA): ICD-10-CM

## 2022-11-22 DIAGNOSIS — N18.4 CHRONIC RENAL DISEASE, STAGE IV (HCC): ICD-10-CM

## 2022-11-22 DIAGNOSIS — I10 PRIMARY HYPERTENSION: ICD-10-CM

## 2022-11-22 PROCEDURE — 99213 OFFICE O/P EST LOW 20 MIN: CPT | Performed by: SURGERY

## 2022-11-22 PROCEDURE — 76770 US EXAM ABDO BACK WALL COMP: CPT

## 2022-11-22 PROCEDURE — 76770 US EXAM ABDO BACK WALL COMP: CPT | Performed by: RADIOLOGY

## 2022-11-22 NOTE — PROGRESS NOTES
"Patient Care Team:  Fátima Connor DO as PCP - General (Family Medicine)    Reason for Visit:  Surgical Weight loss      Subjective   Cassidy FLORES is a 47 y.o. female.     Cassidy is here for follow-up and continued medical management of her morbid obesity.  She is currently on a prescription diet.  Cassidy previously was to apply dietary changes such as following the meal plan as directed.  She admits to following the dietary prescription more consistently now.  As a result she lost weight since her last visit.  She is still waiting to talk to her neurologist with specifics and plans for her anticoagulation regimen.    Review Of Systems:  General ROS: negative  Hematological and Lymphatic ROS: positive for -history of venous thrombotic clots  Respiratory ROS: no cough, shortness of breath, or wheezing  Cardiovascular ROS: no chest pain or dyspnea on exertion  Gastrointestinal ROS: no abdominal pain, change in bowel habits, or black or bloody stools    The following portions of the patient's history were reviewed and updated as appropriate: allergies, current medications, past family history, past medical history, past social history, past surgical history and problem list.    Objective   /94 (BP Location: Right arm, Patient Position: Sitting, Cuff Size: Thigh Adult)   Pulse 102   Temp 98.4 °F (36.9 °C)   Ht 163.8 cm (64.5\")   Wt (!) 155 kg (340 lb 12.8 oz)   SpO2 95%   BMI 57.59 kg/m²       11/22/22  1312   Weight: (!) 155 kg (340 lb 12.8 oz)       General Appearance:  awake, alert, oriented, in no acute distress    Assessment & Plan     Encounter Diagnoses   Name Primary?   • Class 3 severe obesity due to excess calories with serious comorbidity and body mass index (BMI) of 60.0 to 69.9 in adult (HCC) Yes   • Hypercoagulable state (HCC)    • Primary hypertension    • H/O gastroesophageal reflux (GERD)    • MARTHA (obstructive sleep apnea)        Cassidy FLORES was seen today for follow-up, " obesity, nutrition counseling and weight loss.  She has lost weight since her last visit.  Today we discussed healthy changes in lifestyle, diet, and exercise. Dietician consultation obtained.  Cassidy FLORES had received handouts to her explaining the recommendation on portion sizes/appetite control/reading nutrition labels.   Intensive behavioral therapy for obesity was done today as well.   Goals for this month are: Due to the patient's complex anticoagulation history the patient and I discussed conservative methods for weight loss at this time and for treatment of her morbid obesity.  If she wishes to continue with a surgical option she is aware that we would transfer her to a larger facility with the resources capabilities and experience.  For now the patient would like to continue with nonsurgical options.    Follow up in one month for a weight recheck.

## 2022-12-02 ENCOUNTER — HOSPITAL ENCOUNTER (OUTPATIENT)
Dept: LAB | Age: 47
Discharge: HOME | End: 2022-12-02

## 2022-12-02 LAB
INR BLD: 1.84 (ref 0.88–1.18)
PROTHROMBIN TIME: 21.6 SEC (ref 12–14.6)

## 2022-12-09 ENCOUNTER — HOSPITAL ENCOUNTER (OUTPATIENT)
Dept: LAB | Age: 47
Discharge: HOME | End: 2022-12-09
Payer: MEDICARE

## 2022-12-09 LAB
INR BLD: 4.19 (ref 0.88–1.18)
PROTHROMBIN TIME: 42.4 SEC (ref 12–14.6)

## 2022-12-09 PROCEDURE — 85610 PROTHROMBIN TIME: CPT

## 2022-12-09 PROCEDURE — 36415 COLL VENOUS BLD VENIPUNCTURE: CPT

## 2022-12-16 ENCOUNTER — HOSPITAL ENCOUNTER (OUTPATIENT)
Dept: LAB | Age: 47
Discharge: HOME | End: 2022-12-16
Payer: MEDICARE

## 2022-12-16 LAB
INR BLD: 2.95 (ref 0.88–1.18)
PROTHROMBIN TIME: 31.8 SEC (ref 12–14.6)

## 2022-12-16 PROCEDURE — 36415 COLL VENOUS BLD VENIPUNCTURE: CPT

## 2022-12-16 PROCEDURE — 85610 PROTHROMBIN TIME: CPT

## 2022-12-19 RX ORDER — PANTOPRAZOLE SODIUM 40 MG/1
40 TABLET, DELAYED RELEASE ORAL DAILY
Qty: 90 TABLET | Refills: 3 | Status: SHIPPED | OUTPATIENT
Start: 2022-12-19

## 2022-12-22 ENCOUNTER — OFFICE VISIT (OUTPATIENT)
Dept: BARIATRICS/WEIGHT MGMT | Facility: CLINIC | Age: 47
End: 2022-12-22

## 2022-12-22 VITALS
DIASTOLIC BLOOD PRESSURE: 100 MMHG | TEMPERATURE: 97.5 F | OXYGEN SATURATION: 97 % | SYSTOLIC BLOOD PRESSURE: 148 MMHG | BODY MASS INDEX: 48.82 KG/M2 | HEART RATE: 102 BPM | HEIGHT: 65 IN | WEIGHT: 293 LBS

## 2022-12-22 DIAGNOSIS — E66.01 CLASS 3 SEVERE OBESITY DUE TO EXCESS CALORIES WITH SERIOUS COMORBIDITY AND BODY MASS INDEX (BMI) OF 60.0 TO 69.9 IN ADULT: Primary | ICD-10-CM

## 2022-12-22 DIAGNOSIS — I10 PRIMARY HYPERTENSION: ICD-10-CM

## 2022-12-22 DIAGNOSIS — G47.33 OSA (OBSTRUCTIVE SLEEP APNEA): ICD-10-CM

## 2022-12-22 PROCEDURE — 99213 OFFICE O/P EST LOW 20 MIN: CPT | Performed by: NURSE PRACTITIONER

## 2022-12-22 NOTE — ASSESSMENT & PLAN NOTE
Hypertension is worsening.  Continue current treatment regimen.  Dietary sodium restriction.  Weight loss.  Blood pressure will be reassessed at the next regular appointment.

## 2022-12-22 NOTE — ASSESSMENT & PLAN NOTE
Patient's (Body mass index is 56.92 kg/m².) indicates that they are morbidly obese (BMI > 40 or > 35 with obesity - related health condition) with health conditions that include obstructive sleep apnea and hypertension . Weight is improving with treatment. BMI is is above average; BMI management plan is completed. We discussed portion control and increasing exercise.

## 2022-12-22 NOTE — PROGRESS NOTES
"Patient Care Team:  Fátima Connor DO as PCP - General (Family Medicine)    Reason for Visit:  Medical Weight loss    Subjective   Cassidy FLORES is a 47 y.o. female.     Cassidy is here for follow-up and continued medical management of her morbid obesity.  She is currently on a prescription diet.  Cassidy previously was to apply dietary changes such as following the meal plan as directed.  She admits to eating 3-4 meals per day.  As a result she lost weight since her last visit.  She is continuing medical weight loss.  At this time she does not want to have a referral placed to [facility] for bariatric surgery.    Review Of Systems:  Review of Systems   Constitutional: Positive for activity change.   Respiratory: Negative.    Cardiovascular: Negative.    Gastrointestinal: Negative.    Endocrine: Negative.    Musculoskeletal: Positive for joint swelling.   Psychiatric/Behavioral: Negative.          The following portions of the patient's history were reviewed and updated as appropriate: allergies, current medications, past family history, past medical history, past social history, past surgical history, and problem list.    Objective   /100 (BP Location: Right arm, Patient Position: Sitting, Cuff Size: Adult)   Pulse 102   Temp 97.5 °F (36.4 °C)   Ht 163.8 cm (64.5\")   Wt (!) 153 kg (336 lb 12.8 oz)   SpO2 97%   BMI 56.92 kg/m²       12/22/22  1101   Weight: (!) 153 kg (336 lb 12.8 oz)       Physical Exam  Vitals reviewed.   Constitutional:       Appearance: She is obese.   Cardiovascular:      Rate and Rhythm: Normal rate and regular rhythm.   Pulmonary:      Effort: Pulmonary effort is normal.   Skin:     General: Skin is warm and dry.   Neurological:      Mental Status: She is alert and oriented to person, place, and time.   Psychiatric:         Mood and Affect: Mood normal.         Behavior: Behavior normal.         Class 3 Severe Obesity (BMI >=40). Obesity-related health conditions include the " following: obstructive sleep apnea and hypertension. Obesity is improving with treatment. BMI is is above average; BMI management plan is completed. We discussed portion control and increasing exercise.     Assessment & Plan   Diagnoses and all orders for this visit:    1. Class 3 severe obesity due to excess calories with serious comorbidity and body mass index (BMI) of 60.0 to 69.9 in adult (HCC) (Primary)  Assessment & Plan:  Patient's (Body mass index is 56.92 kg/m².) indicates that they are morbidly obese (BMI > 40 or > 35 with obesity - related health condition) with health conditions that include obstructive sleep apnea and hypertension . Weight is improving with treatment. BMI is is above average; BMI management plan is completed. We discussed portion control and increasing exercise.       2. MARTHA (obstructive sleep apnea)  Comments:  Continue CPAP    3. Primary hypertension  Comments:  Blood pressure is elevated today.  Advised to contact PCP and monitor levels at home  Assessment & Plan:  Hypertension is worsening.  Continue current treatment regimen.  Dietary sodium restriction.  Weight loss.  Blood pressure will be reassessed at the next regular appointment.         Cassidy FLORES was seen today for follow-up, obesity, nutrition counseling and weight loss.  She has lost weight since her last visit.  Today we discussed healthy changes in lifestyle, diet, and exercise. Dietician consultation obtained.  Cassidy FLORES had received handouts to her explaining the recommendation on portion sizes/appetite control/reading nutrition labels.   Intensive behavioral therapy for obesity was done today as well.     Goals for this month are:   1. Last night patient had pasta. She was unaware of what was supposed to be eaten at her fourth meal. I provided her with an updated meal plan and reviewed the meal plan with her. She has been given 1 goal which is to avoid group C after her second meal.     Follow up in one month  for a weight recheck.

## 2022-12-27 ENCOUNTER — HOSPITAL ENCOUNTER (OUTPATIENT)
Dept: LAB | Age: 47
Discharge: HOME | End: 2022-12-27
Payer: MEDICARE

## 2022-12-27 LAB
INR BLD: 2.75 (ref 0.88–1.18)
PROTHROMBIN TIME: 30 SEC (ref 12–14.6)

## 2022-12-27 PROCEDURE — 36415 COLL VENOUS BLD VENIPUNCTURE: CPT

## 2022-12-27 PROCEDURE — 85610 PROTHROMBIN TIME: CPT

## 2023-01-15 ENCOUNTER — HOSPITAL ENCOUNTER (EMERGENCY)
Age: 48
Discharge: HOME | End: 2023-01-15
Attending: EMERGENCY MEDICINE
Payer: MEDICARE

## 2023-01-15 ENCOUNTER — APPOINTMENT (OUTPATIENT)
Dept: CT IMAGING | Age: 48
End: 2023-01-15
Payer: MEDICARE

## 2023-01-15 VITALS
RESPIRATION RATE: 12 BRPM | OXYGEN SATURATION: 92 % | HEIGHT: 64 IN | TEMPERATURE: 98.6 F | HEART RATE: 97 BPM | WEIGHT: 293 LBS | DIASTOLIC BLOOD PRESSURE: 96 MMHG | SYSTOLIC BLOOD PRESSURE: 138 MMHG | BODY MASS INDEX: 50.02 KG/M2

## 2023-01-15 DIAGNOSIS — R10.9 ABDOMINAL PAIN, UNSPECIFIED ABDOMINAL LOCATION: Primary | ICD-10-CM

## 2023-01-15 LAB
ALBUMIN SERPL-MCNC: 4.1 G/DL (ref 3.5–5.2)
ALP BLD-CCNC: 104 U/L (ref 35–104)
ALT SERPL-CCNC: 20 U/L (ref 5–33)
ANION GAP SERPL CALCULATED.3IONS-SCNC: 11 MMOL/L (ref 7–19)
AST SERPL-CCNC: 16 U/L (ref 5–32)
BACTERIA: ABNORMAL /HPF
BASOPHILS ABSOLUTE: 0 K/UL (ref 0–0.2)
BASOPHILS RELATIVE PERCENT: 0.4 % (ref 0–1)
BILIRUB SERPL-MCNC: 0.4 MG/DL (ref 0.2–1.2)
BILIRUBIN URINE: NEGATIVE
BLOOD, URINE: ABNORMAL
BUN BLDV-MCNC: 26 MG/DL (ref 6–20)
CALCIUM SERPL-MCNC: 9.4 MG/DL (ref 8.6–10)
CHLORIDE BLD-SCNC: 101 MMOL/L (ref 98–111)
CLARITY: CLEAR
CO2: 27 MMOL/L (ref 22–29)
COLOR: YELLOW
CREAT SERPL-MCNC: 1.6 MG/DL (ref 0.5–0.9)
CRYSTALS, UA: ABNORMAL /HPF
EOSINOPHILS ABSOLUTE: 0.3 K/UL (ref 0–0.6)
EOSINOPHILS RELATIVE PERCENT: 3.6 % (ref 0–5)
EPITHELIAL CELLS, UA: 1 /HPF (ref 0–5)
GFR SERPL CREATININE-BSD FRML MDRD: 40 ML/MIN/{1.73_M2}
GLUCOSE BLD-MCNC: 126 MG/DL (ref 74–109)
GLUCOSE URINE: 500 MG/DL
HCG QUALITATIVE: NEGATIVE
HCT VFR BLD CALC: 47.8 % (ref 37–47)
HEMOGLOBIN: 15.5 G/DL (ref 12–16)
HYALINE CASTS: 1 /HPF (ref 0–8)
IMMATURE GRANULOCYTES #: 0 K/UL
INR BLD: 3.21 (ref 0.88–1.18)
KETONES, URINE: NEGATIVE MG/DL
LEUKOCYTE ESTERASE, URINE: ABNORMAL
LIPASE: 40 U/L (ref 13–60)
LYMPHOCYTES ABSOLUTE: 1.7 K/UL (ref 1.1–4.5)
LYMPHOCYTES RELATIVE PERCENT: 23.6 % (ref 20–40)
MCH RBC QN AUTO: 29.8 PG (ref 27–31)
MCHC RBC AUTO-ENTMCNC: 32.4 G/DL (ref 33–37)
MCV RBC AUTO: 91.9 FL (ref 81–99)
MONOCYTES ABSOLUTE: 0.6 K/UL (ref 0–0.9)
MONOCYTES RELATIVE PERCENT: 8.2 % (ref 0–10)
NEUTROPHILS ABSOLUTE: 4.6 K/UL (ref 1.5–7.5)
NEUTROPHILS RELATIVE PERCENT: 63.8 % (ref 50–65)
NITRITE, URINE: NEGATIVE
PDW BLD-RTO: 14.9 % (ref 11.5–14.5)
PH UA: 5.5 (ref 5–8)
PLATELET # BLD: 334 K/UL (ref 130–400)
PMV BLD AUTO: 11.2 FL (ref 9.4–12.3)
POTASSIUM SERPL-SCNC: 3.8 MMOL/L (ref 3.5–5)
PROTEIN UA: NEGATIVE MG/DL
PROTHROMBIN TIME: 34.1 SEC (ref 12–14.6)
RBC # BLD: 5.2 M/UL (ref 4.2–5.4)
RBC UA: 3 /HPF (ref 0–4)
SODIUM BLD-SCNC: 139 MMOL/L (ref 136–145)
SPECIFIC GRAVITY UA: 1.01 (ref 1–1.03)
TOTAL PROTEIN: 8 G/DL (ref 6.6–8.7)
TROPONIN: <0.01 NG/ML (ref 0–0.03)
UROBILINOGEN, URINE: 0.2 E.U./DL
WBC # BLD: 7.2 K/UL (ref 4.8–10.8)
WBC UA: 5 /HPF (ref 0–5)

## 2023-01-15 PROCEDURE — 6360000002 HC RX W HCPCS: Performed by: EMERGENCY MEDICINE

## 2023-01-15 PROCEDURE — 74177 CT ABD & PELVIS W/CONTRAST: CPT | Performed by: RADIOLOGY

## 2023-01-15 PROCEDURE — 96375 TX/PRO/DX INJ NEW DRUG ADDON: CPT

## 2023-01-15 PROCEDURE — 83690 ASSAY OF LIPASE: CPT

## 2023-01-15 PROCEDURE — 85025 COMPLETE CBC W/AUTO DIFF WBC: CPT

## 2023-01-15 PROCEDURE — 74177 CT ABD & PELVIS W/CONTRAST: CPT

## 2023-01-15 PROCEDURE — 81001 URINALYSIS AUTO W/SCOPE: CPT

## 2023-01-15 PROCEDURE — 96374 THER/PROPH/DIAG INJ IV PUSH: CPT

## 2023-01-15 PROCEDURE — 36415 COLL VENOUS BLD VENIPUNCTURE: CPT

## 2023-01-15 PROCEDURE — 99285 EMERGENCY DEPT VISIT HI MDM: CPT

## 2023-01-15 PROCEDURE — 80053 COMPREHEN METABOLIC PANEL: CPT

## 2023-01-15 PROCEDURE — 84484 ASSAY OF TROPONIN QUANT: CPT

## 2023-01-15 PROCEDURE — 85610 PROTHROMBIN TIME: CPT

## 2023-01-15 PROCEDURE — 93005 ELECTROCARDIOGRAM TRACING: CPT | Performed by: EMERGENCY MEDICINE

## 2023-01-15 PROCEDURE — 6360000004 HC RX CONTRAST MEDICATION: Performed by: EMERGENCY MEDICINE

## 2023-01-15 PROCEDURE — 84703 CHORIONIC GONADOTROPIN ASSAY: CPT

## 2023-01-15 RX ORDER — ONDANSETRON 2 MG/ML
4 INJECTION INTRAMUSCULAR; INTRAVENOUS ONCE
Status: COMPLETED | OUTPATIENT
Start: 2023-01-15 | End: 2023-01-15

## 2023-01-15 RX ORDER — MORPHINE SULFATE 2 MG/ML
2 INJECTION, SOLUTION INTRAMUSCULAR; INTRAVENOUS ONCE
Status: COMPLETED | OUTPATIENT
Start: 2023-01-15 | End: 2023-01-15

## 2023-01-15 RX ADMIN — ONDANSETRON 4 MG: 2 INJECTION INTRAMUSCULAR; INTRAVENOUS at 04:23

## 2023-01-15 RX ADMIN — IOPAMIDOL 90 ML: 755 INJECTION, SOLUTION INTRAVENOUS at 04:28

## 2023-01-15 RX ADMIN — MORPHINE SULFATE 2 MG: 2 INJECTION, SOLUTION INTRAMUSCULAR; INTRAVENOUS at 04:23

## 2023-01-15 ASSESSMENT — PAIN - FUNCTIONAL ASSESSMENT: PAIN_FUNCTIONAL_ASSESSMENT: 0-10

## 2023-01-15 ASSESSMENT — PAIN SCALES - GENERAL: PAINLEVEL_OUTOF10: 10

## 2023-01-15 ASSESSMENT — ENCOUNTER SYMPTOMS
NAUSEA: 1
CONSTIPATION: 1
EYES NEGATIVE: 1
ABDOMINAL PAIN: 1
RESPIRATORY NEGATIVE: 1

## 2023-01-15 ASSESSMENT — PAIN DESCRIPTION - ORIENTATION: ORIENTATION: LOWER

## 2023-01-15 ASSESSMENT — PAIN DESCRIPTION - LOCATION
LOCATION: ABDOMEN;BACK
LOCATION: ABDOMEN

## 2023-01-15 ASSESSMENT — PAIN DESCRIPTION - DESCRIPTORS: DESCRIPTORS: ACHING

## 2023-01-15 NOTE — ED PROVIDER NOTES
[facility] EMERGENCY DEPT  EMERGENCY DEPARTMENT ENCOUNTER      Pt Name: Kellie Dodge  MRN: [id number]  [place] 1975  Date of evaluation: 1/15/2023  Provider: Nabil Choi MD    [address] [place] [address]       Chief Complaint   Patient presents with    Abdominal Pain     Lower abd pain radiating into back, right and left. Patient states she has had this 'for a while\" but it is getting worse         HISTORY OF PRESENT ILLNESS   (Location/Symptom, Timing/Onset,Context/Setting, Quality, Duration, Modifying Factors, Severity)  Note limiting factors. Kellie Dodge is a 52 y.o. female who presents to the emergency department complaining of lower abdominal pain that radiates to both flanks and low back. Says symptoms been present for the last couple days. Initially waxed and waned and since yesterday has become more constant. Had nausea but no vomiting. Says yesterday she was constipated and had to strain to have a bowel movement but was eventually able to have 1. No prior abdominal surgeries. Has a history of type 2 diabetes, coronary artery disease, prior stroke, CHF, CKD. Anticoagulated on Coumadin. HPI    NursingNotes were reviewed. REVIEW OF SYSTEMS    (2-9 systems for level 4, 10 or more for level 5)     Review of Systems   Constitutional: Negative. HENT: Negative. Eyes: Negative. Respiratory: Negative. Cardiovascular: Negative. Gastrointestinal:  Positive for abdominal pain, constipation and nausea. Genitourinary: Negative. Musculoskeletal: Negative. Skin: Negative. Neurological: Negative. Hematological: Negative. Psychiatric/Behavioral: Negative. A complete review of systems was performed and is negative except as noted above in the HPI.        PAST MEDICAL HISTORY     Past Medical History:   Diagnosis Date    CAD (coronary artery disease)     [facility] occlusion with cerebral infarction ([address]     CHF (congestive heart failure) (HCC)     Chronic kidney disease Diabetes mellitus ([address]     Diabetic neuropathy (HCC)     GERD (gastroesophageal reflux disease)     Hyperlipidemia     Hypertension     Neuromuscular disorder ([address]     Seizures ([facility]     as an child         SURGICAL HISTORY       Past Surgical History:   Procedure Laterality Date    CARDIAC SURGERY      COLONOSCOPY N/A 09/02/2021    Dr Wes Browne, Sub prep [place], 4 year recall    CORONARY ARTERY BYPASS GRAFT      2016    ENDOSCOPY, COLON, DIAGNOSTIC      EYE SURGERY      TONSILLECTOMY      at age 8    UPPER GASTROINTESTINAL ENDOSCOPY      Veronica Conner, w/dil per patient    UPPER GASTROINTESTINAL ENDOSCOPY N/A 09/02/2021    Dr Wes Browne, BDM, (-)[place]         CURRENT MEDICATIONS       Previous Medications    AMLODIPINE (NORVASC) 5 MG TABLET    Take 5 mg by mouth daily     ASPIRIN 81 MG TABLET    Take 81 mg by mouth daily    ATORVASTATIN (LIPITOR) 40 MG TABLET    Take 80 mg by mouth nightly     BETHANECHOL (URECHOLINE) 25 MG TABLET    Take 1 tablet by mouth daily    CARVEDILOL (COREG) 3.125 MG TABLET    in the morning and at bedtime    CETIRIZINE (ZYRTEC ALLERGY) 10 MG TABLET    Take 1 tablet by mouth daily    EZETIMIBE (ZETIA) 10 MG TABLET    Take 10 mg by mouth daily     FLUTICASONE (FLONASE) 50 MCG/ACT NASAL SPRAY    1 spray by Each Nostril route daily    FUROSEMIDE (LASIX) 40 MG TABLET    Take 2 tablets by mouth daily    GABAPENTIN (NEURONTIN) 300 MG CAPSULE    Take 1 capsule by mouth 2 times daily as needed (Neuropathy/nerve pains) for up to 3 days. Intended supply: 30 days    INSULIN ASPART (NOVOLOG) 100 UNIT/ML INJECTION VIAL    Inject 10 Units into the skin 4 times daily (before meals and nightly) Up to 10 units ss coverage as directed    INSULIN GLARGINE (LANTUS;BASAGLAR) 100 UNIT/ML INJECTION PEN    Inject 10 Units into the skin nightly    ISOSORBIDE MONONITRATE (IMDUR) 30 MG EXTENDED RELEASE TABLET    Take 1 tablet by mouth in the morning.     LISINOPRIL (PRINIVIL;ZESTRIL) 2.5 MG TABLET    Take 1 tablet by mouth daily     METOPROLOL SUCCINATE ER (TOPROL-XL) 25 MG XL TABLET    Take 25 mg by mouth daily     NITROGLYCERIN (NITROSTAT) 0.4 MG SL TABLET    as needed    NONFORMULARY    daily apple cider gummy     NYSTATIN (MYCOSTATIN) 659619 UNIT/GM POWDER    Apply 3 times daily to lower groin, abdominal area and under bilateral breasts    ONDANSETRON (ZOFRAN ODT) 4 MG DISINTEGRATING TABLET    Take 1 tablet by mouth every 8 hours as needed for Nausea or Vomiting    PANTOPRAZOLE (PROTONIX) 40 MG TABLET    TAKE 1 TABLET BY MOUTH DAILY    PROCHLORPERAZINE (COMPAZINE) 10 MG TABLET    Take 10 mg by mouth 2 times daily as needed (headache)     PROMETHAZINE-DEXTROMETHORPHAN (PROMETHAZINE-DM) 6.25-15 MG/5ML SYRUP    Take 5 mLs by mouth nightly as needed for Cough    RANOLAZINE (RANEXA) 500 MG EXTENDED RELEASE TABLET    Take 1 tablet by mouth in the morning and 1 tablet before bedtime.     SPIRONOLACTONE (ALDACTONE) 25 MG TABLET    Take 12.5 mg by mouth daily    SUCRALFATE (CARAFATE) 1 GM/10ML SUSPENSION    Take 10 mLs by mouth 4 times daily    TOPIRAMATE (TOPAMAX) 50 MG TABLET    Take 1 tablet by mouth daily     VERAPAMIL (CALAN) 40 MG TABLET    Take 40 mg by mouth every 12 hours    VITAMIN D (ERGOCALCIFEROL) 1.25 MG (20104 UT) CAPS CAPSULE    Take 1 capsule by mouth once a week    WARFARIN (COUMADIN) 10 MG TABLET    Take 10 mg by mouth 10mg every day       ALLERGIES     Sulfamethoxazole-trimethoprim    FAMILY HISTORY       Family History   Problem Relation Age of Onset    Osteoarthritis Mother     Diabetes Mother     Hypertension Mother     Heart Failure Mother     Hypertension Father     Heart Surgery Father     Heart Attack Brother     Heart Failure Maternal Grandmother     Diabetes Maternal Grandfather     Heart Disease Maternal Grandfather     Heart Disease Paternal Grandmother     No Known Problems Paternal Grandfather     Colon Cancer Neg Hx     Colon Polyps Neg Hx     Esophageal Cancer Neg Hx     Liver Cancer Neg Hx Liver Disease Neg Hx     Rectal Cancer Neg Hx     Stomach Cancer Neg Hx           SOCIAL HISTORY       Social History     Socioeconomic History    Marital status: Single     Spouse name: None    Number of children: 0    Years of education: None    Highest education level: None   Occupational History    Occupation: customer service     Comment: disability   Tobacco Use    Smoking status: Never    Smokeless tobacco: Never   Vaping Use    Vaping Use: Never used   Substance and Sexual Activity    Alcohol use: Yes     Alcohol/week: 2.0 standard drinks     Types: 2 Glasses of wine per week     Comment: weekly prn    Drug use: Never    Sexual activity: Not Currently     Comment: has no kids   Social History Narrative    CODE STATUS: Full Code    HEALTH CARE PROXY: her Father, Mr. Denise Aleman, [phone]    AMBULATES: independently    DOMICILED: has 3 steps to enter the home, her Father and Cousing and Cousin's daughter stay with them, has a pet dog     Social Determinants of Health     Financial Resource Strain: Medium Risk    Difficulty of Paying Living Expenses: Somewhat hard   Food Insecurity: Food Insecurity Present    Worried About [facility] in the Last Year: Sometimes true    Ran Out of Food in the Last Year: Sometimes true   Transportation Needs: No Transportation Needs    Lack of Transportation (Medical): No    Lack of Transportation (Non-Medical): No   Physical Activity: Insufficiently Active    Days of Exercise per Week: 2 days    Minutes of Exercise per Session: 30 min   Stress: Stress Concern Present    Feeling of Stress : To some extent   Social Connections:  Moderately Isolated    Frequency of Communication with Friends and Family: More than three times a week    Frequency of Social Gatherings with Friends and Family: More than three times a week    Attends Baptism Services: More than 4 times per year    Active Member of [employer] or Organizations: No    Attends Club or Organization Meetings: Never    Marital Status: Never    Housing Stability: Low Risk     Unable to Pay for Housing in the Last Year: No    Number of Places Lived in the Last Year: 1    Unstable Housing in the Last Year: No       SCREENINGS    Remigio Coma Scale  Eye Opening: Spontaneous  Best Verbal Response: Oriented  Best Motor Response: Obeys commands  Remigio Coma Scale Score: 15        PHYSICAL EXAM    (up to 7 for level 4, 8 or more for level 5)     ED Triage Vitals [01/15/23 0248]   BP Temp Temp Source Heart Rate Resp SpO2 Height Weight   (!) 142/111 98.6 °F (37 °C) Oral (!) 105 18 100 % 5' 4\" (1.626 m) (!) 337 lb 2 oz (152.9 kg)       Physical Exam  Vitals and nursing note reviewed. Constitutional:       General: She is not in acute distress. Appearance: Normal appearance. She is well-developed. She is not diaphoretic. HENT:      Head: Normocephalic and atraumatic. Mouth/Throat:      Pharynx: No oropharyngeal exudate. Eyes:      General: No scleral icterus. Pupils: Pupils are equal, round, and reactive to light. Neck:      Trachea: No tracheal deviation. Cardiovascular:      Rate and Rhythm: Normal rate. Pulses: Normal pulses. Heart sounds: Normal heart sounds. Pulmonary:      Effort: Pulmonary effort is normal.      Breath sounds: Normal breath sounds. No stridor. No wheezing or rhonchi. Abdominal:      General: There is no distension. Palpations: Abdomen is soft. Abdomen is not rigid. Tenderness: There is abdominal tenderness in the left lower quadrant. There is no guarding. Hernia: No hernia is present. Musculoskeletal:         General: No deformity. Cervical back: Normal range of motion. Skin:     General: Skin is warm and dry. Findings: No rash. Neurological:      Mental Status: She is alert and oriented to person, place, and time. Cranial Nerves: No cranial nerve deficit.       Coordination: Coordination normal.   Psychiatric:         Behavior: Behavior normal.       DIAGNOSTIC RESULTS     EKG: All EKG's are interpreted by the Emergency Department Physician who either signs or Co-signs this chart in the absence of a cardiologist.        RADIOLOGY:   Non-plain film images such as CT, Ultrasound and MRI are read by the radiologist. Plainradiographic images are visualized and preliminarily interpreted by the emergency physician with the below findings:        Interpretation per the Radiologist below, if available at the time of this note:    CT ABDOMEN PELVIS W IV CONTRAST Additional Contrast? None    (Results Pending)         ED BEDSIDE ULTRASOUND:   Performed by ED Physician - none    LABS:  Labs Reviewed   CBC WITH AUTO DIFFERENTIAL - Abnormal; Notable for the following components:       Result Value    Hematocrit 47.8 (*)     MCHC 32.4 (*)     RDW 14.9 (*)     All other components within normal limits   COMPREHENSIVE METABOLIC PANEL - Abnormal; Notable for the following components:    Glucose 126 (*)     BUN 26 (*)     Creatinine 1.6 (*)     Est, Glom Filt Rate 40 (*)     All other components within normal limits   URINALYSIS - Abnormal; Notable for the following components:    Glucose, Ur 500 (*)     Blood, Urine TRACE (*)     Leukocyte Esterase, Urine TRACE (*)     All other components within normal limits   PROTIME-INR - Abnormal; Notable for the following components:    Protime 34.1 (*)     INR 3.21 (*)     All other components within normal limits   MICROSCOPIC URINALYSIS - Abnormal; Notable for the following components:    Bacteria, UA TRACE (*)     Crystals, UA NEG (*)     All other components within normal limits   LIPASE       All other labs were within normal range or not returned as of this dictation.     Medications   morphine (PF) injection 2 mg (2 mg IntraVENous Given 1/15/23 0423)   ondansetron (ZOFRAN) injection 4 mg (4 mg IntraVENous Given 1/15/23 0423)   iopamidol (ISOVUE-370) 76 % injection 90 mL (90 mLs IntraVENous Given 1/15/23 0428) EMERGENCY DEPARTMENT COURSE and DIFFERENTIALDIAGNOSIS/MDM:   Vitals:    Vitals:    01/15/23 0248 01/15/23 0330   BP: (!) 142/111 (!) 141/98   Pulse: (!) 105 (!) 103   Resp: 18 (!) 8   Temp: 98.6 °F (37 °C)    TempSrc: Oral    SpO2: 100% 95%   Weight: (!) 337 lb 2 oz (152.9 kg)    Height: 5' 4\" (1.626 m)        [facility]      ED Course as of 01/15/23 0647   Sun Christopher 15, 2023   0311 EKG shows sinus rhythm with a rate of 106. Inferior Q waves. No signs of acute ischemia or infarction. Normal intervals. [CARITO]   [id number] Abdominal exam is benign. Laboratory evaluation overall unremarkable. Renal function at baseline. Slightly supratherapeutic on INR at 3.2. CT ordered to evaluate etiology of abdominal pain. Differential includes diverticulitis, mesenteric ischemia, colitis, cholecystitis, appendicitis, pyelonephritis. Urinalysis without significant evidence of infection or hematuria. [CARITO]   5568 Checked out to oncoming physician at this time pending results of CT of the abdomen pelvis [CARITO]      ED Course User Index  [CARITO] Maribell Hatch MD     Evaluation and work-up here revealed no signs of emergent or life-threatening pathology that would necessitate admission for further work-up or management at this time. Patient is felt to be stable for discharge home with return precautions for worsening of the condition or development of new concerning symptoms. Patient was encouraged to follow-up with their primary care doctor in the appropriate timeframe. Necessary prescriptions and information have been provided for treatment at home. Patient voices understanding and agreement with the plan. CONSULTS:  None    PROCEDURES:  Unless otherwise notedbelow, none     Procedures      FINAL IMPRESSION   No diagnosis found. DISPOSITION/PLAN   DISPOSITION        No notes of EC Admission Criteria type on file. PATIENT REFERRED TO:  No follow-up provider specified.     DISCHARGE MEDICATIONS:  New Prescriptions    No medications on file          (Please note that portions of this note were completed with a voice recognition program.  Efforts were made to edit the dictations butoccasionally words are mis-transcribed.)    Timothy Turcios MD (electronically signed)  AttendingEmergency Physician          Timothy Grant MD  01/15/23 5722

## 2023-01-15 NOTE — ED PROVIDER NOTES
[facility] EMERGENCY DEPT  eMERGENCYdEPARTMENT eNCOUnter      Pt Name: Vicenta Rome  MRN: [id number]  [place] 1975  Date of evaluation: 1/15/2023  Franco Gaines MD    CHIEF COMPLAINT       Chief Complaint   Patient presents with    Abdominal Pain     Lower abd pain radiating into back, right and left. Patient states she has had this 'for a while\" but it is getting worse     Care assumed from Dr. Baljinder Kaplan. Patient is a 80-year-old female presents with lower abdominal pain radiating to both flanks x2 days. No other complaints. CT is pending. If CT is negative plan is for discharge home. PHYSICAL EXAM    (up to 7 for level 4, 8 or more for level 5)     ED Triage Vitals [01/15/23 0248]   BP Temp Temp Source Heart Rate Resp SpO2 Height Weight   (!) 142/111 98.6 °F (37 °C) Oral (!) 105 18 100 % 5' 4\" (1.626 m) (!) 337 lb 2 oz (152.9 kg)       Physical Exam  Vitals reviewed. Constitutional:       General: She is not in acute distress. Appearance: She is well-developed. HENT:      Head: Normocephalic and atraumatic. Eyes:      General: No scleral icterus. Pupils: Pupils are equal, round, and reactive to light. Neck:      Vascular: No JVD. Cardiovascular:      Rate and Rhythm: Normal rate and regular rhythm. Pulses: Normal pulses. Heart sounds: Normal heart sounds. Pulmonary:      Effort: Pulmonary effort is normal. No respiratory distress. Breath sounds: Normal breath sounds. Abdominal:      General: There is no distension. Palpations: Abdomen is soft. There is no pulsatile mass. Tenderness: There is abdominal tenderness (mild) in the right lower quadrant and left lower quadrant. There is right CVA tenderness (mild) and left CVA tenderness (mild). There is no guarding or rebound. Musculoskeletal:         General: No tenderness. Cervical back: Normal range of motion and neck supple.       Thoracic back: Normal. No swelling, edema, tenderness or bony tenderness. Normal range of motion. Lumbar back: Normal. No swelling, edema, deformity, signs of trauma, tenderness or bony tenderness. Right lower leg: No edema. Left lower leg: No edema. Skin:     General: Skin is warm and dry. Capillary Refill: Capillary refill takes less than 2 seconds. Neurological:      Mental Status: She is alert and oriented to person, place, and time. Psychiatric:         Mood and Affect: Mood normal.         Behavior: Behavior normal.       DIAGNOSTIC RESULTS     EKG: All EKG's are interpreted by the Emergency Department Physician who either signs orCo-signs this chart in the absence of a cardiologist.        RADIOLOGY:   Non-plain film images such as CT, Ultrasound and MRI are read by the radiologist. Plain radiographic images are visualized and preliminarily interpreted by the emergency physician with the below findings:          CT ABDOMEN PELVIS W IV CONTRAST Additional Contrast? None   Final Result   NO ACUTE PATHOLOGY SEEN IN ABDOMEN OR PELVIS   This CT exam was performed using one or more of the following dose reduction   techniques: automated exposure control, adjustment of the mA and/or kV according   to patient size, and/or use of iterative reconstruction technique.               LABS:  Labs Reviewed   CBC WITH AUTO DIFFERENTIAL - Abnormal; Notable for the following components:       Result Value    Hematocrit 47.8 (*)     MCHC 32.4 (*)     RDW 14.9 (*)     All other components within normal limits   COMPREHENSIVE METABOLIC PANEL - Abnormal; Notable for the following components:    Glucose 126 (*)     BUN 26 (*)     Creatinine 1.6 (*)     Est, Glom Filt Rate 40 (*)     All other components within normal limits   URINALYSIS - Abnormal; Notable for the following components:    Glucose, Ur 500 (*)     Blood, Urine TRACE (*)     Leukocyte Esterase, Urine TRACE (*)     All other components within normal limits   PROTIME-INR - Abnormal; Notable for the following components:    Protime 34.1 (*)     INR 3.21 (*)     All other components within normal limits   MICROSCOPIC URINALYSIS - Abnormal; Notable for the following components:    Bacteria, UA TRACE (*)     Crystals, UA NEG (*)     All other components within normal limits   LIPASE   HCG, SERUM, QUALITATIVE   TROPONIN       All other labs were within normal range or not returned as of this dictation. EMERGENCY DEPARTMENT COURSE and DIFFERENTIALDIAGNOSIS/MDM:   Vitals:    Vitals:    01/15/23 0248 01/15/23 0330   BP: (!) 142/111 (!) 141/98   Pulse: (!) 105 (!) 103   Resp: 18 (!) 8   Temp: 98.6 °F (37 °C)    TempSrc: Oral    SpO2: 100% 95%   Weight: (!) 337 lb 2 oz (152.9 kg)    Height: 5' 4\" (1.626 m)        MDM    CT scan shows no acute findings. Patient resting comfortably and in no distress. Nontoxic on exam.  Discussed her results with her. Told patient uncertain as to the etiology of her symptoms and recommend she follow-up with primary care for further evaluation. Told to return to the ER for any change worsening symptoms or new concerns. Patient agreeable plan. Patient stable for discharge. CONSULTS:  None    PROCEDURES:  Unlessotherwise noted below, none      Procedures    FINAL IMPRESSION      1.  Abdominal pain, unspecified abdominal location          DISPOSITION/PLAN   DISPOSITION Decision To Discharge    PATIENT REFERRED TO:  [facility] EMERGENCY DEPT  Alexander Balajiyanira  [phone]    As needed, If symptoms worsen    Madelaine Rush DO  [address] [facility] [id number] [phone]    Schedule an appointment as soon as possible for a visit       DISCHARGE MEDICATIONS:  New Prescriptions    No medications on file          (Please note that portions of this note were completed with a voice recognition program.  Efforts were made to edit the dictations but occasionally words are mis-transcribed.)    Irene Novoa MD (electronically signed)  Attending Emergency Physician              Carmelo Burgos MD  01/15/23 0741

## 2023-01-16 LAB
EKG P AXIS: 52 DEGREES
EKG P-R INTERVAL: 160 MS
EKG Q-T INTERVAL: 344 MS
EKG QRS DURATION: 78 MS
EKG QTC CALCULATION (BAZETT): 422 MS
EKG T AXIS: 71 DEGREES

## 2023-01-16 PROCEDURE — 93010 ELECTROCARDIOGRAM REPORT: CPT | Performed by: INTERNAL MEDICINE

## 2023-01-30 LAB
INR BLD: 2.45 (ref 0.88–1.18)
PROTHROMBIN TIME: 27.3 SEC (ref 12–14.6)

## 2023-02-28 LAB
INR BLD: 1.7 (ref 0.88–1.18)
PROTHROMBIN TIME: 20.2 SEC (ref 12–14.6)

## 2023-03-07 LAB
INR BLD: 3.97 (ref 0.88–1.18)
PROTHROMBIN TIME: 40.6 SEC (ref 12–14.6)

## 2023-03-15 LAB
INR PPP: 2.85 (ref 0.88–1.18)
PROTHROMBIN TIME: 30.9 SEC (ref 12–14.6)

## 2023-03-22 LAB
INR PPP: 2.45 (ref 0.88–1.18)
PROTHROMBIN TIME: 27.3 SEC (ref 12–14.6)

## 2023-03-24 ENCOUNTER — NUTRITION (OUTPATIENT)
Dept: BARIATRICS/WEIGHT MGMT | Facility: CLINIC | Age: 48
End: 2023-03-24
Payer: MEDICARE

## 2023-03-24 VITALS — BODY MASS INDEX: 48.82 KG/M2 | WEIGHT: 293 LBS | HEIGHT: 65 IN

## 2023-03-24 NOTE — PROGRESS NOTES
"Metabolic and Bariatric Surgery Adult Nutrition Assessment    Patient Name: Cassidy FLORES   YOB: 1975   MRN: [id number]     Assessment Date:  03/24/2023     Reason for Visit: Follow-up Nutrition Assessment     Treatment Pathway: Medical Weight Loss    Assessment    Anthropometrics   Wt Readings from Last 1 Encounters:   03/24/23 (!) 152 kg (334 lb 9.6 oz)     Ht Readings from Last 1 Encounters:   03/24/23 163.8 cm (64.5\")     BMI Readings from Last 1 Encounters:   03/24/23 56.55 kg/m²        Initial Weight/Date: 367 lbs (Dec 2021)  Weight Changes since last visit: - 2.2 lbs  Net Weight Change: - 32.4 lbs    Past Medical History:   Diagnosis Date   • Coronary artery disease    • Diabetes mellitus (HCC)    • Elevated cholesterol    • GERD (gastroesophageal reflux disease)    • Hypertension    • Obesity    • Personal history of COVID-19 2021   • Sleep apnea     CPAP   • Stroke (HCC)    • TIA (transient ischemic attack)       Past Surgical History:   Procedure Laterality Date   • CARDIAC SURGERY     • ENDOSCOPY N/A 9/2/2022    Procedure: ESOPHAGOGASTRODUODENOSCOPY WITH ANESTHESIA;  Surgeon: Robert Hernandez MD;  Location: [facility] ENDOSCOPY;  Service: General;  Laterality: N/A;  pre obesity  post normal  dr finley   • TONSILLECTOMY        Current Outpatient Medications   Medication Sig Dispense Refill   • amLODIPine (NORVASC) 5 MG tablet Take 1 tablet by mouth Daily.     • atorvastatin (LIPITOR) 80 MG tablet Take 1 tablet by mouth Daily.     • bethanechol (URECHOLINE) 25 MG tablet Take 1 tablet by mouth Daily.     • Continuous Blood Gluc Sensor (Dexcom G6 Sensor)      • Continuous Blood Gluc Transmit (Dexcom G6 Transmitter) misc USE AS DIRECTED. CHANGE EVERY 90 DAYS     • Dapagliflozin Propanediol 10 MG tablet Take 10 mg by mouth Daily. ---  - FARXIGA -     • exenatide er (BYDUREON) 2 MG pen-injector injection Inject 2 mg under the skin into the appropriate area as directed 1 (One) Time Per Week.     • " ezetimibe (ZETIA) 10 MG tablet Take 1 tablet by mouth Daily.     • furosemide (LASIX) 40 MG tablet Take 2 tablets by mouth Daily.     • gabapentin (NEURONTIN) 600 MG tablet Take 1 tablet by mouth 3 (Three) Times a Day.     • insulin aspart (novoLOG) 100 UNIT/ML injection Inject  under the skin into the appropriate area as directed 3 (Three) Times a Day Before Meals.     • lisinopril (PRINIVIL,ZESTRIL) 2.5 MG tablet Take 16 tablets by mouth Daily.     • meloxicam (MOBIC) 15 MG tablet Take 1 tablet by mouth Daily.     • metoprolol succinate XL (TOPROL-XL) 25 MG 24 hr tablet Take 1 tablet by mouth Daily.     • nystatin (MYCOSTATIN) 320765 UNIT/GM powder Apply  topically to the appropriate area as directed 4 (Four) Times a Day.     • pantoprazole (PROTONIX) 40 MG EC tablet Take 1 tablet by mouth Daily.     • spironolactone (ALDACTONE) 25 MG tablet Take 1 tablet by mouth Daily.     • sucralfate (CARAFATE) 1 GM/10ML suspension 10 mL Every 6 (Six) Hours.     • Tirzepatide (MOUNJARO SC) Inject  under the skin into the appropriate area as directed.     • topiramate (TOPAMAX) 100 MG tablet Take 1 tablet by mouth Daily.     • verapamil (CALAN) 40 MG tablet Take 1 tablet by mouth Daily.     • vitamin D (ERGOCALCIFEROL) 1.25 MG (20516 UT) capsule capsule Take 1 capsule by mouth 1 (One) Time Per Week.     • warfarin (COUMADIN) 10 MG tablet Take 1.5 tablets by mouth Every Other Day.     • albuterol sulfate  (90 Base) MCG/ACT inhaler Inhale 2 puffs Every 4 (Four) Hours As Needed for Wheezing. (Patient not taking: Reported on 3/24/2023) 3.1 g 0   • Aspirin Buf,CaCarb-MgCarb-MgO, 81 MG tablet Take 81 mg by mouth Daily. (Patient not taking: Reported on 3/24/2023)     • cloNIDine (CATAPRES) 0.1 MG tablet Take 0.1 mg by mouth Daily. (Patient not taking: Reported on 3/24/2023)     • Insulin Glargine, 1 Unit Dial, (Toujeo SoloStar) 300 UNIT/ML solution pen-injector injection Toujeo SoloStar U-300 Insulin 300 unit/mL (1.5 mL)  subcutaneous pen (Patient not taking: Reported on 3/24/2023)     • metFORMIN ER (GLUCOPHAGE-XR) 500 MG 24 hr tablet Take 500 mg by mouth Daily. (Patient not taking: Reported on 3/24/2023)     • prochlorperazine (COMPAZINE) 10 MG tablet Take 10 mg by mouth Daily. (Patient not taking: Reported on 3/24/2023)     • Semaglutide,0.25 or 0.5MG/DOS, (Ozempic, 0.25 or 0.5 MG/DOSE,) 2 MG/1.5ML solution pen-injector Ozempic 0.25 mg or 0.5 mg (2 mg/1.5 mL) subcutaneous pen injector (Patient not taking: Reported on 3/24/2023)     • traMADol (ULTRAM) 50 MG tablet Take 50 mg by mouth Daily. (Patient not taking: Reported on 3/24/2023)       No current facility-administered medications for this visit.      Allergies   Allergen Reactions   • Sulfamethoxazole-Trimethoprim Other (See Comments)      - BACTRIM -   Gives her a Yeast infection          Nutrition Recall  Eating 2-3 meals daily   (M1) not hungry, eating a piece of stoll sometimes with eggs.  (M2) vegetable medley (in air fryer)   (M3) vegetables in airy fryer with lunch meat ham   Snacking - on fruit, pretzels,   Monitoring portions- states using measuring cups/containers.   Drinking sugary/carbonated beverages- on intake paperwork Pt wrote 1 soda/day however in our discussion she reports only 1 soda/wk.  Fluid Intake- mostly water with some lemon. ~ 6 bottles daily       Success this Month: Going to the gym. Every meal has a vegetable.     Exercise: 4 days/wk to the gym. Walking on treadmill, sitting elliptical, some abdominal machines. 60-90 minutes 4 days/wk.  Recommended increasing physical activity, beyond normal daily habits, gradually working to reach ~30 minutes daily.     Nutrition Intervention  Nutrition education and nutrition coaching for behavior change provided.  Strategies used included Problem Solving and Ongoing reinforcement  Review of medical weight loss prescription 4 meal/day plan and reviewed nutritional needs for Medical Weight Loss.  Self-monitoring  strategies such as keeping a food journal (on paper or electronically) and calculating fluid/protein intake were discussed.    Recommended Diet Changes- Green Prescription Meal Plan  Eat 4 meals per day with protein and vegetables at each meal, no carbs after meal 2., Protein goal: 65 gms., Eat vegetables first at each meal., Discussed protein guidelines for shakes and bars., Reduce snacking -use foods from free foods list only., Eliminate snacks., Reduce fat, sugar, and/or salt in food choices., Choose more nutrient dense foods., Choose foods with increased fiber., Monitor portion sizes using a food scale and/or measuring cup., Eliminate soda and sugar-sweetened beverages and Increase fluid intake to 64 ounces per day      Goals  1. Eliminate snacking between meals- meal 1 between 8/9am. 11:30 am-12:30 pm meal 2, Meal 3 between 3-4pm, Meal 4 5:30-7 pm.   2. Look at meal plan and sample menus prior to each meal.     Monitoring/Evaluation Plan  Anticipate follow up per program protocol. Continue collaboration of care with physician and treatment team.     Electronically signed by  Velma Lee RDN, LD  03/24/2023 10:22 CDT.

## 2023-04-27 LAB
INR PPP: 2.3 (ref 0.88–1.18)
PROTHROMBIN TIME: 25.9 SEC (ref 12–14.6)

## 2023-05-24 ENCOUNTER — TELEPHONE (OUTPATIENT)
Dept: GASTROENTEROLOGY | Age: 48
End: 2023-05-24

## 2023-05-24 NOTE — TELEPHONE ENCOUNTER
05- Per [facility] approval form 89- until further notice

## 2023-05-24 NOTE — TELEPHONE ENCOUNTER
05-24-23 Sent Prior Auth for Troy Dietrich Key: [facility]  PA Case ID: [id number] - Rx #: [id number]    Drug  Sucralfate 1GM/10ML suspension  Form  [facility] Medicare Electronic Prior Authorization Request Form ([address]75 NCP[address])  Original Claim Info  56,535 PA REQUIRED-MD CALL [phone]. NO PA ON SUCRALFATE TABSDRUG REQUIRES PRIOR AUTHORIZATION      Outcome  Approvedtoday  Approved. This drug has been approved under the Member's Medicare Part D benefit. Approved quantity: 1200 mls per 30 day(s). You may fill up to a 90 day supply except for those on Specialty Tier 5, which can be filled up to a 30 day supply. Please call the pharmacy to process the prescription claim.

## 2023-05-30 LAB
INR PPP: 1.86 (ref 0.88–1.18)
PROTHROMBIN TIME: 20.8 SEC (ref 12–14.6)

## 2023-05-31 ENCOUNTER — APPOINTMENT (OUTPATIENT)
Dept: CT IMAGING | Age: 48
End: 2023-05-31
Payer: COMMERCIAL

## 2023-05-31 ENCOUNTER — HOSPITAL ENCOUNTER (EMERGENCY)
Age: 48
Discharge: HOME | End: 2023-06-01
Attending: EMERGENCY MEDICINE
Payer: COMMERCIAL

## 2023-05-31 VITALS
HEART RATE: 99 BPM | WEIGHT: 293 LBS | DIASTOLIC BLOOD PRESSURE: 89 MMHG | OXYGEN SATURATION: 99 % | TEMPERATURE: 98.2 F | BODY MASS INDEX: 57.16 KG/M2 | RESPIRATION RATE: 18 BRPM | SYSTOLIC BLOOD PRESSURE: 144 MMHG

## 2023-05-31 DIAGNOSIS — S20.219A CONTUSION OF CHEST WALL, UNSPECIFIED LATERALITY, INITIAL ENCOUNTER: ICD-10-CM

## 2023-05-31 DIAGNOSIS — Z79.01 ANTICOAGULATED ON COUMADIN: ICD-10-CM
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
## 2023-05-31 LAB
ALBUMIN SERPL-MCNC: 3.8 G/DL (ref 3.5–5.2)
ALP SERPL-CCNC: 94 U/L (ref 35–104)
ALT SERPL-CCNC: 26 U/L (ref 5–33)
ANION GAP SERPL CALCULATED.3IONS-SCNC: 11 MMOL/L (ref 7–19)
AST SERPL-CCNC: 26 U/L (ref 5–32)
BASOPHILS # BLD: 0 K/UL (ref 0–0.2)
BASOPHILS NFR BLD: 0.4 % (ref 0–1)
BILIRUB SERPL-MCNC: 0.4 MG/DL (ref 0.2–1.2)
BUN SERPL-MCNC: 17 MG/DL (ref 6–20)
CALCIUM SERPL-MCNC: 9.2 MG/DL (ref 8.6–10)
CHLORIDE SERPL-SCNC: 105 MMOL/L (ref 98–111)
CO2 SERPL-SCNC: 20 MMOL/L (ref 22–29)
CREAT SERPL-MCNC: 1.2 MG/DL (ref 0.5–0.9)
EOSINOPHIL # BLD: 0.1 K/UL (ref 0–0.6)
EOSINOPHIL NFR BLD: 2.1 % (ref 0–5)
ERYTHROCYTE [DISTWIDTH] IN BLOOD BY AUTOMATED COUNT: 14.6 % (ref 11.5–14.5)
GLUCOSE SERPL-MCNC: 95 MG/DL (ref 74–109)
HCT VFR BLD AUTO: 42.4 % (ref 37–47)
HGB BLD-MCNC: 14 G/DL (ref 12–16)
IMM GRANULOCYTES # BLD: 0 K/UL
INR PPP: 1.44 (ref 0.88–1.18)
LYMPHOCYTES # BLD: 1.6 K/UL (ref 1.1–4.5)
LYMPHOCYTES NFR BLD: 23.5 % (ref 20–40)
MCH RBC QN AUTO: 30.2 PG (ref 27–31)
MCHC RBC AUTO-ENTMCNC: 33 G/DL (ref 33–37)
MCV RBC AUTO: 91.4 FL (ref 81–99)
MONOCYTES # BLD: 0.5 K/UL (ref 0–0.9)
MONOCYTES NFR BLD: 8 % (ref 0–10)
NEUTROPHILS # BLD: 4.5 K/UL (ref 1.5–7.5)
NEUTS SEG NFR BLD: 65.6 % (ref 50–65)
PLATELET # BLD AUTO: 282 K/UL (ref 130–400)
PMV BLD AUTO: 11.1 FL (ref 9.4–12.3)
POTASSIUM SERPL-SCNC: 4.2 MMOL/L (ref 3.5–5)
PROT SERPL-MCNC: 7.1 G/DL (ref 6.6–8.7)
PROTHROMBIN TIME: 17 SEC (ref 12–14.6)
RBC # BLD AUTO: 4.64 M/UL (ref 4.2–5.4)
SODIUM SERPL-SCNC: 136 MMOL/L (ref 136–145)
WBC # BLD AUTO: 6.8 K/UL (ref 4.8–10.8)

## 2023-05-31 PROCEDURE — 74177 CT ABD & PELVIS W/CONTRAST: CPT

## 2023-05-31 PROCEDURE — 96375 TX/PRO/DX INJ NEW DRUG ADDON: CPT

## 2023-05-31 PROCEDURE — 6360000002 HC RX W HCPCS: Performed by: EMERGENCY MEDICINE

## 2023-05-31 PROCEDURE — 6360000004 HC RX CONTRAST MEDICATION: Performed by: EMERGENCY MEDICINE

## 2023-05-31 PROCEDURE — 71260 CT THORAX DX C+: CPT

## 2023-05-31 PROCEDURE — 93005 ELECTROCARDIOGRAM TRACING: CPT | Performed by: EMERGENCY MEDICINE

## 2023-05-31 PROCEDURE — 96374 THER/PROPH/DIAG INJ IV PUSH: CPT

## 2023-05-31 PROCEDURE — 85025 COMPLETE CBC W/AUTO DIFF WBC: CPT

## 2023-05-31 PROCEDURE — 85610 PROTHROMBIN TIME: CPT

## 2023-05-31 PROCEDURE — 72125 CT NECK SPINE W/O DYE: CPT

## 2023-05-31 PROCEDURE — 80053 COMPREHEN METABOLIC PANEL: CPT

## 2023-05-31 PROCEDURE — 36415 COLL VENOUS BLD VENIPUNCTURE: CPT

## 2023-05-31 PROCEDURE — 70450 CT HEAD/BRAIN W/O DYE: CPT

## 2023-05-31 PROCEDURE — 99285 EMERGENCY DEPT VISIT HI MDM: CPT

## 2023-05-31 RX ORDER — CYCLOBENZAPRINE HCL 5 MG
5-10 TABLET ORAL 2 TIMES DAILY PRN
Qty: 20 TABLET | Refills: 0 | Status: SHIPPED | OUTPATIENT
Start: 2023-05-31 | End: 2023-06-10

## 2023-05-31 RX ORDER — ORPHENADRINE CITRATE 30 MG/ML
30 INJECTION INTRAMUSCULAR; INTRAVENOUS ONCE
Status: COMPLETED | OUTPATIENT
Start: 2023-05-31 | End: 2023-05-31

## 2023-05-31 RX ORDER — KETOROLAC TROMETHAMINE 30 MG/ML
30 INJECTION, SOLUTION INTRAMUSCULAR; INTRAVENOUS ONCE
Status: COMPLETED | OUTPATIENT
Start: 2023-05-31 | End: 2023-05-31

## 2023-05-31 RX ADMIN — KETOROLAC TROMETHAMINE 30 MG: 30 INJECTION, SOLUTION INTRAMUSCULAR; INTRAVENOUS at 22:47

## 2023-05-31 RX ADMIN — ORPHENADRINE CITRATE 30 MG: 60 INJECTION INTRAMUSCULAR; INTRAVENOUS at 22:46

## 2023-05-31 RX ADMIN — IOPAMIDOL 90 ML: 755 INJECTION, SOLUTION INTRAVENOUS at 21:36

## 2023-05-31 ASSESSMENT — ENCOUNTER SYMPTOMS
EYES NEGATIVE: 1
GASTROINTESTINAL NEGATIVE: 1
RESPIRATORY NEGATIVE: 1

## 2023-05-31 ASSESSMENT — PAIN SCALES - GENERAL
PAINLEVEL_OUTOF10: 7
PAINLEVEL_OUTOF10: 8
PAINLEVEL_OUTOF10: 7

## 2023-05-31 ASSESSMENT — PAIN - FUNCTIONAL ASSESSMENT: PAIN_FUNCTIONAL_ASSESSMENT: 0-10

## 2023-06-01 LAB
EKG P AXIS: 45 DEGREES
EKG P-R INTERVAL: 148 MS
EKG Q-T INTERVAL: 336 MS
EKG QRS DURATION: 76 MS
EKG QTC CALCULATION (BAZETT): 414 MS
EKG T AXIS: 58 DEGREES

## 2023-06-01 PROCEDURE — 93010 ELECTROCARDIOGRAM REPORT: CPT | Performed by: INTERNAL MEDICINE
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
## 2023-06-01 NOTE — ED NOTES
PT to bathroom and back to bed via w/c without incident or assistance.       Saritha Simon RN  05/31/23 1720

## 2023-06-06 ENCOUNTER — HOSPITAL ENCOUNTER (EMERGENCY)
Age: 48
Discharge: HOME | End: 2023-06-06
Payer: COMMERCIAL

## 2023-06-06 VITALS
HEART RATE: 95 BPM | TEMPERATURE: 98.1 F | SYSTOLIC BLOOD PRESSURE: 157 MMHG | OXYGEN SATURATION: 100 % | RESPIRATION RATE: 18 BRPM | DIASTOLIC BLOOD PRESSURE: 96 MMHG
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
## 2023-06-06 DIAGNOSIS — S06.0X0D BRAIN CONCUSSION, WITHOUT LOSS OF CONSCIOUSNESS, SUBSEQUENT ENCOUNTER: Primary | ICD-10-CM

## 2023-06-06 LAB
ALBUMIN SERPL-MCNC: 3.8 G/DL (ref 3.5–5.2)
ALP SERPL-CCNC: 99 U/L (ref 35–104)
ALT SERPL-CCNC: 24 U/L (ref 5–33)
ANION GAP SERPL CALCULATED.3IONS-SCNC: 8 MMOL/L (ref 7–19)
AST SERPL-CCNC: 20 U/L (ref 5–32)
BASOPHILS # BLD: 0 K/UL (ref 0–0.2)
BASOPHILS NFR BLD: 0.6 % (ref 0–1)
BILIRUB SERPL-MCNC: 0.4 MG/DL (ref 0.2–1.2)
BUN SERPL-MCNC: 27 MG/DL (ref 6–20)
CALCIUM SERPL-MCNC: 9.2 MG/DL (ref 8.6–10)
CHLORIDE SERPL-SCNC: 101 MMOL/L (ref 98–111)
CO2 SERPL-SCNC: 29 MMOL/L (ref 22–29)
CREAT SERPL-MCNC: 1.7 MG/DL (ref 0.5–0.9)
EOSINOPHIL # BLD: 0.2 K/UL (ref 0–0.6)
EOSINOPHIL NFR BLD: 4.4 % (ref 0–5)
ERYTHROCYTE [DISTWIDTH] IN BLOOD BY AUTOMATED COUNT: 14.7 % (ref 11.5–14.5)
GLUCOSE SERPL-MCNC: 99 MG/DL (ref 74–109)
HCT VFR BLD AUTO: 45.1 % (ref 37–47)
HGB BLD-MCNC: 14.9 G/DL (ref 12–16)
IMM GRANULOCYTES # BLD: 0 K/UL
INR PPP: 1.75 (ref 0.88–1.18)
LYMPHOCYTES # BLD: 1.9 K/UL (ref 1.1–4.5)
LYMPHOCYTES NFR BLD: 35.6 % (ref 20–40)
MCH RBC QN AUTO: 29.9 PG (ref 27–31)
MCHC RBC AUTO-ENTMCNC: 33 G/DL (ref 33–37)
MCV RBC AUTO: 90.6 FL (ref 81–99)
MONOCYTES # BLD: 0.4 K/UL (ref 0–0.9)
MONOCYTES NFR BLD: 7.6 % (ref 0–10)
NEUTROPHILS # BLD: 2.8 K/UL (ref 1.5–7.5)
NEUTS SEG NFR BLD: 51.6 % (ref 50–65)
PLATELET # BLD AUTO: 299 K/UL (ref 130–400)
PMV BLD AUTO: 11.1 FL (ref 9.4–12.3)
POTASSIUM SERPL-SCNC: 4.1 MMOL/L (ref 3.5–5)
PROT SERPL-MCNC: 7.5 G/DL (ref 6.6–8.7)
PROTHROMBIN TIME: 20 SEC (ref 12–14.6)
RBC # BLD AUTO: 4.98 M/UL (ref 4.2–5.4)
SODIUM SERPL-SCNC: 138 MMOL/L (ref 136–145)
WBC # BLD AUTO: 5.4 K/UL (ref 4.8–10.8)

## 2023-06-06 PROCEDURE — 80053 COMPREHEN METABOLIC PANEL: CPT

## 2023-06-06 PROCEDURE — 6370000000 HC RX 637 (ALT 250 FOR IP): Performed by: NURSE PRACTITIONER

## 2023-06-06 PROCEDURE — 99283 EMERGENCY DEPT VISIT LOW MDM: CPT

## 2023-06-06 PROCEDURE — 85025 COMPLETE CBC W/AUTO DIFF WBC: CPT

## 2023-06-06 PROCEDURE — 85610 PROTHROMBIN TIME: CPT

## 2023-06-06 PROCEDURE — 36415 COLL VENOUS BLD VENIPUNCTURE: CPT

## 2023-06-06 RX ORDER — HYDROCODONE BITARTRATE AND ACETAMINOPHEN 5; 325 MG/1; MG/1
1 TABLET ORAL ONCE
Status: COMPLETED | OUTPATIENT
Start: 2023-06-06 | End: 2023-06-06

## 2023-06-06 RX ORDER — LIDOCAINE 50 MG/G
1 PATCH TOPICAL DAILY
Qty: 10 PATCH | Refills: 0 | Status: SHIPPED | OUTPATIENT
Start: 2023-06-06 | End: 2023-06-16

## 2023-06-06 RX ADMIN — HYDROCODONE BITARTRATE AND ACETAMINOPHEN 1 TABLET: 5; 325 TABLET ORAL at 22:32

## 2023-06-06 ASSESSMENT — PAIN SCALES - GENERAL: PAINLEVEL_OUTOF10: 7
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
## 2023-06-28 ENCOUNTER — HOSPITAL ENCOUNTER (OUTPATIENT)
Dept: PHYSICAL THERAPY | Age: 48
Setting detail: THERAPIES SERIES
Discharge: HOME | End: 2023-06-28
Payer: OTHER MISCELLANEOUS

## 2023-06-28 PROCEDURE — 97162 PT EVAL MOD COMPLEX 30 MIN: CPT

## 2023-06-28 ASSESSMENT — PAIN SCALES - GENERAL: PAINLEVEL_OUTOF10: 5

## 2023-06-28 ASSESSMENT — PAIN DESCRIPTION - PAIN TYPE: TYPE: ACUTE PAIN

## 2023-06-28 ASSESSMENT — PAIN DESCRIPTION - ORIENTATION: ORIENTATION: MID;UPPER

## 2023-06-28 ASSESSMENT — PAIN DESCRIPTION - LOCATION: LOCATION: NECK;BACK;SHOULDER;HEAD

## 2023-06-30 LAB
INR PPP: 2.94 (ref 0.88–1.18)
PROTHROMBIN TIME: 30 SEC (ref 12–14.6)

## 2023-07-03 ENCOUNTER — HOSPITAL ENCOUNTER (OUTPATIENT)
Dept: PHYSICAL THERAPY | Age: 48
Setting detail: THERAPIES SERIES
Discharge: HOME | End: 2023-07-03
Payer: OTHER MISCELLANEOUS

## 2023-07-03 PROCEDURE — 97110 THERAPEUTIC EXERCISES: CPT

## 2023-07-03 ASSESSMENT — PAIN DESCRIPTION - LOCATION: LOCATION: NECK;HEAD;SHOULDER

## 2023-07-03 ASSESSMENT — PAIN SCALES - GENERAL: PAINLEVEL_OUTOF10: 4

## 2023-07-03 ASSESSMENT — PAIN DESCRIPTION - PAIN TYPE: TYPE: ACUTE PAIN

## 2023-07-03 ASSESSMENT — PAIN DESCRIPTION - ORIENTATION: ORIENTATION: MID;UPPER

## 2023-07-03 ASSESSMENT — PAIN DESCRIPTION - DESCRIPTORS: DESCRIPTORS: TIGHTNESS;ACHING

## 2023-07-03 NOTE — PROGRESS NOTES
Physical Therapy: Daily Note   Patient: Romana Smoker (46 y.o. female)   Examination Date:   Plan of Care/Certification Expiration Date: 23    No data recorded   :  1975 # of Visits since [facility]:   2   MRN: [id number]  CSN: [id number] Start of Care Date:   2023   Insurance: Payor: [address] / Plan: [address] / Product Type: *No Product type* /   Insurance ID: [id number] - (Auto) Secondary Insurance (if applicable): Khalif Rasta   Referring Physician: PENELOPE Phillips DO   PCP: Slime Brown DO Visits to Date/Visits Approved: 2 / 10    No Show/Cancelled Appts:   /       Medical Diagnosis: Pain in thoracic spine [M54.6]    Treatment Diagnosis: Neck/upper back pain        SUBJECTIVE EXAMINATION   Pain Level: Pain Screening  Patient Currently in Pain: Yes  Pain Assessment: 0-10  Pain Level: 4  Pain Type: Acute pain  Pain Location: Neck, Head, Shoulder  Pain Orientation: Mid, Upper  Pain Descriptors: Tightness, Aching    Patient Comments: Subjective: No changes since initial evaluation. States pain is manageable but she is dealing with some \"dizziness, feeling foggy-headed. \"    OBJECTIVE EXAMINATION   Restrictions:  Restrictions/Precautions: Seizure   No data recorded No data recorded      TREATMENT     Exercises:      Treatment Reasoning    Exercise 2: CROM x 5 ea  Exercise 3: Bilat upper trap stretch 3 x 10\" ea  Exercise 4: Bilat levator stretch 3 x 10\" ea (small ROM d/t dizziness)  Exercise 5: Bilat scalene stretch  Exercise 6: Isometric cervicals 3\" x 5 ea  Exercise 7: Chin tucks x 10  Exercise 8: Slouch with overcorrection x 10  Exercise 9: Corner stretch  Exercise 10: Scap retraction with t-band- x10 (no band 7/3)  Exercise 11: Standing Is, Ts  Exercise 12: Rhomboid stretch with hands clasped in front 5\" x 5  Exercise 13: Supine on towel/short thoracic pad  Exercise 14: Supine (may need head elevated, knee bolster) cane flexion and ER  Exercise

## 2023-07-05 ENCOUNTER — HOSPITAL ENCOUNTER (OUTPATIENT)
Dept: PHYSICAL THERAPY | Age: 48
Setting detail: THERAPIES SERIES
Discharge: HOME | End: 2023-07-05
Payer: OTHER MISCELLANEOUS

## 2023-07-05 PROCEDURE — 97140 MANUAL THERAPY 1/> REGIONS: CPT

## 2023-07-05 PROCEDURE — 97110 THERAPEUTIC EXERCISES: CPT

## 2023-07-05 ASSESSMENT — PAIN DESCRIPTION - LOCATION: LOCATION: HEAD;NECK;SHOULDER

## 2023-07-05 ASSESSMENT — PAIN SCALES - GENERAL: PAINLEVEL_OUTOF10: 4

## 2023-07-05 NOTE — PROGRESS NOTES
Daily Treatment Note  Date: 2023  Patient Name: Freddy Fox  MRN: [id number]     :   1975    Referring Physician: Dorsie Olszewski, NP Nonie Meek, DO   PCP: Jose Inman DO    Medical Diagnosis: Pain in thoracic spine [M54.6] Neck/upper back pain  Treatment Diagnosis: Neck/upper back pain      Insurance: Payor: [address]  / Plan: [address] / Product Type: *No Product type* /   Insurance ID: [id number] - (Auto)    Subjective:   General  Diagnosis: Neck/upper back pain  Referring Provider (secondary): Dinorah Maria DO  Onset Date: 23  PT Insurance Information: [employer] (Auto), [employer] Medicare (Primary), [place] Medicaid (Secondary)  Total # of Visits Approved: 10  Total # of Visits to Date: 3  Plan of Care/Certification Expiration Date: 23  Progress Note Due Date: 23  Referring Provider (secondary): Dinorah Maria DO  Subjective: right now the pain is about a 4/10  Patient Currently in Pain: Yes  Pain Level: 4  Pain Location: Head, Neck, Shoulder       Treatment Activities:  Exercises:      Treatment Reasoning    Exercise 2: CROM x 5 ea  Exercise 3: Bilat upper trap stretch 3 x 10\" ea  Exercise 4: Bilat levator stretch 3 x 10\" ea (small ROM d/t dizziness)  Exercise 5: Bilat scalene stretch 3 x 10\"  Exercise 6: Isometric cervicals 3\" x 5 ea  Exercise 7: Chin tucks x 10  Exercise 8: Slouch with overcorrection x 10  Exercise 9: Corner stretch  Exercise 10: Scap retraction with t-band- x10 (no band 7/5)  Exercise 11: Standing Is, Ts  Exercise 12: Rhomboid stretch with hands clasped in front 5\" x 5  Exercise 13: Supine on towel/short thoracic pad  Exercise 14: Supine (may need head elevated, knee bolster) cane flexion and ER  x 5 ea  Exercise 15: Supine cane benchpress/shoulder protraction  x 5 ea  Exercise 16: Suboccipital release  x 5'  Exercise 17: Supine vs sitting STM/IASTM to neck/upper back (sitting x 8')  Exercise 18: NO IFC  Exercise 19:  If needed, U/S to bilat

## 2023-07-10 ENCOUNTER — HOSPITAL ENCOUNTER (OUTPATIENT)
Dept: PHYSICAL THERAPY | Age: 48
Setting detail: THERAPIES SERIES
Discharge: HOME | End: 2023-07-10
Payer: OTHER MISCELLANEOUS

## 2023-07-10 PROCEDURE — 97110 THERAPEUTIC EXERCISES: CPT

## 2023-07-10 ASSESSMENT — PAIN DESCRIPTION - PAIN TYPE: TYPE: ACUTE PAIN

## 2023-07-10 ASSESSMENT — PAIN DESCRIPTION - LOCATION: LOCATION: HEAD;NECK

## 2023-07-10 ASSESSMENT — PAIN SCALES - GENERAL: PAINLEVEL_OUTOF10: 4

## 2023-07-10 NOTE — PROGRESS NOTES
Daily Treatment Note  Date: 7/10/2023  Patient Name: Jaime Lazcano  MRN: [id number]     :   1975    Referring Physician: PENELOPE Deal DO   PCP: Winsome Emmanuel DO    Medical Diagnosis: Pain in thoracic spine [M54.6] Neck/upper back pain  Treatment Diagnosis: Neck/upper back pain      Insurance: Payor: [address] / Plan: [address] / Product Type: *No Product type* /   Insurance ID: [id number] - (Auto)    Subjective:   General  Diagnosis: Neck/upper back pain  Referring Provider (secondary): João Paulino DO  Onset Date: 23  PT Insurance Information: [employer] (Auto), [employer] Medicare (Primary), [place] Medicaid (Secondary)  Total # of Visits Approved: 10  Total # of Visits to Date: 4  Plan of Care/Certification Expiration Date: 23  Progress Note Due Date: 23  Referring Provider (secondary): João Paulino DO  Subjective: it's more a dull ache and i'm a little dizzy  Patient Currently in Pain: Yes  Pain Level: 4  Pain Type: Acute pain  Pain Location: Head, Neck       Treatment Activities:  Exercises:      Treatment Reasoning    Exercise 1: Pulleys  x 2'  Exercise 2: CROM x 5 ea  Exercise 3: Bilat upper trap stretch 3 x 10\" ea  Exercise 4: Bilat levator stretch 3 x 10\" ea (small ROM d/t dizziness)  Exercise 5: Bilat scalene stretch 3 x 10\"  Exercise 6: Isometric cervicals 3\" x 5 ea  Exercise 7: Chin tucks x 10  Exercise 8: Slouch with overcorrection x 10  Exercise 9: Corner stretch  Exercise 10: Scap retraction with t-band- x10 (no band 7/10)  Exercise 11: Standing Is, Ts  Exercise 12: Rhomboid stretch with hands clasped in front 5\" x 5  Exercise 13: Supine on towel/short thoracic pad  Exercise 14: Supine (may need head elevated, knee bolster) cane flexion and ER  x 5 ea  Exercise 15: Supine cane benchpress/shoulder protraction  x 5 ea  Exercise 16: Suboccipital release  x 5'  Exercise 17: Supine vs sitting STM/IASTM to neck/upper back (sitting x

## 2023-07-12 ENCOUNTER — HOSPITAL ENCOUNTER (OUTPATIENT)
Dept: PHYSICAL THERAPY | Age: 48
Setting detail: THERAPIES SERIES
Discharge: HOME | End: 2023-07-12
Payer: OTHER MISCELLANEOUS

## 2023-07-12 PROCEDURE — 97110 THERAPEUTIC EXERCISES: CPT

## 2023-07-12 PROCEDURE — 97140 MANUAL THERAPY 1/> REGIONS: CPT

## 2023-07-12 ASSESSMENT — PAIN DESCRIPTION - PAIN TYPE: TYPE: ACUTE PAIN

## 2023-07-12 ASSESSMENT — PAIN DESCRIPTION - ORIENTATION: ORIENTATION: MID;UPPER

## 2023-07-12 ASSESSMENT — PAIN DESCRIPTION - DESCRIPTORS: DESCRIPTORS: TIGHTNESS;ACHING

## 2023-07-12 ASSESSMENT — PAIN SCALES - GENERAL: PAINLEVEL_OUTOF10: 3

## 2023-07-12 ASSESSMENT — PAIN DESCRIPTION - LOCATION: LOCATION: HEAD;NECK

## 2023-07-12 NOTE — PROGRESS NOTES
Physical Therapy: Daily Note   Patient: Krysten Kirkpatrick (46 y.o. female)   Examination Date:   Plan of Care/Certification Expiration Date: 23    No data recorded   :  1975 # of Visits since [facility]:   5   MRN: [id number]  CSN: [id number] Start of Care Date:   2023   Insurance: Payor: [address] / Plan: [address] / Product Type: *No Product type* /   Insurance ID: [id number] - (Auto) Secondary Insurance (if applicable): Carlos Bradshaw   Referring Physician: PENELOPE Mandel DO   PCP: Cody Tan DO Visits to Date/Visits Approved: 5 / 10    No Show/Cancelled Appts:   /       Medical Diagnosis: Pain in thoracic spine [M54.6] Neck/upper back pain  Treatment Diagnosis: Neck/upper back pain        SUBJECTIVE EXAMINATION   Pain Level: Pain Screening  Patient Currently in Pain: Yes  Pain Assessment: 0-10  Pain Level: 3  Pain Type: Acute pain  Pain Location: Head, Neck  Pain Orientation: Mid, Upper  Pain Descriptors: Tightness, Aching    Patient Comments: Subjective: Woke up feeling a bit dizzy. Some neck tightness also. It is better but for sure still there.             OBJECTIVE EXAMINATION     TREATMENT     Exercises:      Treatment Reasoning    Exercise 1: Pulleys  x 2'  Exercise 2: CROM x 5 ea  Exercise 3: Bilat upper trap stretch 3 x 10\" ea  Exercise 4: Bilat levator stretch 3 x 10\" ea (small ROM d/t dizziness)  Exercise 5: Bilat scalene stretch 3 x 10\"  Exercise 6: Isometric cervicals 3\" x 5 ea  Exercise 7: Chin tucks x 10  Exercise 8: Slouch with overcorrection x 10  Exercise 9: Corner stretch  Exercise 10: Scap retraction with t-band- x10 (no band 7/10)  Exercise 11: Standing Is, Ts  Exercise 12: Rhomboid stretch with hands clasped in front 5\" x 5  Exercise 13: Supine on towel/short thoracic pad  Exercise 14: Supine (may need head elevated, knee bolster) cane flexion and ER  x 5 ea  Exercise 15: Supine cane benchpress/shoulder protraction  x 5

## 2023-07-17 ENCOUNTER — HOSPITAL ENCOUNTER (OUTPATIENT)
Dept: PHYSICAL THERAPY | Age: 48
Setting detail: THERAPIES SERIES
Discharge: HOME | End: 2023-07-17
Payer: OTHER MISCELLANEOUS

## 2023-07-17 PROCEDURE — 97140 MANUAL THERAPY 1/> REGIONS: CPT

## 2023-07-17 PROCEDURE — 97110 THERAPEUTIC EXERCISES: CPT

## 2023-07-17 ASSESSMENT — PAIN DESCRIPTION - LOCATION: LOCATION: NECK

## 2023-07-17 ASSESSMENT — PAIN SCALES - GENERAL: PAINLEVEL_OUTOF10: 4

## 2023-07-17 ASSESSMENT — PAIN DESCRIPTION - PAIN TYPE: TYPE: CHRONIC PAIN

## 2023-07-17 ASSESSMENT — PAIN DESCRIPTION - ORIENTATION: ORIENTATION: RIGHT

## 2023-07-17 NOTE — PROGRESS NOTES
Daily Treatment Note  Date: 2023  Patient Name: Traci Hogan  MRN: [id number]     :   1975    Referring Physician: PENELOPE Bose DO   PCP: Tila Huitron DO    Medical Diagnosis: Pain in thoracic spine [M54.6] Neck/upper back pain  Treatment Diagnosis: Neck/upper back pain      Insurance: Payor: [address] / Plan: [address] / Product Type: *No Product type* /   Insurance ID: [id number] - (Auto)    Subjective:   General  Diagnosis: Neck/upper back pain  Referring Provider (secondary): Carlos Alberto Wild DO  Onset Date: 23  PT Insurance Information: [employer] (Auto), [employer] (Primary), [place] Medicaid (Secondary)  Total # of Visits Approved: 10  Total # of Visits to Date: 6  Plan of Care/Certification Expiration Date: 23  Progress Note Due Date: 23  Referring Provider (secondary): Carlos Alberto Wild DO  Subjective: i've got a catch on the R side of my neck, woke up with it  Patient Currently in Pain: Yes  Pain Level: 4  Pain Type: Chronic pain  Pain Location: Neck  Pain Orientation: Right       Treatment Activities:  Exercises:      Treatment Reasoning    Exercise 1: Pulleys  x 2'  Exercise 2: CROM x 5 ea  Exercise 3: Bilat upper trap stretch 3 x 10\" ea  Exercise 4: Bilat levator stretch 3 x 10\" ea (small ROM d/t dizziness)  Exercise 5: Bilat scalene stretch 3 x 10\"  Exercise 6: Isometric cervicals 3\" x 5 ea  Exercise 7: Chin tucks x 10  Exercise 8: Slouch with overcorrection x 10  Exercise 9: Corner stretch  Exercise 10: Scap retraction with t-band- x10 (no band)  Exercise 11: Standing Is, Ts  Exercise 12: Rhomboid stretch with hands clasped in front 5\" x 5  Exercise 13: Supine on towel/short thoracic pad  Exercise 14: Supine (may need head elevated, knee bolster) cane flexion and ER  x 5 ea  Exercise 15: Supine cane benchpress/shoulder protraction  x 5 ea  Exercise 16: Suboccipital release  x 5'  Exercise 17: Supine vs sitting STM/IASTM to

## 2023-07-19 ENCOUNTER — HOSPITAL ENCOUNTER (OUTPATIENT)
Dept: PHYSICAL THERAPY | Age: 48
Setting detail: THERAPIES SERIES
Discharge: HOME | End: 2023-07-19
Payer: OTHER MISCELLANEOUS

## 2023-07-19 PROCEDURE — 97110 THERAPEUTIC EXERCISES: CPT

## 2023-07-19 PROCEDURE — 97140 MANUAL THERAPY 1/> REGIONS: CPT

## 2023-07-19 ASSESSMENT — PAIN DESCRIPTION - LOCATION: LOCATION: NECK

## 2023-07-19 ASSESSMENT — PAIN DESCRIPTION - DESCRIPTORS: DESCRIPTORS: ACHING;TIGHTNESS;STABBING

## 2023-07-19 ASSESSMENT — PAIN DESCRIPTION - PAIN TYPE: TYPE: CHRONIC PAIN

## 2023-07-19 ASSESSMENT — PAIN DESCRIPTION - ORIENTATION: ORIENTATION: RIGHT

## 2023-07-19 ASSESSMENT — PAIN SCALES - GENERAL: PAINLEVEL_OUTOF10: 4

## 2023-07-19 NOTE — PROGRESS NOTES
Physical Therapy  Daily Treatment Note  Date: 2023  Patient Name: Buzz Byrd  MRN: [id number]     :   1975    Subjective:      PT Visit Information  Onset Date: 23  PT Insurance Information: [employer] (Auto), [place] Medicare (Primary), [place] Medicaid (Secondary)  Total # of Visits Approved: 10  Total # of Visits to Date: 7  Plan of Care/Certification Expiration Date: 23  Progress Note Due Date: 23  Referring Provider (secondary): Eliseo Jaimes DO  Subjective: Patient reports that she continues to have a catch in the R side of her neck this morning that goes down into her back some. Pain Screening  Patient Currently in Pain: Yes  Pain Assessment: 0-10  Pain Level: 4  Pain Type: Chronic pain  Pain Location: Neck  Pain Orientation: Right  Pain Descriptors: Aching;Tightness; Stabbing       Treatment Activities:   Exercises  Exercise 1: Pulleys  x 3'  Exercise 2: CROM x 5 ea  Exercise 3: Bilat upper trap stretch 3 x 10\" ea  Exercise 4: Bilat levator stretch 3 x 10\" ea (small ROM d/t dizziness)  Exercise 5: Bilat scalene stretch 3 x 10\"  Exercise 6: Isometric cervicals 5\" x 5 ea  Exercise 7: Chin tucks x 10  Exercise 8: Slouch with overcorrection x 10  Exercise 9: Corner stretch  Exercise 10: Scap retraction with t-band- x15 (no band)  Exercise 11: Standing Is, Ts  Exercise 12: Rhomboid stretch with hands clasped in front 5\" x 5  Exercise 13: Supine on towel/short thoracic pad  Exercise 14: Supine (may need head elevated, knee bolster) cane flexion and ER  x 10 ea  Exercise 15: Supine cane benchpress/shoulder protraction  x 10 ea  Exercise 16: Suboccipital release  x 5'  Exercise 17: Supine vs sitting STM/IASTM to neck/upper back (sitting x 10')  Exercise 18: NO IFC  Exercise 19:  If needed, U/S to bilat upper traps/cervical paraspinals  Exercise 20: HEP ISSUED 2023     Assessment:   Conditions Requiring Skilled Therapeutic Intervention  Body Structures, Functions, Activity Limitations

## 2023-07-24 ENCOUNTER — HOSPITAL ENCOUNTER (OUTPATIENT)
Dept: PHYSICAL THERAPY | Age: 48
Setting detail: THERAPIES SERIES
Discharge: HOME | End: 2023-07-24
Payer: OTHER MISCELLANEOUS

## 2023-07-24 PROCEDURE — 97110 THERAPEUTIC EXERCISES: CPT

## 2023-07-24 PROCEDURE — 97140 MANUAL THERAPY 1/> REGIONS: CPT

## 2023-07-24 NOTE — PROGRESS NOTES
Patient/Caregiver education & training, Safety education & training, Home exercise program  Modalities: Heat/Cold, Ultrasound           Therapy Time  Individual Time In: 9753       Individual Time Out: 1150  Minutes: 45  Timed Code Treatment Minutes: 45 Minutes     Electronically signed by Virgilio Haas PT  on 7/24/2023 at 1:04 PM   POC NOTE

## 2023-07-26 ENCOUNTER — HOSPITAL ENCOUNTER (OUTPATIENT)
Dept: PHYSICAL THERAPY | Age: 48
Setting detail: THERAPIES SERIES
Discharge: HOME | End: 2023-07-26
Payer: OTHER MISCELLANEOUS

## 2023-07-26 LAB
INR PPP: 1.84 (ref 0.88–1.18)
PROTHROMBIN TIME: 20.7 SEC (ref 12–14.6)

## 2023-07-26 PROCEDURE — 97110 THERAPEUTIC EXERCISES: CPT

## 2023-07-26 ASSESSMENT — PAIN SCALES - GENERAL: PAINLEVEL_OUTOF10: 6

## 2023-07-26 ASSESSMENT — PAIN DESCRIPTION - PAIN TYPE: TYPE: CHRONIC PAIN

## 2023-07-26 ASSESSMENT — PAIN DESCRIPTION - LOCATION: LOCATION: NECK

## 2023-07-26 ASSESSMENT — PAIN DESCRIPTION - ORIENTATION: ORIENTATION: RIGHT;LEFT

## 2023-07-26 NOTE — PROGRESS NOTES
incident    Therapy Prognosis: Good       GOALS   Patient Goals : reduce neck pain  Short Term Goals Completed by 3-4 weeks Current Status Goal Status   Independent with HEP 7/26: \"I've been doing the stretches at home. \" Met   Improve cervical ROM to at least 35 degrees extension, 25 degrees L SB, 100% rotation bilat 7/26: See strength section In progress                                                                       Long Term Goals Completed by 4-6 weeks Current Status Goal Status   Report decreased frequency/intensity of headaches 7/26: \"They're not as frequent as they were. They're a whole lot better. There's still tension in my shoulders and upper back. \" In progress   Return to gym 7/26:  Has returned to gym and slowly working back up to it.  Partially met   Improve NPDI score to 30% impairment or better UPDATED: Improve NPDI score to 20% impairment 7/26: 30% impairment Met, Updated                                                                TREATMENT PLAN   Plan Frequency: 2x  Plan weeks: 6-8 weeks  Current Treatment Recommendations: Strengthening, ROM, Manual, Pain management, Positioning, Modalities, Equipment evaluation, education, & procurement, Patient/Caregiver education & training, Safety education & training, Home exercise program  Modalities: Heat/Cold, Ultrasound   Requires PT Follow-Up: Yes     Therapy Time  Individual Time In: 1117       Individual Time Out: 1150  Minutes: 33  Timed Code Treatment Minutes: 33 Minutes     Electronically signed by Ruben Watson PT  on 7/26/2023 at 12:11 PM

## 2023-07-31 ENCOUNTER — HOSPITAL ENCOUNTER (OUTPATIENT)
Dept: PHYSICAL THERAPY | Age: 48
Setting detail: THERAPIES SERIES
Discharge: HOME | End: 2023-07-31
Payer: OTHER MISCELLANEOUS

## 2023-07-31 PROCEDURE — 97140 MANUAL THERAPY 1/> REGIONS: CPT

## 2023-07-31 PROCEDURE — 97110 THERAPEUTIC EXERCISES: CPT

## 2023-07-31 ASSESSMENT — PAIN DESCRIPTION - ORIENTATION: ORIENTATION: RIGHT;LEFT

## 2023-07-31 ASSESSMENT — PAIN DESCRIPTION - PAIN TYPE: TYPE: CHRONIC PAIN

## 2023-07-31 ASSESSMENT — PAIN DESCRIPTION - DESCRIPTORS: DESCRIPTORS: TIGHTNESS

## 2023-07-31 ASSESSMENT — PAIN DESCRIPTION - LOCATION: LOCATION: NECK

## 2023-07-31 NOTE — PROGRESS NOTES
Physical Therapy  Daily Treatment Note  Date: 2023  Patient Name: Traci Hogan  MRN: [id number]     :   1975    Subjective:      PT Visit Information  Onset Date: 23  PT Insurance Information: [employer] (Auto), Norma Lim Medicare (Primary), [place] Medicaid (Secondary)  Total # of Visits Approved: 14  Total # of Visits to Date: 10  Plan of Care/Certification Expiration Date: 23  Progress Note Due Date: 23  Referring Provider (secondary): Carlos Alberto Wild DO  Subjective: Patient reports that she is not in a whole lot of pain this morning but just has the continued stiffness in her neck with the R > L. Pain Screening  Patient Currently in Pain: Denies  Pain Type: Chronic pain  Pain Location: Neck  Pain Orientation: Right;Left  Pain Descriptors: Tightness       Treatment Activities:   Exercises  Exercise 1: Pulleys  x 3'  Exercise 2: CROM x 5 ea  Exercise 3: Bilat upper trap stretch 3 x 10\" ea  Exercise 4: Bilat levator stretch 3 x 10\" ea  Exercise 5: Bilat scalene stretch 3 x 10\"  Exercise 6: Isometric cervicals 5\" x 5 ea  Exercise 7: Chin tucks x 10  Exercise 8: Slouch with overcorrection x 10  Exercise 9: Corner stretch; attempt with increased pain in shoulders  Exercise 10: Scap retraction with yellow t-band x 10  Exercise 11: Standing Is, Ts yellow band x 10  Exercise 12: Rhomboid stretch with hands clasped in front 5\" x 5  Exercise 13: Supine on towel/short thoracic pad  Exercise 14: Supine (may need head elevated, knee bolster) cane flexion and ER  x 15 ea  Exercise 15: Supine cane benchpress/shoulder protraction  x 15  Exercise 16: Suboccipital release  x 2'  Exercise 17: Supine vs sitting STM/IASTM to neck/upper back (sitting x 5 min with IASTM', supine stm x 10 min)- sitting STM only   Exercise 18: NO IFC  Exercise 19:  If needed, U/S to bilat upper traps/cervical paraspinals  Exercise 20: HEP ISSUED 2023     Assessment:   Conditions Requiring Skilled Therapeutic Intervention  Body

## 2023-08-04 ENCOUNTER — HOSPITAL ENCOUNTER (OUTPATIENT)
Dept: PHYSICAL THERAPY | Age: 48
Setting detail: THERAPIES SERIES
Discharge: HOME | End: 2023-08-04
Payer: OTHER MISCELLANEOUS

## 2023-08-04 LAB
INR PPP: 3.29 (ref 0.88–1.18)
PROTHROMBIN TIME: 32.7 SEC (ref 12–14.6)

## 2023-08-04 PROCEDURE — 97140 MANUAL THERAPY 1/> REGIONS: CPT

## 2023-08-04 PROCEDURE — 97110 THERAPEUTIC EXERCISES: CPT

## 2023-08-04 ASSESSMENT — PAIN DESCRIPTION - ORIENTATION: ORIENTATION: RIGHT

## 2023-08-04 ASSESSMENT — PAIN DESCRIPTION - LOCATION: LOCATION: NECK

## 2023-08-04 ASSESSMENT — PAIN SCALES - GENERAL: PAINLEVEL_OUTOF10: 4

## 2023-08-04 ASSESSMENT — PAIN DESCRIPTION - PAIN TYPE: TYPE: CHRONIC PAIN

## 2023-08-04 NOTE — PROGRESS NOTES
Daily Treatment Note  Date: 2023  Patient Name: Vilma Fontanez  MRN: [id number]     :   1975    Referring Physician: PENELOPE Torres DO   PCP: Davonte Jiménez DO    Medical Diagnosis: Pain in thoracic spine [M54.6] Neck/upper back pain  Treatment Diagnosis: Neck/upper back pain      Insurance: Payor: [address] / Plan: [address] / Product Type: *No Product type* /   Insurance ID: [id number] - (Auto)    Subjective:   General  Diagnosis: Neck/upper back pain  Referring Provider (secondary): Jackson Castellanos DO  Onset Date: 23  PT Insurance Information: [employer] (Auto), [employer] Medicare (Primary), [place] Medicaid (Secondary)  Total # of Visits Approved: 14  Total # of Visits to Date: 11  Plan of Care/Certification Expiration Date: 23  Progress Note Due Date: 23  Referring Provider (secondary): Jackson Castellanos DO  Subjective: i've god a little pull on the R side of my neck  Patient Currently in Pain: Yes  Pain Level: 4  Pain Type: Chronic pain  Pain Location: Neck  Pain Orientation: Right       Treatment Activities:  Exercises:      Treatment Reasoning    Exercise 1: Pulleys  x 3'  Exercise 2: CROM x 5 ea  Exercise 3: Bilat upper trap stretch 3 x 10\" ea  Exercise 4: Bilat levator stretch 3 x 10\" ea  Exercise 5: Bilat scalene stretch 3 x 10\"  Exercise 6: Isometric cervicals 5\" x 5 ea  Exercise 7: Chin tucks x 10  Exercise 8: Slouch with overcorrection x 10  Exercise 9: Corner stretch; attempt with increased pain in shoulders  Exercise 10: Scap retraction with yellow t-band x 10  Exercise 11: Standing Is, Ts yellow band x 10  Exercise 12: Rhomboid stretch with hands clasped in front 5\" x 5  Exercise 13: Supine on towel/short thoracic pad  Exercise 14: Supine (may need head elevated, knee bolster) cane flexion and ER  x 15 ea  Exercise 15: Supine cane benchpress/shoulder protraction  x 15  Exercise 16: Suboccipital release  x 2'  8/4: 4'  Exercise 17: Supine vs

## 2023-08-07 ENCOUNTER — HOSPITAL ENCOUNTER (OUTPATIENT)
Dept: PHYSICAL THERAPY | Age: 48
Setting detail: THERAPIES SERIES
Discharge: HOME | End: 2023-08-07
Payer: OTHER MISCELLANEOUS

## 2023-08-07 PROCEDURE — 97110 THERAPEUTIC EXERCISES: CPT

## 2023-08-07 PROCEDURE — 97140 MANUAL THERAPY 1/> REGIONS: CPT

## 2023-08-07 ASSESSMENT — PAIN DESCRIPTION - ORIENTATION: ORIENTATION: RIGHT

## 2023-08-07 ASSESSMENT — PAIN DESCRIPTION - LOCATION: LOCATION: NECK

## 2023-08-07 ASSESSMENT — PAIN SCALES - GENERAL: PAINLEVEL_OUTOF10: 3

## 2023-08-07 NOTE — PROGRESS NOTES
benchpress/shoulder protraction  x 10  Exercise 16: Suboccipital release  x 2'  8/7, not today  Exercise 17: Supine vs sitting STM/IASTM to neck/upper back (sitting x 5 min with IASTM', supine stm x 10 min)- sitting STM only 8/7  Exercise 18: NO IFC  Exercise 19: If needed, U/S to bilat upper traps/cervical paraspinals  not today  Exercise 20: HEP ISSUED 7/12/2023    Limitations addressed: Pain modulation, Posture, Flexibility                     Pt Education:         ASSESSMENT     Assessment: Assessment: Patient relates continued upper shoulder pain. She has intermittent RUE pain with use of RUE. She performs ther ex as per flow sheet. Body Structures, Functions, Activity Limitations Requiring Skilled Therapeutic Intervention: Decreased ROM, Decreased tolerance to work activity, Decreased strength, Decreased high-level IADLs, Decreased posture, Increased pain    Post-Treatment Pain Level: Activity Tolerance: Patient tolerated evaluation without incident    Therapy Prognosis: Good       GOALS   Patient Goals : reduce neck pain  Short Term Goals Completed by 3-4 weeks Current Status Goal Status   Independent with HEP 7/26: \"I've been doing the stretches at home. \" Met   Improve cervical ROM to at least 35 degrees extension, 25 degrees L SB, 100% rotation bilat 7/26: See strength section In progress                                                                       Long Term Goals Completed by 4-6 weeks Current Status Goal Status   Report decreased frequency/intensity of headaches 7/26: \"They're not as frequent as they were. They're a whole lot better. There's still tension in my shoulders and upper back. \" In progress   Return to gym 7/26:  Has returned to gym and slowly working back up to it.  Partially met   Improve NPDI score to 30% impairment or better UPDATED: Improve NPDI score to 20% impairment 7/26: 30% impairment Met, Updated                                                                TREATMENT

## 2023-08-09 ENCOUNTER — HOSPITAL ENCOUNTER (OUTPATIENT)
Dept: PHYSICAL THERAPY | Age: 48
Setting detail: THERAPIES SERIES
Discharge: HOME | End: 2023-08-09
Payer: OTHER MISCELLANEOUS

## 2023-08-09 PROCEDURE — 97140 MANUAL THERAPY 1/> REGIONS: CPT

## 2023-08-09 PROCEDURE — 97110 THERAPEUTIC EXERCISES: CPT

## 2023-08-09 NOTE — PROGRESS NOTES
Physical Therapy: Daily Note   Patient: Yanely Krishnamurthy (46 y.o. female)   Examination Date:   Plan of Care/Certification Expiration Date: 23    No data recorded   :  1975 # of Visits since [facility]:   13   MRN: [id number]  CSN: [id number] Start of Care Date:   2023   Insurance: Payor: [address] / Plan: [address] / Product Type: *No Product type* /   Insurance ID: [id number] - (Auto) Secondary Insurance (if applicable): [employer]   Referring Physician: PENELOPE Pike DO   PCP: Erika Campos DO Visits to Date/Visits Approved: 15 / 14    No Show/Cancelled Appts:   /       Medical Diagnosis: Pain in thoracic spine [M54.6] Neck/upper back pain  Treatment Diagnosis: Neck/upper back pain        SUBJECTIVE EXAMINATION       Patient Comments: Subjective: Pateint staets she has about 3/10 pain today.           OBJECTIVE EXAMINATION   Restrictions:  Restrictions/Precautions: Seizure   No data recorded No data recorded        TREATMENT     Exercises:      Treatment Reasoning    Exercise 1: Pulleys  x 3'  Exercise 2: CROM x 5 ea  Exercise 3: Bilat upper trap stretch 3 x 10\" ea  Exercise 4: Bilat levator stretch 3 x 10\" ea  Exercise 5: Bilat scalene stretch 3 x 10\"  Exercise 6: Isometric cervicals 5\" x 5 ea  Exercise 7: Chin tucks x 10  Exercise 8: Slouch with overcorrection x 10  Exercise 9: Corner stretch; attempt with increased pain in shoulders, 5 sec hold, 5 reps  Exercise 10: Scap retraction with red t-band x 10  Exercise 11: Standing Is, Ts red band x 10  Exercise 12: Rhomboid stretch with hands clasped in front 5\" x 5  Exercise 13: Supine on towel/short thoracic pad x 1 min with pad  Exercise 14: Supine (may need head elevated, knee bolster) cane flexion and ER  x 15 ea  Exercise 15: Supine cane benchpress/shoulder protraction  x 10  Exercise 16: Suboccipital release  x 2' today  Exercise 17: Supine vs sitting STM/IASTM to neck/upper back (sitting x 5

## 2023-08-11 LAB
INR PPP: 7.94 (ref 0.88–1.18)
PROTHROMBIN TIME: 65.1 SEC (ref 12–14.6)

## 2023-08-14 ENCOUNTER — HOSPITAL ENCOUNTER (OUTPATIENT)
Dept: PHYSICAL THERAPY | Age: 48
Setting detail: THERAPIES SERIES
Discharge: HOME | End: 2023-08-14
Payer: OTHER MISCELLANEOUS

## 2023-08-14 PROCEDURE — 97110 THERAPEUTIC EXERCISES: CPT

## 2023-08-14 ASSESSMENT — PAIN DESCRIPTION - ORIENTATION: ORIENTATION: RIGHT

## 2023-08-14 ASSESSMENT — PAIN DESCRIPTION - PAIN TYPE: TYPE: CHRONIC PAIN

## 2023-08-14 ASSESSMENT — PAIN DESCRIPTION - LOCATION: LOCATION: HEAD;NECK

## 2023-08-14 ASSESSMENT — PAIN SCALES - GENERAL: PAINLEVEL_OUTOF10: 3

## 2023-08-14 NOTE — PROGRESS NOTES
Daily Treatment Note  Date: 2023  Patient Name: Chai Macdonald  MRN: [id number]     :   1975    Referring Physician: PENELOPE Garcia DO   PCP: Angela Gibbs DO    Medical Diagnosis: Pain in thoracic spine [M54.6] Neck/upper back pain  Treatment Diagnosis: Neck/upper back pain      Insurance: Payor: [address] / Plan: [address] / Product Type: *No Product type* /   Insurance ID: [id number] - (Auto)    Subjective:   General  Diagnosis: Neck/upper back pain  Referring Provider (secondary): Louis Lynn DO  Onset Date: 23  PT Insurance Information: [employer] (Auto), [employer] Medicare (Primary), [place] Medicaid (Secondary)  Total # of Visits Approved: 14  Total # of Visits to Date: 15  Plan of Care/Certification Expiration Date: 23  Progress Note Due Date: 23  Referring Provider (secondary): Louis Lynn DO  Subjective: i have a slight headache but they aren't as bad or frequent as they were and a little tension on the R side of my neck  Pain Level: 3  Pain Type: Chronic pain  Pain Location: Head, Neck  Pain Orientation: Right       Treatment Activities:  Exercises:      Treatment Reasoning    Exercise 1: Pulleys  x 3'  Exercise 2: CROM x 5 ea  Exercise 3: Bilat upper trap stretch 3 x 10\" ea  Exercise 4: Bilat levator stretch 3 x 10\" ea  Exercise 5: Bilat scalene stretch 3 x 10\"  Exercise 6: Isometric cervicals 5\" x 5 ea  Exercise 7: Chin tucks x 10  Exercise 8: Slouch with overcorrection x 10  Exercise 9: Corner stretch; attempt with increased pain in shoulders, 5 sec hold, 5 reps  Exercise 10: Scap retraction with red t-band x 10  Exercise 11: Standing Is, Ts red band x 10  Exercise 12: Rhomboid stretch with hands clasped in front 5\" x 5  Exercise 13: Supine on towel/short thoracic pad x 1 min with pad--not today  Exercise 14: Supine (may need head elevated, knee bolster) cane flexion and ER  x 15 ea  Exercise 15: Supine cane benchpress/shoulder

## 2023-08-16 ENCOUNTER — APPOINTMENT (OUTPATIENT)
Dept: PHYSICAL THERAPY | Age: 48
End: 2023-08-16
Payer: OTHER MISCELLANEOUS

## 2023-08-16 LAB
INR PPP: 2.8 (ref 0.88–1.18)
PROTHROMBIN TIME: 28.9 SEC (ref 12–14.6)

## 2023-08-18 LAB
INR PPP: 2.91 (ref 0.88–1.18)
PROTHROMBIN TIME: 29.7 SEC (ref 12–14.6)

## 2023-08-21 ENCOUNTER — HOSPITAL ENCOUNTER (OUTPATIENT)
Dept: PHYSICAL THERAPY | Age: 48
Setting detail: THERAPIES SERIES
Discharge: HOME | End: 2023-08-21
Payer: OTHER MISCELLANEOUS

## 2023-08-21 LAB
INR PPP: 3.42 (ref 0.88–1.18)
PROTHROMBIN TIME: 33.7 SEC (ref 12–14.6)

## 2023-08-21 PROCEDURE — 97110 THERAPEUTIC EXERCISES: CPT

## 2023-08-21 ASSESSMENT — PAIN DESCRIPTION - DESCRIPTORS: DESCRIPTORS: TIGHTNESS

## 2023-08-21 ASSESSMENT — PAIN SCALES - GENERAL: PAINLEVEL_OUTOF10: 3

## 2023-08-21 ASSESSMENT — PAIN DESCRIPTION - PAIN TYPE: TYPE: CHRONIC PAIN

## 2023-08-21 ASSESSMENT — PAIN DESCRIPTION - LOCATION: LOCATION: NECK

## 2023-08-21 ASSESSMENT — PAIN DESCRIPTION - ORIENTATION: ORIENTATION: RIGHT

## 2023-08-21 NOTE — PROGRESS NOTES
not today  Exercise 20: HEP ISSUED 7/12/2023        Assessment:   Conditions Requiring Skilled Therapeutic Intervention  Body Structures, Functions, Activity Limitations Requiring Skilled Therapeutic Intervention: Decreased ROM; Decreased tolerance to work activity; Decreased strength;Decreased high-level IADLs;Decreased posture; Increased pain  Assessment: Patient doing well with exericses and performing with good hold times. Extension seems to continue to be most painful. She states that sub occ release felt good today and she reported a pain of 1/10 post session  Treatment Diagnosis: Neck/upper back pain  Therapy Prognosis: Good        Goals:Short Term Goals  Time Frame for Short Term Goals: 3-4 weeks  Short Term Goal 1: Independent with HEP  STG 1 Current Status[de-identified] 7/26: \"I've been doing the stretches at home. \"  STG Goal 1 Status[de-identified] Met  Short Term Goal 2: Improve cervical ROM to at least 35 degrees extension, 25 degrees L SB, 100% rotation bilat  STG 2 Current Status[de-identified] 7/26: See strength section   8/14: extension  30 deg  , L SB 32 deg, Rotation R 60%,  L 75%  STG Goal 2 Status[de-identified] In progress  Long Term Goals  Time Frame for Long Term Goals : 4-6 weeks  Long Term Goal 1: Report decreased frequency/intensity of headaches  LTG 1 Current Status[de-identified] 7/26: \"They're not as frequent as they were. They're a whole lot better. There's still tension in my shoulders and upper back. \"    8/14: same as 7/26  LTG Goal 1 Status[de-identified] In progress  Long Term Goal 2: Return to gym  LTG 2 Current Status[de-identified] 7/26:  Has returned to gym and slowly working back up to it.     8/14: back at gym but not performing her full routine like before her accident  LTG Goal 2 Status[de-identified] Partially met  Long Term Goal 3: Improve NPDI score to 30% impairment or better UPDATED: Improve NPDI score to 20% impairment  LTG 3 Current Status[de-identified] 7/26: 30% impairment    8/14: 32% impairment  LTG Goal 3 Status[de-identified] Not Met  Patient Goals   Patient Goals : reduce neck

## 2023-08-23 ENCOUNTER — HOSPITAL ENCOUNTER (OUTPATIENT)
Dept: PHYSICAL THERAPY | Age: 48
Setting detail: THERAPIES SERIES
Discharge: HOME | End: 2023-08-23
Payer: OTHER MISCELLANEOUS

## 2023-08-23 PROCEDURE — 97110 THERAPEUTIC EXERCISES: CPT

## 2023-08-23 ASSESSMENT — PAIN DESCRIPTION - LOCATION: LOCATION: NECK

## 2023-08-23 ASSESSMENT — PAIN DESCRIPTION - PAIN TYPE: TYPE: CHRONIC PAIN

## 2023-08-23 ASSESSMENT — PAIN DESCRIPTION - ORIENTATION: ORIENTATION: RIGHT;LEFT

## 2023-08-23 ASSESSMENT — PAIN DESCRIPTION - DESCRIPTORS: DESCRIPTORS: TIGHTNESS;ACHING;SORE

## 2023-08-23 ASSESSMENT — PAIN SCALES - GENERAL: PAINLEVEL_OUTOF10: 4

## 2023-08-23 NOTE — PROGRESS NOTES
Physical Therapy: Daily Note/Reassessment   Patient: Rishabh Vides (46 y.o. female)   Examination Date: 5834  Plan of Care/Certification Expiration Date: 09/15/23    No data recorded   :  1975 # of Visits since [facility]:   16   MRN: [id number]  CSN: [id number] Start of Care Date:   2023   Insurance: Payor: [address] / Plan: [address] / Product Type: *No Product type* /   Insurance ID: [id number] - (Auto) Secondary Insurance (if applicable): [employer]   Referring Physician: PENELOPE Slade DO   PCP: Fatou Quinones DO Visits to Date/Visits Approved:     No Show/Cancelled Appts:   /       Medical Diagnosis: Pain in thoracic spine [M54.6]    Treatment Diagnosis: Neck/upper back pain        SUBJECTIVE EXAMINATION   Pain Level: Pain Screening  Patient Currently in Pain: Yes  Pain Assessment: 0-10  Pain Level: 4  Pain Type: Chronic pain  Pain Location: Neck  Pain Orientation: Right, Left  Pain Descriptors: Tightness, Aching, Sore    Patient Comments: Subjective: She continues with headaches, though not always as intense as previously. Still c/o significant increase of muscle tension in shoulders. Has f/u with her PCP on  and f/u with \"stroke clinic\" on .     HEP Compliance: Good        OBJECTIVE EXAMINATION   Restrictions:  Restrictions/Precautions: Seizure   No data recorded No data recorded      TREATMENT     Exercises:      Treatment Reasoning    Exercise 1: Pulleys  x 3'- not today  Exercise 2: CROM x 10 ea  Exercise 3: Bilat upper trap stretch 4 x 10\" ea  Exercise 4: Bilat levator stretch 4 x 10\" ea  Exercise 5: Bilat scalene stretch 4 x 10\"  Exercise 6: Isometric cervicals 5\" x 5 ea  Exercise 7: Chin tucks x 10  Exercise 8: Slouch with overcorrection x 10  Exercise 9: Corner stretch; attempt with increased pain in shoulders, 5 sec hold, 5 reps--not today  Exercise 10: Scap retraction with gree t-band x 10  Exercise 11: Standing Is, Ts green

## 2023-08-24 ENCOUNTER — TELEPHONE (OUTPATIENT)
Dept: GASTROENTEROLOGY | Age: 48
End: 2023-08-24

## 2023-08-24 RX ORDER — PANTOPRAZOLE SODIUM 40 MG/1
40 TABLET, DELAYED RELEASE ORAL DAILY
Qty: 30 TABLET | Refills: 1 | Status: SHIPPED | OUTPATIENT
Start: 2023-08-24

## 2023-08-24 NOTE — TELEPHONE ENCOUNTER
08- Recevied Med request from [facility] for patients protonix       Called the patient to see if she was aware that they were requesting refills.      She said yes they are going to be filling her Protonix       Routed to [address] [place] [address] new Protonix rx sent to [facility]

## 2023-08-25 LAB
INR PPP: 4.79 (ref 0.88–1.18)
PROTHROMBIN TIME: 43.9 SEC (ref 12–14.6)

## 2023-08-29 ENCOUNTER — OFFICE VISIT (OUTPATIENT)
Dept: GASTROENTEROLOGY | Age: 48
End: 2023-08-29
Payer: MEDICARE

## 2023-08-29 VITALS
HEART RATE: 122 BPM | HEIGHT: 64 IN | OXYGEN SATURATION: 98 % | WEIGHT: 293 LBS | SYSTOLIC BLOOD PRESSURE: 117 MMHG | BODY MASS INDEX: 50.02 KG/M2 | DIASTOLIC BLOOD PRESSURE: 80 MMHG

## 2023-08-29 DIAGNOSIS — K58.1 IRRITABLE BOWEL SYNDROME WITH CONSTIPATION: ICD-10-CM

## 2023-08-29 DIAGNOSIS — K21.9 CHRONIC GERD: Primary | ICD-10-CM

## 2023-08-29 PROCEDURE — 3074F SYST BP LT 130 MM HG: CPT | Performed by: NURSE PRACTITIONER

## 2023-08-29 PROCEDURE — 3079F DIAST BP 80-89 MM HG: CPT | Performed by: NURSE PRACTITIONER

## 2023-08-29 PROCEDURE — 99214 OFFICE O/P EST MOD 30 MIN: CPT | Performed by: NURSE PRACTITIONER

## 2023-08-29 RX ORDER — ATORVASTATIN CALCIUM 80 MG/1
80 TABLET, FILM COATED ORAL
COMMUNITY
Start: 2023-06-14

## 2023-08-29 RX ORDER — PANTOPRAZOLE SODIUM 40 MG/1
40 TABLET, DELAYED RELEASE ORAL DAILY
Qty: 90 TABLET | Refills: 3 | Status: SHIPPED | OUTPATIENT
Start: 2023-08-29

## 2023-08-29 RX ORDER — ACYCLOVIR 400 MG/1
TABLET ORAL
COMMUNITY
Start: 2023-05-30

## 2023-08-29 RX ORDER — ACYCLOVIR 400 MG/1
TABLET ORAL
COMMUNITY
Start: 2023-08-10

## 2023-08-29 ASSESSMENT — ENCOUNTER SYMPTOMS
CONSTIPATION: 1
CHOKING: 0
NAUSEA: 0
BLOOD IN STOOL: 0
SHORTNESS OF BREATH: 0
ABDOMINAL PAIN: 0
ABDOMINAL DISTENTION: 0
TROUBLE SWALLOWING: 0
ANAL BLEEDING: 0
RECTAL PAIN: 0
COUGH: 0
VOMITING: 0
DIARRHEA: 0

## 2023-08-29 NOTE — PATIENT INSTRUCTIONS
Patient Education        Gastroesophageal Reflux Disease (GERD): Care Instructions  Overview     Gastroesophageal reflux disease (GERD) is the backward flow of stomach acid into the esophagus. The esophagus is the tube that leads from your throat to your stomach. A one-way valve prevents the stomach acid from backing up into this tube. But when you have GERD, this valve does not close tightly enough. This can also cause pain and swelling in your esophagus. (This is called esophagitis.)  If you have mild GERD symptoms including heartburn, you may be able to control the problem with antacids or over-the-counter medicine. You can also make lifestyle changes to help reduce your symptoms. These include changing your diet and eating habits, such as not eating late at night and losing weight. Follow-up care is a key part of your treatment and safety. Be sure to make and go to all appointments, and call your doctor if you are having problems. It's also a good idea to know your test results and keep a list of the medicines you take. How can you care for yourself at home? Take your medicines exactly as prescribed. Call your doctor if you think you are having a problem with your medicine. Your doctor may recommend over-the-counter medicine. For mild or occasional indigestion, antacids, such as Tums, Gaviscon, Mylanta, or Maalox, may help. Your doctor also may recommend over-the-counter acid reducers, such as famotidine (Pepcid AC), cimetidine (Tagamet HB), or omeprazole (Prilosec). Read and follow all instructions on the label. If you use these medicines often, talk with your doctor. Change your eating habits. It's best to eat several small meals instead of two or three large meals. After you eat, wait 2 to 3 hours before you lie down. Avoid foods that make your symptoms worse.  These may include chocolate, mint, alcohol, pepper, spicy foods, high-fat foods, or drinks with caffeine in them, such as tea, coffee, lois,

## 2023-08-29 NOTE — PROGRESS NOTES
Subjective:     Patient ID: Jairo Whyte is a 50 y.o. female  PCP: Marlena Sal DO  Referring Provider: No ref. provider found    HPI  Patient presents to the office today with the following complaints: Gastroesophageal Reflux      Pt seen today for follow up. Hx chronic GERD. Currently treated with Protonix 40 mg daily. Symptoms are controlled. She will use Carafate as well prn. She denies any further issues or concerns. Hx chronic constipation. She is using Linzess 72 mcg prn to \"clean out. \"  She has tried and failed Miralax in the past.          Last EGD 10/2022 - Dr Octavio Montesinos   Last Colonoscopy 9/2021 - sub prep, fair prep, 4 year recall   Denies any family hx colon cancer or colon polyps    Assessment:     1. Chronic GERD    2. Irritable bowel syndrome with constipation            Plan:   - Continue Protonix 40 mg daily, refills provided   - Recommend Linzess 72 mcg daily, refills provided   - Follow up in 1 year or sooner if needed   - Call with any questions or concerns       Orders  No orders of the defined types were placed in this encounter.     Medications  Orders Placed This Encounter   Medications    pantoprazole (PROTONIX) 40 MG tablet     Sig: Take 1 tablet by mouth daily     Dispense:  90 tablet     Refill:  3     Hold on file until needed    linaCLOtide (LINZESS) 72 MCG CAPS capsule     Sig: Take 1 capsule by mouth every morning (before breakfast)     Dispense:  90 capsule     Refill:  3         Patient History:     Past Medical History:   Diagnosis Date    CAD (coronary artery disease)     [facility] occlusion with cerebral infarction ([address])     CHF (congestive heart failure) (HCC)     Chronic kidney disease     Diabetes mellitus ([address])     Diabetic neuropathy ([address])     GERD (gastroesophageal reflux disease)     Hyperlipidemia     Hypertension     Neuromuscular disorder ([address])     Seizures ([address])     as an child       Past Surgical History:   Procedure Laterality Date    CARDIAC

## 2023-08-30 LAB
INR PPP: 2.33 (ref 0.88–1.18)
PROTHROMBIN TIME: 25 SEC (ref 12–14.6)

## 2023-08-31 ENCOUNTER — HOSPITAL ENCOUNTER (OUTPATIENT)
Dept: PHYSICAL THERAPY | Age: 48
Setting detail: THERAPIES SERIES
Discharge: HOME | End: 2023-08-31
Payer: OTHER MISCELLANEOUS

## 2023-08-31 PROCEDURE — 97140 MANUAL THERAPY 1/> REGIONS: CPT

## 2023-08-31 PROCEDURE — 97110 THERAPEUTIC EXERCISES: CPT

## 2023-08-31 NOTE — PROGRESS NOTES
Daily Treatment Note  Date: 2023  Patient Name: Jana Mayfield  MRN: [id number]     :   1975    Referring Physician: PENELOPE Hanna DO   PCP: Bharath Schaffer DO    Medical Diagnosis: Pain in thoracic spine [M54.6] Neck/upper back pain  Treatment Diagnosis: Neck/upper back pain      Insurance: Payor: [address] / Plan: [address] / Product Type: *No Product type* /   Insurance ID: [id number] - (Auto)    Subjective:   General  Diagnosis: Neck/upper back pain  Referring Provider (secondary): Mir Buckley DO  Onset Date: 23  PT Insurance Information: [employer] (Auto), [employer] Medicare (Primary), [place] Medicaid (Secondary)  Total # of Visits Approved: 19  Total # of Visits to Date: 16  Plan of Care/Certification Expiration Date: 09/15/23  Progress Note Due Date: 23  Referring Provider (secondary): Mir Buckley DO  Subjective: not a lot of pain today just tension and it's more on the L       Treatment Activities:  Exercises:      Treatment Reasoning    Exercise 1: Pulleys  x 3'  Exercise 2: CROM x 10 ea  Exercise 3: Bilat upper trap stretch 4 x 10\" ea  Exercise 4: Bilat levator stretch 4 x 10\" ea  Exercise 5: Bilat scalene stretch 4 x 10\"  Exercise 6: Isometric cervicals 5\" x 5 ea  Exercise 7: Chin tucks x 10  Exercise 8: Slouch with overcorrection x 10  Exercise 9: Corner stretch; attempt with increased pain in shoulders, 5 sec hold, 5 reps--not today  Exercise 10: Scap retraction with gree t-band x 10  Exercise 11: Standing Is, Ts green band x 10  Exercise 12: Rhomboid stretch with hands clasped in front 5\" x 5  Exercise 13: Supine on towel/short thoracic pad x 1 min with pad--not today  Exercise 14: Supine (may need head elevated, knee bolster) cane flexion and ER  x 15 ea  Exercise 15: Supine cane benchpress/shoulder protraction  x 15  Exercise 16: Suboccipital release  x 2'  Exercise 17: Supine vs sitting STM/IASTM to neck/upper back (supine stm x 10

## 2023-09-05 ENCOUNTER — HOSPITAL ENCOUNTER (OUTPATIENT)
Dept: PHYSICAL THERAPY | Age: 48
Setting detail: THERAPIES SERIES
Discharge: HOME | End: 2023-09-05
Payer: OTHER MISCELLANEOUS

## 2023-09-05 LAB
INR PPP: 1.97 (ref 0.88–1.18)
PROTHROMBIN TIME: 21.9 SEC (ref 12–14.6)

## 2023-09-05 PROCEDURE — 97110 THERAPEUTIC EXERCISES: CPT

## 2023-09-05 PROCEDURE — 97140 MANUAL THERAPY 1/> REGIONS: CPT

## 2023-09-05 RX ORDER — SUCRALFATE ORAL 1 G/10ML
SUSPENSION ORAL
Qty: 420 ML | Refills: 10 | OUTPATIENT
Start: 2023-09-05

## 2023-09-05 NOTE — PROGRESS NOTES
Physical Therapy  Daily Treatment Note  Date: 2023  Patient Name: Mara Ly  MRN: [id number]     :   1975    Subjective:      PT Visit Information  Onset Date: 23  PT Insurance Information: [employer] (Auto), [employer] Medicare (Primary), [place] Medicaid (Secondary)  Total # of Visits Approved: 19  Total # of Visits to Date:   Plan of Care/Certification Expiration Date: 09/15/23  Progress Note Due Date: 23  Referring Provider (secondary): Curt Izquierdo DO  Subjective: Patient reports that she is having more soreness    Treatment Activities:   Exercises  Exercise 1: Pulleys  x 3'  Exercise 2: CROM x 10 ea  Exercise 3: Bilat upper trap stretch 4 x 10\" ea  Exercise 4: Bilat levator stretch 4 x 10\" ea  Exercise 5: Bilat scalene stretch 4 x 10\"  Exercise 6: Isometric cervicals 5\" x 5 ea  Exercise 7: Chin tucks x 10  Exercise 8: Slouch with overcorrection x 10  Exercise 9: Corner stretch; attempt with increased pain in shoulders, 5 sec hold, 5 reps--not today  Exercise 10: Scap retraction with gree t-band x 15  Exercise 11: Standing Is x 15, Ts x 10 green band  Exercise 12: Rhomboid stretch with hands clasped in front 5\" x 5  Exercise 13: Supine on towel/short thoracic pad x 1 min with pad--not today  Exercise 14: Supine (may need head elevated, knee bolster) cane flexion and ER  x 15 ea  Exercise 15: Supine cane benchpress/shoulder protraction  x 15  Exercise 16: Suboccipital release  x 2'  Exercise 17: Supine vs sitting STM/IASTM to neck/upper back (supine stm x 10 min)  Exercise 18: NO IFC  Exercise 19: If needed, U/S to bilat upper traps/cervical paraspinals  not today  Exercise 20: HEP ISSUED 2023     Assessment:   Conditions Requiring Skilled Therapeutic Intervention  Body Structures, Functions, Activity Limitations Requiring Skilled Therapeutic Intervention: Decreased ROM; Decreased tolerance to work activity; Decreased strength;Decreased high-level IADLs;Decreased posture; Increased

## 2023-09-05 NOTE — TELEPHONE ENCOUNTER
Called pt notified that the rx was sent in on 08- with three refills.   Please ck with Pharmacy regarding medication

## 2023-09-07 ENCOUNTER — HOSPITAL ENCOUNTER (OUTPATIENT)
Dept: PHYSICAL THERAPY | Age: 48
Setting detail: THERAPIES SERIES
Discharge: HOME | End: 2023-09-07
Payer: OTHER MISCELLANEOUS

## 2023-09-07 PROCEDURE — 97110 THERAPEUTIC EXERCISES: CPT

## 2023-09-07 ASSESSMENT — PAIN DESCRIPTION - LOCATION: LOCATION: NECK

## 2023-09-07 ASSESSMENT — PAIN SCALES - GENERAL: PAINLEVEL_OUTOF10: 2

## 2023-09-07 NOTE — PROGRESS NOTES
Physical Therapy: Daily Note/Discharge Summary   Patient: Dillon Alexis (46 y.o. female)   Examination Date:   Plan of Care/Certification Expiration Date: 09/15/23    No data recorded   :  1975 # of Visits since [facility]:   19   MRN: [id number]  CSN: [id number] Start of Care Date:   2023   Insurance: Payor: [address] / Plan: [address] / Product Type: *No Product type* /   Insurance ID: [id number] - (Auto) Secondary Insurance (if applicable): Bentley Almendarez   Referring Physician: PENELOPE Murry DO   PCP: Hardy Godoy DO Visits to Date/Visits Approved:     No Show/Cancelled Appts:   /       Medical Diagnosis: Pain in thoracic spine [M54.6] Neck/upper back pain  Treatment Diagnosis: Neck/upper back pain        SUBJECTIVE EXAMINATION   Pain Level: Pain Screening  Patient Currently in Pain: Yes  Pain Assessment: 0-10  Pain Level: 2  Pain Location: Neck    Patient Comments: Subjective: She rates pain at 2/10 and describes it as mostly \"tension\".     HEP Compliance: Good        OBJECTIVE EXAMINATION   Restrictions:  Restrictions/Precautions: Seizure   No data recorded No data recorded      Cervical Assessment     AROM Cervical Spine   Cervical flexion: 25  Cervical extension: 35  Cervical right lateral: 32  Cervical left lateral: 40  Cervical right rotation: 40  Cervical left rotation: 50           Left Strength  Right Strength         Strength LUE  L Shoulder Flexion: 5/5  L Shoulder ABduction: 5/5  L Elbow Flexion: 5/5  L Elbow Extension: 5/5    Strength RUE  R Shoulder Flexion: 5/5  R Shoulder ABduction: 5/5  R Elbow Flexion: 5/5  R Elbow Extension: 5/5            TREATMENT     Exercises:  Therapeutic exercise (CPT 67187)   Treatment Reasoning    Exercise 1: Pulleys  x 3'  not today  Exercise 2: CROM x 10 ea  Exercise 3: Bilat upper trap stretch 4 x 10\" ea not today  Exercise 4: Bilat levator stretch 4 x 10\" ea not today  Exercise 5: Bilat scalene

## 2023-09-18 RX ORDER — SUCRALFATE ORAL 1 G/10ML
SUSPENSION ORAL
Qty: 420 ML | Refills: 10 | Status: SHIPPED | OUTPATIENT
Start: 2023-09-18

## 2023-09-25 LAB
INR PPP: 1.46 (ref 0.88–1.18)
PROTHROMBIN TIME: 17.3 SEC (ref 12–14.6)

## 2023-10-10 ENCOUNTER — HOSPITAL ENCOUNTER (EMERGENCY)
Age: 48
Discharge: HOME | End: 2023-10-10
Payer: MEDICARE

## 2023-10-10 VITALS
HEIGHT: 64 IN | BODY MASS INDEX: 50.02 KG/M2 | DIASTOLIC BLOOD PRESSURE: 71 MMHG | TEMPERATURE: 98.2 F | SYSTOLIC BLOOD PRESSURE: 115 MMHG | HEART RATE: 86 BPM | RESPIRATION RATE: 18 BRPM | WEIGHT: 293 LBS | OXYGEN SATURATION: 96 %

## 2023-10-10 DIAGNOSIS — M54.50 ACUTE RIGHT-SIDED LOW BACK PAIN WITHOUT SCIATICA: Primary | ICD-10-CM

## 2023-10-10 DIAGNOSIS — B37.2 YEAST INFECTION OF THE SKIN: ICD-10-CM

## 2023-10-10 LAB
ALBUMIN SERPL-MCNC: 3.9 G/DL (ref 3.5–5.2)
ALP SERPL-CCNC: 101 U/L (ref 35–104)
ALT SERPL-CCNC: 16 U/L (ref 5–33)
ANION GAP SERPL CALCULATED.3IONS-SCNC: 10 MMOL/L (ref 7–19)
AST SERPL-CCNC: 14 U/L (ref 5–32)
BASOPHILS # BLD: 0 K/UL (ref 0–0.2)
BASOPHILS NFR BLD: 0.8 % (ref 0–1)
BILIRUB SERPL-MCNC: <0.2 MG/DL (ref 0.2–1.2)
BILIRUB UR QL STRIP: NEGATIVE
BUN SERPL-MCNC: 30 MG/DL (ref 6–20)
CALCIUM SERPL-MCNC: 8.7 MG/DL (ref 8.6–10)
CHLORIDE SERPL-SCNC: 101 MMOL/L (ref 98–111)
CLARITY UR: CLEAR
CO2 SERPL-SCNC: 25 MMOL/L (ref 22–29)
COLOR UR: YELLOW
CREAT SERPL-MCNC: 1.7 MG/DL (ref 0.5–0.9)
CRP SERPL HS-MCNC: 0.7 MG/DL (ref 0–0.5)
EOSINOPHIL # BLD: 0.2 K/UL (ref 0–0.6)
EOSINOPHIL NFR BLD: 4.4 % (ref 0–5)
ERYTHROCYTE [DISTWIDTH] IN BLOOD BY AUTOMATED COUNT: 14.9 % (ref 11.5–14.5)
ERYTHROCYTE [SEDIMENTATION RATE] IN BLOOD BY WESTERGREN METHOD: 10 MM/HR (ref 0–20)
GLUCOSE SERPL-MCNC: 82 MG/DL (ref 74–109)
GLUCOSE UR STRIP.AUTO-MCNC: 500 MG/DL
HCT VFR BLD AUTO: 42.3 % (ref 37–47)
HGB BLD-MCNC: 14.2 G/DL (ref 12–16)
HGB UR STRIP.AUTO-MCNC: NEGATIVE MG/L
IMM GRANULOCYTES # BLD: 0 K/UL
KETONES UR STRIP.AUTO-MCNC: NEGATIVE MG/DL
LEUKOCYTE ESTERASE UR QL STRIP.AUTO: NEGATIVE
LYMPHOCYTES # BLD: 2 K/UL (ref 1.1–4.5)
LYMPHOCYTES NFR BLD: 42.1 % (ref 20–40)
MCH RBC QN AUTO: 30.5 PG (ref 27–31)
MCHC RBC AUTO-ENTMCNC: 33.6 G/DL (ref 33–37)
MCV RBC AUTO: 90.8 FL (ref 81–99)
MONOCYTES # BLD: 0.4 K/UL (ref 0–0.9)
MONOCYTES NFR BLD: 8.4 % (ref 0–10)
NEUTROPHILS # BLD: 2.1 K/UL (ref 1.5–7.5)
NEUTS SEG NFR BLD: 44.1 % (ref 50–65)
NITRITE UR QL STRIP.AUTO: NEGATIVE
PH UR STRIP.AUTO: 5.5 [PH] (ref 5–8)
PLATELET # BLD AUTO: 288 K/UL (ref 130–400)
PMV BLD AUTO: 11.3 FL (ref 9.4–12.3)
POTASSIUM SERPL-SCNC: 3.7 MMOL/L (ref 3.5–5)
PROT SERPL-MCNC: 7.1 G/DL (ref 6.6–8.7)
PROT UR STRIP.AUTO-MCNC: NEGATIVE MG/DL
RBC # BLD AUTO: 4.66 M/UL (ref 4.2–5.4)
SODIUM SERPL-SCNC: 136 MMOL/L (ref 136–145)
SP GR UR STRIP.AUTO: 1.02 (ref 1–1.03)
UROBILINOGEN UR STRIP.AUTO-MCNC: 1 E.U./DL
WBC # BLD AUTO: 4.8 K/UL (ref 4.8–10.8)

## 2023-10-10 PROCEDURE — 85025 COMPLETE CBC W/AUTO DIFF WBC: CPT

## 2023-10-10 PROCEDURE — 6370000000 HC RX 637 (ALT 250 FOR IP): Performed by: NURSE PRACTITIONER

## 2023-10-10 PROCEDURE — 96374 THER/PROPH/DIAG INJ IV PUSH: CPT

## 2023-10-10 PROCEDURE — 86140 C-REACTIVE PROTEIN: CPT

## 2023-10-10 PROCEDURE — 85652 RBC SED RATE AUTOMATED: CPT

## 2023-10-10 PROCEDURE — 99284 EMERGENCY DEPT VISIT MOD MDM: CPT

## 2023-10-10 PROCEDURE — 96375 TX/PRO/DX INJ NEW DRUG ADDON: CPT

## 2023-10-10 PROCEDURE — 96376 TX/PRO/DX INJ SAME DRUG ADON: CPT

## 2023-10-10 PROCEDURE — 36415 COLL VENOUS BLD VENIPUNCTURE: CPT

## 2023-10-10 PROCEDURE — 81003 URINALYSIS AUTO W/O SCOPE: CPT

## 2023-10-10 PROCEDURE — 2580000003 HC RX 258: Performed by: NURSE PRACTITIONER

## 2023-10-10 PROCEDURE — 80053 COMPREHEN METABOLIC PANEL: CPT

## 2023-10-10 PROCEDURE — 6360000002 HC RX W HCPCS: Performed by: NURSE PRACTITIONER

## 2023-10-10 RX ORDER — ORPHENADRINE CITRATE 30 MG/ML
60 INJECTION INTRAMUSCULAR; INTRAVENOUS ONCE
Status: COMPLETED | OUTPATIENT
Start: 2023-10-10 | End: 2023-10-10

## 2023-10-10 RX ORDER — 0.9 % SODIUM CHLORIDE 0.9 %
1000 INTRAVENOUS SOLUTION INTRAVENOUS ONCE
Status: COMPLETED | OUTPATIENT
Start: 2023-10-10 | End: 2023-10-10

## 2023-10-10 RX ORDER — ONDANSETRON 2 MG/ML
4 INJECTION INTRAMUSCULAR; INTRAVENOUS ONCE
Status: COMPLETED | OUTPATIENT
Start: 2023-10-10 | End: 2023-10-10

## 2023-10-10 RX ORDER — NYSTATIN AND TRIAMCINOLONE ACETONIDE 100000; 1 [USP'U]/G; MG/G
OINTMENT TOPICAL ONCE
Status: COMPLETED | OUTPATIENT
Start: 2023-10-10 | End: 2023-10-10

## 2023-10-10 RX ORDER — HYDROMORPHONE HYDROCHLORIDE 1 MG/ML
0.5 INJECTION, SOLUTION INTRAMUSCULAR; INTRAVENOUS; SUBCUTANEOUS ONCE
Status: COMPLETED | OUTPATIENT
Start: 2023-10-10 | End: 2023-10-10

## 2023-10-10 RX ADMIN — NYSTATIN AND TRIAMCINOLONE ACETONIDE: 100000; 1 OINTMENT TOPICAL at 20:01

## 2023-10-10 RX ADMIN — ONDANSETRON 4 MG: 2 INJECTION INTRAMUSCULAR; INTRAVENOUS at 19:52

## 2023-10-10 RX ADMIN — SODIUM CHLORIDE 1000 ML: 9 INJECTION, SOLUTION INTRAVENOUS at 19:52

## 2023-10-10 RX ADMIN — HYDROMORPHONE HYDROCHLORIDE 0.5 MG: 1 INJECTION, SOLUTION INTRAMUSCULAR; INTRAVENOUS; SUBCUTANEOUS at 19:52

## 2023-10-10 RX ADMIN — HYDROMORPHONE HYDROCHLORIDE 0.5 MG: 1 INJECTION, SOLUTION INTRAMUSCULAR; INTRAVENOUS; SUBCUTANEOUS at 21:29

## 2023-10-10 RX ADMIN — ORPHENADRINE CITRATE 60 MG: 60 INJECTION INTRAMUSCULAR; INTRAVENOUS at 21:29

## 2023-10-10 ASSESSMENT — PAIN DESCRIPTION - LOCATION
LOCATION: BACK
LOCATION: HEAD;BACK

## 2023-10-10 ASSESSMENT — PAIN SCALES - GENERAL
PAINLEVEL_OUTOF10: 6
PAINLEVEL_OUTOF10: 8

## 2023-10-11 ASSESSMENT — ENCOUNTER SYMPTOMS
SHORTNESS OF BREATH: 0
BACK PAIN: 1
DIARRHEA: 0
VOMITING: 0

## 2023-10-11 NOTE — ED NOTES
Panus folds cleaned with soap and water and dried well before applying mycolog cream     Kobi Peñaloza RN  10/10/23 2008

## 2023-10-11 NOTE — ED PROVIDER NOTES
[facility] EMERGENCY DEPT  eMERGENCY dEPARTMENT eNCOUnter      Pt Name: Willy Marquis  MRN: [id number]  [address] 1975  Date of evaluation: 10/10/2023  Provider: YARIEL Choudhury    CHIEF COMPLAINT       Chief Complaint   Patient presents with    Chills     Onset 1 week    Abscess     Access to LLQ abdomen for 3-4 days, malodorous per pt    Back Pain         HISTORY OF PRESENT ILLNESS   (Location/Symptom, Timing/Onset,Context/Setting, Quality, Duration, Modifying Factors, Severity)  Note limiting factors. Willy Marquis is a 50 y.o. female who presents to the emergency department just not feeling good. Has low back pain which is chronic. Feels cold. Has rash to lower abd. Obese. No fever. No chest pain or abd pain. No vomiting or diarrhea     The history is provided by the patient. Back Pain  Location:  Lumbar spine  Quality:  Aching  Radiates to:  R thigh  Pain severity:  Moderate  Onset quality:  Sudden  Duration:  3 weeks  Timing:  Constant  Progression:  Unchanged  Chronicity:  Chronic  Context: not falling, not physical stress and not recent injury    Associated symptoms: no chest pain and no fever        NursingNotes were reviewed. REVIEW OF SYSTEMS    (2-9 systems for level 4, 10 or more for level 5)     Review of Systems   Constitutional:  Negative for fever. Respiratory:  Negative for shortness of breath. Cardiovascular:  Negative for chest pain. Gastrointestinal:  Negative for diarrhea and vomiting. Musculoskeletal:  Positive for back pain. Skin:  Positive for rash. Except as noted above the remainder of the review of systems was reviewed and negative.        PAST MEDICAL HISTORY     Past Medical History:   Diagnosis Date    CAD (coronary artery disease)     [facility] occlusion with cerebral infarction (HCC)     CHF (congestive heart failure) (HCC)     Chronic kidney disease     Diabetes mellitus (HCC)     Diabetic neuropathy (HCC)     GERD (gastroesophageal reflux disease)

## 2024-01-09 ENCOUNTER — HOSPITAL ENCOUNTER (INPATIENT)
Age: 49
LOS: 1 days | Discharge: HOME | DRG: 287 | End: 2024-01-11
Attending: EMERGENCY MEDICINE | Admitting: STUDENT IN AN ORGANIZED HEALTH CARE EDUCATION/TRAINING PROGRAM
Payer: MEDICARE

## 2024-01-09 ENCOUNTER — APPOINTMENT (OUTPATIENT)
Dept: GENERAL RADIOLOGY | Age: 49
DRG: 287 | End: 2024-01-09
Payer: MEDICARE

## 2024-01-09 DIAGNOSIS — R07.9 CHEST PAIN, UNSPECIFIED TYPE: Primary | ICD-10-CM

## 2024-01-09 LAB
BASOPHILS # BLD: 0.1 K/UL (ref 0–0.2)
BASOPHILS NFR BLD: 0.8 % (ref 0–1)
EOSINOPHIL # BLD: 0.2 K/UL (ref 0–0.6)
EOSINOPHIL NFR BLD: 4 % (ref 0–5)
ERYTHROCYTE [DISTWIDTH] IN BLOOD BY AUTOMATED COUNT: 14.7 % (ref 11.5–14.5)
HCG SERPL QL: NEGATIVE
HCT VFR BLD AUTO: 49.2 % (ref 37–47)
HGB BLD-MCNC: 16.4 G/DL (ref 12–16)
IMM GRANULOCYTES # BLD: 0 K/UL
LYMPHOCYTES # BLD: 2 K/UL (ref 1.1–4.5)
LYMPHOCYTES NFR BLD: 33.4 % (ref 20–40)
MCH RBC QN AUTO: 30.7 PG (ref 27–31)
MCHC RBC AUTO-ENTMCNC: 33.3 G/DL (ref 33–37)
MCV RBC AUTO: 92.1 FL (ref 81–99)
MONOCYTES # BLD: 0.4 K/UL (ref 0–0.9)
MONOCYTES NFR BLD: 7.2 % (ref 0–10)
NEUTROPHILS # BLD: 3.2 K/UL (ref 1.5–7.5)
NEUTS SEG NFR BLD: 54.3 % (ref 50–65)
PLATELET # BLD AUTO: 335 K/UL (ref 130–400)
PMV BLD AUTO: 11.3 FL (ref 9.4–12.3)
RBC # BLD AUTO: 5.34 M/UL (ref 4.2–5.4)
WBC # BLD AUTO: 6 K/UL (ref 4.8–10.8)

## 2024-01-09 PROCEDURE — 99285 EMERGENCY DEPT VISIT HI MDM: CPT

## 2024-01-09 PROCEDURE — 71045 X-RAY EXAM CHEST 1 VIEW: CPT

## 2024-01-09 PROCEDURE — 93005 ELECTROCARDIOGRAM TRACING: CPT | Performed by: EMERGENCY MEDICINE

## 2024-01-09 ASSESSMENT — PAIN - FUNCTIONAL ASSESSMENT: PAIN_FUNCTIONAL_ASSESSMENT: 0-10

## 2024-01-09 ASSESSMENT — ENCOUNTER SYMPTOMS
ABDOMINAL PAIN: 0
SHORTNESS OF BREATH: 1
NAUSEA: 0
VOMITING: 0
DIARRHEA: 0
ABDOMINAL DISTENTION: 0

## 2024-01-09 ASSESSMENT — PAIN SCALES - GENERAL: PAINLEVEL_OUTOF10: 9

## 2024-01-10 ENCOUNTER — APPOINTMENT (OUTPATIENT)
Dept: NUCLEAR MEDICINE | Age: 49
DRG: 287 | End: 2024-01-10
Payer: MEDICARE

## 2024-01-10 LAB
ALBUMIN SERPL-MCNC: 3.9 G/DL (ref 3.5–5.2)
ALP SERPL-CCNC: 93 U/L (ref 35–104)
ALT SERPL-CCNC: 21 U/L (ref 5–33)
ANION GAP SERPL CALCULATED.3IONS-SCNC: 11 MMOL/L (ref 7–19)
ANION GAP SERPL CALCULATED.3IONS-SCNC: 15 MMOL/L (ref 7–19)
APTT PPP: 32.7 SEC (ref 26–36.2)
AST SERPL-CCNC: 20 U/L (ref 5–32)
BACTERIA URNS QL MICRO: ABNORMAL /HPF
BILIRUB SERPL-MCNC: <0.2 MG/DL (ref 0.2–1.2)
BILIRUB UR QL STRIP: NEGATIVE
BNP BLD-MCNC: 66 PG/ML (ref 0–124)
BUN SERPL-MCNC: 20 MG/DL (ref 6–20)
BUN SERPL-MCNC: 20 MG/DL (ref 6–20)
CALCIUM SERPL-MCNC: 9.3 MG/DL (ref 8.6–10)
CALCIUM SERPL-MCNC: 9.4 MG/DL (ref 8.6–10)
CHLORIDE SERPL-SCNC: 105 MMOL/L (ref 98–111)
CHLORIDE SERPL-SCNC: 105 MMOL/L (ref 98–111)
CHOLEST SERPL-MCNC: 207 MG/DL (ref 160–199)
CLARITY UR: CLEAR
CO2 SERPL-SCNC: 18 MMOL/L (ref 22–29)
CO2 SERPL-SCNC: 23 MMOL/L (ref 22–29)
COLOR UR: YELLOW
CREAT SERPL-MCNC: 1.4 MG/DL (ref 0.5–0.9)
CREAT SERPL-MCNC: 1.5 MG/DL (ref 0.5–0.9)
CRYSTALS URNS MICRO: ABNORMAL /HPF
D DIMER PPP FEU-MCNC: 0.43 UG/ML FEU (ref 0–0.48)
EKG P AXIS: 46 DEGREES
EKG P-R INTERVAL: 158 MS
EKG Q-T INTERVAL: 358 MS
EKG QRS DURATION: 80 MS
EKG QTC CALCULATION (BAZETT): 421 MS
EKG T AXIS: 63 DEGREES
EPI CELLS #/AREA URNS AUTO: 2 /HPF (ref 0–5)
GLUCOSE BLD-MCNC: 104 MG/DL (ref 70–99)
GLUCOSE BLD-MCNC: 78 MG/DL (ref 70–99)
GLUCOSE BLD-MCNC: 85 MG/DL (ref 70–99)
GLUCOSE BLD-MCNC: 85 MG/DL (ref 70–99)
GLUCOSE BLD-MCNC: 91 MG/DL (ref 70–99)
GLUCOSE BLD-MCNC: 93 MG/DL (ref 70–99)
GLUCOSE BLD-MCNC: 98 MG/DL (ref 70–99)
GLUCOSE SERPL-MCNC: 91 MG/DL (ref 74–109)
GLUCOSE SERPL-MCNC: 98 MG/DL (ref 74–109)
GLUCOSE UR STRIP.AUTO-MCNC: =>1000 MG/DL
GONADOTROPIN, CHORIONIC (HCG) QUANT: 0.1 MIU/ML (ref 0–5.3)
HBA1C MFR BLD: 6.1 % (ref 4–6)
HDLC SERPL-MCNC: 61 MG/DL (ref 65–121)
HGB UR STRIP.AUTO-MCNC: ABNORMAL MG/L
HYALINE CASTS #/AREA URNS AUTO: 1 /HPF (ref 0–8)
INR PPP: 1.13 (ref 0.88–1.18)
KETONES UR STRIP.AUTO-MCNC: NEGATIVE MG/DL
LDLC SERPL CALC-MCNC: 120 MG/DL
LEUKOCYTE ESTERASE UR QL STRIP.AUTO: NEGATIVE
MAGNESIUM SERPL-MCNC: 2.3 MG/DL (ref 1.6–2.6)
NITRITE UR QL STRIP.AUTO: NEGATIVE
PERFORMED ON: ABNORMAL
PERFORMED ON: NORMAL
PH UR STRIP.AUTO: 5.5 [PH] (ref 5–8)
PHOSPHATE SERPL-MCNC: 4.3 MG/DL (ref 2.5–4.5)
POTASSIUM SERPL-SCNC: 4.1 MMOL/L (ref 3.5–5)
POTASSIUM SERPL-SCNC: 4.6 MMOL/L (ref 3.5–5)
PROT SERPL-MCNC: 8 G/DL (ref 6.6–8.7)
PROT UR STRIP.AUTO-MCNC: NEGATIVE MG/DL
PROTHROMBIN TIME: 14.2 SEC (ref 12–14.6)
RBC #/AREA URNS AUTO: 2 /HPF (ref 0–4)
SODIUM SERPL-SCNC: 138 MMOL/L (ref 136–145)
SODIUM SERPL-SCNC: 139 MMOL/L (ref 136–145)
SP GR UR STRIP.AUTO: 1.02 (ref 1–1.03)
TRIGL SERPL-MCNC: 130 MG/DL (ref 0–149)
TROPONIN, HIGH SENSITIVITY: 22 NG/L (ref 0–14)
TROPONIN, HIGH SENSITIVITY: 22 NG/L (ref 0–14)
TROPONIN, HIGH SENSITIVITY: 26 NG/L (ref 0–14)
TSH SERPL DL<=0.005 MIU/L-ACNC: 2.46 UIU/ML (ref 0.35–5.5)
UROBILINOGEN UR STRIP.AUTO-MCNC: 1 E.U./DL
WBC #/AREA URNS AUTO: 2 /HPF (ref 0–5)

## 2024-01-10 PROCEDURE — 84443 ASSAY THYROID STIM HORMONE: CPT

## 2024-01-10 PROCEDURE — 94760 N-INVAS EAR/PLS OXIMETRY 1: CPT

## 2024-01-10 PROCEDURE — 93010 ELECTROCARDIOGRAM REPORT: CPT | Performed by: INTERNAL MEDICINE

## 2024-01-10 PROCEDURE — G0378 HOSPITAL OBSERVATION PER HR: HCPCS

## 2024-01-10 PROCEDURE — 80053 COMPREHEN METABOLIC PANEL: CPT

## 2024-01-10 PROCEDURE — 82962 GLUCOSE BLOOD TEST: CPT

## 2024-01-10 PROCEDURE — 6360000004 HC RX CONTRAST MEDICATION: Performed by: HOSPITALIST

## 2024-01-10 PROCEDURE — 2500000003 HC RX 250 WO HCPCS: Performed by: HOSPITALIST

## 2024-01-10 PROCEDURE — 81001 URINALYSIS AUTO W/SCOPE: CPT

## 2024-01-10 PROCEDURE — 80061 LIPID PANEL: CPT

## 2024-01-10 PROCEDURE — 36415 COLL VENOUS BLD VENIPUNCTURE: CPT

## 2024-01-10 PROCEDURE — 85610 PROTHROMBIN TIME: CPT

## 2024-01-10 PROCEDURE — 84100 ASSAY OF PHOSPHORUS: CPT

## 2024-01-10 PROCEDURE — 6370000000 HC RX 637 (ALT 250 FOR IP): Performed by: HOSPITALIST

## 2024-01-10 PROCEDURE — 85379 FIBRIN DEGRADATION QUANT: CPT

## 2024-01-10 PROCEDURE — 83880 ASSAY OF NATRIURETIC PEPTIDE: CPT

## 2024-01-10 PROCEDURE — 6370000000 HC RX 637 (ALT 250 FOR IP): Performed by: NURSE PRACTITIONER

## 2024-01-10 PROCEDURE — 6360000002 HC RX W HCPCS: Performed by: INTERNAL MEDICINE

## 2024-01-10 PROCEDURE — 85025 COMPLETE CBC W/AUTO DIFF WBC: CPT

## 2024-01-10 PROCEDURE — 83036 HEMOGLOBIN GLYCOSYLATED A1C: CPT

## 2024-01-10 PROCEDURE — 84484 ASSAY OF TROPONIN QUANT: CPT

## 2024-01-10 PROCEDURE — 6360000002 HC RX W HCPCS: Performed by: HOSPITALIST

## 2024-01-10 PROCEDURE — 93017 CV STRESS TEST TRACING ONLY: CPT

## 2024-01-10 PROCEDURE — 85730 THROMBOPLASTIN TIME PARTIAL: CPT

## 2024-01-10 PROCEDURE — C8929 TTE W OR WO FOL WCON,DOPPLER: HCPCS

## 2024-01-10 PROCEDURE — A9502 TC99M TETROFOSMIN: HCPCS | Performed by: INTERNAL MEDICINE

## 2024-01-10 PROCEDURE — 2580000003 HC RX 258: Performed by: HOSPITALIST

## 2024-01-10 PROCEDURE — 84703 CHORIONIC GONADOTROPIN ASSAY: CPT

## 2024-01-10 PROCEDURE — 83735 ASSAY OF MAGNESIUM: CPT

## 2024-01-10 PROCEDURE — 84702 CHORIONIC GONADOTROPIN TEST: CPT

## 2024-01-10 PROCEDURE — 3430000000 HC RX DIAGNOSTIC RADIOPHARMACEUTICAL: Performed by: INTERNAL MEDICINE

## 2024-01-10 RX ORDER — SODIUM CHLORIDE 0.9 % (FLUSH) 0.9 %
5-40 SYRINGE (ML) INJECTION PRN
Status: DISCONTINUED | OUTPATIENT
Start: 2024-01-10 | End: 2024-01-11 | Stop reason: HOSPADM

## 2024-01-10 RX ORDER — ASPIRIN 81 MG/1
81 TABLET, CHEWABLE ORAL DAILY
Status: DISCONTINUED | OUTPATIENT
Start: 2024-01-10 | End: 2024-01-11 | Stop reason: HOSPADM

## 2024-01-10 RX ORDER — CETIRIZINE HYDROCHLORIDE 10 MG/1
10 TABLET ORAL DAILY
Status: DISCONTINUED | OUTPATIENT
Start: 2024-01-10 | End: 2024-01-11 | Stop reason: HOSPADM

## 2024-01-10 RX ORDER — VERAPAMIL HYDROCHLORIDE 80 MG/1
40 TABLET ORAL EVERY 12 HOURS SCHEDULED
Status: DISCONTINUED | OUTPATIENT
Start: 2024-01-10 | End: 2024-01-11 | Stop reason: HOSPADM

## 2024-01-10 RX ORDER — METOPROLOL SUCCINATE 25 MG/1
25 TABLET, EXTENDED RELEASE ORAL DAILY
Status: DISCONTINUED | OUTPATIENT
Start: 2024-01-10 | End: 2024-01-11

## 2024-01-10 RX ORDER — POTASSIUM CHLORIDE 20 MEQ/1
40 TABLET, EXTENDED RELEASE ORAL PRN
Status: DISCONTINUED | OUTPATIENT
Start: 2024-01-10 | End: 2024-01-11 | Stop reason: HOSPADM

## 2024-01-10 RX ORDER — MECOBALAMIN 5000 MCG
5 TABLET,DISINTEGRATING ORAL NIGHTLY PRN
Status: DISCONTINUED | OUTPATIENT
Start: 2024-01-10 | End: 2024-01-11 | Stop reason: HOSPADM

## 2024-01-10 RX ORDER — ERGOCALCIFEROL 1.25 MG/1
50000 CAPSULE ORAL WEEKLY
Status: DISCONTINUED | OUTPATIENT
Start: 2024-01-17 | End: 2024-01-11 | Stop reason: HOSPADM

## 2024-01-10 RX ORDER — PANTOPRAZOLE SODIUM 40 MG/1
40 TABLET, DELAYED RELEASE ORAL DAILY
Status: DISCONTINUED | OUTPATIENT
Start: 2024-01-10 | End: 2024-01-11 | Stop reason: HOSPADM

## 2024-01-10 RX ORDER — CALCIUM CARBONATE 500 MG/1
500 TABLET, CHEWABLE ORAL 3 TIMES DAILY PRN
Status: DISCONTINUED | OUTPATIENT
Start: 2024-01-10 | End: 2024-01-11 | Stop reason: HOSPADM

## 2024-01-10 RX ORDER — EZETIMIBE 10 MG/1
10 TABLET ORAL DAILY
Status: DISCONTINUED | OUTPATIENT
Start: 2024-01-10 | End: 2024-01-11 | Stop reason: HOSPADM

## 2024-01-10 RX ORDER — DEXTROMETHORPHAN HYDROBROMIDE AND PROMETHAZINE HYDROCHLORIDE 15; 6.25 MG/5ML; MG/5ML
5 SYRUP ORAL NIGHTLY PRN
Status: DISCONTINUED | OUTPATIENT
Start: 2024-01-10 | End: 2024-01-10 | Stop reason: CLARIF

## 2024-01-10 RX ORDER — FLUTICASONE PROPIONATE 50 MCG
1 SPRAY, SUSPENSION (ML) NASAL DAILY
Status: DISCONTINUED | OUTPATIENT
Start: 2024-01-10 | End: 2024-01-11 | Stop reason: HOSPADM

## 2024-01-10 RX ORDER — RANOLAZINE 500 MG/1
500 TABLET, EXTENDED RELEASE ORAL 2 TIMES DAILY
Status: DISCONTINUED | OUTPATIENT
Start: 2024-01-10 | End: 2024-01-11 | Stop reason: HOSPADM

## 2024-01-10 RX ORDER — DEXTROMETHORPHAN POLISTIREX 30 MG/5ML
15 SUSPENSION ORAL NIGHTLY PRN
Status: DISCONTINUED | OUTPATIENT
Start: 2024-01-10 | End: 2024-01-11 | Stop reason: HOSPADM

## 2024-01-10 RX ORDER — INSULIN GLARGINE 100 [IU]/ML
10 INJECTION, SOLUTION SUBCUTANEOUS NIGHTLY
Status: DISCONTINUED | OUTPATIENT
Start: 2024-01-10 | End: 2024-01-11 | Stop reason: HOSPADM

## 2024-01-10 RX ORDER — GABAPENTIN 300 MG/1
300 CAPSULE ORAL 2 TIMES DAILY PRN
Status: DISCONTINUED | OUTPATIENT
Start: 2024-01-10 | End: 2024-01-11 | Stop reason: HOSPADM

## 2024-01-10 RX ORDER — ONDANSETRON 2 MG/ML
4 INJECTION INTRAMUSCULAR; INTRAVENOUS EVERY 6 HOURS PRN
Status: DISCONTINUED | OUTPATIENT
Start: 2024-01-10 | End: 2024-01-11 | Stop reason: HOSPADM

## 2024-01-10 RX ORDER — BUMETANIDE 0.25 MG/ML
0.5 INJECTION INTRAMUSCULAR; INTRAVENOUS 2 TIMES DAILY
Status: DISCONTINUED | OUTPATIENT
Start: 2024-01-10 | End: 2024-01-11

## 2024-01-10 RX ORDER — NITROGLYCERIN 0.4 MG/1
0.4 TABLET SUBLINGUAL EVERY 5 MIN PRN
Status: DISCONTINUED | OUTPATIENT
Start: 2024-01-10 | End: 2024-01-11 | Stop reason: HOSPADM

## 2024-01-10 RX ORDER — SUCRALFATE 1 G/1
1 TABLET ORAL
Status: DISCONTINUED | OUTPATIENT
Start: 2024-01-10 | End: 2024-01-11 | Stop reason: HOSPADM

## 2024-01-10 RX ORDER — LISINOPRIL 2.5 MG/1
2.5 TABLET ORAL DAILY
Status: DISCONTINUED | OUTPATIENT
Start: 2024-01-10 | End: 2024-01-11 | Stop reason: HOSPADM

## 2024-01-10 RX ORDER — CARVEDILOL 3.12 MG/1
3.12 TABLET ORAL 2 TIMES DAILY WITH MEALS
Status: DISCONTINUED | OUTPATIENT
Start: 2024-01-10 | End: 2024-01-11

## 2024-01-10 RX ORDER — BETHANECHOL CHLORIDE 25 MG/1
25 TABLET ORAL DAILY
Status: DISCONTINUED | OUTPATIENT
Start: 2024-01-10 | End: 2024-01-11 | Stop reason: HOSPADM

## 2024-01-10 RX ORDER — SODIUM CHLORIDE 0.9 % (FLUSH) 0.9 %
5-40 SYRINGE (ML) INJECTION EVERY 12 HOURS SCHEDULED
Status: DISCONTINUED | OUTPATIENT
Start: 2024-01-10 | End: 2024-01-11 | Stop reason: HOSPADM

## 2024-01-10 RX ORDER — MAGNESIUM SULFATE IN WATER 40 MG/ML
2000 INJECTION, SOLUTION INTRAVENOUS PRN
Status: DISCONTINUED | OUTPATIENT
Start: 2024-01-10 | End: 2024-01-11 | Stop reason: HOSPADM

## 2024-01-10 RX ORDER — INSULIN LISPRO 100 [IU]/ML
0-16 INJECTION, SOLUTION INTRAVENOUS; SUBCUTANEOUS
Status: DISCONTINUED | OUTPATIENT
Start: 2024-01-10 | End: 2024-01-11 | Stop reason: HOSPADM

## 2024-01-10 RX ORDER — PROMETHAZINE HYDROCHLORIDE 6.25 MG/5ML
6.25 SYRUP ORAL NIGHTLY PRN
Status: DISCONTINUED | OUTPATIENT
Start: 2024-01-10 | End: 2024-01-11 | Stop reason: HOSPADM

## 2024-01-10 RX ORDER — BUMETANIDE 0.5 MG/1
0.5 TABLET ORAL 2 TIMES DAILY
Status: ON HOLD | COMMUNITY
End: 2024-01-11 | Stop reason: HOSPADM

## 2024-01-10 RX ORDER — DEXTROSE MONOHYDRATE 100 MG/ML
INJECTION, SOLUTION INTRAVENOUS CONTINUOUS PRN
Status: DISCONTINUED | OUTPATIENT
Start: 2024-01-10 | End: 2024-01-11 | Stop reason: HOSPADM

## 2024-01-10 RX ORDER — INSULIN LISPRO 100 [IU]/ML
0-4 INJECTION, SOLUTION INTRAVENOUS; SUBCUTANEOUS NIGHTLY
Status: DISCONTINUED | OUTPATIENT
Start: 2024-01-10 | End: 2024-01-11 | Stop reason: HOSPADM

## 2024-01-10 RX ORDER — REGADENOSON 0.08 MG/ML
0.4 INJECTION, SOLUTION INTRAVENOUS
Status: COMPLETED | OUTPATIENT
Start: 2024-01-10 | End: 2024-01-10

## 2024-01-10 RX ORDER — TOPIRAMATE 50 MG/1
50 TABLET, FILM COATED ORAL DAILY
Status: DISCONTINUED | OUTPATIENT
Start: 2024-01-10 | End: 2024-01-11 | Stop reason: HOSPADM

## 2024-01-10 RX ORDER — ACETAMINOPHEN 650 MG/1
650 SUPPOSITORY RECTAL EVERY 6 HOURS PRN
Status: DISCONTINUED | OUTPATIENT
Start: 2024-01-10 | End: 2024-01-11 | Stop reason: HOSPADM

## 2024-01-10 RX ORDER — FUROSEMIDE 80 MG
80 TABLET ORAL DAILY
Status: DISCONTINUED | OUTPATIENT
Start: 2024-01-10 | End: 2024-01-10

## 2024-01-10 RX ORDER — POTASSIUM CHLORIDE 7.45 MG/ML
10 INJECTION INTRAVENOUS PRN
Status: DISCONTINUED | OUTPATIENT
Start: 2024-01-10 | End: 2024-01-11 | Stop reason: HOSPADM

## 2024-01-10 RX ORDER — ACETAMINOPHEN 325 MG/1
650 TABLET ORAL EVERY 6 HOURS PRN
Status: DISCONTINUED | OUTPATIENT
Start: 2024-01-10 | End: 2024-01-11 | Stop reason: HOSPADM

## 2024-01-10 RX ORDER — NITROGLYCERIN 20 MG/100ML
5-200 INJECTION INTRAVENOUS CONTINUOUS
Status: DISCONTINUED | OUTPATIENT
Start: 2024-01-10 | End: 2024-01-10

## 2024-01-10 RX ORDER — ONDANSETRON 4 MG/1
4 TABLET, ORALLY DISINTEGRATING ORAL EVERY 8 HOURS PRN
Status: DISCONTINUED | OUTPATIENT
Start: 2024-01-10 | End: 2024-01-11 | Stop reason: HOSPADM

## 2024-01-10 RX ORDER — AMLODIPINE BESYLATE 5 MG/1
5 TABLET ORAL DAILY
Status: DISCONTINUED | OUTPATIENT
Start: 2024-01-10 | End: 2024-01-11 | Stop reason: HOSPADM

## 2024-01-10 RX ORDER — ATORVASTATIN CALCIUM 80 MG/1
80 TABLET, FILM COATED ORAL NIGHTLY
Status: DISCONTINUED | OUTPATIENT
Start: 2024-01-10 | End: 2024-01-11 | Stop reason: HOSPADM

## 2024-01-10 RX ORDER — ISOSORBIDE MONONITRATE 30 MG/1
30 TABLET, EXTENDED RELEASE ORAL DAILY
Status: DISCONTINUED | OUTPATIENT
Start: 2024-01-10 | End: 2024-01-10

## 2024-01-10 RX ORDER — POLYETHYLENE GLYCOL 3350 17 G/17G
17 POWDER, FOR SOLUTION ORAL DAILY PRN
Status: DISCONTINUED | OUTPATIENT
Start: 2024-01-10 | End: 2024-01-11 | Stop reason: HOSPADM

## 2024-01-10 RX ORDER — SODIUM CHLORIDE 9 MG/ML
INJECTION, SOLUTION INTRAVENOUS PRN
Status: DISCONTINUED | OUTPATIENT
Start: 2024-01-10 | End: 2024-01-11 | Stop reason: HOSPADM

## 2024-01-10 RX ORDER — SPIRONOLACTONE 25 MG/1
12.5 TABLET ORAL DAILY
Status: DISCONTINUED | OUTPATIENT
Start: 2024-01-10 | End: 2024-01-11 | Stop reason: HOSPADM

## 2024-01-10 RX ADMIN — RANOLAZINE 500 MG: 500 TABLET, EXTENDED RELEASE ORAL at 02:36

## 2024-01-10 RX ADMIN — ATORVASTATIN CALCIUM 80 MG: 80 TABLET, FILM COATED ORAL at 02:37

## 2024-01-10 RX ADMIN — BETHANECHOL CHLORIDE 25 MG: 25 TABLET ORAL at 09:54

## 2024-01-10 RX ADMIN — PANTOPRAZOLE SODIUM 40 MG: 40 TABLET, DELAYED RELEASE ORAL at 09:54

## 2024-01-10 RX ADMIN — BUMETANIDE 0.5 MG: 0.25 INJECTION INTRAMUSCULAR; INTRAVENOUS at 09:54

## 2024-01-10 RX ADMIN — CARVEDILOL 3.12 MG: 3.12 TABLET, FILM COATED ORAL at 16:36

## 2024-01-10 RX ADMIN — ATORVASTATIN CALCIUM 80 MG: 80 TABLET, FILM COATED ORAL at 20:43

## 2024-01-10 RX ADMIN — SUCRALFATE 1 G: 1 TABLET ORAL at 02:37

## 2024-01-10 RX ADMIN — LISINOPRIL 2.5 MG: 2.5 TABLET ORAL at 09:55

## 2024-01-10 RX ADMIN — SUCRALFATE 1 G: 1 TABLET ORAL at 16:36

## 2024-01-10 RX ADMIN — ACETAMINOPHEN 650 MG: 325 TABLET ORAL at 03:21

## 2024-01-10 RX ADMIN — VERAPAMIL HYDROCHLORIDE 40 MG: 80 TABLET ORAL at 20:43

## 2024-01-10 RX ADMIN — APIXABAN 5 MG: 5 TABLET, FILM COATED ORAL at 20:43

## 2024-01-10 RX ADMIN — CETIRIZINE HYDROCHLORIDE 10 MG: 10 TABLET, FILM COATED ORAL at 09:54

## 2024-01-10 RX ADMIN — VERAPAMIL HYDROCHLORIDE 40 MG: 80 TABLET ORAL at 02:36

## 2024-01-10 RX ADMIN — ACETAMINOPHEN 650 MG: 325 TABLET ORAL at 16:36

## 2024-01-10 RX ADMIN — RANOLAZINE 500 MG: 500 TABLET, EXTENDED RELEASE ORAL at 20:43

## 2024-01-10 RX ADMIN — FLUTICASONE PROPIONATE 1 SPRAY: 50 SPRAY, METERED NASAL at 09:54

## 2024-01-10 RX ADMIN — PERFLUTREN 1.5 ML: 6.52 INJECTION, SUSPENSION INTRAVENOUS at 13:48

## 2024-01-10 RX ADMIN — AMLODIPINE BESYLATE 5 MG: 5 TABLET ORAL at 09:55

## 2024-01-10 RX ADMIN — MICONAZOLE NITRATE: 2 POWDER TOPICAL at 09:56

## 2024-01-10 RX ADMIN — SUCRALFATE 1 G: 1 TABLET ORAL at 20:43

## 2024-01-10 RX ADMIN — EZETIMIBE 10 MG: 10 TABLET ORAL at 10:12

## 2024-01-10 RX ADMIN — REGADENOSON 0.4 MG: 0.08 INJECTION, SOLUTION INTRAVENOUS at 12:49

## 2024-01-10 RX ADMIN — RANOLAZINE 500 MG: 500 TABLET, EXTENDED RELEASE ORAL at 09:55

## 2024-01-10 RX ADMIN — SUCRALFATE 1 G: 1 TABLET ORAL at 09:54

## 2024-01-10 RX ADMIN — SPIRONOLACTONE 12.5 MG: 25 TABLET ORAL at 09:55

## 2024-01-10 RX ADMIN — GABAPENTIN 300 MG: 300 CAPSULE ORAL at 20:43

## 2024-01-10 RX ADMIN — APIXABAN 5 MG: 5 TABLET, FILM COATED ORAL at 09:54

## 2024-01-10 RX ADMIN — MICONAZOLE NITRATE: 2 POWDER TOPICAL at 20:46

## 2024-01-10 RX ADMIN — TETROFOSMIN 24 MILLICURIE: 1.38 INJECTION, POWDER, LYOPHILIZED, FOR SOLUTION INTRAVENOUS at 13:24

## 2024-01-10 RX ADMIN — TETROFOSMIN 8 MILLICURIE: 1.38 INJECTION, POWDER, LYOPHILIZED, FOR SOLUTION INTRAVENOUS at 13:24

## 2024-01-10 RX ADMIN — BUMETANIDE 0.5 MG: 0.25 INJECTION INTRAMUSCULAR; INTRAVENOUS at 20:43

## 2024-01-10 RX ADMIN — NITROGLYCERIN 5 MCG/MIN: 20 INJECTION INTRAVENOUS at 02:48

## 2024-01-10 RX ADMIN — SODIUM CHLORIDE, PRESERVATIVE FREE 10 ML: 5 INJECTION INTRAVENOUS at 09:55

## 2024-01-10 RX ADMIN — APIXABAN 5 MG: 5 TABLET, FILM COATED ORAL at 02:41

## 2024-01-10 RX ADMIN — ASPIRIN 81 MG: 81 TABLET, CHEWABLE ORAL at 09:55

## 2024-01-10 RX ADMIN — GABAPENTIN 300 MG: 300 CAPSULE ORAL at 11:53

## 2024-01-10 SDOH — ECONOMIC STABILITY: FOOD INSECURITY: WITHIN THE PAST 12 MONTHS, YOU WORRIED THAT YOUR FOOD WOULD RUN OUT BEFORE YOU GOT MONEY TO BUY MORE.: NEVER TRUE

## 2024-01-10 SDOH — ECONOMIC STABILITY: INCOME INSECURITY: IN THE PAST 12 MONTHS, HAS THE ELECTRIC, GAS, OIL, OR WATER COMPANY THREATENED TO SHUT OFF SERVICE IN YOUR HOME?: NO

## 2024-01-10 SDOH — ECONOMIC STABILITY: INCOME INSECURITY: HOW HARD IS IT FOR YOU TO PAY FOR THE VERY BASICS LIKE FOOD, HOUSING, MEDICAL CARE, AND HEATING?: NOT VERY HARD

## 2024-01-10 ASSESSMENT — PATIENT HEALTH QUESTIONNAIRE - PHQ9
2. FEELING DOWN, DEPRESSED OR HOPELESS: 0
1. LITTLE INTEREST OR PLEASURE IN DOING THINGS: 0
SUM OF ALL RESPONSES TO PHQ QUESTIONS 1-9: 0
SUM OF ALL RESPONSES TO PHQ QUESTIONS 1-9: 0
SUM OF ALL RESPONSES TO PHQ9 QUESTIONS 1 & 2: 0
SUM OF ALL RESPONSES TO PHQ QUESTIONS 1-9: 0
SUM OF ALL RESPONSES TO PHQ QUESTIONS 1-9: 0

## 2024-01-10 ASSESSMENT — PAIN SCALES - GENERAL
PAINLEVEL_OUTOF10: 5
PAINLEVEL_OUTOF10: 4
PAINLEVEL_OUTOF10: 0
PAINLEVEL_OUTOF10: 6
PAINLEVEL_OUTOF10: 4
PAINLEVEL_OUTOF10: 2
PAINLEVEL_OUTOF10: 2
PAINLEVEL_OUTOF10: 5
PAINLEVEL_OUTOF10: 2
PAINLEVEL_OUTOF10: 6
PAINLEVEL_OUTOF10: 4
PAINLEVEL_OUTOF10: 2
PAINLEVEL_OUTOF10: 0
PAINLEVEL_OUTOF10: 6

## 2024-01-10 ASSESSMENT — PAIN DESCRIPTION - ORIENTATION
ORIENTATION: LEFT
ORIENTATION: LEFT
ORIENTATION: RIGHT
ORIENTATION: ANTERIOR
ORIENTATION: RIGHT

## 2024-01-10 ASSESSMENT — PAIN - FUNCTIONAL ASSESSMENT
PAIN_FUNCTIONAL_ASSESSMENT: ACTIVITIES ARE NOT PREVENTED

## 2024-01-10 ASSESSMENT — ENCOUNTER SYMPTOMS
CONSTIPATION: 0
BACK PAIN: 0
SHORTNESS OF BREATH: 0
WHEEZING: 0
BLOOD IN STOOL: 0
DIARRHEA: 0
EYE DISCHARGE: 0
CHEST TIGHTNESS: 0
NAUSEA: 0
COUGH: 0
VOMITING: 0
CHEST TIGHTNESS: 1
ABDOMINAL DISTENTION: 0

## 2024-01-10 ASSESSMENT — PAIN DESCRIPTION - PAIN TYPE
TYPE: ACUTE PAIN

## 2024-01-10 ASSESSMENT — PAIN DESCRIPTION - LOCATION
LOCATION: CHEST
LOCATION: CHEST
LOCATION: HEAD
LOCATION: CHEST
LOCATION: GENERALIZED
LOCATION: CHEST

## 2024-01-10 ASSESSMENT — PAIN DESCRIPTION - DESCRIPTORS
DESCRIPTORS: ACHING
DESCRIPTORS: PRESSURE
DESCRIPTORS: ACHING
DESCRIPTORS: DISCOMFORT

## 2024-01-10 ASSESSMENT — PAIN DESCRIPTION - FREQUENCY: FREQUENCY: CONTINUOUS

## 2024-01-10 ASSESSMENT — PAIN DESCRIPTION - ONSET: ONSET: PROGRESSIVE

## 2024-01-10 NOTE — ED PROVIDER NOTES
[facility] EMERGENCY DEPT  eMERGENCY dEPARTMENT eNCOUnter      Pt Name: Chelsea Galan  MRN: [id number]  Birthdate 1975  Date of evaluation: 1/9/2024  Provider: Mello Cadet MD    CHIEF COMPLAINT       Chief Complaint   Patient presents with    Chest Pain     Started around 3pm, took 2 nitro. Hx of CABG in 2016         HISTORY OF PRESENT ILLNESS   (Location/Symptom, Timing/Onset,Context/Setting, Quality, Duration, Modifying Factors, Severity)  Note limiting factors.   Chelsea Galan is a 48 y.o. female who presents to the emergency department for evaluation regarding episode of chest pain.  Patient states that this pain began today at around 3 PM.  He did take 2 sublingual nitro at home with subsequent improvement of her pain.  She describes it as a tightness across her chest, located more so over the left side.  Patient does have a prior history of coronary artery disease undergoing previous CABG in 2016.  States that she has not underwent any type of stress testing since this intervention.  No prior history of pulmonary embolism or DVT.    HPI    NursingNotes were reviewed.    REVIEW OF SYSTEMS    (2-9 systems for level 4, 10 or more for level 5)     Review of Systems   Constitutional:  Negative for chills and fever.   Respiratory:  Positive for shortness of breath.    Cardiovascular:  Positive for chest pain. Negative for palpitations and leg swelling.   Gastrointestinal:  Negative for abdominal distention, abdominal pain, diarrhea, nausea and vomiting.   Neurological:  Negative for syncope.   All other systems reviewed and are negative.           PAST MEDICALHISTORY     Past Medical History:   Diagnosis Date    CAD (coronary artery disease)     [facility] occlusion with cerebral infarction (HCC)     CHF (congestive heart failure) (HCC)     Chronic kidney disease     Diabetes mellitus (HCC)     Diabetic neuropathy (HCC)     GERD (gastroesophageal reflux disease)     Hyperlipidemia     Hypertension     Neuromuscular

## 2024-01-10 NOTE — H&P
[facility] Hospitalist Group History and Physical    Patient Information:  Patient: Chelsea Galan  MRN: [id number]   Acct: [id number]  YOB: 1975  Admit Date: 1/9/2024      Primary Care Physician: Genny Montiel DO  Advance Directive: Full Code  Health Care Proxy: her Father, Mr. Edi Galan, [phone]         SUBJECTIVE:    Chief Complaint   Patient presents with    Chest Pain     Started around 3pm, took 2 nitro. Hx of CABG in 2016     Interpretation of EP Sign Out:  H: Nitro responsive CP (+2)  E: stated to be fine (+0)  Age 48 (+1)  Multiple Risk factors (+2)  CAD - Had CABG 7 years prior  BMI 54  HTN  HLD  DM  CHF  Never used tobacco  Troponin is pending (+0)  HEART Score 5 ->: in house work up / eval needed    HPI:  Mrs. Chelsea Galan is a pleasant 48 year old  lady known to me from prior admission. She was taking her god-sister somewhere when the pain began. She states that the pain hit her at 2 or 3 pm. She states that she was at the gym in the AM, between 7 and 8am. She states that when it began she had pain in her chest that was non radiating. The pain has a \"nagging\" character. The pain was constant. She states that the pain did not get better with the nitroglycerin. The pain was exacerbated by nothing, although she had nothing that was exertional and nothing that made her emotional. She has had this pain before but it was right sided and middle before, not on the left side before. The pain was made worse by deep breaths. The pain is reproducible. The pain is not positional. Associated symptoms are as per ROS.    Review of Systems:   Review of Systems   Constitutional:  Negative for diaphoresis and fatigue.   HENT:  Negative for sneezing.    Respiratory:  Negative for cough, chest tightness and shortness of breath.    Cardiovascular:  Positive for chest pain. Negative for palpitations and leg swelling.   Gastrointestinal:  Negative for nausea.   Neurological:

## 2024-01-10 NOTE — CONSULTS
present.      Comments: No palpable organomegaly   Musculoskeletal:         General: Normal range of motion.   Skin:     General: Skin is warm.      Coloration: Skin is not pale.      Findings: No rash.   Neurological:      Mental Status: She is alert and oriented to person, place, and time.      Cranial Nerves: No cranial nerve deficit.      Deep Tendon Reflexes: Reflexes normal.           Labs:  Recent Labs     01/09/24  2301   WBC 6.0   HGB 16.4*          Recent Labs     01/09/24  2301 01/10/24  0140    138   K 4.6 4.1    105   CO2 23 18*   BUN 20 20   CREATININE 1.4* 1.5*   LABGLOM 46* 43*   MG 2.3  --    CALCIUM 9.3 9.4   PHOS 4.3  --        CK, CKMB, Troponin: @LABRCNT (CKTOTAL:3, CKMB:3, TROPONINI:3)@    Last 3 BNP:          IMAGING:  XR CHEST PORTABLE    Result Date: 1/10/2024  EXAM:  AP CHEST.  HISTORY:  Chest pain.  FINDINGS: There are multiple sternotomy wires.  The bones are unremarkable.  The cardiac silhouette and pulmonary vasculature are within normal limits.  The costophrenic angles are clear.  No infiltrate or consolidation.      Impression:  No acute cardiopulmonary disease.    ______________________________________ Electronically signed by: ABHIJEET DOMINGUEZ M.D. Date:     01/10/2024 Time:    03:34           Assessment and Plan:    This is a 48 y.o. year old female with past medical history of insulin requiring diabetes mellitus, hypertension, morbid obesity, coronary artery disease with CABG 2016 with LIMA sequential graft to D1 and LAD (occluded mid LAD stent with patent graft by catheterization 8/22/2022), codominant circumflex that was not well injected (appears nonobstructive), Lexiscan study 8/12/2022 with evidence of old lateral infarct, ejection fraction 45%, prior CVA with history of LV thrombus (resolved) on chronic anticoagulation, admitted with atypical protracted left-sided chest pain that is partially reproducible with some nitrate responsiveness reported but

## 2024-01-10 NOTE — PROGRESS NOTES
Cardiology progress note:    Lexiscan study reviewed.  Apical anterior and apical distribution defects appear new.  Inferolateral defect appears chronic consistent with prior infarct.  EF stable.  Given lack of visualization of dominant/codominant circumflex, can consider repeat catheterization.  Will schedule for tomorrow.

## 2024-01-10 NOTE — ED NOTES
ED TO INPATIENT SBAR HANDOFF    Patient Name: Cheslea Galan   : 1975  48 y.o.   Family/Caregiver Present: Yes  Code Status Order: Prior    C-SSRS: Risk of Suicide: No Risk  Sitter No  Restraints:         Situation  Chief Complaint   Patient presents with    Chest Pain     Started around 3pm, took 2 nitro. Hx of CABG in 2016     Brief Description of Patient's Condition: CP, took 2 nitro PTA   Mental Status: oriented, alert, coherent, logical, thought processes intact, and able to concentrate and follow conversation  Arrived from: home    Imaging:   XR CHEST PORTABLE    (Results Pending)     Abnormal labs:   Abnormal Labs Reviewed   CBC WITH AUTO DIFFERENTIAL - Abnormal; Notable for the following components:       Result Value    Hemoglobin 16.4 (*)     Hematocrit 49.2 (*)     RDW 14.7 (*)     All other components within normal limits   COMPREHENSIVE METABOLIC PANEL - Abnormal; Notable for the following components:    Creatinine 1.4 (*)     Est, Glom Filt Rate 46 (*)     All other components within normal limits   TROPONIN - Abnormal; Notable for the following components:    Troponin, High Sensitivity 22 (*)     All other components within normal limits   HEMOGLOBIN A1C - Abnormal; Notable for the following components:    Hemoglobin A1C 6.1 (*)     All other components within normal limits     Background  Allergies:   Allergies   Allergen Reactions    Sulfamethoxazole-Trimethoprim Other (See Comments)     Yeast inf  Gives her a Yeast infection     Current Medications:     History:   Past Medical History:   Diagnosis Date    CAD (coronary artery disease)     [facility] occlusion with cerebral infarction (HCC)     CHF (congestive heart failure) (HCC)     Chronic kidney disease     Diabetes mellitus (HCC)     Diabetic neuropathy (HCC)     GERD (gastroesophageal reflux disease)     Hyperlipidemia     Hypertension     Neuromuscular disorder (HCC)     Seizures (HCC)     as an child       Assessment  Vitals: Level

## 2024-01-10 NOTE — PLAN OF CARE
Problem: Discharge Planning  Goal: Discharge to home or other facility with appropriate resources  Outcome: Progressing  Flowsheets  Taken 1/10/2024 0155  Discharge to home or other facility with appropriate resources:   Identify barriers to discharge with patient and caregiver   Identify discharge learning needs (meds, wound care, etc)  Taken 1/10/2024 0142  Discharge to home or other facility with appropriate resources:   Identify barriers to discharge with patient and caregiver   Identify discharge learning needs (meds, wound care, etc)     Problem: Pain  Goal: Verbalizes/displays adequate comfort level or baseline comfort level  Outcome: Progressing     Problem: ABCDS Injury Assessment  Goal: Absence of physical injury  Outcome: Progressing     Problem: Safety - Adult  Goal: Free from fall injury  Outcome: Progressing

## 2024-01-10 NOTE — PROGRESS NOTES
[facility]      Patient:  Chelsea Galan  YOB: 1975  Date of Service: 1/10/2024  MRN: [id number]   Acct: [id number]   Primary Care Physician: Genny Montiel DO  Advance Directive: Full Code  Admit Date: 1/9/2024       Hospital Day: 0  Portions of this note have been copied forward, however, changed to reflect the most current clinical status of this patient.    CHIEF COMPLAINT Chest pain    SUBJECTIVE:  She is seen sitting at the bedside. She continues to have left sided chest discomfort with nitroglycerin currently infusing. She states that it has improved since her admission. She is anxious to complete her cardiac stress test. She is requesting gabapentin, which she takes PRN at home for neuropathic pain. VSS. She remains on RA currently.     CUMULATIVE HOSPITAL STAY:  The patient is a 48-year-old female with a PMH of CABG, CAD, type 2 diabetes with neuropathy, HLD, HTN, and obesity who presented to [facility] ED on 1/9/2023 with complaints of chest pain.  She reported the pain began around 3 PM the day of admission while at rest.  She described the pain as tightness across her chest localized to the left side.  She did have associated shortness of breath.  It was worsened by deep breaths.  She did take 2 sublingual nitroglycerin with improvement of her pain, however, the pain did not resolve therefore she came to the ED for further evaluation.  Further ED workup revealed BUN 20 creatinine 1.4.  Initial troponin 20 2 repeat troponin 22.  CO2 18.  Cholesterol 207, HDL 61.  A1c 6.1.  H&H 16.4/49.2 with a platelet count of 335.  EKG showed no evidence of acute ST elevation NSR.  Chest x-ray no acute cardiopulmonary process.  The patient was admitted to hospital medicine for chest pain with high risk of cardiac etiology.  She was placed on a nitroglycerin drip for titration of her chest pain.  Cardiology was consulted who recommends Aida scan.  Additionally recommends 2D echo. Repeat trop 26.

## 2024-01-11 ENCOUNTER — APPOINTMENT (OUTPATIENT)
Dept: CARDIAC CATH/INVASIVE PROCEDURES | Age: 49
DRG: 287 | End: 2024-01-11
Payer: MEDICARE

## 2024-01-11 VITALS
OXYGEN SATURATION: 100 % | HEART RATE: 90 BPM | WEIGHT: 293 LBS | BODY MASS INDEX: 50.02 KG/M2 | TEMPERATURE: 97 F | HEIGHT: 64 IN | RESPIRATION RATE: 16 BRPM | DIASTOLIC BLOOD PRESSURE: 89 MMHG | SYSTOLIC BLOOD PRESSURE: 111 MMHG

## 2024-01-11 LAB
ANION GAP SERPL CALCULATED.3IONS-SCNC: 11 MMOL/L (ref 7–19)
BASOPHILS # BLD: 0 K/UL (ref 0–0.2)
BASOPHILS NFR BLD: 0.4 % (ref 0–1)
BUN SERPL-MCNC: 21 MG/DL (ref 6–20)
CALCIUM SERPL-MCNC: 9.3 MG/DL (ref 8.6–10)
CHLORIDE SERPL-SCNC: 103 MMOL/L (ref 98–111)
CO2 SERPL-SCNC: 24 MMOL/L (ref 22–29)
CREAT SERPL-MCNC: 1.6 MG/DL (ref 0.5–0.9)
EOSINOPHIL # BLD: 0.2 K/UL (ref 0–0.6)
EOSINOPHIL NFR BLD: 4.5 % (ref 0–5)
ERYTHROCYTE [DISTWIDTH] IN BLOOD BY AUTOMATED COUNT: 14.1 % (ref 11.5–14.5)
GLUCOSE SERPL-MCNC: 88 MG/DL (ref 74–109)
HCT VFR BLD AUTO: 43.2 % (ref 37–47)
HGB BLD-MCNC: 14.2 G/DL (ref 12–16)
IMM GRANULOCYTES # BLD: 0 K/UL
LYMPHOCYTES # BLD: 1.4 K/UL (ref 1.1–4.5)
LYMPHOCYTES NFR BLD: 30.1 % (ref 20–40)
MCH RBC QN AUTO: 30.1 PG (ref 27–31)
MCHC RBC AUTO-ENTMCNC: 32.9 G/DL (ref 33–37)
MCV RBC AUTO: 91.5 FL (ref 81–99)
MONOCYTES # BLD: 0.5 K/UL (ref 0–0.9)
MONOCYTES NFR BLD: 10.6 % (ref 0–10)
NEUTROPHILS # BLD: 2.5 K/UL (ref 1.5–7.5)
NEUTS SEG NFR BLD: 54 % (ref 50–65)
PLATELET # BLD AUTO: 295 K/UL (ref 130–400)
PMV BLD AUTO: 11.2 FL (ref 9.4–12.3)
POTASSIUM SERPL-SCNC: 4.3 MMOL/L (ref 3.5–5)
RBC # BLD AUTO: 4.72 M/UL (ref 4.2–5.4)
SODIUM SERPL-SCNC: 138 MMOL/L (ref 136–145)
WBC # BLD AUTO: 4.6 K/UL (ref 4.8–10.8)

## 2024-01-11 PROCEDURE — 6360000004 HC RX CONTRAST MEDICATION: Performed by: INTERNAL MEDICINE

## 2024-01-11 PROCEDURE — 2580000003 HC RX 258: Performed by: INTERNAL MEDICINE

## 2024-01-11 PROCEDURE — 36415 COLL VENOUS BLD VENIPUNCTURE: CPT

## 2024-01-11 PROCEDURE — 2500000003 HC RX 250 WO HCPCS: Performed by: INTERNAL MEDICINE

## 2024-01-11 PROCEDURE — B2111ZZ FLUOROSCOPY OF MULTIPLE CORONARY ARTERIES USING LOW OSMOLAR CONTRAST: ICD-10-PCS | Performed by: INTERNAL MEDICINE

## 2024-01-11 PROCEDURE — 80048 BASIC METABOLIC PNL TOTAL CA: CPT

## 2024-01-11 PROCEDURE — 6370000000 HC RX 637 (ALT 250 FOR IP): Performed by: INTERNAL MEDICINE

## 2024-01-11 PROCEDURE — 2709999900 HC NON-CHARGEABLE SUPPLY

## 2024-01-11 PROCEDURE — C1769 GUIDE WIRE: HCPCS

## 2024-01-11 PROCEDURE — 85025 COMPLETE CBC W/AUTO DIFF WBC: CPT

## 2024-01-11 PROCEDURE — 6370000000 HC RX 637 (ALT 250 FOR IP): Performed by: NURSE PRACTITIONER

## 2024-01-11 PROCEDURE — C1894 INTRO/SHEATH, NON-LASER: HCPCS

## 2024-01-11 PROCEDURE — 6360000002 HC RX W HCPCS

## 2024-01-11 PROCEDURE — 4A023N7 MEASUREMENT OF CARDIAC SAMPLING AND PRESSURE, LEFT HEART, PERCUTANEOUS APPROACH: ICD-10-PCS | Performed by: INTERNAL MEDICINE

## 2024-01-11 PROCEDURE — B2151ZZ FLUOROSCOPY OF LEFT HEART USING LOW OSMOLAR CONTRAST: ICD-10-PCS | Performed by: INTERNAL MEDICINE

## 2024-01-11 PROCEDURE — 94760 N-INVAS EAR/PLS OXIMETRY 1: CPT

## 2024-01-11 PROCEDURE — B2131ZZ FLUOROSCOPY OF MULTIPLE CORONARY ARTERY BYPASS GRAFTS USING LOW OSMOLAR CONTRAST: ICD-10-PCS | Performed by: INTERNAL MEDICINE

## 2024-01-11 PROCEDURE — 2140000000 HC CCU INTERMEDIATE R&B

## 2024-01-11 PROCEDURE — 99152 MOD SED SAME PHYS/QHP 5/>YRS: CPT

## 2024-01-11 PROCEDURE — 93459 L HRT ART/GRFT ANGIO: CPT

## 2024-01-11 PROCEDURE — 99153 MOD SED SAME PHYS/QHP EA: CPT

## 2024-01-11 PROCEDURE — 2580000003 HC RX 258: Performed by: HOSPITALIST

## 2024-01-11 PROCEDURE — 6360000002 HC RX W HCPCS: Performed by: HOSPITALIST

## 2024-01-11 PROCEDURE — 6370000000 HC RX 637 (ALT 250 FOR IP): Performed by: HOSPITALIST

## 2024-01-11 RX ORDER — CARVEDILOL 6.25 MG/1
6.25 TABLET ORAL 2 TIMES DAILY WITH MEALS
Qty: 60 TABLET | Refills: 0 | Status: SHIPPED | OUTPATIENT
Start: 2024-01-11

## 2024-01-11 RX ORDER — CARVEDILOL 6.25 MG/1
6.25 TABLET ORAL 2 TIMES DAILY WITH MEALS
Status: DISCONTINUED | OUTPATIENT
Start: 2024-01-11 | End: 2024-01-11 | Stop reason: HOSPADM

## 2024-01-11 RX ORDER — BUMETANIDE 1 MG/1
2 TABLET ORAL DAILY
Status: DISCONTINUED | OUTPATIENT
Start: 2024-01-11 | End: 2024-01-11 | Stop reason: HOSPADM

## 2024-01-11 RX ORDER — BUMETANIDE 2 MG/1
2 TABLET ORAL DAILY
Qty: 30 TABLET | Refills: 0 | Status: SHIPPED | OUTPATIENT
Start: 2024-01-11

## 2024-01-11 RX ORDER — IODIXANOL 320 MG/ML
80 INJECTION, SOLUTION INTRAVASCULAR
Status: COMPLETED | OUTPATIENT
Start: 2024-01-11 | End: 2024-01-11

## 2024-01-11 RX ADMIN — FLUTICASONE PROPIONATE 1 SPRAY: 50 SPRAY, METERED NASAL at 08:40

## 2024-01-11 RX ADMIN — CARVEDILOL 3.12 MG: 3.12 TABLET, FILM COATED ORAL at 08:33

## 2024-01-11 RX ADMIN — RANOLAZINE 500 MG: 500 TABLET, EXTENDED RELEASE ORAL at 08:32

## 2024-01-11 RX ADMIN — IODIXANOL 80 ML: 320 INJECTION, SOLUTION INTRAVASCULAR at 13:25

## 2024-01-11 RX ADMIN — SODIUM BICARBONATE: 84 INJECTION, SOLUTION INTRAVENOUS at 10:51

## 2024-01-11 RX ADMIN — SUCRALFATE 1 G: 1 TABLET ORAL at 16:46

## 2024-01-11 RX ADMIN — BUMETANIDE 0.5 MG: 0.25 INJECTION INTRAMUSCULAR; INTRAVENOUS at 08:33

## 2024-01-11 RX ADMIN — SPIRONOLACTONE 12.5 MG: 25 TABLET ORAL at 08:32

## 2024-01-11 RX ADMIN — CETIRIZINE HYDROCHLORIDE 10 MG: 10 TABLET, FILM COATED ORAL at 08:32

## 2024-01-11 RX ADMIN — APIXABAN 5 MG: 5 TABLET, FILM COATED ORAL at 08:32

## 2024-01-11 RX ADMIN — GABAPENTIN 300 MG: 300 CAPSULE ORAL at 08:32

## 2024-01-11 RX ADMIN — VERAPAMIL HYDROCHLORIDE 40 MG: 80 TABLET ORAL at 08:32

## 2024-01-11 RX ADMIN — SODIUM CHLORIDE, PRESERVATIVE FREE 10 ML: 5 INJECTION INTRAVENOUS at 08:33

## 2024-01-11 RX ADMIN — ACETAMINOPHEN 650 MG: 325 TABLET ORAL at 10:26

## 2024-01-11 RX ADMIN — LISINOPRIL 2.5 MG: 2.5 TABLET ORAL at 08:32

## 2024-01-11 RX ADMIN — PANTOPRAZOLE SODIUM 40 MG: 40 TABLET, DELAYED RELEASE ORAL at 08:32

## 2024-01-11 RX ADMIN — CARVEDILOL 6.25 MG: 6.25 TABLET, FILM COATED ORAL at 16:46

## 2024-01-11 RX ADMIN — EZETIMIBE 10 MG: 10 TABLET ORAL at 08:31

## 2024-01-11 RX ADMIN — ASPIRIN 81 MG: 81 TABLET, CHEWABLE ORAL at 08:32

## 2024-01-11 RX ADMIN — AMLODIPINE BESYLATE 5 MG: 5 TABLET ORAL at 08:38

## 2024-01-11 RX ADMIN — BUMETANIDE 2 MG: 1 TABLET ORAL at 16:46

## 2024-01-11 RX ADMIN — BETHANECHOL CHLORIDE 25 MG: 25 TABLET ORAL at 08:32

## 2024-01-11 ASSESSMENT — PAIN SCALES - GENERAL
PAINLEVEL_OUTOF10: 5
PAINLEVEL_OUTOF10: 0

## 2024-01-11 ASSESSMENT — PAIN DESCRIPTION - FREQUENCY: FREQUENCY: CONTINUOUS

## 2024-01-11 ASSESSMENT — PAIN DESCRIPTION - PAIN TYPE: TYPE: ACUTE PAIN

## 2024-01-11 ASSESSMENT — PAIN DESCRIPTION - DESCRIPTORS: DESCRIPTORS: ACHING

## 2024-01-11 ASSESSMENT — PAIN DESCRIPTION - LOCATION: LOCATION: GENERALIZED

## 2024-01-11 ASSESSMENT — PAIN DESCRIPTION - ONSET: ONSET: GRADUAL

## 2024-01-11 ASSESSMENT — PAIN - FUNCTIONAL ASSESSMENT: PAIN_FUNCTIONAL_ASSESSMENT: PREVENTS OR INTERFERES SOME ACTIVE ACTIVITIES AND ADLS

## 2024-01-11 NOTE — PROGRESS NOTES
Patient arrived to cath holding room 3 from room pcu 731 for hold / cardiac catheterization. Patient is awake, alert, and oriented . Iv via lac with sodium bicarb dluids @ 150 ml/hr. NS started via 2 way tubing @ kvo now. Iv site clear signed.  Procedure reviewed with pt. Pt had already signed consent.  Bilateral groins & rt wrist clipped. Pedal pulses & rt radial pulsed doppled and marked. VSS. Pt stated, \"my chest just feels a little uncomfortable right now, not really pain. Rt groin & rt radial clipped.

## 2024-01-11 NOTE — PLAN OF CARE
Problem: Discharge Planning  Goal: Discharge to home or other facility with appropriate resources  Outcome: Completed  Flowsheets (Taken 1/11/2024 5332)  Discharge to home or other facility with appropriate resources:   Identify barriers to discharge with patient and caregiver   Arrange for needed discharge resources and transportation as appropriate   Identify discharge learning needs (meds, wound care, etc)   Refer to discharge planning if patient needs post-hospital services based on physician order or complex needs related to functional status, cognitive ability or social support system     Problem: Pain  Goal: Verbalizes/displays adequate comfort level or baseline comfort level  Outcome: Completed     Problem: ABCDS Injury Assessment  Goal: Absence of physical injury  Outcome: Completed     Problem: Safety - Adult  Goal: Free from fall injury  Outcome: Completed     Problem: Chronic Conditions and Co-morbidities  Goal: Patient's chronic conditions and co-morbidity symptoms are monitored and maintained or improved  Outcome: Completed

## 2024-01-11 NOTE — DISCHARGE INSTR - DIET

## 2024-01-11 NOTE — DISCHARGE INSTRUCTIONS
Restart Eliquis on 1/12/2024  Stop Metoprolol continue carvedilol  Discontinue verapamil-continue amlodipine

## 2024-01-11 NOTE — CARE COORDINATION
01/11/24 1555   IMM Letter   IMM Letter given to Patient/Family/Significant other/Guardian/POA/by: mg morris SW   IMM Letter date given: 01/11/24   IMM Letter time given: 0353     Second IMM given to patient and explained with patient verbalizing understanding.  All questions and concerns addressed     Signed letter placed in pt soft chart   Patient declined waiting 4 hr period prior to discharge.   Electronically signed by Mg Morris on 1/11/2024 at 3:56 PM

## 2024-01-11 NOTE — DISCHARGE SUMMARY
stable condition. Results: Patient had symptoms of dyspnea during infusion that resolved in recovery. Baseline EKG showed normal sinus rhythm with nonspecific ST/T changes. During stress there were no significant EKG changes or rhythm changes. Baseline and peak blood pressures were 137/89, and 137/89 respectively.  Baseline and peak heart rates were 100 and  112 respectively. EF=43% Lexiscan/Cardiolyte Nuclear Medicine Report Date of Procedure: 1/10/2024 The patient was injected with 8.8 millicuries (mCi) of Technetium (Tc99m).  After an appropriate level of stress the patient was re-injected with 24.9 millicuries (mCi) of Technetium (Tc99m).  Repeat gated images were then performed per standard protocol. Findings: 1.  Analysis of the the stress and rest images reveals small to moderate area of apical anterior reversible defect. Moderate area of mid to apical inferolateral and apical defect with minimal to mild reversibility. 2.  Analysis of the gated images reveals mild to moderately reduced left ventricular function with a calculated ejection fraction of   43 %.      Impression: There is possible small to moderate apical anterior ischemia with moderate mid to apical inferolateral and apical infarct with mild area infarct ischemia, with a calculated ejection fraction of 43 %. Suggest: New defects in apical anterior and apical distribution with possible ischemia. Consider cardiac catheterization. Signed by Dr Christiano POMPA CHEST PORTABLE    Result Date: 1/10/2024  EXAM:  AP CHEST.  HISTORY:  Chest pain.  FINDINGS: There are multiple sternotomy wires.  The bones are unremarkable.  The cardiac silhouette and pulmonary vasculature are within normal limits.  The costophrenic angles are clear.  No infiltrate or consolidation.      Impression:  No acute cardiopulmonary disease.    ______________________________________ Electronically signed by: ABHIJEET DOMINGUEZ M.D. Date:     01/10/2024 Time:    03:34       Pertinent

## 2024-01-12 NOTE — PROGRESS NOTES
CLINICAL PHARMACY NOTE: MEDS TO BEDS    Total # of Prescriptions Filled: 1   The following medications were delivered to the patient:  Discharge Medication List as of 1/11/2024  6:08 PM      Coreg      Additional Documentation:     Delivered Rx's to patient on 7th floor.

## 2024-01-12 NOTE — ADT AUTH CERT
Utilization Reviews       Letter of Determination by Lenora Zavala RN  Last Updated by Lenora Zavala RN on 2024 7998     Review Status Created By   In Primary Lenora Zavala RN       Review Type   --      Criteria Review   SLR Upgrade Observation to Inpatient  Name: Chelsea Galan   : 1975   CSN: [id number]   INSURANCE: [employer]    Date of admission: 24    Current status: Observation    We recommend that patient's status is upgraded to INPATIENT; if you agree, please place a new ADMIT order in [facility] as recommended.     Rationale: p/w cp-cxr neg. Trps 22, 22. Cards consulted.s/p stress test- Apical anterior and apical distribution defects appear new. Inferolateral defect appears chronic consistent with prior infarct. EF stable. Plan for cardiac cath.  Rec upgrade to inpt    Clinical summary 48 y.o. female with as above   Vitals vss  Labs and Imaging reviewed    Status decision based on clinical judgment and Traditional FFS Medicare or Medicare Advantage: Two-Midnight-Rule applies     This chart was reviewed at 1:45 PM EST on 2024.     Martin Martin MD  Physician Advisor  [facility]  Cell [phone]

## 2024-01-26 ENCOUNTER — HOSPITAL ENCOUNTER (OUTPATIENT)
Dept: PHYSICAL THERAPY | Age: 49
Setting detail: THERAPIES SERIES
Discharge: HOME | End: 2024-01-26
Payer: MEDICARE

## 2024-01-26 PROCEDURE — 97162 PT EVAL MOD COMPLEX 30 MIN: CPT

## 2024-01-26 ASSESSMENT — PAIN DESCRIPTION - PAIN TYPE: TYPE: CHRONIC PAIN

## 2024-01-26 ASSESSMENT — PAIN DESCRIPTION - ORIENTATION: ORIENTATION: RIGHT;LEFT

## 2024-01-26 ASSESSMENT — PAIN DESCRIPTION - DESCRIPTORS: DESCRIPTORS: ACHING;TIGHTNESS

## 2024-01-26 ASSESSMENT — PAIN DESCRIPTION - LOCATION: LOCATION: NECK;SHOULDER

## 2024-01-26 ASSESSMENT — PAIN SCALES - GENERAL: PAINLEVEL_OUTOF10: 5

## 2024-01-29 ENCOUNTER — HOSPITAL ENCOUNTER (OUTPATIENT)
Dept: PHYSICAL THERAPY | Age: 49
Setting detail: THERAPIES SERIES
Discharge: HOME | End: 2024-01-29
Payer: MEDICARE

## 2024-01-29 PROCEDURE — 97110 THERAPEUTIC EXERCISES: CPT

## 2024-01-29 ASSESSMENT — PAIN SCALES - GENERAL: PAINLEVEL_OUTOF10: 6

## 2024-01-29 ASSESSMENT — PAIN DESCRIPTION - LOCATION: LOCATION: SHOULDER;NECK

## 2024-01-29 NOTE — PROGRESS NOTES
Physical Therapy: Daily Note   Patient: Chelsea Galan (48 y.o. female)   Examination Date: 2024  Plan of Care/Certification Expiration Date: 24    No data recorded   :  1975 # of Visits since SOC:   2   MRN: [id number]  CSN: [id number] Start of Care Date:   2024   Insurance: Payor: [employer] / Plan: [employer] MEDICARE / Product Type: *No Product type* /   Insurance ID: [id number] - (Medicare Managed) Secondary Insurance (if applicable): MEDICAID [place]   Referring Physician: Constantino Wiggins PA Adam, Myers, PA   PCP: Genny Montiel DO Visits to Date/Visits Approved:     No Show/Cancelled Appts:   /       Medical Diagnosis: Cervicalgia [M54.2] Neck pain  Treatment Diagnosis: Neck pain        SUBJECTIVE EXAMINATION   Pain Level: Pain Screening  Patient Currently in Pain: Yes  Pain Assessment: 0-10  Pain Level: 6  Pain Location: Shoulder, Neck    Patient Comments: Subjective: She rates neck and upper shoulder pain at 6/10.  She relates no increase pain after initial PT eval.    HEP Compliance: Good        OBJECTIVE EXAMINATION   Restrictions:  Restrictions/Precautions: Seizure   No data recorded No data recorded                TREATMENT     Exercises:      Treatment Reasoning    Exercise 1: CROM, 5 reps  Exercise 2: Bilat upper trap stretch, 10 sec, 3 reps  Exercise 3: Bilat levator stretch, 10 sec, 3 reps  Exercise 4: Bilat scalene stretch, 10 sec, 3 reps  Exercise 5: Chin tucks, 10 reps  Exercise 6: Slouch with overcorrection, 10 reps  Exercise 7: Thoracic extension with hands clasped behind neck, 10 reps  Exercise 8: Corner stretch, 10 sec, 3 reps  Exercise 9: Pec stretch on doorway, 10 sec, 3 reps  Exercise 10: Fwd bent rows, 2#, 10 reps  Exercise 11: Scapular retraction with t-band not today  Exercise 12: Standing Is, Ts, Ys not today  Exercise 13: Rolling t-ball up wall not today  Exercise 14: UBE not today  Exercise 15: Suboccipital release/STM/IASTM in supine and/or sitting (may need head

## 2024-02-01 ENCOUNTER — APPOINTMENT (OUTPATIENT)
Dept: PHYSICAL THERAPY | Age: 49
End: 2024-02-01
Payer: MEDICARE

## 2024-02-01 ENCOUNTER — HOSPITAL ENCOUNTER (OUTPATIENT)
Dept: PHYSICAL THERAPY | Age: 49
Setting detail: THERAPIES SERIES
Discharge: HOME | End: 2024-02-01
Payer: MEDICARE

## 2024-02-01 PROCEDURE — 97110 THERAPEUTIC EXERCISES: CPT

## 2024-02-01 PROCEDURE — 97140 MANUAL THERAPY 1/> REGIONS: CPT

## 2024-02-01 ASSESSMENT — PAIN DESCRIPTION - PAIN TYPE: TYPE: CHRONIC PAIN

## 2024-02-01 ASSESSMENT — PAIN DESCRIPTION - ORIENTATION: ORIENTATION: RIGHT;LEFT

## 2024-02-01 ASSESSMENT — PAIN SCALES - GENERAL: PAINLEVEL_OUTOF10: 4

## 2024-02-01 ASSESSMENT — PAIN DESCRIPTION - LOCATION: LOCATION: NECK;SHOULDER

## 2024-02-01 NOTE — PROGRESS NOTES
Daily Treatment Note  Date: 2024  Patient Name: Chelsea Galan  MRN: [id number]     :   1975    Referring Physician: Constantino Wiggins PA Adam, Myers, PA   PCP: Genny Montiel DO    Medical Diagnosis: Cervicalgia [M54.2] Neck pain  Treatment Diagnosis: Neck pain      Insurance: Payor: [employer]ICARE / Plan: [employer] MEDICARE / Product Type: *No Product type* /   Insurance ID: [id number] - (Medicare Managed)    Subjective:   General  Diagnosis: Neck pain  Referring Provider (secondary): Feliciano Meeks PA  PT Insurance Information: [employer] Medicare (Primary- precert req'd), [place] Medicaid (No auth)  Total # of Visits Approved: 7  Total # of Visits to Date: 3  Plan of Care/Certification Expiration Date: 24  Progress Note Due Date: 24  Referring Provider (secondary): Feliciano Meeks PA  Subjective: pertty stiff,  pain is about a 4  Pain Level: 4  Pain Type: Chronic pain  Pain Location: Neck, Shoulder  Pain Orientation: Right, Left       Treatment Activities:  Exercises:      Treatment Reasoning    Exercise 1: CROM, 5 reps  Exercise 2: Bilat upper trap stretch, 10 sec, 3 reps  Exercise 3: Bilat levator stretch, 10 sec, 3 reps  Exercise 4: Bilat scalene stretch, 10 sec, 3 reps  Exercise 5: Chin tucks, 10 reps  Exercise 6: Slouch with overcorrection, 10 reps  Exercise 7: Thoracic extension with hands clasped behind neck, 10 reps  Exercise 8: Corner stretch, 10 sec, 3 reps  Exercise 9: Pec stretch on doorway, 10 sec, 3 reps  Exercise 10: Fwd bent rows, 2#, 10 reps  Exercise 11: Scapular retraction with t-band red  x 10  Exercise 12: Standing Is, Ts, Ys  red  x 5 ea  Exercise 13: Rolling t-ball up wall not today  Exercise 14: UBE not today  Exercise 15: Suboccipital release/STM/IASTM in supine and/or sitting (may need head elevated in supine)  Sitting IASTM to neck/upper shoulders, 5 min.  Exercise 16: NO IFC  Exercise 17: : HEP ISSUED                           Assessment:   Conditions Requiring Skilled Therapeutic

## 2024-02-05 ENCOUNTER — APPOINTMENT (OUTPATIENT)
Dept: PHYSICAL THERAPY | Age: 49
End: 2024-02-05
Payer: MEDICARE

## 2024-02-12 ENCOUNTER — HOSPITAL ENCOUNTER (OUTPATIENT)
Dept: PHYSICAL THERAPY | Age: 49
Setting detail: THERAPIES SERIES
Discharge: HOME | End: 2024-02-12
Payer: MEDICARE

## 2024-02-12 PROCEDURE — 97110 THERAPEUTIC EXERCISES: CPT

## 2024-02-12 NOTE — PROGRESS NOTES
Physical Therapy: Daily Note   Patient: Chelsea Galan (48 y.o. female)   Examination Date: 2024  Plan of Care/Certification Expiration Date: 24    No data recorded   :  1975 # of Visits since SOC:   4   MRN: [id number]  CSN: [id number] Start of Care Date:   2024   Insurance: Payor: [employer] MEDICARE / Plan: [employer] MEDICARE / Product Type: *No Product type* /   Insurance ID: [id number] - (Medicare Managed) Secondary Insurance (if applicable): MEDICAID [place]   Referring Physician: Constantino Wiggins PA Adam, Myers, PA   PCP: Genny Montiel DO Visits to Date/Visits Approved:     No Show/Cancelled Appts:   /       Medical Diagnosis: Cervicalgia [M54.2] Neck pain  Treatment Diagnosis: Neck pain        SUBJECTIVE EXAMINATION   Pain Level: Pain Screening  Patient Currently in Pain: Yes  Pain Assessment: 0-10  Pain Level: 5  Pain Type: Chronic pain  Pain Location: Neck, Head  Pain Orientation: Right, Left  Pain Descriptors: Aching, Tightness    Patient Comments: Subjective: I have had several dizzy spells today and yesterday.  I also have a headache thats very uncomfortable today.      TREATMENT     Exercises:      Treatment Reasoning    Exercise 1: CROM, 5 reps  Exercise 2: Bilat upper trap stretch, 10 sec, 3 reps  Exercise 3: Bilat levator stretch, 10 sec, 3 reps  Exercise 4: Bilat scalene stretch, 10 sec, 3 reps  Exercise 5: Chin tucks, 10 reps  Exercise 6: Slouch with overcorrection, 10 reps  Exercise 7: Thoracic extension with hands clasped behind neck, 10 reps  Exercise 8: Corner stretch, 10 sec, 3 reps  Exercise 9: Pec stretch on doorway, 10 sec, 3 reps  Exercise 10: Fwd bent rows, 2#, 10 reps  Exercise 11: Scapular retraction with t-band red  x 10  Exercise 12: Standing Is x 10, Ts x 10, Ys- x 5  red  Exercise 13: Rolling t-ball up wall -not today  Exercise 14: UBE -NOT TODAY  Exercise 15: Suboccipital release/STM/IASTM in supine and/or sitting (may need head elevated in supine)  Sitting IASTM to

## 2024-02-15 ENCOUNTER — HOSPITAL ENCOUNTER (OUTPATIENT)
Dept: PHYSICAL THERAPY | Age: 49
Setting detail: THERAPIES SERIES
Discharge: HOME | End: 2024-02-15
Payer: MEDICARE

## 2024-02-15 PROCEDURE — 97110 THERAPEUTIC EXERCISES: CPT

## 2024-02-15 NOTE — PROGRESS NOTES
Conditions Requiring Skilled Therapeutic Intervention  Body Structures, Functions, Activity Limitations Requiring Skilled Therapeutic Intervention: Decreased ROM;Decreased high-level IADLs;Vestibular Impairment;Decreased posture;Increased pain  Assessment: Added ball roll up wall to todays routine with good tolerance, she was more tender on her R neck/shoulder region performing IASTM vs her L  Treatment Diagnosis: Neck pain  Requires PT Follow-Up: Yes        Goals:  Short Term Goals  Time Frame for Short Term Goals: 3-4 weeks  Short Term Goal 1: Independent with HEP  STG Goal 1 Status:: New  Short Term Goal 2: Improve cervical ROM to at least 50 degrees flex/ext and 100% rotation  STG Goal 2 Status:: New  STG Goal 3 Status:: New  Long Term Goals  Time Frame for Long Term Goals : 4-6 weeks  Long Term Goal 1: Decrease frequency and intensity dizziness by at least 50%  LTG Goal 1 Status:: New  Long Term Goal 2: Improve NPDI score to at least 25% impairment or better  LTG Goal 2 Status:: New  Patient Goals   Patient Goals : decrease neck pain    Plan:    Physical Therapy Plan  Plan weeks: 4-6 weeks  Current Treatment Recommendations: Strengthening, ROM, Manual, Pain management, Home exercise program, Equipment evaluation, education, & procurement, Patient/Caregiver education & training, Safety education & training      Therapy Time:   Individual Concurrent Group Co-treatment   Time In 1103         Time Out 1139         Minutes 36            Beck Moore PTA   Electronically signed by Beck Moore PTA on 2/15/2024 at 11:42 AM

## 2024-02-19 ENCOUNTER — HOSPITAL ENCOUNTER (OUTPATIENT)
Dept: PHYSICAL THERAPY | Age: 49
Setting detail: THERAPIES SERIES
Discharge: HOME | End: 2024-02-19
Payer: MEDICARE

## 2024-02-19 PROCEDURE — 97110 THERAPEUTIC EXERCISES: CPT

## 2024-02-19 ASSESSMENT — PAIN DESCRIPTION - PAIN TYPE: TYPE: CHRONIC PAIN

## 2024-02-19 ASSESSMENT — PAIN SCALES - GENERAL: PAINLEVEL_OUTOF10: 2

## 2024-02-19 ASSESSMENT — PAIN DESCRIPTION - LOCATION: LOCATION: NECK

## 2024-02-19 ASSESSMENT — PAIN DESCRIPTION - DESCRIPTORS: DESCRIPTORS: TIGHTNESS

## 2024-02-19 NOTE — PROGRESS NOTES
Physical Therapy: Daily Note   Patient: Chelsea Galan (48 y.o. female)   Examination Date: 2024  Plan of Care/Certification Expiration Date: 24    No data recorded   :  1975 # of Visits since SOC:   6   MRN: [id number]  CSN: [id number] Start of Care Date:   2024   Insurance: Payor: [employer] MEDICARE / Plan: [employer] MEDICARE / Product Type: *No Product type* /   Insurance ID: [id number] - (Medicare Managed) Secondary Insurance (if applicable): MEDICAID [place]   Referring Physician: Constantino Wiggins PA Adam, Myers, PA   PCP: Genny Montiel DO Visits to Date/Visits Approved:     No Show/Cancelled Appts:   /       Medical Diagnosis: Cervicalgia [M54.2]    Treatment Diagnosis: Neck pain        SUBJECTIVE EXAMINATION   Pain Level: Pain Screening  Patient Currently in Pain: Yes  Pain Assessment: 0-10  Pain Level: 2  Pain Type: Chronic pain  Pain Location: Neck  Pain Descriptors: Tightness    Patient Comments: Subjective: \"Not really having pain right now, it's just tight.\"  Complains of continued decrease of energy after hospitalization just over a month ago.  States that dizziness \"comes and goes\" but not having it right now.    OBJECTIVE EXAMINATION   Restrictions:  Restrictions/Precautions: Seizure   No data recorded No data recorded      TREATMENT     Exercises:      Treatment Reasoning    Exercise 1: CROM, 5 reps  Exercise 2: Bilat upper trap stretch, 10 sec, 3 reps  Exercise 3: Bilat levator stretch, 10 sec, 3 reps  Exercise 4: Bilat scalene stretch, 10 sec, 3 reps  Exercise 5: Chin tucks, 10 reps  Exercise 6: Slouch with overcorrection, 10 reps  Exercise 7: Thoracic extension with hands clasped behind neck, 5 x 5\"  Exercise 8: Corner stretch, 10 sec, 3 reps  Exercise 9: Pec stretch on doorway, 10 sec, 3 reps  Exercise 10: Fwd bent rows, 2#, 10 reps  Exercise 11: Scapular retraction with t-band red  x 10  Exercise 12: Standing Is x 10, Ts x 10, Ys- x 5  red  Exercise 13: Rolling t-ball up wall x

## 2024-02-22 ENCOUNTER — HOSPITAL ENCOUNTER (OUTPATIENT)
Dept: PHYSICAL THERAPY | Age: 49
Setting detail: THERAPIES SERIES
Discharge: HOME | End: 2024-02-22
Payer: MEDICARE

## 2024-02-22 PROCEDURE — 97140 MANUAL THERAPY 1/> REGIONS: CPT

## 2024-02-22 PROCEDURE — 97110 THERAPEUTIC EXERCISES: CPT

## 2024-02-22 ASSESSMENT — PAIN SCALES - GENERAL: PAINLEVEL_OUTOF10: 3

## 2024-02-22 ASSESSMENT — PAIN DESCRIPTION - DESCRIPTORS: DESCRIPTORS: TIGHTNESS

## 2024-02-22 ASSESSMENT — PAIN DESCRIPTION - PAIN TYPE: TYPE: CHRONIC PAIN

## 2024-02-22 ASSESSMENT — PAIN DESCRIPTION - ORIENTATION: ORIENTATION: RIGHT;LEFT

## 2024-02-22 ASSESSMENT — PAIN DESCRIPTION - LOCATION: LOCATION: NECK

## 2024-02-22 NOTE — PROGRESS NOTES
Physical Therapy: Daily Note   Patient: Chelsea Galan (48 y.o. female)   Examination Date: 2024  Plan of Care/Certification Expiration Date: 24    No data recorded   :  1975 # of Visits since SOC:   7   MRN: [id number]  CSN: [id number] Start of Care Date:   2024   Insurance: Payor: [employer] MEDICARE / Plan: [employer] MEDICARE / Product Type: *No Product type* /   Insurance ID: [id number] - (Medicare Managed) Secondary Insurance (if applicable): MEDICAID [place]   Referring Physician: Constantino Wiggins PA Adam, Myers, PA   PCP: Genny Montiel DO Visits to Date/Visits Approved:     No Show/Cancelled Appts:   /       Medical Diagnosis: Cervicalgia [M54.2]    Treatment Diagnosis: Neck pain        SUBJECTIVE EXAMINATION   Pain Level: Pain Screening  Patient Currently in Pain: Yes  Pain Assessment: 0-10  Pain Level: 3  Pain Type: Chronic pain  Pain Location: Neck  Pain Orientation: Right, Left  Pain Descriptors: Tightness    Patient Comments: Subjective: Therapy is helping me.        TREATMENT     Exercises:      Treatment Reasoning    Exercise 1: CROM, 10 reps  Exercise 2: Bilat upper trap stretch, 10 sec, 3 reps  Exercise 3: Bilat levator stretch, 10 sec, 3 reps  Exercise 4: Bilat scalene stretch, 10 sec, 3 reps  Exercise 5: Chin tucks, 10 reps  Exercise 6: Slouch with overcorrection, 10 reps  Exercise 7: Thoracic extension with hands clasped behind neck, 5 x 5\"  Exercise 8: Corner stretch, 10 sec, 3 reps  Exercise 9: Pec stretch on doorway, 10 sec, 3 reps  Exercise 10: Fwd bent rows, 2#, 10 reps  Exercise 11: Scapular retraction with t-band red  x 10  Exercise 12: Standing Is x 10, Ts x 10, Ys- x 5  red  Exercise 13: Rolling t-ball up wall x 5  Exercise 14: UBE -NOT TODAY  Exercise 15: Suboccipital release/STM/IASTM in supine and/or sitting (may need head elevated in supine)  Sitting IASTM/STM to neck/upper shoulders, 10 min.  Exercise 16: NO IFC  Exercise 17: 2/: HEP ISSUED

## 2024-02-26 ENCOUNTER — HOSPITAL ENCOUNTER (OUTPATIENT)
Dept: PHYSICAL THERAPY | Age: 49
Setting detail: THERAPIES SERIES
Discharge: HOME | End: 2024-02-26
Payer: MEDICARE

## 2024-02-26 PROCEDURE — 97110 THERAPEUTIC EXERCISES: CPT

## 2024-02-26 ASSESSMENT — PAIN DESCRIPTION - ORIENTATION: ORIENTATION: RIGHT;LEFT

## 2024-02-26 ASSESSMENT — PAIN DESCRIPTION - PAIN TYPE: TYPE: CHRONIC PAIN

## 2024-02-26 ASSESSMENT — PAIN DESCRIPTION - LOCATION: LOCATION: NECK

## 2024-02-26 ASSESSMENT — PAIN DESCRIPTION - DESCRIPTORS: DESCRIPTORS: TIGHTNESS

## 2024-02-26 ASSESSMENT — PAIN SCALES - GENERAL: PAINLEVEL_OUTOF10: 2

## 2024-02-26 NOTE — PROGRESS NOTES
still having it----- 2/26: \"Yesterday was pretty rough.  Lately it has been happening a couple of times a day.\" In progress   Improve NPDI score to at least 25% impairment or better 2/22/2024-NPDI score at 22% impairment-------- 2/26: 18% impairment Met                                                                        TREATMENT PLAN   Plan Frequency: 2x  Plan weeks: 4-6 weeks  Current Treatment Recommendations: Strengthening, ROM, Manual, Pain management, Home exercise program, Equipment evaluation, education, & procurement, Patient/Caregiver education & training, Safety education & training   Requires PT Follow-Up: Yes     Therapy Time  Individual Time In: 1100       Individual Time Out: 1148  Minutes: 48      Electronically signed by Sharifa Urban, PT  on 2/26/2024 at 2:35 PM

## 2024-02-29 ENCOUNTER — HOSPITAL ENCOUNTER (OUTPATIENT)
Dept: PHYSICAL THERAPY | Age: 49
Setting detail: THERAPIES SERIES
Discharge: HOME | End: 2024-02-29
Payer: MEDICARE

## 2024-02-29 PROCEDURE — 97140 MANUAL THERAPY 1/> REGIONS: CPT

## 2024-02-29 PROCEDURE — 97110 THERAPEUTIC EXERCISES: CPT

## 2024-02-29 NOTE — PROGRESS NOTES
Physical Therapy: Daily Note   Patient: Chelsea Galan (48 y.o. female)   Examination Date: 2024  Plan of Care/Certification Expiration Date: 24    No data recorded   :  1975 # of Visits since SOC:   9   MRN: [id number]  CSN: [id number] Start of Care Date:   2024   Insurance: Payor: [employer] MEDICARE / Plan: [employer] MEDICARE / Product Type: *No Product type* /   Insurance ID: [id number] - (Medicare Managed) Secondary Insurance (if applicable): MEDICAID [place]   Referring Physician: Constantino Wiggins PA Adam, Myers, PA   PCP: Genny Montiel DO Visits to Date/Visits Approved:     No Show/Cancelled Appts:   /       Medical Diagnosis: Cervicalgia [M54.2]    Treatment Diagnosis: Neck pain        SUBJECTIVE EXAMINATION   Pain Level: Pain Screening  Patient Currently in Pain: Denies    Patient Comments: Subjective: I dont have pain today , its just tension.        TREATMENT     Exercises:      Treatment Reasoning    Exercise 1: CROM, 10 reps  Exercise 2: Bilat upper trap stretch, 10 sec, 3 reps  Exercise 3: Bilat levator stretch, 10 sec, 3 reps  Exercise 4: Bilat scalene stretch, 10 sec, 3 reps  Exercise 5: Chin tucks, 10 reps  Exercise 6: Slouch with overcorrection, 10 reps  Exercise 7: Thoracic extension with hands clasped behind neck, 5 x 5\"  Exercise 8: Corner stretch, 10 sec, 3 reps  Exercise 9: Pec stretch on doorway, 10 sec, 3 reps  Exercise 10: Fwd bent rows, 2#, 10 reps  Exercise 11: Scapular retraction with t-band red  x 10  Exercise 12: Standing Is x 10, Ts x 10, Ys- x 5  red  Exercise 13: Rolling t-ball up wall x 5  Exercise 14: UBE -NOT TODAY  Exercise 15: Suboccipital release/STM/IASTM in supine and/or sitting (may need head elevated in supine)  Sitting IASTM/STM to neck/upper shoulders, 12 min.  Exercise 16: NO IFC  Exercise 17: : HEP ISSUED                                 ASSESSMENT     Assessment: Assessment: Patient did well today in session.  She reported no pain, just tension.  She was

## 2024-03-04 ENCOUNTER — HOSPITAL ENCOUNTER (OUTPATIENT)
Dept: PHYSICAL THERAPY | Age: 49
Setting detail: THERAPIES SERIES
Discharge: HOME | End: 2024-03-04
Payer: MEDICARE

## 2024-03-04 PROCEDURE — 97110 THERAPEUTIC EXERCISES: CPT

## 2024-03-04 PROCEDURE — 97140 MANUAL THERAPY 1/> REGIONS: CPT

## 2024-03-04 NOTE — PROGRESS NOTES
Daily Treatment Note  Date: 3/4/2024  Patient Name: Chelsea Galan  MRN: [id number]     :   1975    Referring Physician: Constantino Wiggins PA Adam, Myers, PA   PCP: Genny Montiel DO    Medical Diagnosis: Cervicalgia [M54.2] Neck pain  Treatment Diagnosis: Neck pain      Insurance: Payor: [employer] MEDICARE / Plan: [employer] MEDICARE / Product Type: *No Product type* /   Insurance ID: [id number] - (Medicare Managed)    Subjective:   General  Diagnosis: Neck pain  Referring Provider (secondary): Feliciano Meeks PA  PT Insurance Information: [employer] Medicare (Primary- precert req'd), [place] Medicaid (No auth)  Total # of Visits Approved: 11  Total # of Visits to Date: 10  Plan of Care/Certification Expiration Date: 24  Progress Note Due Date: 24  Referring Provider (secondary): Feliciano Meeks PA  Subjective: not bad today, no pain just stiffness  Patient Currently in Pain: No       Treatment Activities:  Exercises:      Treatment Reasoning    Exercise 1: CROM, 10 reps  Exercise 2: Bilat upper trap stretch, 10 sec, 3 reps  Exercise 3: Bilat levator stretch, 10 sec, 3 reps  Exercise 4: Bilat scalene stretch, 10 sec, 3 reps  Exercise 5: Chin tucks, 10 reps  Exercise 6: Slouch with overcorrection, 10 reps  Exercise 7: Thoracic extension with hands clasped behind neck, 5 x 5\"  Exercise 8: Corner stretch, 10 sec, 3 reps  Exercise 9: Pec stretch on doorway, 10 sec, 3 reps  Exercise 10: Fwd bent rows, 2#, 10 reps  Exercise 11: Scapular retraction with t-band red  x 10  Exercise 12: Standing Is x 10, Ts x 10, Ys- x 5  red  Exercise 13: Rolling t-ball up wall x 5  Exercise 14: UBE -NOT TODAY  Exercise 15: Suboccipital release/STM/IASTM in supine and/or sitting (may need head elevated in supine)  Sitting IASTM/STM to neck/upper shoulders, 12 min.  Exercise 16: NO IFC  Exercise 17: : HEP ISSUED                           Assessment:   Conditions Requiring Skilled Therapeutic Intervention  Body Structures, Functions, Activity

## 2024-03-11 ENCOUNTER — HOSPITAL ENCOUNTER (OUTPATIENT)
Dept: PHYSICAL THERAPY | Age: 49
Setting detail: THERAPIES SERIES
Discharge: HOME | End: 2024-03-11
Payer: MEDICARE

## 2024-03-11 PROCEDURE — 97140 MANUAL THERAPY 1/> REGIONS: CPT

## 2024-03-11 PROCEDURE — 97110 THERAPEUTIC EXERCISES: CPT

## 2024-03-11 ASSESSMENT — PAIN SCALES - GENERAL: PAINLEVEL_OUTOF10: 3

## 2024-03-11 ASSESSMENT — PAIN DESCRIPTION - PAIN TYPE: TYPE: CHRONIC PAIN

## 2024-03-11 ASSESSMENT — PAIN DESCRIPTION - ORIENTATION: ORIENTATION: RIGHT

## 2024-03-11 ASSESSMENT — PAIN DESCRIPTION - LOCATION: LOCATION: NECK

## 2024-03-11 NOTE — PROGRESS NOTES
Daily Treatment Note  Date: 3/11/2024  Patient Name: Chelsea Galan  MRN: [id number]     :   1975    Referring Physician: Constantino Wiggins PA Adam, Myers, PA   PCP: Genny Montiel DO    Medical Diagnosis: Cervicalgia [M54.2] Neck pain  Treatment Diagnosis: Neck pain      Insurance: Payor: [employer] MEDICARE / Plan: [employer] MEDICARE / Product Type: *No Product type* /   Insurance ID: [id number] - (Medicare Managed)    Subjective:   General  Diagnosis: Neck pain  Referring Provider (secondary): Feliciano Meeks PA  PT Insurance Information: [employer] Medicare (Primary- precert req'd), [place] Medicaid (No auth)  Total # of Visits Approved: 11  Total # of Visits to Date: 11  Plan of Care/Certification Expiration Date: 24  Progress Note Due Date: 24  Referring Provider (secondary): Feliciano Meeks PA  Subjective: a little pain across the back of my neck on the R side  Patient Currently in Pain: Yes  Pain Level: 3  Pain Type: Chronic pain  Pain Location: Neck  Pain Orientation: Right       Treatment Activities:  Exercises:      Treatment Reasoning    Exercise 1: CROM, 10 reps  Exercise 2: Bilat upper trap stretch, 10 sec, 3 reps  Exercise 3: Bilat levator stretch, 10 sec, 3 reps  Exercise 4: Bilat scalene stretch, 10 sec, 3 reps  Exercise 5: Chin tucks, 10 reps  Exercise 6: Slouch with overcorrection, 10 reps  Exercise 7: Thoracic extension with hands clasped behind neck, 5 x 5\"  Exercise 8: Pec stretch on doorway, 10 sec, 3 reps  Exercise 9: Corner stretch, 10 sec, 3 reps  Exercise 10: Fwd bent rows, 2#, 10 reps  Exercise 11: Scapular retraction with t-band red  x 10  Exercise 12: Standing Is x 10, Ts x 10, Ys- x 5  red  Exercise 13: Rolling t-ball up wall x 5  Exercise 14: UBE -NOT TODAY  Exercise 15: Suboccipital release/STM/IASTM in supine and/or sitting (may need head elevated in supine)  Sitting IASTM/STM to neck/upper shoulders, 12 min.  Exercise 17: 2/: HEP ISSUED                           Assessment:   Conditions 
3/11:  22% impairment Met                                                                        TREATMENT PLAN       Plan: Discharge from PT services    Electronically signed by Sharifa Urban PT on 3/11/2024 at 1:40 PM

## 2024-04-30 LAB
ANION GAP SERPL CALCULATED.3IONS-SCNC: 12 MMOL/L (ref 7–19)
BUN SERPL-MCNC: 29 MG/DL (ref 6–20)
CALCIUM SERPL-MCNC: 9 MG/DL (ref 8.6–10)
CHLORIDE SERPL-SCNC: 103 MMOL/L (ref 98–111)
CO2 SERPL-SCNC: 25 MMOL/L (ref 22–29)
CREAT SERPL-MCNC: 1.6 MG/DL (ref 0.5–0.9)
GLUCOSE SERPL-MCNC: 96 MG/DL (ref 74–109)
POTASSIUM SERPL-SCNC: 3.9 MMOL/L (ref 3.5–5)
SODIUM SERPL-SCNC: 140 MMOL/L (ref 136–145)

## 2024-08-23 ENCOUNTER — OFFICE VISIT (OUTPATIENT)
Age: 49
End: 2024-08-23

## 2024-08-23 VITALS
SYSTOLIC BLOOD PRESSURE: 126 MMHG | TEMPERATURE: 97.4 F | RESPIRATION RATE: 20 BRPM | DIASTOLIC BLOOD PRESSURE: 78 MMHG | HEART RATE: 88 BPM | OXYGEN SATURATION: 97 % | WEIGHT: 293 LBS | BODY MASS INDEX: 52.7 KG/M2

## 2024-08-23 DIAGNOSIS — H92.03 OTALGIA OF BOTH EARS: ICD-10-CM

## 2024-08-23 DIAGNOSIS — R05.1 ACUTE COUGH: ICD-10-CM

## 2024-08-23 DIAGNOSIS — R52 BODY ACHES: ICD-10-CM

## 2024-08-23 DIAGNOSIS — H65.01 NON-RECURRENT ACUTE SEROUS OTITIS MEDIA OF RIGHT EAR: Primary | ICD-10-CM

## 2024-08-23 DIAGNOSIS — R42 DIZZY: ICD-10-CM

## 2024-08-23 DIAGNOSIS — Z86.19 HISTORY OF CANDIDAL VULVOVAGINITIS: ICD-10-CM

## 2024-08-23 LAB
Lab: NORMAL
QC PASS/FAIL: NORMAL
SARS-COV-2, POC: NORMAL

## 2024-08-23 RX ORDER — AMOXICILLIN AND CLAVULANATE POTASSIUM 875; 125 MG/1; MG/1
1 TABLET, FILM COATED ORAL 2 TIMES DAILY
Qty: 14 TABLET | Refills: 0 | Status: SHIPPED | OUTPATIENT
Start: 2024-08-23 | End: 2024-08-30

## 2024-08-23 RX ORDER — FLUTICASONE PROPIONATE 50 MCG
1 SPRAY, SUSPENSION (ML) NASAL DAILY
Qty: 16 G | Refills: 0 | Status: SHIPPED | OUTPATIENT
Start: 2024-08-23

## 2024-08-23 RX ORDER — FLUCONAZOLE 150 MG/1
150 TABLET ORAL ONCE
Qty: 1 TABLET | Refills: 0 | Status: SHIPPED | OUTPATIENT
Start: 2024-08-23 | End: 2024-08-23

## 2024-08-23 RX ORDER — CETIRIZINE HYDROCHLORIDE 10 MG/1
10 TABLET ORAL DAILY
Qty: 30 TABLET | Refills: 0 | Status: SHIPPED | OUTPATIENT
Start: 2024-08-23

## 2024-08-23 RX ORDER — BROMPHENIRAMINE MALEATE, PSEUDOEPHEDRINE HYDROCHLORIDE, AND DEXTROMETHORPHAN HYDROBROMIDE 2; 30; 10 MG/5ML; MG/5ML; MG/5ML
10 SYRUP ORAL 4 TIMES DAILY PRN
Qty: 200 ML | Refills: 0 | Status: SHIPPED | OUTPATIENT
Start: 2024-08-23 | End: 2024-08-28

## 2024-08-23 ASSESSMENT — ENCOUNTER SYMPTOMS
BLOOD IN STOOL: 0
VOMITING: 0
WHEEZING: 0
SORE THROAT: 0
EYE DISCHARGE: 0
COLOR CHANGE: 0
SINUS PRESSURE: 0
ABDOMINAL PAIN: 0
SHORTNESS OF BREATH: 0
EYE ITCHING: 0
DIARRHEA: 0
NAUSEA: 0
COUGH: 1
CONSTIPATION: 0
RHINORRHEA: 1

## 2024-08-23 NOTE — PATIENT INSTRUCTIONS
- Take full course of antibiotics  - Increase fluid intake  - Recommended OTC flonase  - Recommended OTC claritin or zyrtec  - The patient is to follow up with PCP or return to clinic if symptoms worsen/fail to improve.     Any condition can change, despite proper treatment. Therefore, if symptoms still persist or worsen after treatment plan intitated today, either go to the nearest ER, or call PCP, or return to UC for further evaluation.    Urgent Care evaluation today is not a substitute for PCP visit. Follow up care is your responsibility to discuss and review this UC visit.     Discussed use, benefits, and side effects of any prescribed medications. All patient questions were answered. Patient demonstrates understanding and agrees with care plan. Patient was given educational materials - see patient instructions below

## 2024-08-23 NOTE — PROGRESS NOTES
[facility]  [facility]  [address] [place] [address] DRIVE  [facility] [address]  Dept: [phone]  Dept Fax: [phone]  Loc: [phone]    Chelsea Galan is a 49 y.o. female who presents today for her medical conditions/complaints as noted below.  Chelsea Galan is complaining of Otalgia ([place]), Headache, Generalized Body Aches, and Dizziness        HPI:   Otalgia   There is pain in both ears. This is a new problem. Episode onset: 2-3 days ago. The problem has been unchanged. There has been no fever. The pain is moderate. Associated symptoms include coughing, headaches, a rash and rhinorrhea. Pertinent negatives include no abdominal pain, diarrhea, ear discharge, hearing loss, neck pain, sore throat or vomiting.   Headache  Headache pattern:  Headache sometimes there, sometimes not at all  Initial event:  Illness  Generalized Body Aches  This is a new problem. The problem has been waxing and waning. Associated symptoms include congestion, coughing, fatigue, headaches and a rash. Pertinent negatives include no abdominal pain, chest pain, chills, fever, myalgias, nausea, neck pain, sore throat or vomiting.   Dizziness  This is a new problem. The problem has been waxing and waning. Associated symptoms include congestion, coughing, fatigue, headaches and a rash. Pertinent negatives include no abdominal pain, chest pain, chills, fever, myalgias, nausea, neck pain, sore throat or vomiting.       Past Medical History:   Diagnosis Date    CAD (coronary artery disease)     [facility] occlusion 2015    [facility] occlusion with cerebral infarction (HCC) 2019    CHF (congestive heart failure) (HCC)     Chronic kidney disease     Diabetes mellitus (HCC)     Diabetic neuropathy (HCC)     GERD (gastroesophageal reflux disease)     Hx of coronary artery bypass graft 2016    done 2016    Hyperlipidemia     Hypertension     Neuromuscular disorder (HCC)     Seizures (HCC)     as an

## 2024-10-21 ENCOUNTER — OFFICE VISIT (OUTPATIENT)
Age: 49
End: 2024-10-21
Payer: MEDICARE

## 2024-10-21 ENCOUNTER — HOSPITAL ENCOUNTER (OUTPATIENT)
Dept: GENERAL RADIOLOGY | Age: 49
Discharge: HOME | End: 2024-10-21
Payer: MEDICARE

## 2024-10-21 VITALS
BODY MASS INDEX: 52.87 KG/M2 | HEART RATE: 100 BPM | DIASTOLIC BLOOD PRESSURE: 76 MMHG | RESPIRATION RATE: 20 BRPM | SYSTOLIC BLOOD PRESSURE: 120 MMHG | TEMPERATURE: 97.5 F | WEIGHT: 293 LBS | OXYGEN SATURATION: 97 %

## 2024-10-21 DIAGNOSIS — M25.511 ACUTE PAIN OF RIGHT SHOULDER: Primary | ICD-10-CM

## 2024-10-21 DIAGNOSIS — H65.01 NON-RECURRENT ACUTE SEROUS OTITIS MEDIA OF RIGHT EAR: ICD-10-CM

## 2024-10-21 DIAGNOSIS — M25.511 ACUTE PAIN OF RIGHT SHOULDER: ICD-10-CM

## 2024-10-21 DIAGNOSIS — H92.03 OTALGIA OF BOTH EARS: ICD-10-CM

## 2024-10-21 DIAGNOSIS — R05.1 ACUTE COUGH: ICD-10-CM

## 2024-10-21 PROCEDURE — 3078F DIAST BP <80 MM HG: CPT

## 2024-10-21 PROCEDURE — 73030 X-RAY EXAM OF SHOULDER: CPT

## 2024-10-21 PROCEDURE — 3074F SYST BP LT 130 MM HG: CPT

## 2024-10-21 PROCEDURE — 72040 X-RAY EXAM NECK SPINE 2-3 VW: CPT

## 2024-10-21 PROCEDURE — 99213 OFFICE O/P EST LOW 20 MIN: CPT

## 2024-10-21 RX ORDER — METHYLPREDNISOLONE 4 MG/1
TABLET ORAL
Qty: 1 KIT | Refills: 0 | Status: SHIPPED | OUTPATIENT
Start: 2024-10-21 | End: 2024-10-27

## 2024-10-21 RX ORDER — CETIRIZINE HYDROCHLORIDE 10 MG/1
10 TABLET ORAL DAILY
Qty: 30 TABLET | Refills: 0 | OUTPATIENT
Start: 2024-10-21

## 2024-10-21 ASSESSMENT — ENCOUNTER SYMPTOMS
DIARRHEA: 0
EYE REDNESS: 0
EYE DISCHARGE: 0
CHEST TIGHTNESS: 0
CONSTIPATION: 0
BACK PAIN: 0
SHORTNESS OF BREATH: 0
VOMITING: 0
ALLERGIC/IMMUNOLOGIC NEGATIVE: 1
WHEEZING: 0
ABDOMINAL PAIN: 0
SORE THROAT: 0
NAUSEA: 0
COUGH: 0
SINUS PAIN: 0
EYE ITCHING: 0
RHINORRHEA: 0

## 2024-10-21 NOTE — PATIENT INSTRUCTIONS
-Xrays ordered; will call with results  -Use ice as needed - 15min on / 15min off  -Rest; no weight lifting until further notice.   -Elevate extremity as tolerated.   -Medrol dose pack prescribed ; monitor glucose closely   -Begin zanaflex. Stop taking flexeril.   -Further recommendations pending xray results.     Any condition can change, despite proper treatment. Therefore, if symptoms still persist or worsen after treatment plan intitated today, patient is to go to the nearest ER, call PCP, or return to urgent care for further evaluation.     Urgent Care evaluation today is not a substitute for PCP visit. Follow up care is the patient's responsibility to discuss and review this UC visit.
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
## 2024-10-23 ENCOUNTER — TRANSCRIBE ORDERS (OUTPATIENT)
Dept: ADMINISTRATIVE | Facility: HOSPITAL | Age: 49
End: 2024-10-23
Payer: MEDICARE

## 2024-10-23 DIAGNOSIS — F07.81 POSTCONCUSSION SYNDROME: Primary | ICD-10-CM

## 2025-01-17 NOTE — CONSULTS
Caller: Eliane Garcia    [facility]: YES       [facility] General Surgery     Consult Note    Patient ID: Ludwin Bains  55 y.o.  female  YOB: 1975    Admitting Diagnosis: History of gallstones [Z87.19]  Pancreatitis, gallstone [K85.10]  Acute pancreatitis without infection or necrosis, unspecified pancreatitis type [K85.90]    Subjective:      Ms. Fredo Cantu is a pleasant 66-year-old woman who I am asked to see in regards to abdominal pain and possible need for cholecystectomy. In March of this year she developed right upper quadrant pain with radiation around to the right flank and mid back. Work-up at that time included an abdominal ultrasound which documented multiple small gallstones. Referral was made to general surgery but she failed to keep that appointment. Her symptoms resolved and she was doing well until several days ago. She then developed recurrent epigastric and right upper quadrant pain that radiated through to the mid back. Associated with this she had nausea but no vomiting. She denies fever or chills. She did not see any scleral icterus no change in the color of her urine or stool. She presented to the emergency department on the 18th where work-up was unrevealing and she was allowed to return home. With worsening symptoms she returned again on the 20th and this time was noted to have mild elevation in her serum lipase. Out of concern for possible mild gallstone pancreatitis she was admitted. Since admission she notes that her symptoms have improved. She still has mild pain now says it begins at the umbilicus and extends around to the left flank. She has no epigastric or right upper quadrant pain at present. She is hungry and has no nausea. Work-up in hospital included a repeat ultrasound which now shows no evidence of gallstones. Gallbladder wall is normal.  There is minimal dilation of the distal common bile duct. Liver function tests have slowly improved as have her serum lipase values.     Past Medical History:   Diagnosis Date    CAD (coronary artery disease)     [facility] occlusion [facility] [address]     Diabetes mellitus ([facility] [address]     GERD (gastroesophageal reflux disease)     Hypertension      Past Surgical History:   Procedure Laterality Date    CARDIAC SURGERY      COLONOSCOPY N/A 09/02/2021    Dr Ashok Sotelo, Sub prep [place], 4 year recall    CORONARY ARTERY BYPASS GRAFT      2016    TONSILLECTOMY      UPPER GASTROINTESTINAL ENDOSCOPY      [facility], w/dil per patient    UPPER GASTROINTESTINAL ENDOSCOPY N/A 09/02/2021    Dr Ashok Sotelo, [facility], (-[place]     No current facility-administered medications on file prior to encounter.      Current Outpatient Medications on File Prior to Encounter   Medication Sig Dispense Refill    ondansetron (ZOFRAN ODT) 4 MG disintegrating tablet Take 1 tablet by mouth every 8 hours as needed for Nausea or Vomiting 15 tablet 0    sucralfate (CARAFATE) 1 GM tablet Take 1 tablet by mouth 4 times daily as needed (gastric upset) 120 tablet 5    nystatin (MYCOSTATIN) 682972 UNIT/GM powder Apply 3 times daily to lower groin, abdominal area and under bilateral breasts 1 Bottle 0    NONFORMULARY daily apple cider gummy       pantoprazole (PROTONIX) 40 MG tablet Take 1 tablet by mouth daily 90 tablet 3    ezetimibe (ZETIA) 10 MG tablet Take 10 mg by mouth      Exenatide (BYDUREON) 2 MG PEN INJECT 0.65 MLS ONCE Q WEEK      furosemide (LASIX) 40 MG tablet Take 80 mg by mouth 2 times daily      insulin glargine (BASAGLAR KWIKPEN) 100 UNIT/ML injection pen Inject 80 Units into the skin nightly       bethanechol (URECHOLINE) 25 MG tablet Take 1 tablet by mouth daily      metFORMIN (GLUCOPHAGE) 1000 MG tablet Take 1 tablet by mouth daily Hold till monday      insulin aspart (NOVOLOG) 100 UNIT/ML injection vial Inject 10 Units into the skin 4 times daily (before meals and nightly) Up to 10 units ss coverage as directed      spironolactone (ALDACTONE) 25 MG tablet Take 12.5 mg by mouth daily      topiramate (TOPAMAX) 50 MG tablet Take 1 tablet by mouth daily      warfarin (COUMADIN) 10 MG tablet Take 10 mg by mouth      aspirin 81 MG tablet Take 81 mg by mouth daily      gabapentin (NEURONTIN) 600 MG tablet Take 600 mg by mouth 3 times daily .  metoprolol succinate ER (TOPROL-XL) 25 MG XL tablet Take 25 mg by mouth daily      prochlorperazine (COMPAZINE) 10 MG tablet Take 10 mg by mouth 2 times daily as needed (headache)      verapamil (CALAN) 40 MG tablet Take 40 mg by mouth every 12 hours      atorvastatin (LIPITOR) 40 MG tablet Take 80 mg by mouth nightly        Allergies: Bactrim [sulfamethoxazole-trimethoprim]    Family History   Problem Relation Age of Onset    Osteoarthritis Mother     Diabetes Mother     Hypertension Mother     Hypertension Father     Heart Attack Brother     Heart Failure Maternal Grandmother     Diabetes Maternal Grandfather     Heart Disease Maternal Grandfather     Heart Disease Paternal Grandmother     No Known Problems Paternal Grandfather     Colon Cancer Neg Hx     Colon Polyps Neg Hx     Esophageal Cancer Neg Hx     Liver Cancer Neg Hx     Liver Disease Neg Hx     Rectal Cancer Neg Hx     Stomach Cancer Neg Hx        Social History     Tobacco Use    Smoking status: Never Smoker    Smokeless tobacco: Never Used   Substance Use Topics    Alcohol use: Yes     Comment: occ     Review of Systems   Constitutional: Positive for appetite change. Negative for activity change, chills, diaphoresis, fever and unexpected weight change. Respiratory: Negative for cough and shortness of breath. Cardiovascular: Negative for chest pain and palpitations. Gastrointestinal: Positive for abdominal pain and nausea. Negative for abdominal distention, constipation, diarrhea and vomiting. Genitourinary: Positive for flank pain. Negative for difficulty urinating, dysuria, frequency and urgency.    Skin: Negative for color change. Neurological: Negative for dizziness, seizures, syncope, speech difficulty and light-headedness. Objective:   /86   Pulse 85   Temp 95.9 °F (35.5 °C) (Temporal)   Resp 18   Ht 5' 4\" (1.626 m)   Wt (!) 353 lb 9 oz (160.4 kg)   SpO2 99%   BMI 60.69 kg/m²   No intake or output data in the 24 hours ending 09/23/21 1727  Physical Exam  Vitals reviewed. Constitutional:       General: She is not in acute distress. Appearance: She is obese. She is not ill-appearing. HENT:      Mouth/Throat:      Mouth: Mucous membranes are moist.      Pharynx: Oropharynx is clear. Eyes:      General: No scleral icterus. Extraocular Movements: Extraocular movements intact. Cardiovascular:      Rate and Rhythm: Normal rate and regular rhythm. Heart sounds: Normal heart sounds. Pulmonary:      Effort: Pulmonary effort is normal.      Breath sounds: Normal breath sounds. Abdominal:      Comments: The abdomen is morbidly obese but soft. No mass appreciated but exam is difficult due to her size. No and no megaly noted. There is tenderness beginning at the umbilicus and extending in a band to the left side and around toward the left flank. No right upper quadrant or epigastric pain noted. Bowel sounds normal.   Musculoskeletal:      Cervical back: Neck supple. Lymphadenopathy:      Cervical: No cervical adenopathy. Skin:     General: Skin is warm and dry. Coloration: Skin is not jaundiced. Neurological:      Mental Status: She is alert. Mental status is at baseline. Psychiatric:         Mood and Affect: Mood normal.         Behavior: Behavior normal.         Thought Content:  Thought content normal.         Judgment: Judgment normal.         Data:  CBC:   Recent Labs     09/21/21  0139 09/22/21  0237 09/23/21  0209   WBC 7.5 5.8 5.0   RBC 4.83 4.79 4.88   HGB 14.3 14.2 14.5   HCT 44.8 45.8 46.2   MCV 92.8 95.6 94.7   RDW 14.5 14.4 14.2    284 294     BMP:   Recent Labs     09/21/21  0139 09/22/21  0237 09/23/21  0209     140 138 138   K 3.8  3.8 4.3 4.1     103 104 105   CO2 24  26 25 25   ANIONGAP 11  11 9 8   GLUCOSE 84  82 126* 84   CREATININE 1.0*  1.1* 1.0* 0.9   LABGLOM 60*  53* 60* >60   CALCIUM 8.7  8.7 8.7 9.1     LFT:   Recent Labs     09/20/21 1955 09/21/21  0139 09/22/21  0237   PROT 8.0 7.1  6.7 6.3*   LABALBU 4.0 3.3*  3.7 3.3*   BILITOT 0.3 0.3  0.3 0.5   ALKPHOS 109* 94  97 96   ALT 14 12  12 13   AST 13 11  11 16     PT/INR:   Recent Labs     09/21/21  0530 09/22/21  0237 09/23/21  0209   PROTIME 34.1* 32.3* 28.4*   INR 3.46* 3.22* 2.74*       Imaging:  NM HEPATOBILIARY   Final Result   1. Patent cystic and common bile ducts. 2. Decreased ejection fraction of 17%. Signed by Dr Guerline Cuellar organ? GALLBLADDER, LIVER   Final Result   No gallstones. No finding to suggest cholecystitis. The pancreas is not optimally visualized for evaluation. The liver appears normal.   Signed by Dr Fredy Jiménez Additional Contrast? None   Final Result   Mild fat stranding associated with pancreatic head and neck, may be   seen in acute pancreatitis. No associated necrosis or hemorrhage. No   fluid collection. Signed by Dr Schultz Sep          Assessment:     1. Mild acute pancreatitis. Presentation with mid epigastric and right upper quadrant abdominal pain and known gallstones on ultrasound 3/2021. Concern for gallstone pancreatitis. Repeat ultrasound is without evidence of residual gallstones. Trace distal common duct dilation but really at the upper limits of normal.  No evidence of acute cholecystitis at this time. Stimulated hepatobiliary scan with reduced ejection fraction the patient without epigastric or right upper quadrant pain at this time. 2.  Supratherapeutic anticoagulation on Coumadin related to previous CVA.   INR this morning 2.74.  3.  Super morbid obesity with INR of 60.1  4. Type 2 diabetes with moderate control. Plan:     1. With normal-appearing gallbladder and no evidence of residual gallstones there is no need for urgent cholecystectomy. I suspect that she may have passed her gallstones causing the symptoms that brought about her hospitalization. If so these are now long gone and her pancreatitis is about resolved. She is at very high risk for anesthesia and surgery given her need for anticoagulation related to previous stroke and her super morbid obesity. 2.  Continue to hold her Coumadin. If still in hospital and her INR becomes subtherapeutic would begin subcutaneous Lovenox. 3.  Okay to begin carb controlled low-fat diet and I have ordered this. 4.  If her symptoms continue to resolve on this diet she can likely be discharged home for outpatient follow-up with her primary medical provider. Thank you for asking me to see Ms. Galan in consultation.     Pam Toney MD

## 2025-03-17 ENCOUNTER — APPOINTMENT (OUTPATIENT)
Dept: CT IMAGING | Age: 50
End: 2025-03-17
Payer: MEDICARE

## 2025-03-17 ENCOUNTER — APPOINTMENT (OUTPATIENT)
Dept: GENERAL RADIOLOGY | Age: 50
End: 2025-03-17
Payer: MEDICARE

## 2025-03-17 ENCOUNTER — HOSPITAL ENCOUNTER (EMERGENCY)
Age: 50
Discharge: HOME | End: 2025-03-17
Payer: MEDICARE

## 2025-03-17 VITALS
HEART RATE: 95 BPM | SYSTOLIC BLOOD PRESSURE: 105 MMHG | RESPIRATION RATE: 18 BRPM | OXYGEN SATURATION: 100 % | BODY MASS INDEX: 50.02 KG/M2 | TEMPERATURE: 97.4 F | HEIGHT: 64 IN | DIASTOLIC BLOOD PRESSURE: 80 MMHG | WEIGHT: 293 LBS

## 2025-03-17 DIAGNOSIS — S16.1XXA ACUTE STRAIN OF NECK MUSCLE, INITIAL ENCOUNTER: Primary | ICD-10-CM

## 2025-03-17 LAB
ALBUMIN SERPL-MCNC: 4.1 G/DL (ref 3.5–5.2)
ALP SERPL-CCNC: 92 U/L (ref 35–104)
ALT SERPL-CCNC: 35 U/L (ref 10–35)
ANION GAP SERPL CALCULATED.3IONS-SCNC: 10 MMOL/L (ref 8–16)
AST SERPL-CCNC: 24 U/L (ref 10–35)
B PARAP IS1001 DNA NPH QL NAA+NON-PROBE: NOT DETECTED
B PERT.PT PRMT NPH QL NAA+NON-PROBE: NOT DETECTED
BASOPHILS # BLD: 0 K/UL (ref 0–0.2)
BASOPHILS NFR BLD: 0.5 % (ref 0–1)
BILIRUB SERPL-MCNC: 0.4 MG/DL (ref 0.2–1.2)
BUN SERPL-MCNC: 28 MG/DL (ref 6–20)
C PNEUM DNA NPH QL NAA+NON-PROBE: NOT DETECTED
CALCIUM SERPL-MCNC: 9.3 MG/DL (ref 8.6–10)
CHLORIDE SERPL-SCNC: 100 MMOL/L (ref 98–107)
CO2 SERPL-SCNC: 28 MMOL/L (ref 22–29)
CREAT SERPL-MCNC: 2 MG/DL (ref 0.5–0.9)
EOSINOPHIL # BLD: 0.2 K/UL (ref 0–0.6)
EOSINOPHIL NFR BLD: 3.3 % (ref 0–5)
ERYTHROCYTE [DISTWIDTH] IN BLOOD BY AUTOMATED COUNT: 13 % (ref 11.5–14.5)
FLUAV RNA NPH QL NAA+NON-PROBE: NOT DETECTED
FLUBV RNA NPH QL NAA+NON-PROBE: NOT DETECTED
GLUCOSE SERPL-MCNC: 128 MG/DL (ref 70–99)
HADV DNA NPH QL NAA+NON-PROBE: NOT DETECTED
HCOV 229E RNA NPH QL NAA+NON-PROBE: NOT DETECTED
HCOV HKU1 RNA NPH QL NAA+NON-PROBE: NOT DETECTED
HCOV NL63 RNA NPH QL NAA+NON-PROBE: NOT DETECTED
HCOV OC43 RNA NPH QL NAA+NON-PROBE: NOT DETECTED
HCT VFR BLD AUTO: 48.3 % (ref 37–47)
HGB BLD-MCNC: 15.8 G/DL (ref 12–16)
HMPV RNA NPH QL NAA+NON-PROBE: NOT DETECTED
HPIV1 RNA NPH QL NAA+NON-PROBE: NOT DETECTED
HPIV2 RNA NPH QL NAA+NON-PROBE: NOT DETECTED
HPIV3 RNA NPH QL NAA+NON-PROBE: NOT DETECTED
HPIV4 RNA NPH QL NAA+NON-PROBE: NOT DETECTED
IMM GRANULOCYTES # BLD: 0 K/UL
LACTATE BLDV-SCNC: 1.2 MMOL/L (ref 0.5–1.9)
LIPASE SERPL-CCNC: 38 U/L (ref 13–60)
LYMPHOCYTES # BLD: 1.7 K/UL (ref 1.1–4.5)
LYMPHOCYTES NFR BLD: 29 % (ref 20–40)
M PNEUMO DNA NPH QL NAA+NON-PROBE: NOT DETECTED
MCH RBC QN AUTO: 30.7 PG (ref 27–31)
MCHC RBC AUTO-ENTMCNC: 32.7 G/DL (ref 33–37)
MCV RBC AUTO: 93.8 FL (ref 81–99)
MONOCYTES # BLD: 0.4 K/UL (ref 0–0.9)
MONOCYTES NFR BLD: 7.5 % (ref 0–10)
NEUTROPHILS # BLD: 3.4 K/UL (ref 1.5–7.5)
NEUTS SEG NFR BLD: 59.5 % (ref 50–65)
PLATELET # BLD AUTO: 275 K/UL (ref 130–400)
PMV BLD AUTO: 10.9 FL (ref 9.4–12.3)
POTASSIUM SERPL-SCNC: 4 MMOL/L (ref 3.5–5)
PROT SERPL-MCNC: 7.3 G/DL (ref 6.4–8.3)
RBC # BLD AUTO: 5.15 M/UL (ref 4.2–5.4)
RSV RNA NPH QL NAA+NON-PROBE: NOT DETECTED
RV+EV RNA NPH QL NAA+NON-PROBE: NOT DETECTED
SARS-COV-2 RNA NPH QL NAA+NON-PROBE: NOT DETECTED
SODIUM SERPL-SCNC: 138 MMOL/L (ref 136–145)
TROPONIN, HIGH SENSITIVITY: 33 NG/L (ref 0–14)
TROPONIN, HIGH SENSITIVITY: 37 NG/L (ref 0–14)
TSH SERPL DL<=0.005 MIU/L-ACNC: 1.26 UIU/ML (ref 0.27–4.2)
WBC # BLD AUTO: 5.7 K/UL (ref 4.8–10.8)

## 2025-03-17 PROCEDURE — 70450 CT HEAD/BRAIN W/O DYE: CPT

## 2025-03-17 PROCEDURE — 85025 COMPLETE CBC W/AUTO DIFF WBC: CPT

## 2025-03-17 PROCEDURE — 6360000002 HC RX W HCPCS

## 2025-03-17 PROCEDURE — 80053 COMPREHEN METABOLIC PANEL: CPT

## 2025-03-17 PROCEDURE — 83605 ASSAY OF LACTIC ACID: CPT

## 2025-03-17 PROCEDURE — 71045 X-RAY EXAM CHEST 1 VIEW: CPT

## 2025-03-17 PROCEDURE — 0202U NFCT DS 22 TRGT SARS-COV-2: CPT

## 2025-03-17 PROCEDURE — 93005 ELECTROCARDIOGRAM TRACING: CPT

## 2025-03-17 PROCEDURE — 84484 ASSAY OF TROPONIN QUANT: CPT

## 2025-03-17 PROCEDURE — 96375 TX/PRO/DX INJ NEW DRUG ADDON: CPT

## 2025-03-17 PROCEDURE — 2580000003 HC RX 258

## 2025-03-17 PROCEDURE — 84443 ASSAY THYROID STIM HORMONE: CPT

## 2025-03-17 PROCEDURE — 36415 COLL VENOUS BLD VENIPUNCTURE: CPT

## 2025-03-17 PROCEDURE — 99285 EMERGENCY DEPT VISIT HI MDM: CPT

## 2025-03-17 PROCEDURE — 96374 THER/PROPH/DIAG INJ IV PUSH: CPT

## 2025-03-17 PROCEDURE — 83690 ASSAY OF LIPASE: CPT

## 2025-03-17 PROCEDURE — 96361 HYDRATE IV INFUSION ADD-ON: CPT

## 2025-03-17 PROCEDURE — 6370000000 HC RX 637 (ALT 250 FOR IP)

## 2025-03-17 RX ORDER — LIDOCAINE 4 G/G
1 PATCH TOPICAL DAILY
Status: DISCONTINUED | OUTPATIENT
Start: 2025-03-17 | End: 2025-03-17 | Stop reason: HOSPADM

## 2025-03-17 RX ORDER — LIDOCAINE 4 G/G
1 PATCH TOPICAL DAILY
Qty: 30 PATCH | Refills: 0 | Status: SHIPPED | OUTPATIENT
Start: 2025-03-17 | End: 2025-04-16

## 2025-03-17 RX ORDER — 0.9 % SODIUM CHLORIDE 0.9 %
1000 INTRAVENOUS SOLUTION INTRAVENOUS ONCE
Status: COMPLETED | OUTPATIENT
Start: 2025-03-17 | End: 2025-03-17

## 2025-03-17 RX ORDER — METHOCARBAMOL 750 MG/1
750 TABLET, FILM COATED ORAL 4 TIMES DAILY
Qty: 40 TABLET | Refills: 0 | Status: SHIPPED | OUTPATIENT
Start: 2025-03-17 | End: 2025-03-27

## 2025-03-17 RX ORDER — ONDANSETRON 2 MG/ML
4 INJECTION INTRAMUSCULAR; INTRAVENOUS ONCE
Status: COMPLETED | OUTPATIENT
Start: 2025-03-17 | End: 2025-03-17

## 2025-03-17 RX ORDER — PREDNISONE 20 MG/1
40 TABLET ORAL ONCE
Status: COMPLETED | OUTPATIENT
Start: 2025-03-17 | End: 2025-03-17

## 2025-03-17 RX ORDER — PROPOFOL 10 MG/ML
INJECTION, EMULSION INTRAVENOUS
Status: DISCONTINUED
Start: 2025-03-17 | End: 2025-03-17 | Stop reason: WASHOUT

## 2025-03-17 RX ORDER — METHOCARBAMOL 500 MG/1
750 TABLET, FILM COATED ORAL ONCE
Status: COMPLETED | OUTPATIENT
Start: 2025-03-17 | End: 2025-03-17

## 2025-03-17 RX ORDER — PREDNISONE 50 MG/1
50 TABLET ORAL DAILY
Qty: 5 TABLET | Refills: 0 | Status: SHIPPED | OUTPATIENT
Start: 2025-03-17 | End: 2025-03-22

## 2025-03-17 RX ORDER — MORPHINE SULFATE 4 MG/ML
4 INJECTION, SOLUTION INTRAMUSCULAR; INTRAVENOUS ONCE
Refills: 0 | Status: COMPLETED | OUTPATIENT
Start: 2025-03-17 | End: 2025-03-17

## 2025-03-17 RX ADMIN — PREDNISONE 40 MG: 20 TABLET ORAL at 13:50

## 2025-03-17 RX ADMIN — ONDANSETRON 4 MG: 2 INJECTION, SOLUTION INTRAMUSCULAR; INTRAVENOUS at 12:17

## 2025-03-17 RX ADMIN — METHOCARBAMOL 750 MG: 500 TABLET ORAL at 13:50

## 2025-03-17 RX ADMIN — MORPHINE SULFATE 4 MG: 4 INJECTION, SOLUTION INTRAMUSCULAR; INTRAVENOUS at 12:17

## 2025-03-17 RX ADMIN — SODIUM CHLORIDE 1000 ML: 0.9 INJECTION, SOLUTION INTRAVENOUS at 12:17

## 2025-03-17 ASSESSMENT — ENCOUNTER SYMPTOMS
ABDOMINAL DISTENTION: 0
SINUS PAIN: 0
CHEST TIGHTNESS: 0
SHORTNESS OF BREATH: 0
BACK PAIN: 1
EYES NEGATIVE: 1
ABDOMINAL PAIN: 0
GASTROINTESTINAL NEGATIVE: 1
SINUS PRESSURE: 0
FACIAL SWELLING: 0

## 2025-03-17 ASSESSMENT — HEART SCORE: ECG: NON-SPECIFC REPOLARIZATION DISTURBANCE/LBTB/PM

## 2025-03-17 NOTE — DISCHARGE INSTRUCTIONS
Please take your medications as prescribed.  You may use Tylenol along with these medications, but please do not exceed 3000 mg of Tylenol in a day.  Please use heat, and ice, alternating these to help with the muscle spasm.  Please avoid heavy lifting or strenuous activity, but participate in gentle range of motion activities as this can help with the muscle spasm.  Please do not operate heavy machinery while you are taking Robaxin, and please discontinue use of your cyclobenzaprine, as these are both muscle relaxers, and should not be used together.  Please follow-up with your primary care provider and return to the ED for any new or worsening symptoms or concerns.

## 2025-03-17 NOTE — ED PROVIDER NOTES
stated plan, and is discharged home in stable condition.     I discussed this patient with the ED attending physician and they are in agreement with the above treatment plan and disposition.           CONSULTS:  None    PROCEDURES:  Unless otherwise notedbelow, none     Procedures      FINAL IMPRESSION     1. Acute strain of neck muscle, initial encounter          DISPOSITION/PLAN   DISPOSITION Decision To Discharge 03/17/2025 03:32:32 PM   DISPOSITION CONDITION Stable           No notes of EC Admission Criteria type on file.    PATIENT REFERRED TO:  Genny Montiel, DO  [address] [facility] [address] [phone]    Schedule an appointment as soon as possible for a visit in 2 days        DISCHARGE MEDICATIONS:  Discharge Medication List as of 3/17/2025  3:39 PM        START taking these medications    Details   lidocaine 4 % external patch Place 1 patch onto the skin daily, TransDERmal, DAILY Starting Mon 3/17/2025, Until Wed 4/16/2025, For 30 days, Disp-30 patch, R-0, Normal      methocarbamol (ROBAXIN-750) 750 MG tablet Take 1 tablet by mouth 4 times daily for 10 days, Disp-40 tablet, R-0Normal      predniSONE (DELTASONE) 50 MG tablet Take 1 tablet by mouth daily for 5 days, Disp-5 tablet, R-0Normal                (Please note that portions of this note were completed with a voice recognition program.  Efforts were made to edit the dictations but occasionally words are mis-transcribed.)    Gayatri Sibley PA-C (electronically signed)

## 2025-03-19 LAB
EKG P AXIS: 25 DEGREES
EKG P-R INTERVAL: 144 MS
EKG Q-T INTERVAL: 380 MS
EKG QRS DURATION: 78 MS
EKG QTC CALCULATION (BAZETT): 425 MS
EKG T AXIS: 70 DEGREES

## 2025-03-19 PROCEDURE — 93010 ELECTROCARDIOGRAM REPORT: CPT | Performed by: INTERNAL MEDICINE

## 2025-06-30 ENCOUNTER — TELEPHONE (OUTPATIENT)
Dept: GASTROENTEROLOGY | Age: 50
End: 2025-06-30

## 2025-06-30 NOTE — TELEPHONE ENCOUNTER
Patient returned missed call from [employer] regarding [facility]. Please return call.    Thank you!

## 2025-07-21 NOTE — ASSESSMENT & PLAN NOTE
Hypertension is improving with treatment.  Continue current treatment regimen.  Dietary sodium restriction.  Weight loss.  Blood pressure will be reassessed at the next regular appointment.  
Patient's (Body mass index is 59.83 kg/m².) indicates that they are morbidly obese (BMI > 40 or > 35 with obesity - related health condition) with health conditions that include obstructive sleep apnea, hypertension and diabetes mellitus . Weight is improving with treatment. BMI is is above average; BMI management plan is completed. We discussed portion control and increasing exercise.   
PROVIDER:[TOKEN:[[id number]]]

## 2025-07-23 ENCOUNTER — TRANSCRIBE ORDERS (OUTPATIENT)
Dept: ADMINISTRATIVE | Age: 50
End: 2025-07-23

## 2025-07-23 DIAGNOSIS — Z12.31 ENCOUNTER FOR SCREENING MAMMOGRAM FOR MALIGNANT NEOPLASM OF BREAST: Primary | ICD-10-CM

## 2025-08-01 ENCOUNTER — HOSPITAL ENCOUNTER (OUTPATIENT)
Dept: WOMENS IMAGING | Age: 50
Discharge: HOME | End: 2025-08-01
Payer: MEDICARE

## 2025-08-01 DIAGNOSIS — Z12.31 ENCOUNTER FOR SCREENING MAMMOGRAM FOR MALIGNANT NEOPLASM OF BREAST: ICD-10-CM

## 2025-08-01 PROCEDURE — 77063 BREAST TOMOSYNTHESIS BI: CPT

## 2025-08-25 ENCOUNTER — TELEPHONE (OUTPATIENT)
Dept: GASTROENTEROLOGY | Age: 50
End: 2025-08-25

## 2025-08-29 ENCOUNTER — TELEPHONE (OUTPATIENT)
Dept: GASTROENTEROLOGY | Age: 50
End: 2025-08-29

## 2025-09-04 ENCOUNTER — ANESTHESIA (OUTPATIENT)
Dept: ENDOSCOPY | Age: 50
End: 2025-09-04
Payer: MEDICARE

## 2025-09-04 ENCOUNTER — ANCILLARY PROCEDURE (OUTPATIENT)
Dept: ENDOSCOPY | Age: 50
End: 2025-09-04
Attending: INTERNAL MEDICINE
Payer: MEDICARE

## 2025-09-04 ENCOUNTER — HOSPITAL ENCOUNTER (OUTPATIENT)
Age: 50
Setting detail: OUTPATIENT SURGERY
Discharge: HOME | End: 2025-09-04
Attending: INTERNAL MEDICINE | Admitting: INTERNAL MEDICINE
Payer: MEDICARE

## 2025-09-04 ENCOUNTER — ANESTHESIA EVENT (OUTPATIENT)
Dept: ENDOSCOPY | Age: 50
End: 2025-09-04
Payer: MEDICARE

## 2025-09-04 VITALS
HEIGHT: 64 IN | WEIGHT: 293 LBS | DIASTOLIC BLOOD PRESSURE: 92 MMHG | HEART RATE: 87 BPM | SYSTOLIC BLOOD PRESSURE: 142 MMHG | RESPIRATION RATE: 16 BRPM | BODY MASS INDEX: 50.02 KG/M2 | OXYGEN SATURATION: 100 % | TEMPERATURE: 97.6 F

## 2025-09-04 DIAGNOSIS — Z12.11 SCREEN FOR COLON CANCER: ICD-10-CM

## 2025-09-04 LAB
GLUCOSE BLD-MCNC: 114 MG/DL (ref 70–99)
HCG, URINE, POC: NEGATIVE
Lab: NORMAL
NEGATIVE QC PASS/FAIL: NORMAL
PERFORMED ON: ABNORMAL
POSITIVE QC PASS/FAIL: NORMAL

## 2025-09-04 PROCEDURE — 7100000010 HC PHASE II RECOVERY - FIRST 15 MIN: Performed by: INTERNAL MEDICINE

## 2025-09-04 PROCEDURE — 88305 TISSUE EXAM BY PATHOLOGIST: CPT

## 2025-09-04 PROCEDURE — 2709999900 HC NON-CHARGEABLE SUPPLY: Performed by: INTERNAL MEDICINE

## 2025-09-04 PROCEDURE — 3609010600 HC COLONOSCOPY POLYPECTOMY SNARE/COLD BIOPSY: Performed by: INTERNAL MEDICINE

## 2025-09-04 PROCEDURE — 3700000000 HC ANESTHESIA ATTENDED CARE: Performed by: INTERNAL MEDICINE

## 2025-09-04 PROCEDURE — 2580000003 HC RX 258: Performed by: ANESTHESIOLOGY

## 2025-09-04 PROCEDURE — 3700000001 HC ADD 15 MINUTES (ANESTHESIA): Performed by: INTERNAL MEDICINE

## 2025-09-04 PROCEDURE — 7100000011 HC PHASE II RECOVERY - ADDTL 15 MIN: Performed by: INTERNAL MEDICINE

## 2025-09-04 PROCEDURE — 82962 GLUCOSE BLOOD TEST: CPT

## 2025-09-04 PROCEDURE — 6360000002 HC RX W HCPCS: Performed by: NURSE ANESTHETIST, CERTIFIED REGISTERED

## 2025-09-04 RX ORDER — LIDOCAINE HYDROCHLORIDE 10 MG/ML
INJECTION, SOLUTION INFILTRATION; PERINEURAL
Status: DISCONTINUED | OUTPATIENT
Start: 2025-09-04 | End: 2025-09-04 | Stop reason: SDUPTHER

## 2025-09-04 RX ORDER — SODIUM CHLORIDE, SODIUM LACTATE, POTASSIUM CHLORIDE, CALCIUM CHLORIDE 600; 310; 30; 20 MG/100ML; MG/100ML; MG/100ML; MG/100ML
INJECTION, SOLUTION INTRAVENOUS CONTINUOUS
Status: DISCONTINUED | OUTPATIENT
Start: 2025-09-04 | End: 2025-09-04 | Stop reason: HOSPADM

## 2025-09-04 RX ORDER — PROPOFOL 10 MG/ML
INJECTION, EMULSION INTRAVENOUS
Status: DISCONTINUED | OUTPATIENT
Start: 2025-09-04 | End: 2025-09-04 | Stop reason: SDUPTHER

## 2025-09-04 RX ADMIN — SODIUM CHLORIDE, SODIUM LACTATE, POTASSIUM CHLORIDE, AND CALCIUM CHLORIDE: 600; 310; 30; 20 INJECTION, SOLUTION INTRAVENOUS at 11:19

## 2025-09-04 RX ADMIN — PROPOFOL 300 MG: 10 INJECTION, EMULSION INTRAVENOUS at 11:26

## 2025-09-04 RX ADMIN — LIDOCAINE HYDROCHLORIDE 50 MG: 10 INJECTION, SOLUTION INFILTRATION; PERINEURAL at 11:26

## 2025-09-04 ASSESSMENT — PAIN - FUNCTIONAL ASSESSMENT
PAIN_FUNCTIONAL_ASSESSMENT: 0-10
PAIN_FUNCTIONAL_ASSESSMENT: 0-10

## 2025-09-04 ASSESSMENT — ENCOUNTER SYMPTOMS: SHORTNESS OF BREATH: 1

## (undated) DEVICE — FRCP BX RADJAW4 NDL 2.8 240 STD OG

## (undated) DEVICE — Device: Brand: DEFENDO AIR/WATER/SUCTION AND BIOPSY VALVE

## (undated) DEVICE — THE CHANNEL CLEANING BRUSH IS A NYLON FLEXI BRUSH ATTACHED TO A FLEXIBLE PLASTIC SHEATH DESIGNED TO SAFELY REMOVE DEBRIS FROM FLEXIBLE ENDOSCOPES.

## (undated) DEVICE — SENSOR PLSE OXMTR AD CBL L3FT ADH TRANSMISSIVE

## (undated) DEVICE — FORCEPS BX L240CM JAW DIA2.4MM ORNG L CAP W/ NDL DISP RAD

## (undated) DEVICE — SYSTEM VENT MED AD NASAL PAP SUPERNO2VA

## (undated) DEVICE — ENDOGATOR AUXILIARY WATER JET CONNECTOR: Brand: ENDOGATOR

## (undated) DEVICE — CONMED SCOPE SAVER BITE BLOCK, 20X27 MM: Brand: SCOPE SAVER

## (undated) DEVICE — ENDO KT LOURDES HOSP

## (undated) DEVICE — SNARE ENDOSCP L240CM LOOP W13MM SHTH DIA2.4MM SM OVL FLX

## (undated) DEVICE — ENDO KIT,LOURDES HOSPITAL: Brand: MEDLINE INDUSTRIES, INC.

## (undated) DEVICE — SENSR O2 OXIMAX FNGR A/ 18IN NONSTR

## (undated) DEVICE — TBG SMPL FLTR LINE NASL 02/C02 A/ BX/100

## (undated) DEVICE — CUFF,BP,DISP,1 TUBE,ADULT,HP: Brand: MEDLINE